# Patient Record
Sex: MALE | Race: WHITE | NOT HISPANIC OR LATINO | Employment: OTHER | URBAN - METROPOLITAN AREA
[De-identification: names, ages, dates, MRNs, and addresses within clinical notes are randomized per-mention and may not be internally consistent; named-entity substitution may affect disease eponyms.]

---

## 2017-01-09 ENCOUNTER — GENERIC CONVERSION - ENCOUNTER (OUTPATIENT)
Dept: OTHER | Facility: OTHER | Age: 72
End: 2017-01-09

## 2017-01-10 ENCOUNTER — GENERIC CONVERSION - ENCOUNTER (OUTPATIENT)
Dept: OTHER | Facility: OTHER | Age: 72
End: 2017-01-10

## 2017-01-10 LAB
INTERPRETATION (HISTORICAL): NORMAL
PDF IMAGE (HISTORICAL): NORMAL

## 2017-01-17 ENCOUNTER — ALLSCRIPTS OFFICE VISIT (OUTPATIENT)
Dept: OTHER | Facility: OTHER | Age: 72
End: 2017-01-17

## 2017-01-23 ENCOUNTER — ALLSCRIPTS OFFICE VISIT (OUTPATIENT)
Dept: OTHER | Facility: OTHER | Age: 72
End: 2017-01-23

## 2017-04-25 ENCOUNTER — GENERIC CONVERSION - ENCOUNTER (OUTPATIENT)
Dept: OTHER | Facility: OTHER | Age: 72
End: 2017-04-25

## 2017-04-25 LAB
A/G RATIO (HISTORICAL): 1.9 (ref 1.2–2.2)
ALBUMIN SERPL BCP-MCNC: 4.2 G/DL (ref 3.5–4.8)
ALP SERPL-CCNC: 81 IU/L (ref 39–117)
ALT SERPL W P-5'-P-CCNC: 17 IU/L (ref 0–44)
AST SERPL W P-5'-P-CCNC: 21 IU/L (ref 0–40)
BILIRUB SERPL-MCNC: 0.4 MG/DL (ref 0–1.2)
BUN SERPL-MCNC: 28 MG/DL (ref 8–27)
BUN/CREA RATIO (HISTORICAL): 16 (ref 10–24)
CALCIUM SERPL-MCNC: 9.4 MG/DL (ref 8.6–10.2)
CHLORIDE SERPL-SCNC: 104 MMOL/L (ref 96–106)
CHOLEST SERPL-MCNC: 155 MG/DL (ref 100–199)
CHOLEST/HDLC SERPL: 2.6 RATIO UNITS (ref 0–5)
CO2 SERPL-SCNC: 22 MMOL/L (ref 18–29)
CREAT SERPL-MCNC: 1.74 MG/DL (ref 0.76–1.27)
EGFR AFRICAN AMERICAN (HISTORICAL): 44 ML/MIN/1.73
EGFR-AMERICAN CALC (HISTORICAL): 38 ML/MIN/1.73
GLUCOSE SERPL-MCNC: 119 MG/DL (ref 65–99)
HBA1C MFR BLD HPLC: 7.4 % (ref 4.8–5.6)
HDLC SERPL-MCNC: 59 MG/DL
LDLC SERPL CALC-MCNC: 81 MG/DL (ref 0–99)
POTASSIUM SERPL-SCNC: 4.2 MMOL/L (ref 3.5–5.2)
SODIUM SERPL-SCNC: 143 MMOL/L (ref 134–144)
TOT. GLOBULIN, SERUM (HISTORICAL): 2.2 G/DL (ref 1.5–4.5)
TOTAL PROTEIN (HISTORICAL): 6.4 G/DL (ref 6–8.5)
TRIGL SERPL-MCNC: 74 MG/DL (ref 0–149)
VLDLC SERPL CALC-MCNC: 15 MG/DL (ref 5–40)

## 2017-04-26 LAB
INTERPRETATION (HISTORICAL): NORMAL
INTERPRETATION (HISTORICAL): NORMAL
PDF IMAGE (HISTORICAL): NORMAL

## 2017-04-28 ENCOUNTER — GENERIC CONVERSION - ENCOUNTER (OUTPATIENT)
Dept: OTHER | Facility: OTHER | Age: 72
End: 2017-04-28

## 2017-05-03 ENCOUNTER — ALLSCRIPTS OFFICE VISIT (OUTPATIENT)
Dept: OTHER | Facility: OTHER | Age: 72
End: 2017-05-03

## 2017-06-01 DIAGNOSIS — N18.30 CHRONIC KIDNEY DISEASE, STAGE III (MODERATE) (HCC): ICD-10-CM

## 2017-06-01 DIAGNOSIS — E55.9 VITAMIN D DEFICIENCY: ICD-10-CM

## 2017-07-25 ENCOUNTER — ALLSCRIPTS OFFICE VISIT (OUTPATIENT)
Dept: OTHER | Facility: OTHER | Age: 72
End: 2017-07-25

## 2017-08-03 ENCOUNTER — GENERIC CONVERSION - ENCOUNTER (OUTPATIENT)
Dept: OTHER | Facility: OTHER | Age: 72
End: 2017-08-03

## 2017-08-03 LAB
A/G RATIO (HISTORICAL): 2.1 (ref 1.2–2.2)
ALBUMIN SERPL BCP-MCNC: 4.2 G/DL (ref 3.5–4.8)
ALP SERPL-CCNC: 78 IU/L (ref 39–117)
ALT SERPL W P-5'-P-CCNC: 13 IU/L (ref 0–44)
AST SERPL W P-5'-P-CCNC: 17 IU/L (ref 0–40)
BASOPHILS # BLD AUTO: 0.1 X10E3/UL (ref 0–0.2)
BASOPHILS # BLD AUTO: 1 %
BILIRUB SERPL-MCNC: 0.3 MG/DL (ref 0–1.2)
BUN SERPL-MCNC: 24 MG/DL (ref 8–27)
BUN/CREA RATIO (HISTORICAL): 15 (ref 10–24)
CALCIUM SERPL-MCNC: 9.1 MG/DL (ref 8.6–10.2)
CHLORIDE SERPL-SCNC: 102 MMOL/L (ref 96–106)
CO2 SERPL-SCNC: 24 MMOL/L (ref 18–29)
CREAT SERPL-MCNC: 1.65 MG/DL (ref 0.76–1.27)
DEPRECATED RDW RBC AUTO: 13.2 % (ref 12.3–15.4)
EGFR AFRICAN AMERICAN (HISTORICAL): 47 ML/MIN/1.73
EGFR-AMERICAN CALC (HISTORICAL): 41 ML/MIN/1.73
EOSINOPHIL # BLD AUTO: 0.1 X10E3/UL (ref 0–0.4)
EOSINOPHIL # BLD AUTO: 2 %
GLUCOSE SERPL-MCNC: 203 MG/DL (ref 65–99)
HBA1C MFR BLD HPLC: 7.3 % (ref 4.8–5.6)
HCT VFR BLD AUTO: 38.4 % (ref 37.5–51)
HGB BLD-MCNC: 13.4 G/DL (ref 12.6–17.7)
IMM.GRANULOCYTES (CD4/8) (HISTORICAL): 0 %
IMM.GRANULOCYTES (CD4/8) (HISTORICAL): 0 X10E3/UL (ref 0–0.1)
LYMPHOCYTES # BLD AUTO: 2.2 X10E3/UL (ref 0.7–3.1)
LYMPHOCYTES # BLD AUTO: 36 %
MCH RBC QN AUTO: 31.5 PG (ref 26.6–33)
MCHC RBC AUTO-ENTMCNC: 34.9 G/DL (ref 31.5–35.7)
MCV RBC AUTO: 90 FL (ref 79–97)
MONOCYTES # BLD AUTO: 0.6 X10E3/UL (ref 0.1–0.9)
MONOCYTES (HISTORICAL): 10 %
NEUTROPHILS # BLD AUTO: 3.1 X10E3/UL (ref 1.4–7)
NEUTROPHILS # BLD AUTO: 51 %
PLATELET # BLD AUTO: 260 X10E3/UL (ref 150–379)
POTASSIUM SERPL-SCNC: 4.3 MMOL/L (ref 3.5–5.2)
RBC (HISTORICAL): 4.25 X10E6/UL (ref 4.14–5.8)
SODIUM SERPL-SCNC: 142 MMOL/L (ref 134–144)
TOT. GLOBULIN, SERUM (HISTORICAL): 2 G/DL (ref 1.5–4.5)
TOTAL PROTEIN (HISTORICAL): 6.2 G/DL (ref 6–8.5)
WBC # BLD AUTO: 6.2 X10E3/UL (ref 3.4–10.8)

## 2017-08-04 ENCOUNTER — GENERIC CONVERSION - ENCOUNTER (OUTPATIENT)
Dept: OTHER | Facility: OTHER | Age: 72
End: 2017-08-04

## 2017-08-04 LAB
INTERPRETATION (HISTORICAL): NORMAL
PDF IMAGE (HISTORICAL): NORMAL
TSH SERPL DL<=0.05 MIU/L-ACNC: 2.95 UIU/ML (ref 0.45–4.5)

## 2017-08-14 ENCOUNTER — ALLSCRIPTS OFFICE VISIT (OUTPATIENT)
Dept: OTHER | Facility: OTHER | Age: 72
End: 2017-08-14

## 2017-09-06 ENCOUNTER — GENERIC CONVERSION - ENCOUNTER (OUTPATIENT)
Dept: OTHER | Facility: OTHER | Age: 72
End: 2017-09-06

## 2017-09-12 ENCOUNTER — GENERIC CONVERSION - ENCOUNTER (OUTPATIENT)
Dept: OTHER | Facility: OTHER | Age: 72
End: 2017-09-12

## 2017-09-19 ENCOUNTER — GENERIC CONVERSION - ENCOUNTER (OUTPATIENT)
Dept: OTHER | Facility: OTHER | Age: 72
End: 2017-09-19

## 2017-12-12 ENCOUNTER — GENERIC CONVERSION - ENCOUNTER (OUTPATIENT)
Dept: OTHER | Facility: OTHER | Age: 72
End: 2017-12-12

## 2017-12-12 LAB
A/G RATIO (HISTORICAL): 1.9 (ref 1.2–2.2)
ALBUMIN SERPL BCP-MCNC: 4.1 G/DL (ref 3.5–4.8)
ALP SERPL-CCNC: 76 IU/L (ref 39–117)
ALT SERPL W P-5'-P-CCNC: 16 IU/L (ref 0–44)
AST SERPL W P-5'-P-CCNC: 20 IU/L (ref 0–40)
BASOPHILS # BLD AUTO: 0.1 X10E3/UL (ref 0–0.2)
BASOPHILS # BLD AUTO: 1 %
BILIRUB SERPL-MCNC: 0.4 MG/DL (ref 0–1.2)
BUN SERPL-MCNC: 28 MG/DL (ref 8–27)
BUN/CREA RATIO (HISTORICAL): 18 (ref 10–24)
CALCIUM SERPL-MCNC: 9.4 MG/DL (ref 8.6–10.2)
CHLORIDE SERPL-SCNC: 104 MMOL/L (ref 96–106)
CHOLEST SERPL-MCNC: 153 MG/DL (ref 100–199)
CHOLEST/HDLC SERPL: 2.7 RATIO UNITS (ref 0–5)
CO2 SERPL-SCNC: 22 MMOL/L (ref 18–29)
CREAT SERPL-MCNC: 1.6 MG/DL (ref 0.76–1.27)
DEPRECATED RDW RBC AUTO: 12.6 % (ref 12.3–15.4)
EGFR AFRICAN AMERICAN (HISTORICAL): 49 ML/MIN/1.73
EGFR-AMERICAN CALC (HISTORICAL): 42 ML/MIN/1.73
EOSINOPHIL # BLD AUTO: 0.2 X10E3/UL (ref 0–0.4)
EOSINOPHIL # BLD AUTO: 3 %
GLUCOSE SERPL-MCNC: 132 MG/DL (ref 65–99)
HBA1C MFR BLD HPLC: 7.4 % (ref 4.8–5.6)
HCT VFR BLD AUTO: 40.1 % (ref 37.5–51)
HDLC SERPL-MCNC: 57 MG/DL
HGB BLD-MCNC: 13.9 G/DL (ref 13–17.7)
IMM.GRANULOCYTES (CD4/8) (HISTORICAL): 0 %
IMM.GRANULOCYTES (CD4/8) (HISTORICAL): 0 X10E3/UL (ref 0–0.1)
LDLC SERPL CALC-MCNC: 78 MG/DL (ref 0–99)
LYMPHOCYTES # BLD AUTO: 2.3 X10E3/UL (ref 0.7–3.1)
LYMPHOCYTES # BLD AUTO: 41 %
MCH RBC QN AUTO: 31.6 PG (ref 26.6–33)
MCHC RBC AUTO-ENTMCNC: 34.7 G/DL (ref 31.5–35.7)
MCV RBC AUTO: 91 FL (ref 79–97)
MONOCYTES # BLD AUTO: 0.7 X10E3/UL (ref 0.1–0.9)
MONOCYTES (HISTORICAL): 12 %
NEUTROPHILS # BLD AUTO: 2.4 X10E3/UL (ref 1.4–7)
NEUTROPHILS # BLD AUTO: 43 %
PLATELET # BLD AUTO: 285 X10E3/UL (ref 150–379)
POTASSIUM SERPL-SCNC: 4.4 MMOL/L (ref 3.5–5.2)
RBC (HISTORICAL): 4.4 X10E6/UL (ref 4.14–5.8)
SODIUM SERPL-SCNC: 143 MMOL/L (ref 134–144)
TOT. GLOBULIN, SERUM (HISTORICAL): 2.2 G/DL (ref 1.5–4.5)
TOTAL PROTEIN (HISTORICAL): 6.3 G/DL (ref 6–8.5)
TRIGL SERPL-MCNC: 92 MG/DL (ref 0–149)
VLDLC SERPL CALC-MCNC: 18 MG/DL (ref 5–40)
WBC # BLD AUTO: 5.6 X10E3/UL (ref 3.4–10.8)

## 2017-12-13 ENCOUNTER — GENERIC CONVERSION - ENCOUNTER (OUTPATIENT)
Dept: OTHER | Facility: OTHER | Age: 72
End: 2017-12-13

## 2017-12-13 LAB
INTERPRETATION (HISTORICAL): NORMAL
INTERPRETATION (HISTORICAL): NORMAL
PDF IMAGE (HISTORICAL): NORMAL
TSH SERPL DL<=0.05 MIU/L-ACNC: 3.63 UIU/ML (ref 0.45–4.5)

## 2017-12-19 ENCOUNTER — ALLSCRIPTS OFFICE VISIT (OUTPATIENT)
Dept: OTHER | Facility: OTHER | Age: 72
End: 2017-12-19

## 2017-12-20 NOTE — PROGRESS NOTES
Assessment  1  Former smoker (V15 82) (I06 628)   · quit 1976; 4 ppd habit   2  Chronic kidney disease, stage 3 (585 3) (N18 3)   3  Diabetes mellitus due to underlying condition with diabetic autonomic neuropathy (249 60,337 1) (E08 43)   4  HTN (hypertension) (401 9) (I10)   5  Hypothyroidism (244 9) (E03 9)   6  Hammond's esophagus (530 85) (K22 70)   7  Coronary artery disease (414 00) (I25 10)   8  On angiotensin-converting enzyme (ACE) inhibitors (V07 52) (Z79 899)    Plan  Hammond's esophagus, Chronic kidney disease, stage 3, Coronary artery disease,Diabetes mellitus due to underlying condition with diabetic autonomic neuropathy, HTN(hypertension), Hypothyroidism, On angiotensin-converting enzyme (ACE) inhibitors    · (1) CBC/PLT/DIFF; Status:Active; Requested HHD:50HRV5052;    · (1) COMPREHENSIVE METABOLIC PANEL; Status:Active; Requested for:19Apr2018;    · (1) HEMOGLOBIN A1C; Status:Active; Requested for:19Apr2018;    · (1) MICROALBUMIN CREATININE RATIO, RANDOM URINE; Status:Active; Requestedfor:19Apr2018;    · Follow-up visit in 4 Months Evaluation and Treatment  Follow-up  Status: Hold For -Scheduling  Requested for: 30XFI2016  Diabetes mellitus due to underlying condition with diabetic autonomic neuropathy    · From  HumaLOG KwikPen 100 UNIT/ML Subcutaneous Solution Pen-injectorInject subcutaneously up to 8 units 3 times daily To HumaLOGKwikPen 100 UNIT/ML Subcutaneous Solution Pen-injector Inject subcutaneously up to9 units 3 times daily   · From  Levemir FlexTouch 100 UNIT/ML Subcutaneous Solution Pen-injectorInject subcutaneously 40  units at bedtime To Levemir FlexTouch 100UNIT/ML Subcutaneous Solution Pen-injector Inject subcutaneously 42  units at bedtime  HTN (hypertension)    · Lisinopril 5 MG Oral Tablet; 1 every day   · Furosemide 40 MG Oral Tablet; Take 1 tablet by mouth  daily   · Metoprolol Succinate ER 25 MG Oral Tablet Extended Release 24 Hour;  Take 1tablet daily  Hypothyroidism    · Levothyroxine Sodium 50 MCG Oral Tablet; Take 1 tablet by mouth  daily  On angiotensin-converting enzyme (ACE) inhibitors    · CoQ-10 100 MG Oral Capsule; TAKE AS DIRECTED  Unlinked    · Aspir-81 81 MG Oral Tablet Delayed Release; 1 every day    Discussion/Summary    Diabetic meds adjusted  bp is low will decrease the acestatin is acceptable  Chief Complaint  f/u lab work discuss lipid panel and blood pressure check rmklpn      History of Present Illness  Pt is here for a 6 month follow uppt denies new issues todayno chest pain no sobpt denies polyuria no polydipsiano chnages in vision      Review of Systems   Constitutional: No fever or chills, feels well, no tiredness, no recent weight gain or weight loss  Eyes: No complaints of eye pain, no red eyes, no discharge from eyes, no itchy eyes  ENT: no complaints of earache, no hearing loss, no nosebleeds, no nasal discharge, no sore throat, no hoarseness  Cardiovascular: No complaints of slow heart rate, no fast heart rate, no chest pain, no palpitations, no leg claudication, no lower extremity  Respiratory: No complaints of shortness of breath, no wheezing, no cough, no SOB on exertion, no orthopnea or PND  Gastrointestinal: No complaints of abdominal pain, no constipation, no nausea or vomiting, no diarrhea or bloody stools  Genitourinary: No complaints of dysuria, no incontinence, no hesitancy, no nocturia, no genital lesion, no testicular pain  Musculoskeletal: No complaints of arthralgia, no myalgias, no joint swelling or stiffness, no limb pain or swelling  Integumentary: No complaints of skin rash or skin lesions, no itching, no skin wound, no dry skin  Neurological: No compliants of headache, no confusion, no convulsions, no numbness or tingling, no dizziness or fainting, no limb weakness, no difficulty walking  Psychiatric: Is not suicidal, no sleep disturbances, no anxiety or depression, no change in personality, no emotional problems  Active Problems  1  Abnormal echocardiogram (793 2) (R93 1)   2  Hammond's esophagus (530 85) (K22 70)   3  BMI 29 0-29 9,adult (V85 25) (Z68 29)   4  Chronic constipation (564 00) (K59 09)   5  Chronic kidney disease, stage 3 (585 3) (N18 3)   6  Colon, diverticulosis (562 10) (K57 30)   7  Cor pulmonale (416 9) (I27 81)   8  Coronary artery disease (414 00) (I25 10)   9  Diabetes mellitus due to underlying condition with diabetic autonomic neuropathy (249 60,337 1) (E08 43)   10  Disc degeneration, lumbar (722 52) (M51 36)   11  Encounter for screening for malignant neoplasm of colon (V76 51) (Z12 11)   12  Enlarged prostate (600 00) (N40 0)   13  Flu vaccine need (V04 81) (Z23)   14  Former smoker (V15 82) (K36 577)   15  H/O heart artery stent (V45 82) (Z95 5)   16  Hiatal hernia (553 3) (K44 9)   17  History of myocardial infarction (412) (I25 2)   18  HTN (hypertension) (401 9) (I10)   19  Hypothyroidism (244 9) (E03 9)   20  Lumbar radiculopathy (724 4) (M54 16)   21  Medicare annual wellness visit, initial (V70 0) (Z00 00)   22  Mixed hyperlipidemia (272 2) (E78 2)   23  Need for pneumococcal vaccination (V03 82) (Z23)   24  Need for shingles vaccine (V04 89) (Z23)   25  Need for tetanus booster (V03 7) (Z23)   26  Need for vaccination (V05 9) (Z23)   27  On angiotensin-converting enzyme (ACE) inhibitors (V07 52) (Z79 899)   28  Pes planus, congenital (754 61) (Q66 50)   29  Renal insufficiency (593 9) (N28 9)   30  Screening for AAA (abdominal aortic aneurysm) (V81 2) (Z13 6)   31  Screening for genitourinary condition (V81 6) (Z13 89)   32  Vitamin D deficiency (268 9) (E55 9)    Past Medical History  1  History of Abnormal taste in mouth (781 1) (R43 2)   2  History of Acquired ankle/foot deformity (736 70) (M21 969)   3  History of Acute upper respiratory infection (465 9) (J06 9)   4  History of Advance care planning (V65 49) (Z71 89)   5  History of Aortic narrowing (747 10) (Q25 1)   6   History of Bilateral leg edema (782 3) (R60 0)   7  History of Bruise (924 9) (T14 8XXA)   8  History of Bulla, lung (492 0) (J43 9)   9  History of Bunion (727 1) (M21 619)   10  History of Callus (700) (L84)   11  History of Difficulty walking (719 7) (R26 2)   12  History of Encounter for screening for malignant neoplasm of prostate (V76 44) (Z12 5)   13  History of Encounter for screening for osteoporosis (V82 81) (Z13 820)   14  Hiatal hernia (553 3) (K44 9)   15  History of arthritis (V13 4) (Z87 39)   16  History of diabetes mellitus (V12 29) (Z86 39)   17  History of diverticulitis of colon (V12 79) (Z87 19)   18  History of high cholesterol (V12 29) (Z86 39)   19  History of hypertension (V12 59) (Z86 79)   20  History of kidney problems (V13 09) (Z87 448)   21  History of thyroid disease (V12 29) (Z86 39)   22  History of transient cerebral ischemia (V12 54) (Z86 73)   23  History of urinary tract infection (V13 02) (Z87 440)   24  Old myocardial infarction (412) (I25 2)   25  History of Screening for genitourinary condition (V81 6) (Z13 89)    The active problems and past medical history were reviewed and updated today  Surgical History  1  History of Cath Stent Placement   2  History of Colonoscopy (Fiberoptic) Screening   3  History of Complete Colonoscopy   4  History of Knee Surgery   5  History of Umbilical Hernia Repair   6  History of Upper Gastrointestinal Endoscopy (Therapeutic)    The surgical history was reviewed and updated today  Family History  Mother    1  Family history of Cancer, colon   2  Family history of arthritis (V17 7) (Z82 61)   3  Family history of diabetes mellitus (V18 0) (Z83 3)   4  Family history of hypertension (V17 49) (Z82 49)   5  Family history of malignant neoplasm of breast (V16 3) (Z80 3)   6  Family history of High cholesterol  Sibling    7  Family history of diabetes mellitus (V18 0) (Z83 3)   8  Family history of hypertension (V17 49) (Z82 49)  Sister    5   Family history of malignant neoplasm (V16 9) (Z80 9)  Brother    10  Family history of cerebrovascular accident (CVA) (V17 1) (Z82 3)    The family history was reviewed and updated today  Social History   · Former smoker (V15 82) (O66 326)   · Lack of exercise (V69 0) (Z72 3)   ·    · No alcohol use   · Sleeps 6 -7 hours a day  The social history was reviewed and updated today  Current Meds   1  Aspir-81 81 MG Oral Tablet Delayed Release; 1 every day; Therapy: 41WXB5799 to Recorded   2  Atorvastatin Calcium 40 MG Oral Tablet; Take 1 tablet by mouth  every day; Therapy: 94Wop5360 to (Evaluate:02Jan2018)  Requested for: 85KYK0407; Last Rx:04Oct2017 Ordered   3  BD Pen Needle Sherie U/F 32G X 4 MM Miscellaneous; Use 4 times daily to inject insulin as directed; Therapy: 41UIZ7886 to (Evaluate:09Sep2017)  Requested for: 62Mve6468; Last Rx:16Tcu4222 Ordered   4  Furosemide 40 MG Oral Tablet; Take 1 tablet by mouth  daily; Therapy: 20DPR5499 to (Evaluate:04Feb2018)  Requested for: 26GLN4771; Last Rx:06Nov2017 Ordered   5  HumaLOG KwikPen 100 UNIT/ML Subcutaneous Solution Pen-injector; Inject subcutaneously up to 8 units 3 times daily; Therapy: 87NHF2518 to (Evaluate:08Feb2018)  Requested for: 89VZA6848; Last Rx:07Rdl3183 Ordered   6  Levemir FlexTouch 100 UNIT/ML Subcutaneous Solution Pen-injector; Inject subcutaneously 40  units at bedtime; Therapy: 66EBG0880 to (Evaluate:08Jan2018)  Requested for: 52QBA2062; Last Rx:37Xqy2477 Ordered   7  Levothyroxine Sodium 50 MCG Oral Tablet; Take 1 tablet by mouth  daily; Therapy: 90Jco6794 to [phone])  Requested for: 04Oct2017; Last Rx:04Oct2017 Ordered   8  Lisinopril 10 MG Oral Tablet; Take 1 tablet by mouth  daily; Therapy: 32Thc6817 to (Evaluate:04Feb2018)  Requested for: 38UOW4468; Last Rx:06Nov2017 Ordered   9  Metoprolol Succinate ER 25 MG Oral Tablet Extended Release 24 Hour; Take 1 tablet daily;  Therapy: 05RSP4728 to (Evaluate:21Apr2018)  Requested for: 22YXZ5952; Last Rx:98Lch9938 Ordered   10  Multi For Him 50+ Oral Tablet; TAKE 1 TABLET DAILY; Therapy: (Lemueljagdeep Johanny) to Recorded   11  Pantoprazole Sodium 40 MG Oral Tablet Delayed Release; Take 1 tablet by mouth  daily; Therapy: 50EGE6129 to (Anel Hunt)  Requested for: 44DAH4956; Last  Rx:53Svb5648 Ordered    The medication list was reviewed and updated today  Allergies  1  No Known Drug Allergies    Vitals  Vital Signs    Recorded: 81MCE8779 01:24PM   Temperature 97 2 F   Heart Rate 76   Respiration 18   Systolic 304   Diastolic 60   Height 5 ft 8 5 in   Weight 202 lb    BMI Calculated 30 27   BSA Calculated 2 06     Physical Exam   Constitutional  General appearance: No acute distress, well appearing and well nourished  Eyes  Conjunctiva and lids: No swelling, erythema, or discharge  Pupils and irises: Equal, round and reactive to light  Ears, Nose, Mouth, and Throat  External inspection of ears and nose: Normal    Otoscopic examination: Tympanic membrance translucent with normal light reflex  Canals patent without erythema  Oropharynx: Normal with no erythema, edema, exudate or lesions  Pulmonary  Auscultation of lungs: Clear to auscultation, equal breath sounds bilaterally, no wheezes, no rales, no rhonci  Cardiovascular  Auscultation of heart: Normal rate and rhythm, normal S1 and S2, without murmurs  Abdomen  Abdomen: Non-tender, no masses  Lymphatic  Palpation of lymph nodes in neck: No lymphadenopathy  Musculoskeletal  Gait and station: Normal    Digits and nails: Normal without clubbing or cyanosis  Skin  Skin and subcutaneous tissue: Normal without rashes or lesions  Neurologic  Cranial nerves: Cranial nerves 2-12 intact           Results/Data  Falls Risk Assessment (Dx Z13 89 Screen for Neurologic Disorder) 51KSM5217 01:22PM User, [facility]     Test Name Result Flag Reference   Falls Risk      No falls in the past year     PHQ-2 Adult Depression Screening 38QPC3485 01: 22PM User, [place]     Test Name Result Flag Reference   PHQ-2 Adult Depression Score 0     Over the last two weeks, how often have you been bothered by any of the following problems? Little interest or pleasure in doing things: Not at all - 0 Feeling down, depressed, or hopeless: Not at all - 0   PHQ-2 Adult Depression Screening Negative       Falls Risk Assessment (Dx Z13 89 Screen for Neurologic Disorder) 09TSN4746 01:22PM User, [employer]     Test Name Result Flag Reference   Falls Risk      No falls in the past year     PHQ-2 Adult Depression Screening 09CRI6132 01:22PM User, [employer]     Test Name Result Flag Reference   PHQ-2 Adult Depression Score 0     Over the last two weeks, how often have you been bothered by any of the following problems? Little interest or pleasure in doing things: Not at all - 0 Feeling down, depressed, or hopeless: Not at all - 0   PHQ-2 Adult Depression Screening Negative       (1) CBC/PLT/DIFF 17Lmb7662 09:04AM Bina Yasmani     Test Name Result Flag Reference   WBC 5 6 x10E3/uL  3 4-10 8   RBC 4 40 x10E6/uL  4 14-5 80   Hemoglobin 13 9 g/dL  13 0-17 7   **Please note reference interval change**   Hematocrit 40 1 %  37 5-51 0   MCV 91 fL  79-97   MCH 31 6 pg  26 6-33 0   MCHC 34 7 g/dL  31 5-35 7   RDW 12 6 %  12 3-15 4   Platelets 018 B00Z8/AM  150-379   Neutrophils 43 %  Not Estab  Lymphs 41 %  Not Estab  Monocytes 12 %  Not Estab  Eos 3 %  Not Estab  Basos 1 %  Not Estab  Neutrophils (Absolute) 2 4 x10E3/uL  1 4-7 0   Lymphs (Absolute) 2 3 x10E3/uL  0 7-3 1   Monocytes(Absolute) 0 7 x10E3/uL  0 1-0 9   Eos (Absolute) 0 2 x10E3/uL  0 0-0 4   Baso (Absolute) 0 1 x10E3/uL  0 0-0 2   Immature Granulocytes 0 %  Not Estab     Immature Grans (Abs) 0 0 x10E3/uL  0 0-0 1     (1) COMPREHENSIVE METABOLIC PANEL 85ZSW5346 38:85RS Bina Yasmani     Test Name Result Flag Reference   Glucose, Serum 132 mg/dL H 65-99   BUN 28 mg/dL H 8-27   Creatinine, Serum 1 60 mg/dL H 0 76-1 27 BUN/Creatinine Ratio 18  10-24   Sodium, Serum 143 mmol/L  134-144   Potassium, Serum 4 4 mmol/L  3 5-5 2   Chloride, Serum 104 mmol/L     Carbon Dioxide, Total 22 mmol/L  18-29   Calcium, Serum 9 4 mg/dL  8 6-10 2   Protein, Total, Serum 6 3 g/dL  6 0-8 5   Albumin, Serum 4 1 g/dL  3 5-4 8   Globulin, Total 2 2 g/dL  1 5-4 5   A/G Ratio 1 9  1 2-2 2   Bilirubin, Total 0 4 mg/dL  0 0-1 2   Alkaline Phosphatase, S 76 IU/L     AST (SGOT) 20 IU/L  0-40   ALT (SGPT) 16 IU/L  0-44   eGFR If NonAfricn Am 42 mL/min/1 73 L >59   eGFR If Africn Am 49 mL/min/1 73 L >59     (1) LIPID PANEL, FASTING 12Dec2017 09:04AM Se 911 [facility]     Test Name Result Flag Reference   Cholesterol, Total 153 mg/dL  100-199   Triglycerides 92 mg/dL  0-149   HDL Cholesterol 57 mg/dL  >39   VLDL Cholesterol Keyur 18 mg/dL  5-40   LDL Cholesterol Calc 78 mg/dL  0-99   T  Chol/HDL Ratio 2 7 ratio units  0 0-5 0   T  Chol/HDL Ratio                                                            Men  Women                                              1/2 Avg  Risk  3 4    3 3                                                  Avg Risk  5 0    4 4                                               2X Avg  Risk  9 6    7 1                                               3X Avg  Risk 23 4   11 0     (1) TSH 95Cqa3654 09:04AM Se [facility]     Test Name Result Flag Reference   TSH 3 630 uIU/mL  0 450-4 500     (1) HEMOGLOBIN A1C 77Bol6371 09:04AM Se [facility]     Test Name Result Flag Reference   Hemoglobin A1c 7 4 % H 4 8-5 6   Pre-diabetes: 5 7 - 6 4          Diabetes: >6 4          Glycemic control for adults with diabetes: <7 0     Health Management  Controlled type 2 diabetes mellitus with insulin therapy   *VB - Eye Exam; every 1 year; Last 12Sep2017; Next Due: 25Yzv4810; Active  *VB - Foot Exam; every 1 year; Last 23EKZ0100; Next Due: 24ANK0678;  Active    Signatures   Electronically signed by : Hyun Hernandez DO; Dec 19 2017  1:58PM EST (Author)

## 2018-01-10 NOTE — RESULT NOTES
Verified Results  (1) COMPREHENSIVE METABOLIC PANEL 10QTQ0954 86:97XU [facility]     Test Name Result Flag Reference   Glucose, Serum 190 mg/dL H 65-99   BUN 35 mg/dL H 8-27   Creatinine, Serum 1 74 mg/dL H 0 76-1 27   eGFR If NonAfricn Am 38 mL/min/1 73 L >59   eGFR If Africn Am 44 mL/min/1 73 L >59   BUN/Creatinine Ratio 20  10-22   Sodium, Serum 143 mmol/L  134-144   Potassium, Serum 4 4 mmol/L  3 5-5 2   Chloride, Serum 104 mmol/L     Carbon Dioxide, Total 23 mmol/L  18-29   Calcium, Serum 9 6 mg/dL  8 6-10 2   Protein, Total, Serum 6 0 g/dL  6 0-8 5   Albumin, Serum 4 5 g/dL  3 5-4 8   Globulin, Total 1 5 g/dL  1 5-4 5   A/G Ratio 3 0 H 1 1-2 5   Bilirubin, Total 0 3 mg/dL  0 0-1 2   Alkaline Phosphatase, S 104 IU/L     AST (SGOT) 23 IU/L  0-40   ALT (SGPT) 23 IU/L  0-44     (1) HEMOGLOBIN A1C 92GRY6126 09:56AM [facility]     Test Name Result Flag Reference   Hemoglobin A1c 7 9 % H 4 8-5 6   Pre-diabetes: 5 7 - 6 4           Diabetes: >6 4           Glycemic control for adults with diabetes: <7 0     (1) MICROALBUMIN CREATININE RATIO, RANDOM URINE 47FRT1335 09:56AM [facility]     Test Name Result Flag Reference   Creatinine, Urine 83 7 mg/dL  Not Estab  Microalbumin, Urine 3 2 ug/mL  Not Estab  Microalb/Creat Ratio 3 8 mg/g creat  0 0-30 0     () [facility] CKD Program 01ZZJ1436 09:56AM [facility]     Test Name Result Flag Reference   Interpretation Note     -------------------------------  CHRONIC KIDNEY DISEASE:  EGFR, BLOOD PRESSURE, AND PROTEINURIA ASSESSMENT  The regression of eGFR with time is not statistically  significant  Current eGFR is 38 mL/min/1 73mE2 corresponding  to CKD stage 3b  Multiply eGFR by 1 159 if patient is    Potassium is within goal and has not  changed significantly, was 4 3 and now is 4 4 mmol/L  Albumin:Creatinine ratio is not elevated , 3 8 mg/g creat    Glycemic control (HB A1c: 7 9 %) is above goal but may be  appropriate if patient is at risk for hypoglycemia  EGFR, BLOOD PRESSURE, AND PROTEINURIA TREATMENT SUGGESTIONS  -  Based upon current eGFR, patient is at high risk for adverse  outcomes such as CKD progression, CVD, and mortality  Guidelines recommend a target blood pressure of 140/90 mmHg  or less in CKD patients to reduce cardiovascular risk and  CKD progression  A higher blood pressure target may be  appropriate in older individuals to avoid symptomatic  hypotension  EGFR, BLOOD PRESSURE, AND PROTEINURIA FOLLOW-UP  -  Spot Urine Panel (Albumin preferred) within 12 months;  fasting Renal Panel within 6 months; Hemoglobin A1C within 3  months;  -  BONE and MINERAL ASSESSMENT  Calcium is within goal and has risen, was 9 2 and now is 9 6  mg/dL  Carbon Dioxide is within goal and has not changed  significantly, was 23 and now is 23 mmol/L  KDOQI guidelines  recommend the measurement of 25-hydroxy vitamin D in  patients with CKD  BONE and MINERAL TREATMENT SUGGESTIONS  -  Interpretations require simultaneous measurements of serum  calcium and phosphorus  BONE and MINERAL FOLLOW-UP  -  fasting PTH with Renal Panel and 25-Hydroxy Vitamin D are  recommended by KDOQI guidelines, at least yearly;  -  LIPIDS ASSESSMENT  Most recent order does not include a fasting Lipid Panel  LIPIDS FOLLOW-UP  -  fasting Lipid Panel within 9 months;  -  ANEMIA ASSESSMENT  Most recent order does not include a CBC Panel or iron  studies  ANEMIA FOLLOW-UP  -  CBC within 9 months;  -------------------------------  DISCLAIMER  These assessments and treatment suggestions are provided as  a convenience in support of the physician-patient  relationship and are not intended to replace the physician's  clinical judgment  They are derived from the national  guidelines in addition to other evidence and expert opinion  The clinician should consider this information within the  context of clinical opinion and the individual patient    SEE GUIDANCE FOR CHRONIC KIDNEY DISEASE PROGRAM: [employer] Kidney Disease Outcomes Quality Initiative  (KDOQI (TM)), with its limitations and disclaimers, are at  www kidney  org/professionals/KDOQI  Kidney Disease Improving  Global Outcomes (KDIGO) clinical practice guidelines are at  http://kdigo  org/home/guidelines/  The members of  Edward Wilburn national advisory panel are listed at  www  [employer] com  This program is intended for patients who  have been diagnosed with stages 3, 4, or pre-dialysis 5 CKD  It is not intended for children, pregnant patients, or  transplant patients  PDF Image            Discussion/Summary   Will discuss labs at follow up appt

## 2018-01-10 NOTE — MISCELLANEOUS
Message  talked with pt and reminded him to make appt with urologist  (he refuses to go to [place] so we gave him the name of Dr Krupa Cox) Faxed records to Dr Krupa Cox as well/lr      Active Problems    1  Abnormal echocardiogram (793 2) (R93 1)   2  Acquired ankle/foot deformity (736 70) (M21 969)   3  Hammond's esophagus (530 85) (K22 70)   4  Bilateral leg edema (782 3) (R60 0)   5  Bruise (924 9) (T14 8)   6  Bunion (727 1) (M20 10)   7  Callus (700) (L84)   8  Chronic renal failure, stage 3 (moderate) (585 3) (N18 3)   9  Colon, diverticulosis (562 10) (K57 30)   10  Controlled type 2 diabetes mellitus with insulin therapy (250 00,V58 67) (E11 9,Z79 4)   11  Cor pulmonale (416 9) (I27 81)   12  Coronary artery disease (414 00) (I25 10)   13  Diabetes mellitus due to underlying condition with diabetic autonomic neuropathy    (249 60,337 1) (E08 43)   14  Difficulty walking (719 7) (R26 2)   15  Disc degeneration, lumbar (722 52) (M51 36)   16  Encounter for screening for malignant neoplasm of colon (V76 51) (Z12 11)   17  Encounter for screening for malignant neoplasm of prostate (V76 44) (Z12 5)   18  Enlarged prostate (600 00) (N40 0)   19  Esophagitis, reflux (530 11) (K21 0)   20  Flu vaccine need (V04 81) (Z23)   21  H/O heart artery stent (V45 82) (Z95 5)   22  Hiatal hernia (553 3) (K44 9)   23  History of myocardial infarction (412) (I25 2)   24  HTN (hypertension) (401 9) (I10)   25  Hypothyroidism (244 9) (E03 9)   26  Lumbar radiculopathy (724 4) (M54 16)   27  Mixed hyperlipidemia (272 2) (E78 2)   28  Need for pneumococcal vaccination (V03 82) (Z23)   29  Need for shingles vaccine (V04 89) (Z23)   30  Need for tetanus booster (V03 7) (Z23)   31  Need for vaccination (V05 9) (Z23)   32  Pes planus, congenital (754 61) (Q66 50)   33  Renal insufficiency (593 9) (N28 9)   34  Screening for genitourinary condition (V81 6) (Z13 89)   35  Vitamin D deficiency (268 9) (E55 9)    Current Meds   1   Aspir-81 81 MG Oral Tablet Delayed Release; 1 every day; Therapy: 01SXD7654 to Recorded   2  BD Pen Needle Sherie U/F 32G X 4 MM Miscellaneous; use 4 times daily to inject insulin   as directed Dx: E8 43; Therapy: 81DSP4552 to (Evaluate:36Qfa0048)  Requested for: 31FSG8899; Last   Rx:11Jan2016 Ordered   3  Crestor 20 MG Oral Tablet (Rosuvastatin Calcium); 1 every other day; Therapy: 65BTA7108 to  Requested for: 97Mox2612 Recorded   4  Furosemide 40 MG Oral Tablet; TAKE 1 TABLET DAILY; Therapy: 99UJC4906 to (Evaluate:60Tla0965)  Requested for: 90VCT6108; Last   Rx:69Sjv6424 Ordered   5  HumaLOG 100 UNIT/ML Subcutaneous Solution; 8 units 3 times a day; Therapy: 58LNS2096 to (Evaluate:25Jun2016); Last Rx:18Fuw1926 Ordered   6  Levemir FlexTouch 100 UNIT/ML Subcutaneous Solution Pen-injector; 30 UNITS AT   NIGHT  Requested for: 37QXW8797; Last RI:65AOO2041 Ordered   7  Levemir FlexTouch 100 UNIT/ML Subcutaneous Solution Pen-injector; INJECT   SUBCUTANEOUSLY AS DIRECTED; Therapy: (Lauren Shelter) to Recorded   8  Levothyroxine Sodium 50 MCG Oral Tablet; Take 1 tablet daily; Therapy: 91SWQ5877 to ([facility])  Requested for: 76GCK7635; Last   VJ:61ZZE2359 Ordered   9  Lisinopril 10 MG Oral Tablet; TAKE 1 TABLET DAILY; Therapy: 15YYU5357 to (Evaluate:81Wnf4456) Recorded   10  Metoprolol Tartrate 50 MG Oral Tablet; qd Recorded   11  Multi For Him 50+ Oral Tablet; TAKE 1 TABLET DAILY; Therapy: (Lauren Shelter) to Recorded   12  Pantoprazole Sodium 40 MG Oral Tablet Delayed Release; Take 1 tablet by mouth     daily; Therapy: 80LHS3912 to (Evaluate:46Gid5977)  Requested for: 20Apr2016; Last    Rx:98Jlp4686 Ordered    Allergies    1   No Known Drug Allergies    Signatures   Electronically signed by : Alex Arevalo, ; Jul 8 2016 11:48AM EST                       (Author)

## 2018-01-11 ENCOUNTER — ALLSCRIPTS OFFICE VISIT (OUTPATIENT)
Dept: OTHER | Facility: OTHER | Age: 73
End: 2018-01-11

## 2018-01-11 NOTE — RESULT NOTES
Discussion/Summary   will discuss labs at follow up appt     Verified Results  (1) COMPREHENSIVE METABOLIC PANEL [id number] 27:56EY [facility]     Test Name Result Flag Reference   Glucose, Serum 119 mg/dL H 65-99   BUN 28 mg/dL H 8-27   Creatinine, Serum 1 74 mg/dL H 0 76-1 27   BUN/Creatinine Ratio 16  10-24   Sodium, Serum 143 mmol/L  134-144   Potassium, Serum 4 2 mmol/L  3 5-5 2   Chloride, Serum 104 mmol/L     Carbon Dioxide, Total 22 mmol/L  18-29   Calcium, Serum 9 4 mg/dL  8 6-10 2   Protein, Total, Serum 6 4 g/dL  6 0-8 5   Albumin, Serum 4 2 g/dL  3 5-4 8   Globulin, Total 2 2 g/dL  1 5-4 5   A/G Ratio 1 9  1 2-2 2   Bilirubin, Total 0 4 mg/dL  0 0-1 2   Alkaline Phosphatase, S 81 IU/L     AST (SGOT) 21 IU/L  0-40   ALT (SGPT) 17 IU/L  0-44   eGFR If NonAfricn Am 38 mL/min/1 73 L >59   eGFR If Africn Am 44 mL/min/1 73 L >59     (1) LIPID PANEL, FASTING 25Apr2017 08:14AM [facility]     Test Name Result Flag Reference   Cholesterol, Total 155 mg/dL  100-199   Triglycerides 74 mg/dL  0-149   HDL Cholesterol 59 mg/dL  >39   VLDL Cholesterol Keyur 15 mg/dL  5-40   LDL Cholesterol Calc 81 mg/dL  0-99   T  Chol/HDL Ratio 2 6 ratio units  0 0-5 0   T  Chol/HDL Ratio                                                             Men  Women                                               1/2 Avg  Risk  3 4    3 3                                                   Avg Risk  5 0    4 4                                                2X Avg  Risk  9 6    7 1                                                3X Avg  Risk 23 4   11 0     (1) HEMOGLOBIN A1C 25Apr2017 08:14AM [facility]     Test Name Result Flag Reference   Hemoglobin A1c 7 4 % H 4 8-5 6   Pre-diabetes: 5 7 - 6 4           Diabetes: >6 4           Glycemic control for adults with diabetes: <7 0     [facility]) Cardiovascular Risk Assessment 25Apr2017 08:14AM [facility]     Test Name Result Flag Reference   Interpretation NTAP     PDF Image Not applicable       [facility]) [facility] CKD Program [id number] 08:14AM [facility]     Test Name Result Flag Reference   Interpretation Note     -------------------------------  CHRONIC KIDNEY DISEASE:  EGFR, BLOOD PRESSURE, AND PROTEINURIA ASSESSMENT  The regression of eGFR with time is not statistically  significant  Current eGFR is 38 mL/min/1 73mE2 corresponding  to CKD stage 3b  Multiply eGFR by 1 159 if patient is    Potassium is within goal and has not  changed significantly, was 4 4 and now is 4 2 mmol/L  Glycemic control (HB A1c: 7 4 %) is above goal but may be  appropriate if patient is at risk for hypoglycemia  Previous  urine protein measurement was normal   EGFR, BLOOD PRESSURE, AND PROTEINURIA TREATMENT SUGGESTIONS  -  Based upon current eGFR, patient is at high risk for adverse  outcomes such as CKD progression, CVD, and mortality  Guidelines recommend a target blood pressure of 140/90 mmHg  or less in CKD patients to reduce cardiovascular risk and  CKD progression  A higher blood pressure target may be  appropriate in older individuals to avoid symptomatic  hypotension  EGFR, BLOOD PRESSURE, AND PROTEINURIA FOLLOW-UP  -  fasting Renal Panel within 6 months; Spot Urine Panel  (Albumin preferred) within 9 months; Hemoglobin A1C within 3  months;  -  BONE and MINERAL ASSESSMENT  Calcium is within goal and has not changed significantly,  was 9 6 and now is 9 4 mg/dL  Carbon Dioxide is within goal  and has not changed significantly, was 23 and now is 22  mmol/L  KDOQI guidelines recommend the measurement of  25-hydroxy vitamin D in patients with CKD  BONE and MINERAL TREATMENT SUGGESTIONS  -  Interpretations require simultaneous measurements of serum  calcium and phosphorus    BONE and MINERAL FOLLOW-UP  -  fasting PTH with Renal Panel and 25-Hydroxy Vitamin D are  recommended by KDOQI guidelines, at least yearly;  -  LIPIDS ASSESSMENT  LDL-C is normal and has not changed significantly, was 74  and now is 81 mg/dL  Triglyceride is normal and has not  changed significantly, was 90 and now is 74 mg/dL  Non-HDL  Cholesterol is optimal and has not changed significantly,  was 92 and now is 96 mg/dL  HDL-C is normal and has not  changed significantly, was 59 and now is 59 mg/dL  LIPIDS TREATMENT SUGGESTIONS  -  Therapeutic lifestyle changes are always valuable to  maintain optimal blood lipid status (diet, exercise, weight  management)  If at least a 50% LDL reduction from baseline  has not been achieved, begin or increase statin  Consider  measurement of LDL particle number or Apo B to adjudicate  need for further LDL lowering therapy  If statin cannot be  tolerated or increased, alternatives include use of an  intestinal agent (ezetimibe or bile acid sequestrant) or  niacin  LIPIDS FOLLOW-UP  -  fasting Lipid Panel within 12 months;  -  ANEMIA ASSESSMENT  Most recent order does not include a CBC Panel or iron  studies  ANEMIA FOLLOW-UP  -  CBC within 6 months;  -------------------------------  DISCLAIMER  These assessments and treatment suggestions are provided as  a convenience in support of the physician-patient  relationship and are not intended to replace the physician's  clinical judgment  They are derived from the national  guidelines in addition to other evidence and expert opinion  The clinician should consider this information within the  context of clinical opinion and the individual patient  SEE GUIDANCE FOR CHRONIC KIDNEY DISEASE PROGRAM: National  Kidney Foundation Kidney Disease Outcomes Quality Initiative  (KDOQI (TM)), with its limitations and disclaimers, are at  www kidney  org/professionals/KDOQI  Kidney Disease Improving  Global Outcomes (KDIGO) clinical practice guidelines are at  http://kdigo  org/home/guidelines/  The members of  Edward Wilburn national advisory panel are listed at  www  [employer] com   This program is intended for patients who  have been diagnosed with stages 3, 4, or pre-dialysis 5 CKD  It is not intended for children, pregnant patients, or  transplant patients  PDF Image

## 2018-01-12 NOTE — RESULT NOTES
Verified Results  (1) CBC/PLT/DIFF 11Oct2016 08:54AM [place] [facility]     Test Name Result Flag Reference   WBC 7 2 x10E3/uL  3 4-10 8   RBC 4 61 x10E6/uL  4 14-5 80   Hemoglobin 14 3 g/dL  12 6-17 7   Hematocrit 41 9 %  37 5-51 0   MCV 91 fL  79-97   MCH 31 0 pg  26 6-33 0   MCHC 34 1 g/dL  31 5-35 7   RDW 13 0 %  12 3-15 4   Platelets 910 X59B2/DE  150-379   Neutrophils 47 %     Lymphs 39 %     Monocytes 10 %     Eos 3 %     Basos 1 %     Neutrophils (Absolute) 3 4 x10E3/uL  1 4-7 0   Lymphs (Absolute) 2 8 x10E3/uL  0 7-3 1   Monocytes(Absolute) 0 7 x10E3/uL  0 1-0 9   Eos (Absolute) 0 2 x10E3/uL  0 0-0 4   Baso (Absolute) 0 1 x10E3/uL  0 0-0 2   Immature Granulocytes 0 %     Immature Grans (Abs) 0 0 x10E3/uL  0 0-0 1     (1) COMPREHENSIVE METABOLIC PANEL [id number] 70:62AE [facility]     Test Name Result Flag Reference   Glucose, Serum 134 mg/dL H 65-99   BUN 24 mg/dL  8-27   Creatinine, Serum 1 58 mg/dL H 0 76-1 27   eGFR If NonAfricn Am 43 mL/min/1 73 L >59   eGFR If Africn Am 50 mL/min/1 73 L >59   BUN/Creatinine Ratio 15  10-22   Sodium, Serum 142 mmol/L  134-144   **Effective October 17, 2016 the reference interval**                   for Sodium, Serum will be changing to:                                                             136 - 144   Potassium, Serum 4 3 mmol/L  3 5-5 2   **Effective October 17, 2016 the reference interval**                   for Potassium, Serum will be changing to:                                          0 -  7 days        3 7 - 5 2                                          8 - 30 days        3 7 - 6 4                                          1 -  6 months      3 8 - 6 0                                   7 months -  1 year        3 8 - 5 3                                              >1 year        3 5 - 5 2   Chloride, Serum 101 mmol/L     **Effective October 17, 2016 the reference interval**                   for Chloride, Serum will be changing to: 97 - 106   Carbon Dioxide, Total 23 mmol/L  18-29   Calcium, Serum 9 2 mg/dL  8 6-10 2   Protein, Total, Serum 6 4 g/dL  6 0-8 5   Albumin, Serum 4 3 g/dL  3 5-4 8   Globulin, Total 2 1 g/dL  1 5-4 5   A/G Ratio 2 0  1 1-2 5   Bilirubin, Total 0 4 mg/dL  0 0-1 2   Alkaline Phosphatase, S 81 IU/L     AST (SGOT) 19 IU/L  0-40   ALT (SGPT) 16 IU/L  0-44     (1) LIPID PANEL, FASTING 11Oct2016 08:54AM [facility]     Test Name Result Flag Reference   Cholesterol, Total 151 mg/dL  100-199   Triglycerides 90 mg/dL  0-149   HDL Cholesterol 59 mg/dL  >39   According to ATP-III Guidelines, HDL-C >59 mg/dL is considered a  negative risk factor for CHD  VLDL Cholesterol Keyur 18 mg/dL  5-40   LDL Cholesterol Calc 74 mg/dL  0-99   T  Chol/HDL Ratio 2 6 ratio units  0 0-5 0   T  Chol/HDL Ratio                                                             Men  Women                                               1/2 Avg  Risk  3 4    3 3                                                   Avg Risk  5 0    4 4                                                2X Avg  Risk  9 6    7 1                                                3X Avg  Risk 23 4   11 0     (1) TSH 11Oct2016 08:54AM [facility]     Test Name Result Flag Reference   TSH 3 180 uIU/mL  0 450-4 500     (1) HEMOGLOBIN A1C 11Oct2016 08:54AM [facility]     Test Name Result Flag Reference   Hemoglobin A1c 7 8 % H 4 8-5 6   Pre-diabetes: 5 7 - 6 4           Diabetes: >6 4           Glycemic control for adults with diabetes: <7 0     (1) MICROALBUMIN CREATININE RATIO, RANDOM URINE 11Oct2016 08:54AM [facility]     Test Name Result Flag Reference   Creatinine, Urine 104 3 mg/dL  Not Estab  Microalbumin, Urine <3 0 ug/mL  Not Estab     Microalb/Creat Ratio <2 9 mg/g creat  0 0-30 0     [facility]) [facility] CKD Program 11Oct2016 08:54AM [facility]     Test Name Result Flag Reference   Interpretation Note -------------------------------  CHRONIC KIDNEY DISEASE:  EGFR, BLOOD PRESSURE, AND PROTEINURIA ASSESSMENT  The regression of eGFR with time is not statistically  significant  Current eGFR is 43 mL/min/1 73mE2 corresponding  to CKD stage 3b  Multiply eGFR by 1 159 if patient is    Potassium is within goal and has not  changed significantly, was 4 5 and now is 4 3 mmol/L  Urine  albumin is undetectable  Glycemic control (HB A1c: 7 8 %) is  above goal but may be appropriate if patient is at risk for  hypoglycemia  EGFR, BLOOD PRESSURE, AND PROTEINURIA TREATMENT SUGGESTIONS  -  Based upon current eGFR, patient is at high risk for adverse  outcomes such as CKD progression, CVD, and mortality  Guidelines recommend a target blood pressure of 140/90 mmHg  or less in CKD patients to reduce cardiovascular risk and  CKD progression  A higher blood pressure target may be  appropriate in older individuals to avoid symptomatic  hypotension  EGFR, BLOOD PRESSURE, AND PROTEINURIA FOLLOW-UP  -  Spot Urine Panel (Albumin preferred) within 12 months;  fasting Renal Panel within 6 months; Hemoglobin A1C within 3  months;  -  BONE and MINERAL ASSESSMENT  Calcium is within goal and has not changed significantly,  was 9 5 and now is 9 2 mg/dL  Carbon Dioxide is within goal  and has not changed significantly, was 22 and now is 23  mmol/L  KDOQI guidelines recommend the measurement of  25-hydroxy vitamin D in patients with CKD  BONE and MINERAL TREATMENT SUGGESTIONS  -  Interpretations require simultaneous measurements of serum  calcium and phosphorus  BONE and MINERAL FOLLOW-UP  -  fasting PTH with Renal Panel and 25-Hydroxy Vitamin D are  recommended by KDOQI guidelines, at least yearly;  -  LIPIDS ASSESSMENT  LDL-C is normal and has not changed significantly, was 80  and now is 74 mg/dL  Triglyceride is normal and has risen,  was 67 and now is 90 mg/dL   Non-HDL Cholesterol is optimal  and has not changed significantly, was 93 and now is 92  mg/dL  HDL-C is normal and has decreased, was 67 and now is  59 mg/dL  LIPIDS TREATMENT SUGGESTIONS  -  Therapeutic lifestyle changes are always valuable to  maintain optimal blood lipid status (diet, exercise, weight  management)  If at least a 50% LDL reduction from baseline  has not been achieved, begin or increase statin  Consider  measurement of LDL particle number or Apo B to adjudicate  need for further LDL lowering therapy  If statin cannot be  tolerated or increased, alternatives include use of an  intestinal agent (ezetimibe or bile acid sequestrant) or  niacin  LIPIDS FOLLOW-UP  -  fasting Lipid Panel within 12 months;  -  ANEMIA ASSESSMENT  Hemoglobin is normal and has risen, was 13 5 and now is 14 3  g/dL  Hemoglobin target assumes EMMA is not in use  ANEMIA TREATMENT SUGGESTIONS  -  No specific change of treatment is indicated at this time  ANEMIA FOLLOW-UP  -  CBC within 12 months;  -------------------------------  DISCLAIMER  These assessments and treatment suggestions are provided as  a convenience in support of the physician-patient  relationship and are not intended to replace the physician's  clinical judgment  They are derived from the national  guidelines in addition to other evidence and expert opinion  The clinician should consider this information within the  context of clinical opinion and the individual patient  SEE GUIDANCE FOR CHRONIC KIDNEY DISEASE PROGRAM: National  Kidney Foundation Kidney Disease Outcomes Quality Initiative  (KDOQI (TM)), with its limitations and disclaimers, are at  www kidney  org/professionals/KDOQI  Kidney Disease Improving  Global Outcomes (KDIGO) clinical practice guidelines are at  http://kdigo  org/home/guidelines/  The members of  Edward Wilburn national advisory panel are listed at  www  [employer] com  This program is intended for patients who  have been diagnosed with stages 3, 4, or pre-dialysis 5 CKD    It is not intended for children, pregnant patients, or  transplant patients  PDF Image          [facility]) [facility] [id number] 08[facility]54[facility]      Test Name Result Flag Reference   Interpretation NTAP     PDF Image Not applicable         Discussion/Summary   Will discuss labs at follow up appt

## 2018-01-12 NOTE — PROGRESS NOTES
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     (249 60,337 1) (E08 43)   3  H/O heart artery stent (V45 82) (Z95 5)   4  HTN (hypertension) (401 9) (I10)   5  Mixed hyperlipidemia (272 2) (E78 2)    Plan   Coronary artery disease, History of myocardial infarction    · EKG/ECG- POC; Status:Complete;   Done: [id number]   Perform: In Office; Due:11Jan2019; Last Updated By:Humberto Lai; 1/11/2018 8:51:13 AM;Ordered; For:Coronary artery disease, History of myocardial infarction; Ordered By:Morgan Henley;    Discussion/Summary   Cardiology Discussion Summary Free Text Note Form [facility]:    1  CAD - Continue ASA and Atorvastatin  Goal LDL < 70  Was slightly higher in December  We discussed adjusting diet and exercising more  Hypertension - BP at goal  Continue metoprolol and lisinopril  LE edema - resolved with increased Lasix dose, may miss doses occasionally as no edema has been present for awhile  Dyslipidemia - Atorvastatin  Recheck with Dr Alejandro Rubio during next visit  DM -follow up with Dr Alejandro Rubio      Chief Complaint   Chief Complaint Free Text Note Form: 6 month f/u visit - ekg done today  No cardiac complaints today  ylm/ma      History of Present Illness   Cardiology HPI Free Text Note Form [facility]: Mr Umm Douglass is a 67year old male here for followup of CAD  He has a h/o PCI to the circumflex vessel in 1992  His last stress test was done in 5/2015 which showed inferior/inferolateral infarct c/w attenuation  EF 63%  He exercised for 6:38   denies any chest pain or shortness of breath with exertion  He denies lower extremity edema, orthopnea or PND  not exercise regularly but is very active with grandchildren  Reports good adherence to medications  has increased by 3 pounds since last visit    profile in December showed LDL of 78, HDL of 57 and TG of 92          Review of Systems Cardiology Male ROS:         Cardiac: no chest pain-- and-- no signs of swelling  Skin: Rash located on the       Genitourinary: recurrent urinary tract infections,-- frequent urination at night-- and-- kidney problems      Psychological: No complaints of feeling depressed, anxiety, panic attacks, or difficulty concentrating  General: changes in weight lbs  Respiratory: No complaints of shortness of breath, cough with sputum, or wheezing  HEENT: No complaints of serious problems, hearing problems, nose problems, throat problems, or snoring  Gastrointestinal: constipation      Hematologic: No complaints of bleeding disorders, anemia, blood clots, or excessive brusing  Neurological: No complaints of numbness, tingling, dizziness, weakness, seizures, headaches, syncope or fainting, AM fatigue, daytime sleepiness, no witnessed apnea episodes  Musculoskeletal: arthritis-- and-- back pain    ROS Reviewed:    ROS reviewed  Active Problems   Problems    1  Abnormal echocardiogram (793 2) (R93 1)   2  Hammond's esophagus (530 85) (K22 70)   3  BMI 29 0-29 9,adult (V85 25) (Z68 29)   4  Chronic constipation (564 00) (K59 09)   5  Chronic kidney disease, stage 3 (585 3) (N18 3)   6  Colon, diverticulosis (562 10) (K57 30)   7  Denied: History of Cor pulmonale   8  Coronary artery disease (414 00) (I25 10)   9  Diabetes mellitus due to underlying condition with diabetic autonomic neuropathy     (249 60,337 1) (E08 43)   10  Disc degeneration, lumbar (722 52) (M51 36)   11  Encounter for screening for malignant neoplasm of colon (V76 51) (Z12 11)   12  Enlarged prostate (600 00) (N40 0)   13  Flu vaccine need (V04 81) (Z23)   14  Former smoker (V15 82) (R08 492)   15  H/O heart artery stent (V45 82) (Z95 5)   16  Hiatal hernia (553 3) (K44 9)   17  History of myocardial infarction (412) (I25 2)   18  HTN (hypertension) (401 9) (I10)   19  Hypothyroidism (244 9) (E03 9)   20   Lumbar radiculopathy (724 4) (M54 16)   21  Medicare annual wellness visit, initial (V70 0) (Z00 00)   22  Medicare annual wellness visit, subsequent (V70 0) (Z00 00)   23  Mixed hyperlipidemia (272 2) (E78 2)   24  Need for pneumococcal vaccination (V03 82) (Z23)   25  Need for shingles vaccine (V04 89) (Z23)   26  Need for tetanus booster (V03 7) (Z23)   27  Need for vaccination (V05 9) (Z23)   28  On angiotensin-converting enzyme (ACE) inhibitors (V07 52) (Z79 899)   29  Pes planus, congenital (754 61) (Q66 50)   30  Renal insufficiency (593 9) (N28 9)   31  Screening for AAA (abdominal aortic aneurysm) (V81 2) (Z13 6)   32  Vitamin D deficiency (268 9) (E55 9)    Past Medical History   Problems    1  History of Abnormal taste in mouth (781 1) (R43 2)   2  History of Acquired ankle/foot deformity (736 70) (M21 969)   3  History of Acute upper respiratory infection (465 9) (J06 9)   4  History of Advance care planning (V65 49) (Z71 89)   5  History of Aortic narrowing (747 10) (Q25 1)   6  History of Bilateral leg edema (782 3) (R60 0)   7  History of Bruise (924 9) (T14 8XXA)   8  History of Bulla, lung (492 0) (J43 9)   9  History of Bunion (727 1) (M21 619)   10  History of Callus (700) (L84)   11  Denied: History of Cor pulmonale   12  History of Difficulty walking (719 7) (R26 2)   13  History of Encounter for screening for malignant neoplasm of prostate (V76 44) (Z12 5)   14  History of Encounter for screening for osteoporosis (V82 81) (Z13 820)   15  Hiatal hernia (553 3) (K44 9)   16  History of arthritis (V13 4) (Z87 39)   17  History of diabetes mellitus (V12 29) (Z86 39)   18  History of diverticulitis of colon (V12 79) (Z87 19)   19  History of high cholesterol (V12 29) (Z86 39)   20  History of hypertension (V12 59) (Z86 79)   21  History of kidney problems (V13 09) (Z87 448)   22  History of thyroid disease (V12 29) (Z86 39)   23  History of transient cerebral ischemia (V12 54) (Z86 73)   24   History of urinary tract infection (V13 02) (Z87 440)   25  Old myocardial infarction (412) (I25 2)   32  History of Screening for genitourinary condition (V81 6) (Z13 89)   27  History of Screening for genitourinary condition (V81 6) (Z13 89)   28  Screening for genitourinary condition (V81 6) (Z13 89)  Active Problems And Past Medical History Reviewed: The active problems and past medical history were reviewed and updated today  Surgical History   Problems    1  History of Cath Stent Placement   2  History of Colonoscopy (Fiberoptic) Screening   3  History of Complete Colonoscopy   4  History of Knee Surgery   5  History of Umbilical Hernia Repair   6  History of Upper Gastrointestinal Endoscopy (Therapeutic)  Surgical History Reviewed: The surgical history was reviewed and updated today  Family History   Mother    1  Family history of Cancer, colon   2  Family history of arthritis (V17 7) (Z82 61)   3  Family history of diabetes mellitus (V18 0) (Z83 3)   4  Family history of hypertension (V17 49) (Z82 49)   5  Family history of malignant neoplasm of breast (V16 3) (Z80 3)   6  Family history of High cholesterol  Sibling    7  Family history of diabetes mellitus (V18 0) (Z83 3)   8  Family history of hypertension (V17 49) (Z82 49)  Sister    5  Family history of malignant neoplasm (V16 9) (Z80 9)  Brother    8  Family history of cerebrovascular accident (CVA) (V17 1) (Z82 3)  Family History Reviewed: The family history was reviewed and updated today  Social History   Problems    · Former smoker (V15 82) (D51 424)   · Lack of exercise (V69 0) (Z72 3)   ·    · No alcohol use   · Sleeps 6 -7 hours a day  Social History Reviewed: The social history was reviewed and updated today  Current Meds    1  Aspir-81 81 MG Oral Tablet Delayed Release; 1 every day; Therapy: 11XCF3036 to Recorded   2  Atorvastatin Calcium 40 MG Oral Tablet; Take 1 tablet by mouth  every day;      Therapy: 36Apl8425 to (Evaluate:27Mar2018)  Requested for: 38Eja3221; Last     Rx:27Dec2017 Ordered   3  BD Pen Needle Sherie U/F 32G X 4 MM Miscellaneous; Use 4 times daily to inject insulin     as directed; Therapy: 87NJV9596 to (Evaluate:06Jan2018)  Requested for: 43Ufo2345; Last     Rx:27Dec2017 Ordered   4  CoQ-10 100 MG Oral Capsule; TAKE AS DIRECTED; Therapy: 98SWU4889 to (Last Rx:19Dec2017) Ordered   5  Furosemide 40 MG Oral Tablet; Take 1 tablet by mouth  daily; Therapy: 24JDG8998 to (Evaluate:04Feb2018)  Requested for: 73EPE1204; Last     Rx:06Nov2017 Ordered   6  HumaLOG KwikPen 100 UNIT/ML Subcutaneous Solution Pen-injector; Inject     subcutaneously up to 9 units 3 times daily; Therapy: 79RBY6764 to (Evaluate:20Feb2018)  Requested for: 40GZK6911; Last     Rx:19Dec2017 Ordered   7  Levemir FlexTouch 100 UNIT/ML Subcutaneous Solution Pen-injector; Inject     subcutaneously 42  units at bedtime; Therapy: 85WHO5599 to ([phone])  Requested for: 93HPZ0291; Last     Rx:19Dec2017 Ordered   8  Levothyroxine Sodium 50 MCG Oral Tablet; Take 1 tablet by mouth  daily; Therapy: 04Apr2016 to (22 [id number])  Requested for: 83DVL8294; Last     Rx:04Oct2017 Ordered   9  Lisinopril 5 MG Oral Tablet; 1 every day; Therapy: 77Jrg3354 to (Last Rx:19Dec2017)  Requested for: 14OVB5166 Ordered   10  Metoprolol Succinate ER 25 MG Oral Tablet Extended Release 24 Hour; Take 1 tablet      daily; Therapy: 75UIV9820 to (Evaluate:21Apr2018)  Requested for: 37JQA9776; Last      Rx:08Xsa3099 Ordered   11  Multi For Him 50+ Oral Tablet; TAKE 1 TABLET DAILY; Therapy: ([place]) to Recorded   12  Pantoprazole Sodium 40 MG Oral Tablet Delayed Release; Take 1 tablet by mouth  daily; Therapy: 80LRZ5380 to (Daniela Bolton)  Requested for: 81IIO6497; Last      Rx:62Jwb0753 Ordered  Medication List Reviewed: The medication list was reviewed and updated today        Allergies   Medication 1  No Known Drug Allergies    Vitals   Vital Signs    Recorded: 33WSQ8631 08:53AM   Heart Rate 63, Apical   Systolic 893, LUE, Sitting   Diastolic 76, LUE, Sitting   Height 5 ft 8 5 in   Weight 203 lb    BMI Calculated 30 42   BSA Calculated 2 07   O2 Saturation 97, RA     Physical Exam        Constitutional - General appearance: No acute distress, well appearing and well nourished  Eyes - Conjunctiva and Sclera examination: Conjunctiva pink, sclera anicteric  Neck - Normal, no JVD   Pulmonary - Respiratory effort: No signs of respiratory distress  -- Auscultation of lungs: Clear to auscultation  Cardiovascular - Auscultation of heart: Abnormal   A grade 2 systolic murmur was heard at the LUSB  -- Pedal pulses: Normal, 2+ bilaterally  -- Examination of extremities for edema and/or varicosities: Normal        Abdomen - Soft  Musculoskeletal - Gait and station: Normal gait  Skin - Skin: Normal without rashes  Skin is warm and well perfused  Neurologic - Speech normal  No focal deficits  Psychiatric - Orientation to person, place, and time: Normal       Results/Data   Diagnostic Studies Reviewed Cardio:      Echocardiogram/KEVIN: EF 60% with mild valve disease  Mild pulmonary HTN  ECG Report:      Rhythm and rate:  normal sinus rhythm  P-waves:   the P wave is normal       QRS: the QRS is normal      ST segment: the ST segments are normal       Health Management   Controlled type 2 diabetes mellitus with insulin therapy   *VB - Eye Exam; every 1 year; Last 74Yiq8058; Next Due: 98Plf2120; Active  *VB - Foot Exam; every 1 year; Last 98TIG4880; Next Due: 16LTU6516;  Active    Signatures    Electronically signed by : Morgan Kate DO; Jan 11 2018  9:36AM EST                       (Author)

## 2018-01-13 VITALS
WEIGHT: 200 LBS | SYSTOLIC BLOOD PRESSURE: 112 MMHG | HEART RATE: 80 BPM | RESPIRATION RATE: 16 BRPM | DIASTOLIC BLOOD PRESSURE: 60 MMHG | TEMPERATURE: 96.6 F | HEIGHT: 69 IN | BODY MASS INDEX: 29.62 KG/M2

## 2018-01-13 VITALS
HEIGHT: 69 IN | BODY MASS INDEX: 29.62 KG/M2 | TEMPERATURE: 98.2 F | DIASTOLIC BLOOD PRESSURE: 62 MMHG | RESPIRATION RATE: 18 BRPM | WEIGHT: 200 LBS | SYSTOLIC BLOOD PRESSURE: 110 MMHG | HEART RATE: 66 BPM

## 2018-01-13 VITALS
HEIGHT: 69 IN | WEIGHT: 199 LBS | HEART RATE: 58 BPM | DIASTOLIC BLOOD PRESSURE: 52 MMHG | BODY MASS INDEX: 29.47 KG/M2 | SYSTOLIC BLOOD PRESSURE: 102 MMHG | OXYGEN SATURATION: 96 %

## 2018-01-15 VITALS
HEART RATE: 60 BPM | HEIGHT: 69 IN | RESPIRATION RATE: 16 BRPM | TEMPERATURE: 98.1 F | BODY MASS INDEX: 30.51 KG/M2 | SYSTOLIC BLOOD PRESSURE: 124 MMHG | WEIGHT: 206 LBS | DIASTOLIC BLOOD PRESSURE: 64 MMHG

## 2018-01-15 VITALS
SYSTOLIC BLOOD PRESSURE: 122 MMHG | DIASTOLIC BLOOD PRESSURE: 60 MMHG | BODY MASS INDEX: 29.77 KG/M2 | HEIGHT: 69 IN | WEIGHT: 201 LBS

## 2018-01-15 NOTE — PROGRESS NOTES
Assessment    1  Medicare annual wellness visit, initial (V70 0) (Z00 00)   2  Controlled type 2 diabetes mellitus with insulin therapy (250 00,V58 67) (E11 9,Z79 4)   3  HTN (hypertension) (401 9) (I10)   4  Hypothyroidism (244 9) (E03 9)   5  Mixed hyperlipidemia (272 2) (E78 2)   6  Former smoker (V15 82) (K87 220)   · quit 1976; 4 ppd habit   7  History of Screening for genitourinary condition (V81 6) (Z13 89)   8  Screening for genitourinary condition (V81 6) (Z13 89)   9  Chronic renal failure, stage 3 (moderate) (585 3) (N18 3)   10  BMI 29 0-29 9,adult (V85 25) (Z68 29)   11  Screening for AAA (abdominal aortic aneurysm) (V81 2) (Z13 6)   12  Encounter for screening for osteoporosis (V82 81) (Z13 820)    Plan  Hammond's esophagus, Esophagitis, reflux    · Pantoprazole Sodium 40 MG Oral Tablet Delayed Release; Take 1 tablet by  mouth  daily  Chronic renal failure, stage 3 (moderate), Controlled type 2 diabetes mellitus with insulin  therapy, HTN (hypertension), Hypothyroidism, Mixed hyperlipidemia    · (1) COMPREHENSIVE METABOLIC PANEL; Status:Active; Requested IMJ:[id number];    · (1) HEMOGLOBIN A1C; Status:Active; Requested LUR:[id number];    · (1) MICROALBUMIN CREATININE RATIO, RANDOM URINE; Status:Active; Requested  KFU:[id number]; Controlled type 2 diabetes mellitus with insulin therapy    · From  Levemir FlexTouch 100 UNIT/ML Subcutaneous Solution Pen-injector  Inject 35 units HS To Levemir FlexTouch 100 UNIT/ML Subcutaneous Solution  Pen-injector INJECT 40 units HS  Controlled type 2 diabetes mellitus with insulin therapy, HTN (hypertension)    · Lisinopril 10 MG Oral Tablet; TAKE 1 TABLET DAILY  Diabetes mellitus due to underlying condition with diabetic autonomic neuropathy    · HumaLOG KwikPen 100 UNIT/ML Subcutaneous Solution Pen-injector;  Inject  up to 8 units 3  times daily  Encounter for screening for osteoporosis, Medicare annual wellness visit, initial    · * DXA BONE DENSITY SPINE HIP AND PELVIS; Status:Active; Requested  for:14Oct2016;   HTN (hypertension)    · Furosemide 40 MG Oral Tablet; Take 1 tablet by mouth  daily  Hypothyroidism    · Levothyroxine Sodium 50 MCG Oral Tablet; Take 1 tablet by mouth  daily  Medicare annual wellness visit, initial    · Medicare Annual Wellness Visit; Status:Complete;   Done: 67XMM1265 10:02AM  Mixed hyperlipidemia    · Atorvastatin Calcium 40 MG Oral Tablet; 1 every day   · Levemir FlexTouch 100 UNIT/ML Subcutaneous Solution Pen-injector;  INJECT SUBCUTANEOUSLY AS DIRECTED  Screening for genitourinary condition    · *VB - Urinary Incontinence Screen (Dx Z13 89 Screen for UI); Status:Complete;   Done:  20OPR7498 10:10AM  SocHx: Former smoker, Medicare annual wellness visit, initial, Screening for AAA  (abdominal aortic aneurysm)    · VAS CMS AAA SCREENING; Status:Active; Requested for:14Oct2016; Unlinked    · Aspir-81 81 MG Oral Tablet Delayed Release; 1 every day   · Metoprolol Tartrate 50 MG Oral Tablet; qd    Discussion/Summary    Looking at colon and path report looks like pt has tubular adenomatous polyp - 2 of them - pt states he was told gi was going to call him to let him know when his next colon will be  Does not sound like he got in touch yet  I advised pt to call them and see when his next one should be  Report says 3 years but this may have been before his path came back  pt has stromg family history of colon ca  Impression: Initial Annual Wellness Visit  Cardiovascular screening and counseling: the risks and benefits of screening were discussed, screening is current, due for a lipid panel and Dx - V81 2 Screen for CV Disorder  Diabetes screening and counseling: the risks and benefits of screening were discussed, screening is current, due for blood glucose and Dx - V77 1 Screen for DM  Colorectal cancer screening and counseling: the risks and benefits of screening were discussed, screening is current, Adline Dub and Dx - V76 51 Screen for CRC  Prostate cancer screening and counseling: the risks and benefits of screening were discussed, screening is current, Justin Quiñones - and Dx - V76 44 Screen PSA  Osteoporosis screening and counseling: the risks and benefits of screening were discussed and due for bone density ultrasound  Abdominal aortic aneurysm screening and counseling: the risks and benefits of screening were discussed and Dx - V81 2 Screen for CV Disorder  Glaucoma screening and counseling: the risks and benefits of screening were discussed, screening is current and Dx - V80 1 Screen for Glaucoma  HIV screening and counseling: screening not indicated  Chief Complaint  AWV rmklpn      Advance Directives  Advance Directive [facility]:   NO - Patient does not have an advance health care directive  The patient has a signed [employer] form located  sheet given  Capacity/Competence: This patient has full decision making capacity for discussion of advance care planning and This patient has full decision making competency for discussion of advance care planning  The provider spent 10 minutes discussing Advance Directives  History of Present Illness  HPI: pt is here for an AWV    pt has seen Dr Justin Quiñones for his enlarged prostate and was advised on watching and he can see him back if symptoms arise  pt currently denies symptoms    pt does not want flu shot   Welcome to [employer] and Wellness Visits: The patient is being seen for the initial annual wellness visit  Medicare Screening and Risk Factors   Hospitalizations: no previous hospitalizations  Medicare Screening Tests Risk Questions   Drug and Alcohol Use: The patient is a former cigarette smoker  The patient reports rare alcohol use  He has never used illicit drugs  Diet and Physical Activity: Current diet includes well balanced meals and limited junk food  He exercises 2 times per week  Exercise: walking 20 minutes per day     Mood Disorder and Cognitive Impairment Screening: He denies feeling down, depressed, or hopeless over the past two weeks  He denies feeling little interest or pleasure in doing things over the past two weeks  Cognitive impairment screening: denies difficulty learning/retaining new information, denies difficulty handling complex tasks, denies difficulty with reasoning, denies difficulty with spatial ability and orientation, denies difficulty with language and denies difficulty with behavior  Functional Ability/Level of Safety: Hearing is normal bilaterally and a hearing aid is not used  Activities of daily living details: does not need help using the phone, no transportation help needed, does not need help shopping, no meal preparation help needed, does not need help doing housework, does not need help doing laundry, does not need help managing medications and does not need help managing money  Home safety risk factors:  no grab bars in the bathroom, but no unfamiliar surroundings, no loose rugs, no poor household lighting, no uneven floors, no household clutter and handrails on the stairs  Advance Directives: Advance directives: no living will, no durable power of  for health care directives and no advance directives  Co-Managers and Medical Equipment/Suppliers: See Patient Care Team   Preventive Quality Program 65 and Older: Falls Risk: The patient fell 0 times in the past 12 months  The patient currently has no urinary incontinence symptoms  Patient Care Team    Care Team Member Role Specialty Office Number   Patricia Powell MD Specialist Ophthalmology [phone]   Shania Paula [facility] [phone], [address] [place] [address] Specialist Cardiology [phone]   Kendall CHRISTINE  Specialist Nephrology [phone]   Ying Bass MD Specialist Urology [phone]   [address] [place] [address] Specialist Podiatry [phone]     Active Problems    1  Abnormal echocardiogram (793 2) (R93 1)   2   Advance care planning (V65 49) (Z71 89)   3  Hammond's esophagus (530 85) (K22 70)   4  Chronic constipation (564 00) (K59 09)   5  Chronic renal failure, stage 3 (moderate) (585 3) (N18 3)   6  Colon, diverticulosis (562 10) (K57 30)   7  Controlled type 2 diabetes mellitus with insulin therapy (250 00,V58 67) (E11 9,Z79 4)   8  Cor pulmonale (416 9) (I27 81)   9  Coronary artery disease (414 00) (I25 10)   10  Diabetes mellitus due to underlying condition with diabetic autonomic neuropathy    (249 60,337 1) (E08 43)   11  Difficulty walking (719 7) (R26 2)   12  Disc degeneration, lumbar (722 52) (M51 36)   13  Encounter for screening for malignant neoplasm of colon (V76 51) (Z12 11)   14  Encounter for screening for malignant neoplasm of prostate (V76 44) (Z12 5)   15  Enlarged prostate (600 00) (N40 0)   16  Esophagitis, reflux (530 11) (K21 0)   17  Flu vaccine need (V04 81) (Z23)   18  Former smoker (V15 82) (O76 683)   19  H/O heart artery stent (V45 82) (Z95 5)   20  Hiatal hernia (553 3) (K44 9)   21  History of myocardial infarction (412) (I25 2)   22  HTN (hypertension) (401 9) (I10)   23  Hypothyroidism (244 9) (E03 9)   24  Lumbar radiculopathy (724 4) (M54 16)   25  Mixed hyperlipidemia (272 2) (E78 2)   26  Need for pneumococcal vaccination (V03 82) (Z23)   27  Need for shingles vaccine (V04 89) (Z23)   28  Need for tetanus booster (V03 7) (Z23)   29  Need for vaccination (V05 9) (Z23)   30  Pes planus, congenital (754 61) (Q66 50)   31  Renal insufficiency (593 9) (N28 9)   32   Vitamin D deficiency (268 9) (E55 9)    Past Medical History    · History of Abnormal taste in mouth (781 1) (R43 2)   · History of Acquired ankle/foot deformity (736 70) (M21 969)   · History of Acute upper respiratory infection (465 9) (J06 9)   · History of Aortic narrowing (747 10) (Q25 1)   · History of Bilateral leg edema (782 3) (R60 0)   · History of Bruise (924 9) (T14 8)   · History of Bulla, lung (492 0) (J43 9)   · History of Bunion (727 1) (M21 619)   · History of Callus (700) (L84)   · Hiatal hernia (553 3) (K44 9)   · History of arthritis (V13 4) (Z87 39)   · History of diabetes mellitus (V12 29) (Z86 39)   · History of diverticulitis of colon (V12 79) (Z87 19)   · History of high cholesterol (V12 29) (Z86 39)   · History of hypertension (V12 59) (Z86 79)   · History of kidney problems (V13 09) (Z87 448)   · History of thyroid disease (V12 29) (Z86 39)   · History of transient cerebral ischemia (V12 54) (Z86 73)   · History of urinary tract infection (V13 02) (Z87 440)   · Old myocardial infarction (412) (I25 2)   · History of Screening for genitourinary condition (V81 6) (Z13 89)    The active problems and past medical history were reviewed and updated today  Surgical History    · History of Cath Stent Placement   · History of Colonoscopy (Fiberoptic) Screening   · History of Complete Colonoscopy   · History of Knee Surgery   · History of Umbilical Hernia Repair   · History of Upper Gastrointestinal Endoscopy (Therapeutic)    The surgical history was reviewed and updated today  Family History  Mother    · Family history of Cancer, colon   · Family history of arthritis (V17 7) (Z82 61)   · Family history of diabetes mellitus (V18 0) (Z83 3)   · Family history of hypertension (V17 49) (Z82 49)   · Family history of malignant neoplasm of breast (V16 3) (Z80 3)   · Family history of High cholesterol  Sibling    · Family history of diabetes mellitus (V18 0) (Z83 3)   · Family history of hypertension (V17 49) (Z82 49)  Sister    · Family history of malignant neoplasm (V16 9) (Z80 9)  Brother    · Family history of cerebrovascular accident (CVA) (V17 1) (Z82 3)    The family history was reviewed and updated today         Social History    · Former smoker (V15 82) (P91 469)   · quit 1976; 4 ppd habit   · Lack of exercise (V69 0) (Z72 3)   ·    · No alcohol use   · Sleeps 6 -7 hours a day  The social history was reviewed and updated today       Current Meds   1  Aspir-81 81 MG Oral Tablet Delayed Release; 1 every day; Therapy: 77ZRV8001 to Recorded   2  Atorvastatin Calcium 40 MG Oral Tablet; 1 every day; Therapy: 35Civ8645 to (Last Rx:90Enz7818)  Requested for: 73Luy5623 Ordered   3  BD Pen Needle Sherie U/F 32G X 4 MM Miscellaneous; Use 4 times daily to inject insulin   as directed; Therapy: 42MEQ6194 to (Evaluate:20Oct2016)  Requested for: 98TGE5784; Last   Rx:44Bnu9939 Ordered   4  Furosemide 40 MG Oral Tablet; Take 1 tablet by mouth  daily; Therapy: 72WHG3510 to (Evaluate:05Csi9032)  Requested for: 97Hgw6617; Last   Rx:10Lya3734 Ordered   5  HumaLOG KwikPen 100 UNIT/ML Subcutaneous Solution Pen-injector; Inject up to 8   units 3  times daily; Therapy: 15RRU8485 to (Evaluate:26Oct2016)  Requested for: 03Jtu1465; Last   Rx:07Eir2265 Ordered   6  Levemir FlexTouch 100 UNIT/ML Subcutaneous Solution Pen-injector; INJECT   SUBCUTANEOUSLY AS DIRECTED; Therapy: (Recorded:40Oyq8127) to Recorded   7  Levothyroxine Sodium 50 MCG Oral Tablet; Take 1 tablet by mouth  daily; Therapy: 04Apr2016 to (Joann Angela)  Requested for: 04TKO2089; Last   Rx:10Oct2016 Ordered   8  Lisinopril 10 MG Oral Tablet; TAKE 1 TABLET DAILY; Therapy: 46LAP7087 to (Evaluate:09Apr2017)  Requested for: 41IOG0476; Last   Rx:11Oct2016 Ordered   9  Metoprolol Tartrate 50 MG Oral Tablet; qd Recorded   10  Multi For Him 50+ Oral Tablet; TAKE 1 TABLET DAILY; Therapy: (Hannah Burns) to Recorded   11  Pantoprazole Sodium 40 MG Oral Tablet Delayed Release; Take 1 tablet by mouth     daily; Therapy: 85VPH0538 to (Evaluate:08Jan2017)  Requested for: 75VQV9623; Last    Rx:95Vbd9869 Ordered    The medication list was reviewed and updated today  Allergies    1   No Known Drug Allergies    Immunizations   1    Influenza  Temporarily Deferred: Pt refuses    PCV  28-Dec-2015    PPSV  Temporarily Deferred: Pt refuses    Tetanus  Temporarily Deferred: Pt refuses    Zoster  Temporarily Deferred: Pt refuses     Vitals  Signs    Systolic: 399  Diastolic: 70  Heart Rate: 72  Respiration: 18  Temperature: 97 6 F  Height: 5 ft 9 in  Weight: 199 lb   BMI Calculated: 29 39  BSA Calculated: 2 06    Results/Data  PHQ-2 Adult Depression Screening 14Oct2016 10:28AM User, Adreimas     Test Name Result Flag Reference   PHQ-2 Adult Depression Score 0     Over the last two weeks, how often have you been bothered by any of the following problems?   Little interest or pleasure in doing things: Not at all - 0  Feeling down, depressed, or hopeless: Not at all - 0   PHQ-2 Adult Depression Screening Negative       Falls Risk Assessment (Dx Z13 89 Screen for Neurologic Disorder) 63LFM9419 10:28AM User, Adreimas     Test Name Result Flag Reference   Falls Risk      No falls in the past year     *VB - Urinary Incontinence Screen (Dx Z13 89 Screen for UI) 14Oct2016 10:10AM Faby Alvarenga     Test Name Result Flag Reference   Urinary Incontinence Assessment 14Oct2016       Medicare Annual Wellness Visit 48YJL0751 10:02AM [employer]y [email or web address]     Test Name Result Flag Reference   [address] 62SFY1775       (1) CBC/PLT/DIFF 80YGY4350 08:54AM Faby [email or web address]     Test Name Result Flag Reference   WBC 7 2 x10E3/uL  3 4-10 8   RBC 4 61 x10E6/uL  4 14-5 80   Hemoglobin 14 3 g/dL  12 6-17 7   Hematocrit 41 9 %  37 5-51 0   MCV 91 fL  79-97   MCH 31 0 pg  26 6-33 0   MCHC 34 1 g/dL  31 5-35 7   RDW 13 0 %  12 3-15 4   Platelets 068 B47Y3/TC  150-379   Neutrophils 47 %     Lymphs 39 %     Monocytes 10 %     Eos 3 %     Basos 1 %     Neutrophils (Absolute) 3 4 x10E3/uL  1 4-7 0   Lymphs (Absolute) 2 8 x10E3/uL  0 7-3 1   Monocytes(Absolute) 0 7 x10E3/uL  0 1-0 9   Eos (Absolute) 0 2 x10E3/uL  0 0-0 4   Baso (Absolute) 0 1 x10E3/uL  0 0-0 2   Immature Granulocytes 0 %     Immature Grans (Abs) 0 0 x10E3/uL  0 0-0 1     (1) COMPREHENSIVE METABOLIC PANEL 01ZML0071 93:72OK Georganna Tenafly     Test Name Result Flag Reference   Glucose, Serum 134 mg/dL H 65-99   BUN 24 mg/dL  8-27   Creatinine, Serum 1 58 mg/dL H 0 76-1 27   eGFR If NonAfricn Am 43 mL/min/1 73 L >59   eGFR If Africn Am 50 mL/min/1 73 L >59   BUN/Creatinine Ratio 15  10-22   Sodium, Serum 142 mmol/L  134-144   **Effective October 17, 2016 the reference interval**                   for Sodium, Serum will be changing to:                                                             136 - 144   Potassium, Serum 4 3 mmol/L  3 5-5 2   **Effective October 17, 2016 the reference interval**                   for Potassium, Serum will be changing to:                                          0 -  7 days        3 7 - 5 2                                          8 - 30 days        3 7 - 6 4                                          1 -  6 months      3 8 - 6 0                                   7 months -  1 year        3 8 - 5 3                                              >1 year        3 5 - 5 2   Chloride, Serum 101 mmol/L     **Effective October 17, 2016 the reference interval**                   for Chloride, Serum will be changing to:                                                              97 - 106   Carbon Dioxide, Total 23 mmol/L  18-29   Calcium, Serum 9 2 mg/dL  8 6-10 2   Protein, Total, Serum 6 4 g/dL  6 0-8 5   Albumin, Serum 4 3 g/dL  3 5-4 8   Globulin, Total 2 1 g/dL  1 5-4 5   A/G Ratio 2 0  1 1-2 5   Bilirubin, Total 0 4 mg/dL  0 0-1 2   Alkaline Phosphatase, S 81 IU/L     AST (SGOT) 19 IU/L  0-40   ALT (SGPT) 16 IU/L  0-44     (1) LIPID PANEL, FASTING 11Oct2016 08:54AM Lexii Slider     Test Name Result Flag Reference   Cholesterol, Total 151 mg/dL  100-199   Triglycerides 90 mg/dL  0-149   HDL Cholesterol 59 mg/dL  >39   According to ATP-III Guidelines, HDL-C >59 mg/dL is considered a  negative risk factor for CHD  VLDL Cholesterol Keyur 18 mg/dL  5-40   LDL Cholesterol Calc 74 mg/dL  0-99   T   Chol/HDL Ratio 2 6 ratio units 0 0-5 0   T  Chol/HDL Ratio                                                             Men  Women                                               1/2 Avg  Risk  3 4    3 3                                                   Avg Risk  5 0    4 4                                                2X Avg  Risk  9 6    7 1                                                3X Avg  Risk 23 4   11 0     (1) TSH 11Oct2016 08:54AM Marbella Bone     Test Name Result Flag Reference   TSH 3 180 uIU/mL  0 450-4 500     (1) HEMOGLOBIN A1C 11Oct2016 08:54AM Marbella Bone     Test Name Result Flag Reference   Hemoglobin A1c 7 8 % H 4 8-5 6   Pre-diabetes: 5 7 - 6 4           Diabetes: >6 4           Glycemic control for adults with diabetes: <7 0     (1) MICROALBUMIN CREATININE RATIO, RANDOM URINE 11Oct2016 08:54AM Marbella Bone     Test Name Result Flag Reference   Creatinine, Urine 104 3 mg/dL  Not Estab  Microalbumin, Urine <3 0 ug/mL  Not Estab     Microalb/Creat Ratio <2 9 mg/g creat  0 0-30 0     Future Appointments    Date/Time Provider Specialty Site   11/07/2016 09:00 AM Brenda Fox DO Cardiology [facility] [address] [place] [address]   01/17/2017 09:30 AM Marbella Torres DO Family Medicine [address]     Signatures   Electronically signed by : Anant Lara DO; Oct 14 2016 10:33AM EST                       (Author)

## 2018-01-16 NOTE — RESULT NOTES
Verified Results  (1) TSH 91BDY2372 08:26AM uNha El     Test Name Result Flag Reference   TSH 0 770 uIU/mL  0 450-4 500       Discussion/Summary   labs acceptable

## 2018-01-16 NOTE — RESULT NOTES
Verified Results  ([address] [facility]) Hemoglobin A1c 65ZWO2939 09:01AM Shamar Velazquez     Test Name Result Flag Reference   Hemoglobin A1c 7 9 %Hb H    Reference Range:  American Diabetes Association (ADA) Guidelines:  <5 7: Decreased risk for diabetes  5 7 - 6 4: Increased risk for diabetes  >6 4: Ongoing Hyperglycemia of any cause  <7 0: Glycemic control for adults with diabetes   Estimated Average Glucose 180 mg/dL         Discussion/Summary   Will discuss labs at follow up appt

## 2018-01-16 NOTE — RESULT NOTES
Verified Results  (1) CBC/PLT/DIFF [id number] 08:43AM [facility]     Test Name Result Flag Reference   WBC 5 7 x10E3/uL  3 4-10 8   RBC 4 36 x10E6/uL  4 14-5 80   Hemoglobin 13 5 g/dL  12 6-17 7   Hematocrit 39 4 %  37 5-51 0   MCV 90 fL  79-97   MCH 31 0 pg  26 6-33 0   MCHC 34 3 g/dL  31 5-35 7   RDW 13 3 %  12 3-15 4   Platelets 812 N85D5/CG  150-379   Neutrophils 45 %     Lymphs 40 %     Monocytes 9 %     Eos 4 %     Basos 2 %     Neutrophils (Absolute) 2 6 x10E3/uL  1 4-7 0   Lymphs (Absolute) 2 3 x10E3/uL  0 7-3 1   Monocytes(Absolute) 0 5 x10E3/uL  0 1-0 9   Eos (Absolute) 0 2 x10E3/uL  0 0-0 4   Baso (Absolute) 0 1 x10E3/uL  0 0-0 2   Immature Granulocytes 0 %     Immature Grans (Abs) 0 0 x10E3/uL  0 0-0 1   WBC 5 7 x10E3/uL  3 4-10 8   RBC 4 36 x10E6/uL  4 14-5 80   Hemoglobin 13 5 g/dL  12 6-17 7   Hematocrit 39 4 %  37 5-51 0   MCV 90 fL  79-97   MCH 31 0 pg  26 6-33 0   MCHC 34 3 g/dL  31 5-35 7   RDW 13 3 %  12 3-15 4   Platelets 532 K66M6/KE  150-379   Neutrophils 45 %     Lymphs 40 %     Monocytes 9 %     Eos 4 %     Basos 2 %     Neutrophils (Absolute) 2 6 x10E3/uL  1 4-7 0   Lymphs (Absolute) 2 3 x10E3/uL  0 7-3 1   Monocytes(Absolute) 0 5 x10E3/uL  0 1-0 9   Eos (Absolute) 0 2 x10E3/uL  0 0-0 4   Baso (Absolute) 0 1 x10E3/uL  0 0-0 2   Immature Granulocytes 0 %     Immature Grans (Abs) 0 0 x10E3/uL  0 0-0 1           (1) COMPREHENSIVE METABOLIC PANEL [id number] 55:80WJ [facility]     Test Name Result Flag Reference   Glucose, Serum 135 mg/dL H 65-99   BUN 32 mg/dL H 8-27   Creatinine, Serum 1 64 mg/dL H 0 76-1 27   eGFR If NonAfricn Am 41 mL/min/1 73 L >59   eGFR If Africn Am 48 mL/min/1 73 L >59   BUN/Creatinine Ratio 20  10-22   Sodium, Serum 143 mmol/L  134-144   Potassium, Serum 4 5 mmol/L  3 5-5 2   Chloride, Serum 103 mmol/L     Carbon Dioxide, Total 22 mmol/L  18-29   Calcium, Serum 9 5 mg/dL  8 6-10 2   Protein, Total, Serum 6 4 g/dL  6 0-8 5   Albumin, Serum 4 4 g/dL 3 5-4 8   Globulin, Total 2 0 g/dL  1 5-4 5   A/G Ratio 2 2  1 1-2 5   Bilirubin, Total 0 3 mg/dL  0 0-1 2   Alkaline Phosphatase, S 66 IU/L     AST (SGOT) 22 IU/L  0-40   ALT (SGPT) 17 IU/L  0-44   Glucose, Serum 135 mg/dL H 65-99   BUN 32 mg/dL H 8-27   Creatinine, Serum 1 64 mg/dL H 0 76-1 27   eGFR If NonAfricn Am 41 mL/min/1 73 L >59   eGFR If Africn Am 48 mL/min/1 73 L >59   BUN/Creatinine Ratio 20  10-22   Sodium, Serum 143 mmol/L  134-144   Potassium, Serum 4 5 mmol/L  3 5-5 2   Chloride, Serum 103 mmol/L     Carbon Dioxide, Total 22 mmol/L  18-29   Calcium, Serum 9 5 mg/dL  8 6-10 2   Protein, Total, Serum 6 4 g/dL  6 0-8 5   Albumin, Serum 4 4 g/dL  3 5-4 8   Globulin, Total 2 0 g/dL  1 5-4 5   A/G Ratio 2 2  1 1-2 5   Bilirubin, Total 0 3 mg/dL  0 0-1 2   Alkaline Phosphatase, S 66 IU/L     AST (SGOT) 22 IU/L  0-40   ALT (SGPT) 17 IU/L  0-44           (1) TSH [id number] 08:43AM [facility], Deberah Cockayne     Test Name Result Flag Reference   TSH 0 490 uIU/mL  0 450-4 500                 (1) HEMOGLOBIN A1C [id number] 08:43AM [facility], Deberah Cockayne     Test Name Result Flag Reference   Hemoglobin A1c 7 5 % H 4 8-5 6   Pre-diabetes: 5 7 - 6 4           Diabetes: >6 4           Glycemic control for adults with diabetes: <7 0                       (1) PSA (SCREEN) (Dx V76 44 Screen for Prostate Cancer) [id number] 08:43AM [facility], Deberah Cockayne     Test Name Result Flag Reference   Prostate Specific Ag, Serum 3 2 ng/mL  0 0-4 0   Roche ECLIA methodology  According to the American Urological Association, Serum PSA should  decrease and remain at undetectable levels after radical  prostatectomy  The AUA defines biochemical recurrence as an initial  PSA value 0 2 ng/mL or greater followed by a subsequent confirmatory  PSA value 0 2 ng/mL or greater  Values obtained with different assay methods or kits cannot be used  interchangeably   Results cannot be interpreted as absolute evidence  of the presence or absence of malignant disease  [facility]) [address] [place] [address] Blvd CKD Program [id number] 08:43AM Lizeth Warner November     Test Name Result Flag Reference   Interpretation Note     -------------------------------  CHRONIC KIDNEY DISEASE:  EGFR, BLOOD PRESSURE, AND PROTEINURIA ASSESSMENT  The regression of eGFR with time is not statistically  significant  Current eGFR is 41 mL/min/1 73mE2 corresponding  to CKD stage 3b  Multiply eGFR by 1 159 if patient is    Potassium is within goal and has not  changed significantly, was 4 7 and now is 4 5 mmol/L  Glycemic control (HB A1c: 7 5 %) is not within goal and  additional action is indicated  EGFR, BLOOD PRESSURE, AND PROTEINURIA TREATMENT SUGGESTIONS  -  Guidelines recommend a target blood pressure of 140/90 mmHg  or less in CKD patients to reduce cardiovascular risk and  CKD progression  A higher blood pressure target may be  appropriate in older individuals to avoid symptomatic  hypotension  Assessment of albuminuria (urine  albumin:creatinine ratio or urine protein:creatinine ratio  preferred) is recommended at least annually in CKD patients  for staging and disease prognosis  EGFR, BLOOD PRESSURE, AND PROTEINURIA FOLLOW-UP  -  fasting Renal Panel within 6 months; Spot Urine Panel  (Albumin preferred) is recommended by KDOQI guidelines, at  least yearly; Hemoglobin A1C within 3 months;  -  BONE and MINERAL ASSESSMENT  Calcium is within goal and has not changed significantly,  was 9 3 and now is 9 5 mg/dL  Carbon Dioxide is within goal  and has not changed significantly, was 22 and now is 22  mmol/L  KDOQI guidelines recommend the measurement of  25-hydroxy vitamin D in patients with CKD  BONE and MINERAL TREATMENT SUGGESTIONS  -  Interpretations require simultaneous measurements of serum  calcium and phosphorus    BONE and MINERAL FOLLOW-UP  -  fasting PTH with Renal Panel and 25-Hydroxy Vitamin D are  recommended by KDOQI guidelines, at least yearly;  -  LIPIDS ASSESSMENT  Most recent order does not include a fasting Lipid Panel  LIPIDS FOLLOW-UP  -  fasting Lipid Panel within 9 months;  -  ANEMIA ASSESSMENT  Hemoglobin is normal and has risen, was 13 1 and now is 13 5  g/dL  Hemoglobin target assumes EMMA is not in use  ANEMIA TREATMENT SUGGESTIONS  -  No specific change of treatment is indicated at this time  ANEMIA FOLLOW-UP  -  CBC within 12 months;  -------------------------------  DISCLAIMER  These assessments and treatment suggestions are provided as  a convenience in support of the physician-patient  relationship and are not intended to replace the physician's  clinical judgment  They are derived from the national  guidelines in addition to other evidence and expert opinion  The clinician should consider this information within the  context of clinical opinion and the individual patient  SEE GUIDANCE FOR CHRONIC KIDNEY DISEASE PROGRAM: National  Kidney Foundation Kidney Disease Outcomes Quality Initiative  (KDOQI (TM)), with its limitations and disclaimers, are at  www kidney  org/professionals/KDOQI  Kidney Disease Improving  Global Outcomes (KDIGO) clinical practice guidelines are at  http://kdigo  org/home/guidelines/  The members of  Edward Wilburn national advisory panel are listed at  www  [employer] com  This program is intended for patients who  have been diagnosed with stages 3, 4, or pre-dialysis 5 CKD  It is not intended for children, pregnant patients, or  transplant patients  PDF Image

## 2018-01-17 NOTE — RESULT NOTES
Verified Results  [facility]) CMP14+eGFR [id number] 09:01AM [facility]     Test Name Result Flag Reference   Glucose, Serum 169 mg/dL H 65-99   BUN 25 mg/dL  8-27   Creatinine, Serum 1 67 mg/dL H 0 76-1 27   eGFR If NonAfricn Am 41 mL/min/1 73 L >59   eGFR If Africn Am 47 mL/min/1 73 L >59   BUN/Creatinine Ratio 15  10-22   Sodium, Serum 143 mmol/L  134-144   Potassium, Serum 4 7 mmol/L  3 5-5 2   Chloride, Serum 105 mmol/L     Carbon Dioxide, Total 22 mmol/L  18-29   Calcium, Serum 9 3 mg/dL  8 6-10 2   Protein, Total, Serum 6 4 g/dL  6 0-8 5   Albumin, Serum 4 4 g/dL  3 5-4 8   Globulin, Total 2 0 g/dL  1 5-4 5   A/G Ratio 2 2  1 1-2 5   Bilirubin, Total 0 3 mg/dL  0 0-1 2   Alkaline Phosphatase, S 66 IU/L     AST (SGOT) 18 IU/L  0-40   ALT (SGPT) 12 IU/L  0-44     (LC) Lipid Panel With LDL/HDL Ratio [id number] 09:01AM [facility]     Test Name Result Flag Reference   Cholesterol, Total 160 mg/dL  100-199   Triglycerides 67 mg/dL  0-149   HDL Cholesterol 67 mg/dL  >39   According to ATP-III Guidelines, HDL-C >59 mg/dL is considered a  negative risk factor for CHD  VLDL Cholesterol Keyur 13 mg/dL  5-40   LDL Cholesterol Calc 80 mg/dL  0-99   LDL/HDL Ratio 1 2 ratio units  0 0-3 6   LDL/HDL Ratio                                                             Men  Women                                               1/2 Avg  Risk  1 0    1 5                                                   Avg Risk  3 6    3 2                                                2X Avg  Risk  6 2    5 0                                                3X Avg  Risk  8 0    6 1     (LC) TSH+Free T4 [id number] 09:01AM [facility]     Test Name Result Flag Reference   TSH 4 720 uIU/mL H 0 450-4 500   T4,Free(Direct) 0 99 ng/dL  0 82-1 77     (LC) Microalbumin, Random Urine [id number] 09:01AM [facility]     Test Name Result Flag Reference   Microalbumin, Urine <3 0 ug/mL  0 0-17 0       Discussion/Summary   Will discuss labs at follow up appt

## 2018-01-17 NOTE — RESULT NOTES
Verified Results  [facility]) CBC+Platelet+Hem Review [id number] 09:01AM Naeem Judd     Test Name Result Flag Reference   WBC 6 4 x10E3/uL  3 4-10 8   RBC 4 37 x10E6/uL  4 14-5 80   Hemoglobin 13 1 g/dL  12 6-17 7   Hematocrit 40 0 %  37 5-51 0   MCV 92 fL  79-97   MCH 30 0 pg  26 6-33 0   MCHC 32 8 g/dL  31 5-35 7   RDW 14 0 %  12 3-15 4   Platelets 117 K93Y1/FZ  150-379   Neutrophils 57 %     Lymphs 39 %     Monocytes 3 %     Eos 0 %     Basos 1 %     Neutrophils Absolute 3 6 X10E3/uL  1 4-7 0   Lymphs (Absolute) 2 5 X10E3/uL  0 7-3 1   Monocytes(Absolute) 0 2 X10E3/uL  0 1-0 9   Eos (Absolute Value) 0 0 X10E3/uL  0 0-0 4   Baso(Absolute) 0 1 X10E3/uL  0 0-0 2   RBC Comment RBC's appear normal   Normal   Platelet Comment Adequate  Adequate     [facility]) Cardiovascular Risk Assessment [id number] 09:01AM Naeem Judd     Test Name Result Flag Reference   Interpretation NTAP     PDF Image Not applicable       [facility]) [address] [place] [address] CKD Program [id number] 09:01AM Naeem Judd     Test Name Result Flag Reference   Interpretation Note     -------------------------------  CHRONIC KIDNEY DISEASE:  EGFR, BLOOD PRESSURE, AND PROTEINURIA ASSESSMENT  The regression of eGFR with time is not statistically significant  Current eGFR is 41 mL/min/1 73mE2 corresponding to CKD stage 3b  Multiply eGFR by 1 159 if patient is   Potassium is  within goal and has not changed significantly, was 4 7 and now is 4 7  mmol/L  Urine albumin is undetectable  EGFR, BLOOD PRESSURE, AND PROTEINURIA TREATMENT SUGGESTIONS  -  Based upon current eGFR, patient is at high risk for adverse outcomes  such as CKD progression, CVD, and mortality  Guidelines recommend a  target blood pressure of 140/90 mmHg or less in CKD patients to reduce  cardiovascular risk and CKD progression  A higher blood pressure  target may be appropriate in older individuals to avoid symptomatic  hypotension    EGFR, BLOOD PRESSURE, AND PROTEINURIA FOLLOW-UP  -  Spot Urine Panel (Albumin preferred) within 12 months; fasting Renal  Panel within 6 months;  -  BONE and MINERAL ASSESSMENT  Calcium is within goal and has not changed significantly, was 9 4 and  now is 9 3 mg/dL  Carbon Dioxide is within goal and has not changed  significantly, was 22 and now is 22 mmol/L  KDOQI guidelines recommend  the measurement of 25-hydroxy vitamin D in patients with CKD  BONE and MINERAL TREATMENT SUGGESTIONS  -  Interpretations require simultaneous measurements of serum calcium and  phosphorus  BONE and MINERAL FOLLOW-UP  -  fasting PTH with Renal Panel and 25-Hydroxy Vitamin D are recommended  by KDOQI guidelines, at least yearly;  -  LIPIDS ASSESSMENT  LDL-C is normal and has risen, was 57 and now is 80 mg/dL  Triglyceride is normal and has not changed significantly, was 80 and  now is 67 mg/dL  Non-HDL Cholesterol is optimal and has risen, was 73  and now is 93 mg/dL  HDL-C is high and has risen, was 55 and now is 67  mg/dL  LIPIDS TREATMENT SUGGESTIONS  -  Therapeutic lifestyle changes are always valuable to maintain optimal  blood lipid status (diet, exercise, weight management)  If at least a  50% LDL reduction from baseline has not been achieved, begin or  increase statin  Consider measurement of LDL particle number or Apo B  to adjudicate need for further LDL lowering therapy  If statin cannot  be tolerated or increased, alternatives include use of an intestinal  agent (ezetimibe or bile acid sequestrant) or niacin  LIPIDS FOLLOW-UP  -  fasting Lipid Panel within 12 months;  -  ANEMIA ASSESSMENT  Hemoglobin is normal and has risen, was 12 7 and now is 13 1 g/dL  Hemoglobin target assumes EMMA is not in use  ANEMIA TREATMENT SUGGESTIONS  -  No specific change of treatment is indicated at this time    ANEMIA FOLLOW-UP  -  CBC within 12 months;  -------------------------------  DISCLAIMER  These assessments and treatment suggestions are provided as a  convenience in support of the physician-patient relationship and are  not intended to replace the physician's clinical judgment  They are  derived from the national guidelines in addition to other evidence and  expert opinion  The clinician should consider this information within  the context of clinical opinion and the individual patient  SEE GUIDANCE FOR CHRONIC KIDNEY DISEASE PROGRAM: National Kidney  Foundation Kidney Disease Outcomes Quality Initiative (KDOQI (TM)),  with its limitations and disclaimers, are at  www kidney  org/professionals/KDOQI  Kidney Disease Improving Global  Outcomes (KDIGO) clinical practice guidelines are at  http://kdigo  org/home/guidelines/  The members of Edward Wilburn national  advisory panel are listed at www  [employer] com  This program is  intended for patients who have been diagnosed with stages 3, 4, or  pre-dialysis 5 CKD  It is not intended for children, pregnant  patients, or transplant patients  PDF Image            Discussion/Summary   will discuss labs at follow up appt

## 2018-01-18 NOTE — RESULT NOTES
Discussion/Summary   will discuss labs at follow up appt     Verified Results  (1) CBC/PLT/DIFF [id number] 10:34AM [facility]     Test Name Result Flag Reference   WBC 6 2 x10E3/uL  3 4-10 8   RBC 4 25 x10E6/uL  4 14-5 80   Hemoglobin 13 4 g/dL  12 6-17 7   Hematocrit 38 4 %  37 5-51 0   MCV 90 fL  79-97   MCH 31 5 pg  26 6-33 0   MCHC 34 9 g/dL  31 5-35 7   RDW 13 2 %  12 3-15 4   Platelets 781 S94M6/BS  150-379   Neutrophils 51 %     Lymphs 36 %     Monocytes 10 %     Eos 2 %     Basos 1 %     Neutrophils (Absolute) 3 1 x10E3/uL  1 4-7 0   Lymphs (Absolute) 2 2 x10E3/uL  0 7-3 1   Monocytes(Absolute) 0 6 x10E3/uL  0 1-0 9   Eos (Absolute) 0 1 x10E3/uL  0 0-0 4   Baso (Absolute) 0 1 x10E3/uL  0 0-0 2   Immature Granulocytes 0 %     Immature Grans (Abs) 0 0 x10E3/uL  0 0-0 1     (1) COMPREHENSIVE METABOLIC PANEL [id number] 69:30CF [facility]     Test Name Result Flag Reference   Glucose, Serum 203 mg/dL H 65-99   BUN 24 mg/dL  8-27   Creatinine, Serum 1 65 mg/dL H 0 76-1 27   BUN/Creatinine Ratio 15  10-24   Sodium, Serum 142 mmol/L  134-144   Potassium, Serum 4 3 mmol/L  3 5-5 2   Chloride, Serum 102 mmol/L     Carbon Dioxide, Total 24 mmol/L  18-29   Calcium, Serum 9 1 mg/dL  8 6-10 2   Protein, Total, Serum 6 2 g/dL  6 0-8 5   Albumin, Serum 4 2 g/dL  3 5-4 8   Globulin, Total 2 0 g/dL  1 5-4 5   A/G Ratio 2 1  1 2-2 2   Bilirubin, Total 0 3 mg/dL  0 0-1 2   Alkaline Phosphatase, S 78 IU/L     AST (SGOT) 17 IU/L  0-40   ALT (SGPT) 13 IU/L  0-44   eGFR If NonAfricn Am 41 mL/min/1 73 L >59   eGFR If Africn Am 47 mL/min/1 73 L >59     (1) TSH [id number] 10:34AM [facility]      Test Name Result Flag Reference   TSH 2 950 uIU/mL  0 450-4 500     (1) HEMOGLOBIN A1C [id number] 10:34AM [facility]     Test Name Result Flag Reference   Hemoglobin A1c 7 3 % H 4 8-5 6   Pre-diabetes: 5 7 - 6 4           Diabetes: >6 4           Glycemic control for adults with diabetes: <7 0     () [facility] CKD Program [id number] 10:34AM Jesus Ch     Test Name Result Flag Reference   Interpretation Note     -------------------------------  CHRONIC KIDNEY DISEASE:  EGFR, BLOOD PRESSURE, AND PROTEINURIA ASSESSMENT  Current eGFR is 41 mL/min/1 73mE2 corresponding to CKD stage  3b  Multiply eGFR by 1 159 if patient is   Potassium is within goal and has not changed significantly,  was 4 2 and now is 4 3 mmol/L  Glycemic control (HB A1c: 7 3  risk for hypoglycemia  Previous urine protein measurement  was normal   EGFR, BLOOD PRESSURE, AND PROTEINURIA TREATMENT SUGGESTIONS  -  Based upon current eGFR, patient is at high risk for adverse  outcomes such as CKD progression, CVD, and mortality  Guidelines recommend a target blood pressure of 140/90 mmHg  or less in CKD patients to reduce cardiovascular risk and  CKD progression  A higher blood pressure target may be  appropriate in older individuals to avoid symptomatic  hypotension  EGFR, BLOOD PRESSURE, AND PROTEINURIA FOLLOW-UP  -  fasting Renal Panel within 6 months; Spot Urine Panel  (Albumin preferred) within 5 months; Hemoglobin A1C within 3  months;  -  BONE and MINERAL ASSESSMENT  Calcium is within goal and has not changed significantly,  was 9 4 and now is 9 1 mg/dL  Carbon Dioxide is within goal  and has risen, was 22 and now is 24 mmol/L  KDOQI guidelines  recommend the measurement of 25-hydroxy vitamin D in  patients with CKD  BONE and MINERAL TREATMENT SUGGESTIONS  -  Interpretations require simultaneous measurements of serum  calcium and phosphorus  BONE and MINERAL FOLLOW-UP  -  fasting PTH with Renal Panel and 25-Hydroxy Vitamin D are  recommended by KDOQI guidelines, at least yearly;  -  LIPIDS ASSESSMENT  Most recent order does not include a fasting Lipid Panel  LIPIDS FOLLOW-UP  -  fasting Lipid Panel within 9 months;  -  ANEMIA ASSESSMENT  Hemoglobin is normal and has decreased, was 14 3 and now is  13 4 g/dL   Hemoglobin target assumes EMMA is not in use  ANEMIA TREATMENT SUGGESTIONS  -  No specific change of treatment is indicated at this time  ANEMIA FOLLOW-UP  -  CBC within 12 months;  -------------------------------  DISCLAIMER  These assessments and treatment suggestions are provided as  a convenience in support of the physician-patient  relationship and are not intended to replace the physician's  clinical judgment  They are derived from the national  guidelines in addition to other evidence and expert opinion  The clinician should consider this information within the  context of clinical opinion and the individual patient  SEE GUIDANCE FOR CHRONIC KIDNEY DISEASE PROGRAM: National  Kidney Foundation Kidney Disease Outcomes Quality Initiative  (KDOQI (TM)), with its limitations and disclaimers, are at  www kidney  org/professionals/KDOQI  Kidney Disease Improving  Global Outcomes (KDIGO) clinical practice guidelines are at  http://kdigo  org/home/guidelines/  The members of  Edward Wilburn national advisory panel are listed at  www  [employer] com  This program is intended for patients who  have been diagnosed with stages 3, 4, or pre-dialysis 5 CKD  It is not intended for children, pregnant patients, or  transplant patients  PDF Image

## 2018-01-22 VITALS
HEIGHT: 69 IN | BODY MASS INDEX: 30.07 KG/M2 | OXYGEN SATURATION: 97 % | HEART RATE: 63 BPM | SYSTOLIC BLOOD PRESSURE: 122 MMHG | DIASTOLIC BLOOD PRESSURE: 76 MMHG | WEIGHT: 203 LBS

## 2018-01-23 VITALS
RESPIRATION RATE: 18 BRPM | SYSTOLIC BLOOD PRESSURE: 106 MMHG | BODY MASS INDEX: 29.92 KG/M2 | HEART RATE: 76 BPM | TEMPERATURE: 97.2 F | DIASTOLIC BLOOD PRESSURE: 60 MMHG | WEIGHT: 202 LBS | HEIGHT: 69 IN

## 2018-01-23 NOTE — RESULT NOTES
Discussion/Summary   will discuss labs at follow up appt     Verified Results  (1) CBC/PLT/DIFF [id number] 09:04AM [facility]     Test Name Result Flag Reference   WBC 5 6 x10E3/uL  3 4-10 8   RBC 4 40 x10E6/uL  4 14-5 80   Hemoglobin 13 9 g/dL  13 0-17 7   **Please note reference interval change**   Hematocrit 40 1 %  37 5-51 0   MCV 91 fL  79-97   MCH 31 6 pg  26 6-33 0   MCHC 34 7 g/dL  31 5-35 7   RDW 12 6 %  12 3-15 4   Platelets 794 L70T5/PV  150-379   Neutrophils 43 %  Not Estab  Lymphs 41 %  Not Estab  Monocytes 12 %  Not Estab  Eos 3 %  Not Estab  Basos 1 %  Not Estab  Neutrophils (Absolute) 2 4 x10E3/uL  1 4-7 0   Lymphs (Absolute) 2 3 x10E3/uL  0 7-3 1   Monocytes(Absolute) 0 7 x10E3/uL  0 1-0 9   Eos (Absolute) 0 2 x10E3/uL  0 0-0 4   Baso (Absolute) 0 1 x10E3/uL  0 0-0 2   Immature Granulocytes 0 %  Not Estab     Immature Grans (Abs) 0 0 x10E3/uL  0 0-0 1     (1) COMPREHENSIVE METABOLIC PANEL [id number] 62:25PA [facility]     Test Name Result Flag Reference   Glucose, Serum 132 mg/dL H 65-99   BUN 28 mg/dL H 8-27   Creatinine, Serum 1 60 mg/dL H 0 76-1 27   BUN/Creatinine Ratio 18  10-24   Sodium, Serum 143 mmol/L  134-144   Potassium, Serum 4 4 mmol/L  3 5-5 2   Chloride, Serum 104 mmol/L     Carbon Dioxide, Total 22 mmol/L  18-29   Calcium, Serum 9 4 mg/dL  8 6-10 2   Protein, Total, Serum 6 3 g/dL  6 0-8 5   Albumin, Serum 4 1 g/dL  3 5-4 8   Globulin, Total 2 2 g/dL  1 5-4 5   A/G Ratio 1 9  1 2-2 2   Bilirubin, Total 0 4 mg/dL  0 0-1 2   Alkaline Phosphatase, S 76 IU/L     AST (SGOT) 20 IU/L  0-40   ALT (SGPT) 16 IU/L  0-44   eGFR If NonAfricn Am 42 mL/min/1 73 L >59   eGFR If Africn Am 49 mL/min/1 73 L >59     (1) LIPID PANEL, FASTING [id number] 09:04AM [facility]     Test Name Result Flag Reference   Cholesterol, Total 153 mg/dL  100-199   Triglycerides 92 mg/dL  0-149   HDL Cholesterol 57 mg/dL  >39   VLDL Cholesterol Keyur 18 mg/dL  5-40   LDL Cholesterol Calc 78 mg/dL  0-99   T  Chol/HDL Ratio 2 7 ratio units  0 0-5 0   T  Chol/HDL Ratio                                                             Men  Women                                               1/2 Avg  Risk  3 4    3 3                                                   Avg Risk  5 0    4 4                                                2X Avg  Risk  9 6    7 1                                                3X Avg  Risk 23 4   11 0     (1) TSH [id number] 09:04AM [facility] Mason[facility]     Test Name Result Flag Reference   TSH 3 630 uIU/mL  0 450-4 500     (1) HEMOGLOBIN A1C [id number] 09:04AM Tony Norberto     Test Name Result Flag Reference   Hemoglobin A1c 7 4 % H 4 8-5 6   Pre-diabetes: 5 7 - 6 4           Diabetes: >6 4           Glycemic control for adults with diabetes: <7 0     () [facility] CKD Program [id number] 09:04AM Tonyyovany AponteNorberto     Test Name Result Flag Reference   Interpretation Note     -------------------------------  CHRONIC KIDNEY DISEASE:  EGFR, BLOOD PRESSURE, AND PROTEINURIA ASSESSMENT  The overall regression of eGFR with time is not  statistically significant  Current eGFR is 42 mL/min/1 73mE2  corresponding to CKD stage 3b  Multiply eGFR by 1 159 if  patient is   Potassium is within goal and  has not changed significantly, was 4 3 and now is 4 4  mmol/L  Glycemic control (HB A1c: 7 4 %) is above goal but  may be appropriate if patient is at risk for hypoglycemia  Previous urine protein measurement was normal   EGFR, BLOOD PRESSURE, AND PROTEINURIA TREATMENT SUGGESTIONS  -  Based upon current eGFR, patient is at high risk for adverse  outcomes such as CKD progression, CVD, and mortality  Guidelines recommend a target blood pressure of 140/90 mmHg  or less in CKD patients to reduce cardiovascular risk and  CKD progression  A higher blood pressure target may be  appropriate in older individuals to avoid symptomatic  hypotension    EGFR, BLOOD PRESSURE, AND PROTEINURIA FOLLOW-UP  -  fasting Renal Panel within 6 months; Spot Urine Panel  (Albumin preferred) within 1 month; Hemoglobin A1C within 3  months;  -  BONE and MINERAL ASSESSMENT  Calcium is within goal and has not changed significantly,  was 9 1 and now is 9 4 mg/dL  Carbon Dioxide is within goal  and has not changed significantly, was 24 and now is 22  mmol/L  Guidelines recommend the measurement of 25-hydroxy  vitamin D in patients with CKD  BONE and MINERAL TREATMENT SUGGESTIONS  -  Interpretations require simultaneous measurements of serum  calcium and phosphorus  BONE and MINERAL FOLLOW-UP  -  fasting PTH with Renal Panel and 25-Hydroxy Vitamin D are  recommended by KDOQI guidelines, at least yearly;  -  LIPIDS ASSESSMENT  LDL-C is normal and has not changed significantly, was 81  and now is 78 mg/dL  Triglyceride is normal and has not  changed significantly, was 74 and now is 92 mg/dL  Non-HDL  Cholesterol is optimal and has not changed significantly,  was 96 and now is 96 mg/dL  HDL-C is normal and has not  changed significantly, was 59 and now is 57 mg/dL  LIPIDS TREATMENT SUGGESTIONS  -  Therapeutic lifestyle changes are always valuable to  maintain optimal blood lipid status (diet, exercise, weight  management)  If at least a 50% LDL reduction from baseline  has not been achieved, begin or increase statin  Consider  measurement of LDL particle number or Apo B to adjudicate  need for further LDL lowering therapy  If statin cannot be  tolerated or increased, alternatives include use of an  intestinal agent (ezetimibe or bile acid sequestrant) or  niacin  LIPIDS FOLLOW-UP  -  fasting Lipid Panel within 12 months;  -  ANEMIA ASSESSMENT  Hemoglobin is normal and has risen, was 13 4 and now is 13 9  g/dL  Hemoglobin target assumes EMMA is not in use  ANEMIA TREATMENT SUGGESTIONS  -  No specific change of treatment is indicated at this time    ANEMIA FOLLOW-UP  -  CBC within 12 months;  -------------------------------  DISCLAIMER  These assessments and treatment suggestions are provided as  a convenience in support of the physician-patient  relationship and are not intended to replace the physician's  clinical judgment  They are derived from national guidelines  in addition to other evidence and expert opinion  The  clinician should consider this information within the  context of clinical opinion and the individual patient  SEE GUIDANCE FOR CHRONIC KIDNEY DISEASE PROGRAM: Kidney  Disease Improving Global Outcomes (KDIGO) clinical practice  guidelines are at http://kdigo  org/home/guidelines/   [facility] Kidney Disease Outcomes Quality  Initiative (KDOQI (TM)), with its limitations and  disclaimers, are at www kidney  org/professionals/KDOQI  This  program is intended for patients who have been diagnosed  with stages 3, 4, or pre-dialysis 5 CKD  It is not intended  for children, pregnant patients, or transplant patients  PDF Image   [facility]) Cardiovascular Risk Assessment [id number] 09:04AM Atul Rocha     Test Name Result Flag Reference   Interpretation Note     Supplemental report is available     PDF Image Not applicable

## 2018-01-23 NOTE — PROGRESS NOTES
Assessment    1  Former smoker (V15 82) (A74 321)   · quit 1976; 4 ppd habit   2  Chronic kidney disease, stage 3 (585 3) (N18 3)   3  Diabetes mellitus due to underlying condition with diabetic autonomic neuropathy   (249 60,337 1) (E08 43)   4  HTN (hypertension) (401 9) (I10)   5  Hypothyroidism (244 9) (E03 9)   6  Hammond's esophagus (530 85) (K22 70)   7  Coronary artery disease (414 00) (I25 10)   8  On angiotensin-converting enzyme (ACE) inhibitors (V07 52) (Z79 899)   9  Medicare annual wellness visit, subsequent (V70 0) (Z00 00)    Plan  Hammond's esophagus, Chronic kidney disease, stage 3, Coronary artery disease, Diabetes  mellitus due to underlying condition with diabetic autonomic neuropathy, HTN  (hypertension), Hypothyroidism, On angiotensin-converting enzyme (ACE) inhibitors    · (1) CBC/PLT/DIFF; Status:Active; Requested ECQ:17DUJ7824;    · (1) COMPREHENSIVE METABOLIC PANEL; Status:Active; Requested for:19Apr2018;    · (1) HEMOGLOBIN A1C; Status:Active; Requested for:19Apr2018;    · (1) MICROALBUMIN CREATININE RATIO, RANDOM URINE; Status:Active; Requested  for:19Apr2018;    · Follow-up visit in 4 Months Evaluation and Treatment  Follow-up  Status: Complete  Done:  20IVM7747  Diabetes mellitus due to underlying condition with diabetic autonomic neuropathy    · From  HumaLOG KwikPen 100 UNIT/ML Subcutaneous Solution Pen-injector  Inject subcutaneously up to 8 units 3 times daily To HumaLOG  KwikPen 100 UNIT/ML Subcutaneous Solution Pen-injector Inject subcutaneously up to  9 units 3 times daily   · From  Levemir FlexTouch 100 UNIT/ML Subcutaneous Solution Pen-injector  Inject subcutaneously 40  units at bedtime To Levemir FlexTouch 100  UNIT/ML Subcutaneous Solution Pen-injector Inject subcutaneously 42  units at bedtime  HTN (hypertension)    · Lisinopril 5 MG Oral Tablet; 1 every day   · Furosemide 40 MG Oral Tablet;  Take 1 tablet by mouth  daily   · Metoprolol Succinate ER 25 MG Oral Tablet Extended Release 24 Hour; Take  1 tablet daily  Hypothyroidism    · Levothyroxine Sodium 50 MCG Oral Tablet; Take 1 tablet by mouth  daily  Medicare annual wellness visit, subsequent    · Medicare Annual Wellness Visit; Status:Complete;   Done: 62DWB9787 04:52PM  On angiotensin-converting enzyme (ACE) inhibitors    · CoQ-10 100 MG Oral Capsule; TAKE AS DIRECTED  Unlinked    · Aspir-81 81 MG Oral Tablet Delayed Release; 1 every day    Discussion/Summary  Impression: Subsequent Annual Wellness Visit  Cardiovascular screening and counseling: screening is current, due for a lipid panel and Dx - V81 2 Screen for CV Disorder  Diabetes screening and counseling: screening is current, due for blood glucose and Dx - V77 1 Screen for DM  Colorectal cancer screening and counseling: the risks and benefits of screening were discussed and Dx - V76 51 Screen for CRC  Prostate cancer screening and counseling: Dx - V76 44 Screen PSA  Chief Complaint  AWV rmklpn      History of Present Illness  HPI: pt here for an AWV   Welcome to [employer] and Wellness Visits: The patient is being seen for the subsequent annual wellness visit  Medicare Screening and Risk Factors   Hospitalizations: no previous hospitalizations  Medicare Screening Tests Risk Questions   Drug and Alcohol Use: The patient is a former cigarette smoker  The patient reports never drinking alcohol  He has never used illicit drugs  Diet and Physical Activity: Current diet includes well balanced meals and limited junk food  He exercises infrequently  Exercise: walking  Mood Disorder and Cognitive Impairment Screening: He denies feeling down, depressed, or hopeless over the past two weeks  He denies feeling little interest or pleasure in doing things over the past two weeks     Cognitive impairment screening: denies difficulty learning/retaining new information, denies difficulty handling complex tasks, denies difficulty with reasoning, denies difficulty with spatial ability and orientation, denies difficulty with language and denies difficulty with behavior  Functional Ability/Level of Safety: Hearing is normal bilaterally  He does not use a hearing aid  The patient is currently able to do activities of daily living without limitations, able to do instrumental activities of daily living without limitations, able to participate in social activities without limitations and able to drive without limitations  Activities of daily living details: does not need help using the phone, no transportation help needed, does not need help shopping, no meal preparation help needed, does not need help doing housework, does not need help doing laundry, does not need help managing medications and does not need help managing money  Fall risk factors: The patient fell 0 times in the past 12 months  Home safety risk factors:  no grab bars in the bathroom, but no unfamiliar surroundings, no loose rugs, no poor household lighting, no uneven floors, no household clutter and handrails on the stairs  Advance Directives: Advance directives: no living will, no durable power of  for health care directives and no advance directives  Co-Managers and Medical Equipment/Suppliers: See Patient Care Team   Preventive Quality Program 65 and Older: Falls Risk: The patient fell 0 times in the past 12 months  The patient currently has no urinary incontinence symptoms  Patient Care Team    Care Team Member Role Specialty Office Number   Chapito Talley MD Specialist Ophthalmology [phone]   Lizette Marston Referring Family Medicine [phone] [address] [place] [address] Specialist Cardiology [phone]   Ida CHRISTINE  Specialist Nephrology [phone]   Ricardo Frazier MD Specialist Urology [phone]   [address] [place] [address] Specialist Podiatry [phone]     Active Problems    1  Abnormal echocardiogram (793 2) (R93 1)   2   Hammond's esophagus (414 02) (K22 70)   3  BMI 29 0-29 9,adult (V85 25) (Z68 29)   4  Chronic constipation (564 00) (K59 09)   5  Chronic kidney disease, stage 3 (585 3) (N18 3)   6  Colon, diverticulosis (562 10) (K57 30)   7  Cor pulmonale (416 9) (I27 81)   8  Coronary artery disease (414 00) (I25 10)   9  Diabetes mellitus due to underlying condition with diabetic autonomic neuropathy   (249 60,337 1) (E08 43)   10  Disc degeneration, lumbar (722 52) (M51 36)   11  Encounter for screening for malignant neoplasm of colon (V76 51) (Z12 11)   12  Enlarged prostate (600 00) (N40 0)   13  Flu vaccine need (V04 81) (Z23)   14  Former smoker (V15 82) (L54 816)   15  H/O heart artery stent (V45 82) (Z95 5)   16  Hiatal hernia (553 3) (K44 9)   17  History of myocardial infarction (412) (I25 2)   18  HTN (hypertension) (401 9) (I10)   19  Hypothyroidism (244 9) (E03 9)   20  Lumbar radiculopathy (724 4) (M54 16)   21  Medicare annual wellness visit, initial (V70 0) (Z00 00)   22  Mixed hyperlipidemia (272 2) (E78 2)   23  Need for pneumococcal vaccination (V03 82) (Z23)   24  Need for shingles vaccine (V04 89) (Z23)   25  Need for tetanus booster (V03 7) (Z23)   26  Need for vaccination (V05 9) (Z23)   27  On angiotensin-converting enzyme (ACE) inhibitors (V07 52) (Z79 899)   28  Pes planus, congenital (754 61) (Q66 50)   29  Renal insufficiency (593 9) (N28 9)   30  Screening for AAA (abdominal aortic aneurysm) (V81 2) (Z13 6)   31   Vitamin D deficiency (268 9) (E55 9)    Past Medical History    · History of Abnormal taste in mouth (781 1) (R43 2)   · History of Acquired ankle/foot deformity (736 70) (M21 969)   · History of Acute upper respiratory infection (465 9) (J06 9)   · History of Advance care planning (V65 49) (Z71 89)   · History of Aortic narrowing (747 10) (Q25 1)   · History of Bilateral leg edema (782 3) (R60 0)   · History of Bruise (924 9) (T14 8XXA)   · History of Bulla, lung (492 0) (J43 9)   · History of Bunion (727 1) (M21 619)   · History of Callus (700) (L84)   · History of Difficulty walking (719 7) (R26 2)   · History of Encounter for screening for malignant neoplasm of prostate (V76 44) (Z12 5)   · History of Encounter for screening for osteoporosis (V82 81) (V69 363)   · Hiatal hernia (553 3) (K44 9)   · History of arthritis (V13 4) (Z87 39)   · History of diabetes mellitus (V12 29) (Z86 39)   · History of diverticulitis of colon (V12 79) (Z87 19)   · History of high cholesterol (V12 29) (Z86 39)   · History of hypertension (V12 59) (Z86 79)   · History of kidney problems (V13 09) (Z87 448)   · History of thyroid disease (V12 29) (Z86 39)   · History of transient cerebral ischemia (V12 54) (Z86 73)   · History of urinary tract infection (V13 02) (Z87 440)   · Old myocardial infarction (412) (I25 2)   · History of Screening for genitourinary condition (V81 6) (Z13 89)    Surgical History    · History of Cath Stent Placement   · History of Colonoscopy (Fiberoptic) Screening   · History of Complete Colonoscopy   · History of Knee Surgery   · History of Umbilical Hernia Repair   · History of Upper Gastrointestinal Endoscopy (Therapeutic)    Family History  Mother    · Family history of Cancer, colon   · Family history of arthritis (V17 7) (Z82 61)   · Family history of diabetes mellitus (V18 0) (Z83 3)   · Family history of hypertension (V17 49) (Z82 49)   · Family history of malignant neoplasm of breast (V16 3) (Z80 3)   · Family history of High cholesterol  Sibling    · Family history of diabetes mellitus (V18 0) (Z83 3)   · Family history of hypertension (V17 49) (Z82 49)  Sister    · Family history of malignant neoplasm (V16 9) (Z80 9)  Brother    · Family history of cerebrovascular accident (CVA) (V17 1) (Z82 3)    Social History    · Former smoker (V15 82) (Z87 891)   · quit 1976; 4 ppd habit   · Lack of exercise (V69 0) (Z72 3)   ·    · No alcohol use   · Sleeps 6 -7 hours a day    Current Meds   1   Aspir-81 81 MG Oral Tablet Delayed Release; 1 every day; Therapy: 87GSV8922 to Recorded   2  Atorvastatin Calcium 40 MG Oral Tablet; Take 1 tablet by mouth  every day; Therapy: 38Bgi8397 to (Evaluate:02Jan2018)  Requested for: 14SRG5031; Last   Rx:04Oct2017 Ordered   3  BD Pen Needle Sherie U/F 32G X 4 MM Miscellaneous; Use 4 times daily to inject insulin   as directed; Therapy: 22XRH4840 to (Evaluate:09Sep2017)  Requested for: 71Axv0755; Last   Rx:63Ymm3137 Ordered   4  Furosemide 40 MG Oral Tablet; Take 1 tablet by mouth  daily; Therapy: 62THZ5799 to (Evaluate:04Feb2018)  Requested for: 62JOP8298; Last   Rx:06Nov2017 Ordered   5  HumaLOG KwikPen 100 UNIT/ML Subcutaneous Solution Pen-injector; Inject   subcutaneously up to 8 units 3 times daily; Therapy: 85ZOF3250 to (Evaluate:08Feb2018)  Requested for: 86XBC8914; Last   Rx:19Hsd5374 Ordered   6  Levemir FlexTouch 100 UNIT/ML Subcutaneous Solution Pen-injector; Inject   subcutaneously 40  units at bedtime; Therapy: 28RYN9298 to (Evaluate:08Jan2018)  Requested for: 69SAQ0149; Last   Rx:50Jhd7206 Ordered   7  Levothyroxine Sodium 50 MCG Oral Tablet; Take 1 tablet by mouth  daily; Therapy: 04Apr2016 to ()  Requested for: 04Oct2017; Last   Rx:04Oct2017 Ordered   8  Lisinopril 10 MG Oral Tablet; Take 1 tablet by mouth  daily; Therapy: 20Kgg8845 to (Evaluate:04Feb2018)  Requested for: 14ZOV3904; Last   Rx:06Nov2017 Ordered   9  Metoprolol Succinate ER 25 MG Oral Tablet Extended Release 24 Hour; Take 1 tablet   daily; Therapy: 86IAC4865 to (Evaluate:21Apr2018)  Requested for: 98YTE5579; Last   Rx:72Yqd9811 Ordered   10  Multi For Him 50+ Oral Tablet; TAKE 1 TABLET DAILY; Therapy: (Ludmila Scanlon) to Recorded   11  Pantoprazole Sodium 40 MG Oral Tablet Delayed Release; Take 1 tablet by mouth     daily; Therapy: 60ZGR5988 to (Patsey Peabody)  Requested for: 56PRI5974; Last    Rx:74Fby7904 Ordered    Allergies    1   No Known Drug Allergies    Immunizations   1    Influenza  Temporarily Deferred: Pt refuses    PCV  28-Dec-2015    PPSV  Temporarily Deferred: Pt refuses    Tetanus  Temporarily Deferred: Pt refuses    Zoster  Temporarily Deferred: Pt refuses     Vitals  Signs    Temperature: 97 2 F  Heart Rate: 76  Respiration: 18  Systolic: 306  Diastolic: 60  Height: 5 ft 8 5 in  Weight: 202 lb   BMI Calculated: 30 27  BSA Calculated: 2 06    Results/Data  PHQ-2 Adult Depression Screening 84Wjf4945 02:48PM User, [employer]     Test Name Result Flag Reference   PHQ-2 Adult Depression Score 0     Over the last two weeks, how often have you been bothered by any of the following problems? Little interest or pleasure in doing things: Not at all - 0  Feeling down, depressed, or hopeless: Not at all - 0   PHQ-2 Adult Depression Screening Negative       Falls Risk Assessment (Dx Z13 89 Screen for Neurologic Disorder) 66LIH4221 02:48PM User, [employer]     Test Name Result Flag Reference   Falls Risk      No falls in the past year       Health Management  Controlled type 2 diabetes mellitus with insulin therapy   *VB - Eye Exam; every 1 year; Last 08Fpl4692; Next Due: 53Itw1506; Active  *VB - Foot Exam; every 1 year; Last 08AGB9902; Next Due: 71ZHP0212;  Active    Signatures   Electronically signed by : Catarina Bloch, DO; Dec 19 2017  4:53PM EST                       (Author)

## 2018-01-24 DIAGNOSIS — I10 ESSENTIAL HYPERTENSION: Primary | ICD-10-CM

## 2018-01-24 RX ORDER — FUROSEMIDE 40 MG/1
TABLET ORAL
Qty: 90 TABLET | Refills: 1 | Status: SHIPPED | OUTPATIENT
Start: 2018-01-24 | End: 2018-02-05 | Stop reason: SDUPTHER

## 2018-02-05 DIAGNOSIS — E08.43 DIABETES MELLITUS DUE TO UNDERLYING CONDITION WITH DIABETIC AUTONOMIC NEUROPATHY, WITH LONG-TERM CURRENT USE OF INSULIN (HCC): Primary | ICD-10-CM

## 2018-02-05 DIAGNOSIS — Z79.4 DIABETES MELLITUS DUE TO UNDERLYING CONDITION WITH DIABETIC AUTONOMIC NEUROPATHY, WITH LONG-TERM CURRENT USE OF INSULIN (HCC): Primary | ICD-10-CM

## 2018-02-05 DIAGNOSIS — I10 ESSENTIAL HYPERTENSION: ICD-10-CM

## 2018-02-05 PROBLEM — N18.30 CHRONIC KIDNEY DISEASE, STAGE 3 (HCC): Status: ACTIVE | Noted: 2017-01-23

## 2018-02-05 RX ORDER — FUROSEMIDE 40 MG/1
TABLET ORAL
Qty: 90 TABLET | Refills: 1 | Status: SHIPPED | OUTPATIENT
Start: 2018-02-05 | End: 2018-08-30 | Stop reason: SDUPTHER

## 2018-02-05 RX ORDER — LEVOTHYROXINE SODIUM 0.05 MG/1
TABLET ORAL
Qty: 90 TABLET | Refills: 1 | Status: SHIPPED | OUTPATIENT
Start: 2018-02-05 | End: 2018-05-10 | Stop reason: SDUPTHER

## 2018-02-05 RX ORDER — INSULIN LISPRO 100 [IU]/ML
INJECTION, SOLUTION INTRAVENOUS; SUBCUTANEOUS
Qty: 30 ML | Refills: 1 | Status: SHIPPED | OUTPATIENT
Start: 2018-02-05 | End: 2019-02-22 | Stop reason: SDUPTHER

## 2018-02-05 RX ORDER — PEN NEEDLE, DIABETIC 32GX 5/32"
NEEDLE, DISPOSABLE MISCELLANEOUS
Qty: 200 EACH | Refills: 1 | Status: SHIPPED | OUTPATIENT
Start: 2018-02-05 | End: 2018-06-04 | Stop reason: SDUPTHER

## 2018-02-05 RX ORDER — INSULIN DETEMIR 100 [IU]/ML
INJECTION, SOLUTION SUBCUTANEOUS
Qty: 45 ML | Refills: 1 | Status: SHIPPED | OUTPATIENT
Start: 2018-02-05 | End: 2019-02-22 | Stop reason: SDUPTHER

## 2018-03-12 ENCOUNTER — TELEPHONE (OUTPATIENT)
Dept: FAMILY MEDICINE CLINIC | Facility: CLINIC | Age: 73
End: 2018-03-12

## 2018-03-12 NOTE — TELEPHONE ENCOUNTER
Please have pt call his ins and get his formulary so we can reccomend a viable alternative  I have no idea what his plan will allow

## 2018-03-12 NOTE — TELEPHONE ENCOUNTER
PT SAYS THAT HIS HUMALOG QUICK PEN IS TOO EXPENSIVE, HE WANTS TO KNOW WHAT ELSE WE CAN RECOMMEND  I RECOMMENDED CHECKING WITH HIS INSURANCE, BUT HE WANTED TO KNOW WHAT DR LOMBARDI THOUGHT MIGHT BE COMPARABLE   PLEASE ADVISE

## 2018-03-26 DIAGNOSIS — E78.2 MIXED HYPERLIPIDEMIA: Primary | ICD-10-CM

## 2018-03-26 DIAGNOSIS — K22.719 BARRETT'S ESOPHAGUS WITH DYSPLASIA: ICD-10-CM

## 2018-03-26 DIAGNOSIS — I10 ESSENTIAL HYPERTENSION: Primary | ICD-10-CM

## 2018-03-26 RX ORDER — ATORVASTATIN CALCIUM 40 MG/1
TABLET, FILM COATED ORAL
Qty: 90 TABLET | Refills: 1 | Status: SHIPPED | OUTPATIENT
Start: 2018-03-26 | End: 2018-08-30 | Stop reason: SDUPTHER

## 2018-03-26 RX ORDER — METOPROLOL SUCCINATE 25 MG/1
TABLET, EXTENDED RELEASE ORAL
Qty: 90 TABLET | Refills: 2 | Status: SHIPPED | OUTPATIENT
Start: 2018-03-26 | End: 2018-11-15 | Stop reason: SDUPTHER

## 2018-03-26 RX ORDER — PANTOPRAZOLE SODIUM 40 MG/1
TABLET, DELAYED RELEASE ORAL
Qty: 90 TABLET | Refills: 1 | Status: SHIPPED | OUTPATIENT
Start: 2018-03-26 | End: 2019-05-11 | Stop reason: SDUPTHER

## 2018-04-20 LAB
ALBUMIN SERPL-MCNC: 4.3 G/DL (ref 3.5–4.8)
ALBUMIN/CREAT UR: 8.4 MG/G CREAT (ref 0–30)
ALBUMIN/GLOB SERPL: 2.3 {RATIO} (ref 1.2–2.2)
ALP SERPL-CCNC: 86 IU/L (ref 39–117)
ALT SERPL-CCNC: 16 IU/L (ref 0–44)
AST SERPL-CCNC: 18 IU/L (ref 0–40)
BASOPHILS # BLD AUTO: 0.1 X10E3/UL (ref 0–0.2)
BASOPHILS NFR BLD AUTO: 2 %
BILIRUB SERPL-MCNC: 0.3 MG/DL (ref 0–1.2)
BUN SERPL-MCNC: 29 MG/DL (ref 8–27)
BUN/CREAT SERPL: 16 (ref 10–24)
CALCIUM SERPL-MCNC: 9.4 MG/DL (ref 8.6–10.2)
CHLORIDE SERPL-SCNC: 100 MMOL/L (ref 96–106)
CO2 SERPL-SCNC: 24 MMOL/L (ref 18–29)
CREAT SERPL-MCNC: 1.79 MG/DL (ref 0.76–1.27)
CREAT UR-MCNC: 58.6 MG/DL
EOSINOPHIL # BLD AUTO: 0.3 X10E3/UL (ref 0–0.4)
EOSINOPHIL NFR BLD AUTO: 4 %
ERYTHROCYTE [DISTWIDTH] IN BLOOD BY AUTOMATED COUNT: 12.8 % (ref 12.3–15.4)
GLOBULIN SER-MCNC: 1.9 G/DL (ref 1.5–4.5)
GLUCOSE SERPL-MCNC: 190 MG/DL (ref 65–99)
HBA1C MFR BLD: 7.5 % (ref 4.8–5.6)
HCT VFR BLD AUTO: 41.3 % (ref 37.5–51)
HGB BLD-MCNC: 14.3 G/DL (ref 13–17.7)
IMM GRANULOCYTES # BLD: 0 X10E3/UL (ref 0–0.1)
IMM GRANULOCYTES NFR BLD: 0 %
LYMPHOCYTES # BLD AUTO: 2.4 X10E3/UL (ref 0.7–3.1)
LYMPHOCYTES NFR BLD AUTO: 37 %
MCH RBC QN AUTO: 31.8 PG (ref 26.6–33)
MCHC RBC AUTO-ENTMCNC: 34.6 G/DL (ref 31.5–35.7)
MCV RBC AUTO: 92 FL (ref 79–97)
MICROALBUMIN UR-MCNC: 4.9 UG/ML
MICRODELETION SYND BLD/T FISH: NORMAL
MONOCYTES # BLD AUTO: 0.8 X10E3/UL (ref 0.1–0.9)
MONOCYTES NFR BLD AUTO: 12 %
NEUTROPHILS # BLD AUTO: 3 X10E3/UL (ref 1.4–7)
NEUTROPHILS NFR BLD AUTO: 45 %
PLATELET # BLD AUTO: 276 X10E3/UL (ref 150–379)
POTASSIUM SERPL-SCNC: 4.3 MMOL/L (ref 3.5–5.2)
PROT SERPL-MCNC: 6.2 G/DL (ref 6–8.5)
RBC # BLD AUTO: 4.5 X10E6/UL (ref 4.14–5.8)
SL AMB EGFR AFRICAN AMERICAN: 43 ML/MIN/1.73
SL AMB EGFR NON AFRICAN AMERICAN: 37 ML/MIN/1.73
SODIUM SERPL-SCNC: 141 MMOL/L (ref 134–144)
WBC # BLD AUTO: 6.5 X10E3/UL (ref 3.4–10.8)

## 2018-05-10 ENCOUNTER — OFFICE VISIT (OUTPATIENT)
Dept: FAMILY MEDICINE CLINIC | Facility: CLINIC | Age: 73
End: 2018-05-10
Payer: MEDICARE

## 2018-05-10 ENCOUNTER — TELEPHONE (OUTPATIENT)
Dept: FAMILY MEDICINE CLINIC | Facility: CLINIC | Age: 73
End: 2018-05-10

## 2018-05-10 VITALS
TEMPERATURE: 97.5 F | DIASTOLIC BLOOD PRESSURE: 74 MMHG | BODY MASS INDEX: 30.36 KG/M2 | HEART RATE: 84 BPM | HEIGHT: 69 IN | SYSTOLIC BLOOD PRESSURE: 110 MMHG | WEIGHT: 205 LBS | RESPIRATION RATE: 16 BRPM

## 2018-05-10 DIAGNOSIS — I10 ESSENTIAL HYPERTENSION: ICD-10-CM

## 2018-05-10 DIAGNOSIS — I25.10 CORONARY ARTERY DISEASE INVOLVING NATIVE CORONARY ARTERY OF NATIVE HEART WITHOUT ANGINA PECTORIS: ICD-10-CM

## 2018-05-10 DIAGNOSIS — E08.43 DIABETES MELLITUS DUE TO UNDERLYING CONDITION WITH DIABETIC AUTONOMIC NEUROPATHY, WITH LONG-TERM CURRENT USE OF INSULIN (HCC): Primary | ICD-10-CM

## 2018-05-10 DIAGNOSIS — N18.30 CHRONIC KIDNEY DISEASE, STAGE 3 (HCC): ICD-10-CM

## 2018-05-10 DIAGNOSIS — Z87.891 FORMER SMOKER: ICD-10-CM

## 2018-05-10 DIAGNOSIS — E03.1 CONGENITAL HYPOTHYROIDISM WITHOUT GOITER: ICD-10-CM

## 2018-05-10 DIAGNOSIS — E78.2 MIXED HYPERLIPIDEMIA: ICD-10-CM

## 2018-05-10 DIAGNOSIS — Z13.6 SCREENING FOR AAA (ABDOMINAL AORTIC ANEURYSM): ICD-10-CM

## 2018-05-10 DIAGNOSIS — Z79.4 DIABETES MELLITUS DUE TO UNDERLYING CONDITION WITH DIABETIC AUTONOMIC NEUROPATHY, WITH LONG-TERM CURRENT USE OF INSULIN (HCC): Primary | ICD-10-CM

## 2018-05-10 PROCEDURE — 99214 OFFICE O/P EST MOD 30 MIN: CPT | Performed by: FAMILY MEDICINE

## 2018-05-10 RX ORDER — ASPIRIN 81 MG/1
81 TABLET ORAL EVERY MORNING
COMMUNITY
Start: 2015-09-28

## 2018-05-10 RX ORDER — LISINOPRIL 5 MG/1
5 TABLET ORAL DAILY
Refills: 0
Start: 2018-05-10 | End: 2018-05-13 | Stop reason: SDUPTHER

## 2018-05-10 RX ORDER — LISINOPRIL 5 MG/1
TABLET ORAL DAILY
COMMUNITY
Start: 2015-08-27 | End: 2018-05-10 | Stop reason: SDUPTHER

## 2018-05-10 RX ORDER — GLIPIZIDE 5 MG/1
5 TABLET ORAL
Qty: 180 TABLET | Refills: 1 | Status: SHIPPED | OUTPATIENT
Start: 2018-05-10 | End: 2018-08-30 | Stop reason: SDUPTHER

## 2018-05-10 RX ORDER — LEVOTHYROXINE SODIUM 0.05 MG/1
50 TABLET ORAL DAILY
Qty: 90 TABLET | Refills: 0
Start: 2018-05-10 | End: 2018-08-30 | Stop reason: SDUPTHER

## 2018-05-10 NOTE — TELEPHONE ENCOUNTER
----- Message from Ashlie Meneses DO sent at 5/10/2018  9:28 AM EDT -----  Dr Haja Banda - glaucoma screening - done within a year we need for dash

## 2018-05-10 NOTE — TELEPHONE ENCOUNTER
----- Message from Audrey Godwin DO sent at 5/10/2018  9:28 AM EDT -----  [place], colon done last sept - we need for dash

## 2018-05-10 NOTE — ASSESSMENT & PLAN NOTE
Because of cost pt is taking insulin every other day  We will have him cut dose and take daily at 1/2 dose as it will last same amount until he can take it as directed    I will add med will follow three months

## 2018-05-10 NOTE — PROGRESS NOTES
Assessment/Plan:    Problem List Items Addressed This Visit     Chronic kidney disease, stage 3    Relevant Orders    CBC and differential    Comprehensive metabolic panel    Coronary artery disease involving native coronary artery of native heart without angina pectoris    Relevant Orders    CBC and differential    Diabetes mellitus due to underlying condition with diabetic autonomic neuropathy, with long-term current use of insulin ([address] ) - Primary     Because of cost pt is taking insulin every other day  We will have him cut dose and take daily at 1/2 dose as it will last same amount until he can take it as directed  I will add med will follow three months         Relevant Medications    lisinopril (ZESTRIL) 5 mg tablet    glipiZIDE (GLUCOTROL) 5 mg tablet    Other Relevant Orders    CBC and differential    HEMOGLOBIN A1C W/ EAG ESTIMATION    Comprehensive metabolic panel    Essential hypertension    Relevant Medications    lisinopril (ZESTRIL) 5 mg tablet    Other Relevant Orders    CBC and differential    Congenital hypothyroidism without goiter    Relevant Medications    levothyroxine 50 mcg tablet    Other Relevant Orders    CBC and differential    Mixed hyperlipidemia    Relevant Orders    CBC and differential    Lipid Panel with Direct LDL reflex    Comprehensive metabolic panel      Other Visit Diagnoses     Screening for AAA (abdominal aortic aneurysm)        Relevant Orders    US abdominal aorta screening aaa    CBC and differential    Former smoker        Relevant Orders    US abdominal aorta screening aaa    CBC and differential          There are no Patient Instructions on file for this visit  Return in about 3 months (around 8/10/2018)  Subjective:      Patient ID: Cami Khanna is a 67 y o  male      Chief Complaint   Patient presents with    Follow-up     lj    Hypertension     review new labs       Pt is here to folow up chronic conditions  Pt had his labs  Pt states he has been very active no Chest pain no sob  Back is sore    The levimir is expensive pt is doing it every other day he is in his donought hole already      Hypertension   Pertinent negatives include no chest pain, headaches, palpitations or shortness of breath  The following portions of the patient's history were reviewed and updated as appropriate: allergies, current medications, past family history, past medical history, past social history, past surgical history and problem list     Review of Systems   Constitutional: Negative for activity change, appetite change, chills, diaphoresis, fatigue, fever and unexpected weight change  HENT: Negative for congestion, dental problem, ear pain, mouth sores, sinus pain, sinus pressure, sore throat and trouble swallowing  Eyes: Negative for photophobia, discharge and itching  Respiratory: Negative for apnea, chest tightness and shortness of breath  Cardiovascular: Negative for chest pain, palpitations and leg swelling  Gastrointestinal: Negative for abdominal distention, abdominal pain, blood in stool, nausea and vomiting  Endocrine: Negative for cold intolerance, heat intolerance, polydipsia, polyphagia and polyuria  Genitourinary: Negative for difficulty urinating  Musculoskeletal: Negative for arthralgias  Skin: Negative for color change and wound  Neurological: Negative for dizziness, syncope, speech difficulty and headaches  Hematological: Negative for adenopathy  Psychiatric/Behavioral: Negative for agitation and behavioral problems           Current Outpatient Prescriptions   Medication Sig Dispense Refill    aspirin (ASPIR-81) 81 mg EC tablet Take by mouth daily      atorvastatin (LIPITOR) 40 mg tablet TAKE 1 TABLET BY MOUTH  EVERY DAY 90 tablet 1    BD PEN NEEDLE URSULA U/F 32G X 4 MM MISC USE 4 TIMES DAILY TO INJECT INSULIN AS DIRECTED 200 each 1    Coenzyme Q10 (COQ-10) 100 MG CAPS Take by mouth      furosemide (LASIX) 40 mg tablet TAKE 1 TABLET BY MOUTH  DAILY 90 tablet 1    HUMALOG KWIKPEN 100 UNIT/ML SOPN INJECT SUBCUTANEOUSLY UP TO 8 UNITS 3 TIMES DAILY 30 mL 1    LEVEMIR FLEXTOUCH subcutaneous injection pen 100 units/mL INJECT SUBCUTANEOUSLY 40  UNITS AT BEDTIME 45 mL 1    levothyroxine 50 mcg tablet Take 1 tablet (50 mcg total) by mouth daily 90 tablet 0    lisinopril (ZESTRIL) 5 mg tablet Take 1 tablet (5 mg total) by mouth daily  0    metoprolol succinate (TOPROL-XL) 25 mg 24 hr tablet TAKE 1 TABLET BY MOUTH  DAILY 90 tablet 2    pantoprazole (PROTONIX) 40 mg tablet TAKE 1 TABLET BY MOUTH  DAILY 90 tablet 1    glipiZIDE (GLUCOTROL) 5 mg tablet Take 1 tablet (5 mg total) by mouth 2 (two) times a day before meals 180 tablet 1     No current facility-administered medications for this visit  Objective:    /74   Pulse 84   Temp 97 5 °F (36 4 °C)   Resp 16   Ht 5' 9" (1 753 m)   Wt 93 kg (205 lb)   BMI 30 27 kg/m²        Physical Exam   Constitutional: He appears well-developed and well-nourished  No distress  HENT:   Head: Normocephalic and atraumatic  Right Ear: External ear normal    Left Ear: External ear normal    Nose: Nose normal    Mouth/Throat: Oropharynx is clear and moist  No oropharyngeal exudate  Eyes: EOM are normal  Pupils are equal, round, and reactive to light  Right eye exhibits no discharge  Left eye exhibits no discharge  No scleral icterus  Neck: No thyromegaly present  Cardiovascular: Normal rate and normal heart sounds  No murmur heard  Pulmonary/Chest: Effort normal and breath sounds normal  No respiratory distress  He has no wheezes  Abdominal: Soft  Bowel sounds are normal  He exhibits no distension and no mass  There is no tenderness  There is no rebound and no guarding  Musculoskeletal: Normal range of motion  Neurological: He is alert  He displays normal reflexes  Coordination normal    Skin: Skin is warm and dry  No rash noted  He is not diaphoretic  No erythema     Psychiatric: He has a normal mood and affect  His behavior is normal    Nursing note and vitals reviewed  Recent Results (from the past 672 hour(s))   CBC and differential    Collection Time: 04/19/18  9:17 AM   Result Value Ref Range    [facility] LAB WHITE BLOOD CELL COUNT 6 5 3 4 - 10 8 x10E3/uL    [facility] LAB RED BLOOD CELLS 4 50 4  14 - 5 80 x10E6/uL    Hemoglobin 14 3 13 0 - 17 7 g/dL    Hematocrit 41 3 37 5 - 51 0 %    MCV 92 79 - 97 fL    MCH 31 8 26 6 - 33 0 pg    MCHC 34 6 31 5 - 35 7 g/dL    RDW 12 8 12 3 - 15 4 %    Platelet Count 528 169 - 379 x10E3/uL    Neutrophils 45 Not Estab  %    Lymphocytes 37 Not Estab  %    Monocytes 12 Not Estab  %    Eosinophils 4 Not Estab  %    Basophils 2 Not Estab  %    Neutrophils (Absolute) 3 0 1 4 - 7 0 x10E3/uL    Lymphocytes (Absolute) 2 4 0 7 - 3 1 x10E3/uL    Monocytes (Absolute) 0 8 0 1 - 0 9 x10E3/uL    Eosinophils (Absolute) 0 3 0 0 - 0 4 x10E3/uL    Basophils (Absolute) 0 1 0 0 - 0 2 x10E3/uL    IMM  GRANULOCYTES (CD4/8) 0 Not Estab  %    IIMM  GRANS,ABS (CD4/8) 0 0 0 0 - 0 1 x10E3/uL   Comprehensive metabolic panel    Collection Time: 04/19/18  9:17 AM   Result Value Ref Range    [facility] GLUCOSE 190 (H) 65 - 99 mg/dL    BUN 29 (H) 8 - 27 mg/dL    Creatinine, Serum 1 79 (H) 0 76 - 1 27 mg/dL    eGFR Non  37 (L) >59 mL/min/1 73    [facility] AM[facility] EGFR AFRICAN AMERICAN 43 (L) >59 mL/min/1 73    [facility] BUN/CREATININE RATIO 16 10 - 24    SL AMB SODIUM 141 134 - 144 mmol/L    SL AMB POTASSIUM 4 3 3 5 - 5 2 mmol/L    SL AMB CHLORIDE 100 96 - 106 mmol/L    SL AMB CARBON DIOXIDE 24 18 - 29 mmol/L    CALCIUM 9 4 8 6 - 10 2 mg/dL    [facility] PROTEIN, TOTAL 6 2 6 0 - 8 5 g/dL    Serum Albumin 4 3 3 5 - 4 8 g/dL    Globulin, Total 1 9 1 5 - 4 5 g/dL    [facility] ALBUMIN/GLOBULIN RATIO 2 3 (H) 1 2 - 2 2    [facility] AM[facility] BILIRUBIN, TOTAL 0 3 0 0 - 1 2 mg/dL    Alk Phos Isoenzymes 86 39 - 117 IU/L    [facility] AM[facility] AST 18 0 - 40 IU/L    SL AMB ALT 16 0 - 44 IU/L   Microalbumin / creatinine urine ratio Collection Time: 04/19/18  9:17 AM   Result Value Ref Range    Creatinine, Urine 58 6 Not Estab  mg/dL    Microalbum  ,U,Random 4 9 Not Estab  ug/mL    Microalb/Creat Ratio 8 4 0 0 - 30 0 mg/g creat   [facility] CKD [facility]    Collection Time: 04/19/18  9:17 AM   Result Value Ref Range    [facility] INTERPRETATION Note    Hemoglobin A1c    Collection Time: 04/19/18  9:17 AM   Result Value Ref Range    Hemoglobin A1C 7 5 (H) 4 8 - 5 6 %         Audrey Godwin DO

## 2018-05-13 DIAGNOSIS — E08.43 DIABETES MELLITUS DUE TO UNDERLYING CONDITION WITH DIABETIC AUTONOMIC NEUROPATHY, WITH LONG-TERM CURRENT USE OF INSULIN (HCC): ICD-10-CM

## 2018-05-13 DIAGNOSIS — I10 ESSENTIAL HYPERTENSION: ICD-10-CM

## 2018-05-13 DIAGNOSIS — Z79.4 DIABETES MELLITUS DUE TO UNDERLYING CONDITION WITH DIABETIC AUTONOMIC NEUROPATHY, WITH LONG-TERM CURRENT USE OF INSULIN (HCC): ICD-10-CM

## 2018-05-14 RX ORDER — LISINOPRIL 5 MG/1
TABLET ORAL
Qty: 90 TABLET | Refills: 1 | Status: SHIPPED | OUTPATIENT
Start: 2018-05-14 | End: 2018-08-14 | Stop reason: SDUPTHER

## 2018-06-04 DIAGNOSIS — Z79.4 DIABETES MELLITUS DUE TO UNDERLYING CONDITION WITH DIABETIC AUTONOMIC NEUROPATHY, WITH LONG-TERM CURRENT USE OF INSULIN (HCC): ICD-10-CM

## 2018-06-04 DIAGNOSIS — E08.43 DIABETES MELLITUS DUE TO UNDERLYING CONDITION WITH DIABETIC AUTONOMIC NEUROPATHY, WITH LONG-TERM CURRENT USE OF INSULIN (HCC): ICD-10-CM

## 2018-06-05 NOTE — TELEPHONE ENCOUNTER
Pt left message on refill hotline for a refill on his michelle pen needles  Pt states he uses 4 needles a day a 90 day supply 360 total sent to [facility]  Pt is almost out please send   Natalya Olivares

## 2018-06-27 ENCOUNTER — OFFICE VISIT (OUTPATIENT)
Dept: CARDIOLOGY CLINIC | Facility: CLINIC | Age: 73
End: 2018-06-27
Payer: MEDICARE

## 2018-06-27 VITALS
OXYGEN SATURATION: 97 % | HEART RATE: 64 BPM | DIASTOLIC BLOOD PRESSURE: 60 MMHG | WEIGHT: 203 LBS | HEIGHT: 69 IN | SYSTOLIC BLOOD PRESSURE: 110 MMHG | BODY MASS INDEX: 30.07 KG/M2

## 2018-06-27 DIAGNOSIS — E78.2 MIXED HYPERLIPIDEMIA: ICD-10-CM

## 2018-06-27 DIAGNOSIS — I10 ESSENTIAL HYPERTENSION: ICD-10-CM

## 2018-06-27 DIAGNOSIS — I25.2 HISTORY OF MYOCARDIAL INFARCTION: ICD-10-CM

## 2018-06-27 DIAGNOSIS — I50.32 CHRONIC DIASTOLIC CONGESTIVE HEART FAILURE (HCC): ICD-10-CM

## 2018-06-27 DIAGNOSIS — I25.10 CORONARY ARTERY DISEASE INVOLVING NATIVE CORONARY ARTERY OF NATIVE HEART WITHOUT ANGINA PECTORIS: Primary | ICD-10-CM

## 2018-06-27 PROCEDURE — 93000 ELECTROCARDIOGRAM COMPLETE: CPT | Performed by: INTERNAL MEDICINE

## 2018-06-27 PROCEDURE — 99214 OFFICE O/P EST MOD 30 MIN: CPT | Performed by: INTERNAL MEDICINE

## 2018-06-27 NOTE — PROGRESS NOTES
Subjective:       Patient is a 75 y o  male who presents for follow-up of atherosclerotic coronary artery disease  Recent history: taking medications as instructed, no medication side effects noted, no TIA's, no chest pain on exertion, no dyspnea on exertion, no swelling of ankles and no palpitations  Patient's symptoms have been stable  Medication side effects include: none  He has a h/o PCI to the circumflex vessel in 1992  His last stress test was done in 5/2015 which showed inferior/inferolateral infarct c/w attenuation  EF 63%  He exercised for 6:38  He denies any chest pain or shortness of breath with exertion  He denies lower extremity edema, orthopnea or PND  Reports good adherence to medications  No change in weight since his last visit  His last lipid  profile in December showed LDL of 78, HDL of 57 and TG of 92   Scheduled to have repeat study in August      The following portions of the patient's history were reviewed and updated as appropriate:   He  has a past medical history of Aortic narrowing; Arthritis; Bilateral leg edema; [place], lung ([facility] [address] ); Diabetes mellitus ([facility] [address] ); High cholesterol; History of kidney problems; Hypertension; Old myocardial infarction; Thyroid disease; and Transient cerebral ischemia  He  has a past surgical history that includes Coronary angioplasty with stent; Colonoscopy; Knee surgery; Umbilical hernia repair; and Upper gastrointestinal endoscopy  His family history includes Arthritis in his mother; Cancer in his mother and sister; Colon cancer in his mother; Diabetes in his mother and other; Hypertension in his mother and other; Other in his brother and mother  He  reports that he quit smoking about 42 years ago  He smoked 4 00 packs per day  He has never used smokeless tobacco  He reports that he does not drink alcohol  His drug history is not on file    Current Outpatient Prescriptions   Medication Sig Dispense Refill    aspirin (ASPIR-81) 81 mg EC tablet Take by mouth daily      atorvastatin (LIPITOR) 40 mg tablet TAKE 1 TABLET BY MOUTH  EVERY DAY 90 tablet 1    Coenzyme Q10 (COQ-10) 100 MG CAPS Take by mouth      furosemide (LASIX) 40 mg tablet TAKE 1 TABLET BY MOUTH  DAILY 90 tablet 1    glipiZIDE (GLUCOTROL) 5 mg tablet Take 1 tablet (5 mg total) by mouth 2 (two) times a day before meals 180 tablet 1    HUMALOG KWIKPEN 100 UNIT/ML SOPN INJECT SUBCUTANEOUSLY UP TO 8 UNITS 3 TIMES DAILY 30 mL 1    Insulin Pen Needle (BD PEN NEEDLE URSULA U/F) 32G X 4 MM MISC by Other route 4 (four) times a day 360 each 1    LEVEMIR FLEXTOUCH subcutaneous injection pen 100 units/mL INJECT SUBCUTANEOUSLY 40  UNITS AT BEDTIME 45 mL 1    levothyroxine 50 mcg tablet Take 1 tablet (50 mcg total) by mouth daily 90 tablet 0    lisinopril (ZESTRIL) 5 mg tablet TAKE 1 TABLET BY MOUTH  EVERY DAY 90 tablet 1    metoprolol succinate (TOPROL-XL) 25 mg 24 hr tablet TAKE 1 TABLET BY MOUTH  DAILY 90 tablet 2    pantoprazole (PROTONIX) 40 mg tablet TAKE 1 TABLET BY MOUTH  DAILY 90 tablet 1     No current facility-administered medications for this visit  He has No Known Allergies       Review of Systems  Review of Systems   Constitutional: Negative for chills, fatigue and fever  HENT: Negative for congestion, nosebleeds and postnasal drip  Respiratory: Negative for cough, chest tightness and shortness of breath  Cardiovascular: Negative for chest pain, palpitations and leg swelling  Gastrointestinal: Negative for abdominal distention, abdominal pain, diarrhea, nausea and vomiting  Endocrine: Negative for polydipsia, polyphagia and polyuria  Musculoskeletal: Positive for arthralgias, back pain and gait problem  Negative for myalgias  Skin: Negative for color change, pallor and rash  Allergic/Immunologic: Negative for environmental allergies, food allergies and immunocompromised state  Neurological: Negative for dizziness, seizures, syncope and light-headedness  Hematological: Negative for adenopathy  Does not bruise/bleed easily  Psychiatric/Behavioral: Negative for dysphoric mood  The patient is not nervous/anxious  Objective:      Physical Exam  /60 (BP Location: Left arm, Patient Position: Sitting, Cuff Size: Standard)   Pulse 64 Comment: apical  Ht 5' 9" (1 753 m)   Wt 92 1 kg (203 lb)   SpO2 97% Comment: RA  BMI 29 98 kg/m²   Physical Exam   Constitutional: He appears healthy  No distress  HENT:   Nose: Nose normal    Mouth/Throat: Oropharynx is clear  Eyes: Conjunctivae are normal  Pupils are equal, round, and reactive to light  Neck: Neck supple  No JVD present  Cardiovascular: Normal rate and regular rhythm  Exam reveals no distant heart sounds and no friction rub  Murmur heard  Pulmonary/Chest: Effort normal and breath sounds normal  He has no wheezes  He has no rales  He exhibits no tenderness  Abdominal: Soft  He exhibits no distension  There is no tenderness  Musculoskeletal: He exhibits no edema  Neurological: He is alert and oriented to person, place, and time  Skin: Skin is warm and dry  No rash noted  Cardiographics  ECG: NSR with low voltage    Lab Review   Lab Results   Component Value Date     12/12/2017    K 4 4 12/12/2017     12/12/2017    CO2 22 12/12/2017    BUN 29 (H) 04/19/2018    CREATININE 1 79 (H) 04/19/2018    CREATININE 1 60 (H) 12/12/2017    GLUCOSE 132 (H) 12/12/2017    CALCIUM 9 4 12/12/2017     Lab Results   Component Value Date    WBC 5 6 12/12/2017    HGB 14 3 04/19/2018    HGB 13 9 12/12/2017    HCT 41 3 04/19/2018    HCT 40 1 12/12/2017    MCV 92 04/19/2018    MCV 91 12/12/2017     04/19/2018     12/12/2017     Lab Results   Component Value Date    CHOL 153 12/12/2017    TRIG 92 12/12/2017    HDL 57 12/12/2017          Assessment:      1  Coronary artery disease involving native coronary artery of native heart without angina pectoris    2   Essential hypertension    3  Mixed hyperlipidemia    4  History of myocardial infarction    5  Chronic diastolic congestive heart failure (HCC)         Plan:       - previous LDL was > 70  Discussed diet and exercise  Scheduled for repeat testing next month  Will review results and add Zetia if needed  - continue ASA  - continue atorvastatin 40 mg daily  - continue lisinopril 5 mg daily  - furosemide 40 mg daily

## 2018-06-29 PROBLEM — I50.32 CHRONIC DIASTOLIC CONGESTIVE HEART FAILURE (HCC): Status: ACTIVE | Noted: 2018-06-29

## 2018-07-11 ENCOUNTER — HOSPITAL ENCOUNTER (OUTPATIENT)
Dept: NON INVASIVE DIAGNOSTICS | Facility: HOSPITAL | Age: 73
Discharge: HOME | End: 2018-07-11
Attending: INTERNAL MEDICINE
Payer: MEDICARE

## 2018-07-11 DIAGNOSIS — I25.10 CORONARY ARTERY DISEASE INVOLVING NATIVE CORONARY ARTERY OF NATIVE HEART WITHOUT ANGINA PECTORIS: ICD-10-CM

## 2018-07-11 PROCEDURE — 93017 CV STRESS TEST TRACING ONLY: CPT

## 2018-07-12 LAB
CHEST PAIN STATEMENT: NORMAL
MAX DIASTOLIC BP: 68 MMHG
MAX HEART RATE: 137 BPM
MAX PREDICTED HEART RATE: 146 BPM
MAX. SYSTOLIC BP: 164 MMHG
PROTOCOL NAME: NORMAL
REASON FOR TERMINATION: NORMAL
TARGET HR FORMULA: NORMAL
TEST INDICATION: NORMAL
TIME IN EXERCISE PHASE: NORMAL

## 2018-07-13 PROCEDURE — 93016 CV STRESS TEST SUPVJ ONLY: CPT | Performed by: INTERNAL MEDICINE

## 2018-07-13 PROCEDURE — 93018 CV STRESS TEST I&R ONLY: CPT | Performed by: INTERNAL MEDICINE

## 2018-08-10 LAB
ALBUMIN SERPL-MCNC: 4.4 G/DL (ref 3.5–4.8)
ALBUMIN/GLOB SERPL: 2.2 {RATIO} (ref 1.2–2.2)
ALP SERPL-CCNC: 83 IU/L (ref 39–117)
ALT SERPL-CCNC: 17 IU/L (ref 0–44)
AMBIG ABBREV DEFAULT: NORMAL
AST SERPL-CCNC: 20 IU/L (ref 0–40)
BASOPHILS # BLD AUTO: 0.1 X10E3/UL (ref 0–0.2)
BASOPHILS NFR BLD AUTO: 1 %
BILIRUB SERPL-MCNC: 0.5 MG/DL (ref 0–1.2)
BUN SERPL-MCNC: 24 MG/DL (ref 8–27)
BUN/CREAT SERPL: 16 (ref 10–24)
CALCIUM SERPL-MCNC: 9.4 MG/DL (ref 8.6–10.2)
CHLORIDE SERPL-SCNC: 105 MMOL/L (ref 96–106)
CHOLEST SERPL-MCNC: 142 MG/DL (ref 100–199)
CO2 SERPL-SCNC: 22 MMOL/L (ref 20–29)
CREAT SERPL-MCNC: 1.49 MG/DL (ref 0.76–1.27)
EOSINOPHIL # BLD AUTO: 0.4 X10E3/UL (ref 0–0.4)
EOSINOPHIL NFR BLD AUTO: 6 %
ERYTHROCYTE [DISTWIDTH] IN BLOOD BY AUTOMATED COUNT: 13.6 % (ref 12.3–15.4)
EST. AVERAGE GLUCOSE BLD GHB EST-MCNC: 157 MG/DL
GLOBULIN SER-MCNC: 2 G/DL (ref 1.5–4.5)
GLUCOSE SERPL-MCNC: 119 MG/DL (ref 65–99)
HBA1C MFR BLD: 7.1 % (ref 4.8–5.6)
HCT VFR BLD AUTO: 41.9 % (ref 37.5–51)
HDLC SERPL-MCNC: 53 MG/DL
HGB BLD-MCNC: 14.2 G/DL (ref 13–17.7)
IMM GRANULOCYTES # BLD: 0 X10E3/UL (ref 0–0.1)
IMM GRANULOCYTES NFR BLD: 0 %
LDLC SERPL CALC-MCNC: 75 MG/DL (ref 0–99)
LYMPHOCYTES # BLD AUTO: 2.3 X10E3/UL (ref 0.7–3.1)
LYMPHOCYTES NFR BLD AUTO: 38 %
MCH RBC QN AUTO: 30.9 PG (ref 26.6–33)
MCHC RBC AUTO-ENTMCNC: 33.9 G/DL (ref 31.5–35.7)
MCV RBC AUTO: 91 FL (ref 79–97)
MICRODELETION SYND BLD/T FISH: NORMAL
MICRODELETION SYND BLD/T FISH: NORMAL
MONOCYTES # BLD AUTO: 0.5 X10E3/UL (ref 0.1–0.9)
MONOCYTES NFR BLD AUTO: 9 %
NEUTROPHILS # BLD AUTO: 2.8 X10E3/UL (ref 1.4–7)
NEUTROPHILS NFR BLD AUTO: 46 %
PLATELET # BLD AUTO: 262 X10E3/UL (ref 150–379)
POTASSIUM SERPL-SCNC: 4.2 MMOL/L (ref 3.5–5.2)
PROT SERPL-MCNC: 6.4 G/DL (ref 6–8.5)
RBC # BLD AUTO: 4.59 X10E6/UL (ref 4.14–5.8)
SL AMB EGFR AFRICAN AMERICAN: 53 ML/MIN/1.73
SL AMB EGFR NON AFRICAN AMERICAN: 46 ML/MIN/1.73
SL AMB PDF IMAGE: NORMAL
SODIUM SERPL-SCNC: 144 MMOL/L (ref 134–144)
TRIGL SERPL-MCNC: 69 MG/DL (ref 0–149)
WBC # BLD AUTO: 6 X10E3/UL (ref 3.4–10.8)

## 2018-08-14 ENCOUNTER — OFFICE VISIT (OUTPATIENT)
Dept: FAMILY MEDICINE CLINIC | Facility: CLINIC | Age: 73
End: 2018-08-14
Payer: MEDICARE

## 2018-08-14 VITALS
DIASTOLIC BLOOD PRESSURE: 70 MMHG | SYSTOLIC BLOOD PRESSURE: 136 MMHG | BODY MASS INDEX: 29.77 KG/M2 | TEMPERATURE: 97.2 F | RESPIRATION RATE: 18 BRPM | HEIGHT: 69 IN | WEIGHT: 201 LBS

## 2018-08-14 DIAGNOSIS — I10 ESSENTIAL HYPERTENSION: ICD-10-CM

## 2018-08-14 DIAGNOSIS — N18.30 CHRONIC KIDNEY DISEASE, STAGE 3 (HCC): ICD-10-CM

## 2018-08-14 DIAGNOSIS — K22.719 BARRETT'S ESOPHAGUS WITH DYSPLASIA: ICD-10-CM

## 2018-08-14 DIAGNOSIS — Z79.4 DIABETES MELLITUS DUE TO UNDERLYING CONDITION WITH DIABETIC AUTONOMIC NEUROPATHY, WITH LONG-TERM CURRENT USE OF INSULIN (HCC): ICD-10-CM

## 2018-08-14 DIAGNOSIS — M51.36 DISC DEGENERATION, LUMBAR: Primary | ICD-10-CM

## 2018-08-14 DIAGNOSIS — E08.43 DIABETES MELLITUS DUE TO UNDERLYING CONDITION WITH DIABETIC AUTONOMIC NEUROPATHY, WITH LONG-TERM CURRENT USE OF INSULIN (HCC): ICD-10-CM

## 2018-08-14 DIAGNOSIS — E03.1 CONGENITAL HYPOTHYROIDISM WITHOUT GOITER: ICD-10-CM

## 2018-08-14 DIAGNOSIS — E78.2 MIXED HYPERLIPIDEMIA: ICD-10-CM

## 2018-08-14 DIAGNOSIS — M54.16 LUMBAR RADICULOPATHY: ICD-10-CM

## 2018-08-14 DIAGNOSIS — I25.10 CORONARY ARTERY DISEASE INVOLVING NATIVE CORONARY ARTERY OF NATIVE HEART WITHOUT ANGINA PECTORIS: ICD-10-CM

## 2018-08-14 DIAGNOSIS — I50.32 CHRONIC DIASTOLIC CONGESTIVE HEART FAILURE (HCC): ICD-10-CM

## 2018-08-14 PROCEDURE — 99214 OFFICE O/P EST MOD 30 MIN: CPT | Performed by: FAMILY MEDICINE

## 2018-08-14 RX ORDER — LISINOPRIL 5 MG/1
5 TABLET ORAL DAILY
Qty: 90 TABLET | Refills: 0 | Status: SHIPPED | OUTPATIENT
Start: 2018-08-14 | End: 2018-11-15 | Stop reason: SDUPTHER

## 2018-08-14 NOTE — PROGRESS NOTES
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 autonomic neuropathy, with long-term current use of insulin (HCC)     Lab Results   Component Value Date    HGBA1C 7 5 (H) 04/19/2018       No results for input(s): POCGLU in the last 72 hours  Blood Sugar Average: Last 72 hrs:  stable           Relevant Orders    CBC and differential    Comprehensive metabolic panel    Hemoglobin A1C    Disc degeneration, lumbar - Primary     Pt states he is looking for new belt         Relevant Orders    CBC and differential    Essential hypertension     Stable           Relevant Medications    lisinopril (ZESTRIL) 5 mg tablet    Other Relevant Orders    CBC and differential    Comprehensive metabolic panel    Congenital hypothyroidism without goiter    Relevant Orders    CBC and differential    TSH, 3rd generation    Lumbar radiculopathy    Relevant Orders    CBC and differential    Mixed hyperlipidemia    Relevant Orders    CBC and differential    Chronic diastolic congestive heart failure (HCC)    Relevant Orders    CBC and differential          There are no Patient Instructions on file for this visit  Return in about 4 months (around 12/14/2018) for Recheck  Subjective:      Patient ID: Juan C Prieto is a 75 y o  male      Chief Complaint   Patient presents with    Follow-up     lab work review and medication review  [id number]       Pt is here for a three month follow up  Pt denies cp, no sob no polyuria no polydipsia  Pt states he has 4 herniated dischs in his back and states he was looking for a new belt          The following portions of the patient's history were reviewed and updated as appropriate: allergies, current medications, past family history, past medical history, past social history, past surgical history and problem list     Review of Systems   Constitutional: Negative for activity change, appetite change, chills, diaphoresis, fatigue, fever and unexpected weight change  HENT: Negative for congestion, dental problem, ear pain, mouth sores, sinus pain, sinus pressure, sore throat and trouble swallowing  Eyes: Negative for photophobia, discharge and itching  Respiratory: Negative for apnea, chest tightness and shortness of breath  Cardiovascular: Negative for chest pain, palpitations and leg swelling  Gastrointestinal: Negative for abdominal distention, abdominal pain, blood in stool, nausea and vomiting  Endocrine: Negative for cold intolerance, heat intolerance, polydipsia, polyphagia and polyuria  Genitourinary: Negative for difficulty urinating  Musculoskeletal: Negative for arthralgias  Skin: Negative for color change and wound  Neurological: Negative for dizziness, syncope, speech difficulty and headaches  Hematological: Negative for adenopathy  Psychiatric/Behavioral: Negative for agitation and behavioral problems           Current Outpatient Prescriptions   Medication Sig Dispense Refill    aspirin (ASPIR-81) 81 mg EC tablet Take by mouth daily      atorvastatin (LIPITOR) 40 mg tablet TAKE 1 TABLET BY MOUTH  EVERY DAY 90 tablet 1    Coenzyme Q10 (COQ-10) 100 MG CAPS Take by mouth      furosemide (LASIX) 40 mg tablet TAKE 1 TABLET BY MOUTH  DAILY 90 tablet 1    glipiZIDE (GLUCOTROL) 5 mg tablet Take 1 tablet (5 mg total) by mouth 2 (two) times a day before meals 180 tablet 1    HUMALOG KWIKPEN 100 UNIT/ML SOPN INJECT SUBCUTANEOUSLY UP TO 8 UNITS 3 TIMES DAILY 30 mL 1    Insulin Pen Needle (BD PEN NEEDLE URUSLA U/F) 32G X 4 MM MISC by Other route 4 (four) times a day 360 each 1    LEVEMIR FLEXTOUCH subcutaneous injection pen 100 units/mL INJECT SUBCUTANEOUSLY 40  UNITS AT BEDTIME 45 mL 1    levothyroxine 50 mcg tablet Take 1 tablet (50 mcg total) by mouth daily 90 tablet 0    lisinopril (ZESTRIL) 5 mg tablet Take 1 tablet (5 mg total) by mouth daily 90 tablet 0    metoprolol succinate (TOPROL-XL) 25 mg 24 hr tablet TAKE 1 TABLET BY MOUTH  DAILY 90 tablet 2    pantoprazole (PROTONIX) 40 mg tablet TAKE 1 TABLET BY MOUTH  DAILY 90 tablet 1     No current facility-administered medications for this visit  Objective:    /70   Temp (!) 97 2 °F (36 2 °C)   Resp 18   Ht 5' 9" (1 753 m)   Wt 91 2 kg (201 lb)   BMI 29 68 kg/m²        Physical Exam   Constitutional: He appears well-developed and well-nourished  No distress  HENT:   Head: Normocephalic and atraumatic  Right Ear: External ear normal    Left Ear: External ear normal    Nose: Nose normal    Mouth/Throat: Oropharynx is clear and moist  No oropharyngeal exudate  Eyes: EOM are normal  Pupils are equal, round, and reactive to light  Right eye exhibits no discharge  Left eye exhibits no discharge  No scleral icterus  Neck: No thyromegaly present  Cardiovascular: Normal rate and normal heart sounds  No murmur heard  Pulmonary/Chest: Effort normal and breath sounds normal  No respiratory distress  He has no wheezes  Abdominal: Soft  Bowel sounds are normal  He exhibits no distension and no mass  There is no tenderness  There is no rebound and no guarding  Musculoskeletal: Normal range of motion  Neurological: He is alert  He displays normal reflexes  Coordination normal    Skin: Skin is warm and dry  No rash noted  He is not diaphoretic  No erythema  Psychiatric: He has a normal mood and affect  His behavior is normal    Nursing note and vitals reviewed          Recent Results (from the past 1008 hour(s))   Stress strip    Collection Time: 07/11/18  2:06 PM   Result Value Ref Range    Protocol Name 74-03 [facility]     Time In Exercise Phase 00:07:01     MAX   SYSTOLIC  mmHg    Max Diastolic Bp 68 mmHg    Max Heart Rate 137 BPM    Max Predicted Heart Rate 146 BPM    Reason for Termination Target Heart Rate Achieved     Test Indication CAD     Target Hr Formular (220 - Age)*100%     Arrhy During Ex      ECG Interp Before Ex      ECG Interp during Ex      Ex Summary Comment      Chest Pain Statement none     Overall Hr Response To Exercise      Overall BP Response To Exercise        labs from lab crop not interfaced used paper copy    Carmella Meza DO

## 2018-08-14 NOTE — ASSESSMENT & PLAN NOTE
Lab Results   Component Value Date    HGBA1C 7 5 (H) 04/19/2018       No results for input(s): POCGLU in the last 72 hours      Blood Sugar Average: Last 72 hrs:  stable

## 2018-08-30 DIAGNOSIS — I10 ESSENTIAL HYPERTENSION: ICD-10-CM

## 2018-08-30 DIAGNOSIS — E78.2 MIXED HYPERLIPIDEMIA: ICD-10-CM

## 2018-08-30 DIAGNOSIS — E03.1 CONGENITAL HYPOTHYROIDISM WITHOUT GOITER: ICD-10-CM

## 2018-08-30 DIAGNOSIS — E08.43 DIABETES MELLITUS DUE TO UNDERLYING CONDITION WITH DIABETIC AUTONOMIC NEUROPATHY, WITH LONG-TERM CURRENT USE OF INSULIN (HCC): ICD-10-CM

## 2018-08-30 DIAGNOSIS — Z79.4 DIABETES MELLITUS DUE TO UNDERLYING CONDITION WITH DIABETIC AUTONOMIC NEUROPATHY, WITH LONG-TERM CURRENT USE OF INSULIN (HCC): ICD-10-CM

## 2018-08-30 RX ORDER — ATORVASTATIN CALCIUM 40 MG/1
TABLET, FILM COATED ORAL
Qty: 90 TABLET | Refills: 1 | Status: SHIPPED | OUTPATIENT
Start: 2018-08-30 | End: 2019-02-22 | Stop reason: SDUPTHER

## 2018-08-30 RX ORDER — FUROSEMIDE 40 MG/1
TABLET ORAL
Qty: 90 TABLET | Refills: 1 | Status: SHIPPED | OUTPATIENT
Start: 2018-08-30 | End: 2019-05-11 | Stop reason: SDUPTHER

## 2018-08-30 RX ORDER — LEVOTHYROXINE SODIUM 0.05 MG/1
TABLET ORAL
Qty: 90 TABLET | Refills: 1 | Status: SHIPPED | OUTPATIENT
Start: 2018-08-30 | End: 2018-12-20 | Stop reason: SDUPTHER

## 2018-08-30 RX ORDER — GLIPIZIDE 5 MG/1
TABLET ORAL
Qty: 180 TABLET | Refills: 1 | Status: SHIPPED | OUTPATIENT
Start: 2018-08-30 | End: 2019-01-04 | Stop reason: SDUPTHER

## 2018-09-18 LAB
LEFT EYE DIABETIC RETINOPATHY: NORMAL
RIGHT EYE DIABETIC RETINOPATHY: NORMAL

## 2018-09-27 DIAGNOSIS — E08.43 DIABETES MELLITUS DUE TO UNDERLYING CONDITION WITH DIABETIC AUTONOMIC NEUROPATHY, WITH LONG-TERM CURRENT USE OF INSULIN (HCC): ICD-10-CM

## 2018-09-27 DIAGNOSIS — Z79.4 DIABETES MELLITUS DUE TO UNDERLYING CONDITION WITH DIABETIC AUTONOMIC NEUROPATHY, WITH LONG-TERM CURRENT USE OF INSULIN (HCC): ICD-10-CM

## 2018-09-27 RX ORDER — PEN NEEDLE, DIABETIC 32GX 5/32"
NEEDLE, DISPOSABLE MISCELLANEOUS
Qty: 360 EACH | Refills: 1 | Status: SHIPPED | OUTPATIENT
Start: 2018-09-27 | End: 2019-05-11 | Stop reason: SDUPTHER

## 2018-11-15 DIAGNOSIS — I10 ESSENTIAL HYPERTENSION: ICD-10-CM

## 2018-11-15 RX ORDER — LISINOPRIL 5 MG/1
TABLET ORAL
Qty: 90 TABLET | Refills: 0 | Status: SHIPPED | OUTPATIENT
Start: 2018-11-15 | End: 2018-12-20

## 2018-11-19 RX ORDER — METOPROLOL SUCCINATE 25 MG/1
TABLET, EXTENDED RELEASE ORAL
Qty: 90 TABLET | Refills: 2 | Status: SHIPPED | OUTPATIENT
Start: 2018-11-19 | End: 2019-09-12 | Stop reason: SDUPTHER

## 2018-12-11 LAB
ALBUMIN SERPL-MCNC: 4.2 G/DL (ref 3.5–4.8)
ALBUMIN/GLOB SERPL: 2.3 {RATIO} (ref 1.2–2.2)
ALP SERPL-CCNC: 79 IU/L (ref 39–117)
ALT SERPL-CCNC: 20 IU/L (ref 0–44)
AST SERPL-CCNC: 21 IU/L (ref 0–40)
BASOPHILS # BLD AUTO: 0.1 X10E3/UL (ref 0–0.2)
BASOPHILS NFR BLD AUTO: 1 %
BILIRUB SERPL-MCNC: 0.4 MG/DL (ref 0–1.2)
BUN SERPL-MCNC: 22 MG/DL (ref 8–27)
BUN/CREAT SERPL: 15 (ref 10–24)
CALCIUM SERPL-MCNC: 9.2 MG/DL (ref 8.6–10.2)
CHLORIDE SERPL-SCNC: 106 MMOL/L (ref 96–106)
CO2 SERPL-SCNC: 22 MMOL/L (ref 20–29)
CREAT SERPL-MCNC: 1.47 MG/DL (ref 0.76–1.27)
EOSINOPHIL # BLD AUTO: 0.3 X10E3/UL (ref 0–0.4)
EOSINOPHIL NFR BLD AUTO: 5 %
ERYTHROCYTE [DISTWIDTH] IN BLOOD BY AUTOMATED COUNT: 12.7 % (ref 12.3–15.4)
GLOBULIN SER-MCNC: 1.8 G/DL (ref 1.5–4.5)
GLUCOSE SERPL-MCNC: 136 MG/DL (ref 65–99)
HBA1C MFR BLD: 7.7 % (ref 4.8–5.6)
HCT VFR BLD AUTO: 40.8 % (ref 37.5–51)
HGB BLD-MCNC: 14 G/DL (ref 13–17.7)
IMM GRANULOCYTES # BLD: 0 X10E3/UL (ref 0–0.1)
IMM GRANULOCYTES NFR BLD: 0 %
LABCORP COMMENT: NORMAL
LYMPHOCYTES # BLD AUTO: 2.5 X10E3/UL (ref 0.7–3.1)
LYMPHOCYTES NFR BLD AUTO: 40 %
MCH RBC QN AUTO: 31.3 PG (ref 26.6–33)
MCHC RBC AUTO-ENTMCNC: 34.3 G/DL (ref 31.5–35.7)
MCV RBC AUTO: 91 FL (ref 79–97)
MICRODELETION SYND BLD/T FISH: NORMAL
MONOCYTES # BLD AUTO: 0.6 X10E3/UL (ref 0.1–0.9)
MONOCYTES NFR BLD AUTO: 10 %
NEUTROPHILS # BLD AUTO: 2.7 X10E3/UL (ref 1.4–7)
NEUTROPHILS NFR BLD AUTO: 44 %
PLATELET # BLD AUTO: 265 X10E3/UL (ref 150–379)
POTASSIUM SERPL-SCNC: 4.2 MMOL/L (ref 3.5–5.2)
PROT SERPL-MCNC: 6 G/DL (ref 6–8.5)
RBC # BLD AUTO: 4.48 X10E6/UL (ref 4.14–5.8)
SL AMB EGFR AFRICAN AMERICAN: 54 ML/MIN/1.73
SL AMB EGFR NON AFRICAN AMERICAN: 46 ML/MIN/1.73
SODIUM SERPL-SCNC: 143 MMOL/L (ref 134–144)
TSH SERPL DL<=0.005 MIU/L-ACNC: 4.95 UIU/ML (ref 0.45–4.5)
WBC # BLD AUTO: 6.2 X10E3/UL (ref 3.4–10.8)

## 2018-12-20 ENCOUNTER — TELEPHONE (OUTPATIENT)
Dept: FAMILY MEDICINE CLINIC | Facility: CLINIC | Age: 73
End: 2018-12-20

## 2018-12-20 ENCOUNTER — OFFICE VISIT (OUTPATIENT)
Dept: FAMILY MEDICINE CLINIC | Facility: CLINIC | Age: 73
End: 2018-12-20
Payer: MEDICARE

## 2018-12-20 VITALS
WEIGHT: 203 LBS | SYSTOLIC BLOOD PRESSURE: 130 MMHG | HEIGHT: 69 IN | RESPIRATION RATE: 16 BRPM | BODY MASS INDEX: 30.07 KG/M2 | DIASTOLIC BLOOD PRESSURE: 68 MMHG | TEMPERATURE: 96.8 F | HEART RATE: 72 BPM

## 2018-12-20 DIAGNOSIS — I10 ESSENTIAL HYPERTENSION: ICD-10-CM

## 2018-12-20 DIAGNOSIS — Z79.4 DIABETES MELLITUS DUE TO UNDERLYING CONDITION WITH DIABETIC AUTONOMIC NEUROPATHY, WITH LONG-TERM CURRENT USE OF INSULIN (HCC): Primary | ICD-10-CM

## 2018-12-20 DIAGNOSIS — E03.1 CONGENITAL HYPOTHYROIDISM WITHOUT GOITER: ICD-10-CM

## 2018-12-20 DIAGNOSIS — I50.32 CHRONIC DIASTOLIC CONGESTIVE HEART FAILURE (HCC): ICD-10-CM

## 2018-12-20 DIAGNOSIS — N18.30 CHRONIC KIDNEY DISEASE, STAGE 3 (HCC): ICD-10-CM

## 2018-12-20 DIAGNOSIS — I25.10 CORONARY ARTERY DISEASE INVOLVING NATIVE CORONARY ARTERY OF NATIVE HEART WITHOUT ANGINA PECTORIS: ICD-10-CM

## 2018-12-20 DIAGNOSIS — E08.43 DIABETES MELLITUS DUE TO UNDERLYING CONDITION WITH DIABETIC AUTONOMIC NEUROPATHY, WITH LONG-TERM CURRENT USE OF INSULIN (HCC): Primary | ICD-10-CM

## 2018-12-20 DIAGNOSIS — K22.719 BARRETT'S ESOPHAGUS WITH DYSPLASIA: ICD-10-CM

## 2018-12-20 DIAGNOSIS — E78.2 MIXED HYPERLIPIDEMIA: ICD-10-CM

## 2018-12-20 PROBLEM — H25.093 AGE-RELATED INCIPIENT CATARACT OF BOTH EYES: Status: ACTIVE | Noted: 2018-12-20

## 2018-12-20 PROCEDURE — 99214 OFFICE O/P EST MOD 30 MIN: CPT | Performed by: FAMILY MEDICINE

## 2018-12-20 RX ORDER — OLMESARTAN MEDOXOMIL 5 MG/1
20 TABLET ORAL DAILY
Qty: 90 TABLET | Refills: 1 | Status: SHIPPED | OUTPATIENT
Start: 2018-12-20 | End: 2018-12-20 | Stop reason: SDUPTHER

## 2018-12-20 RX ORDER — OLMESARTAN MEDOXOMIL 5 MG/1
5 TABLET ORAL DAILY
Qty: 90 TABLET | Refills: 0 | Status: SHIPPED | OUTPATIENT
Start: 2018-12-20 | End: 2019-04-23

## 2018-12-20 RX ORDER — LEVOTHYROXINE SODIUM 0.05 MG/1
50 TABLET ORAL DAILY
Qty: 90 TABLET | Refills: 1 | Status: SHIPPED | OUTPATIENT
Start: 2018-12-20 | End: 2019-01-04 | Stop reason: SDUPTHER

## 2018-12-20 NOTE — PROGRESS NOTES
Assessment/Plan:    Problem List Items Addressed This Visit     Hammond's esophagus with dysplasia    Relevant Orders    CBC and differential    Chronic kidney disease, stage 3 (HCC)    Relevant Orders    CBC and differential    Coronary artery disease involving native coronary artery of native heart without angina pectoris    Relevant Orders    CBC and differential    Diabetes mellitus due to underlying condition with diabetic autonomic neuropathy, with long-term current use of insulin ([facility] [address] ) - Primary     Lab Results   Component Value Date    HGBA1C 7 7 (H) 12/10/2018       No results for input(s): POCGLU in the last 72 hours  Blood Sugar Average: Last 72 hrs: pt is a little higher than he was however its the holiday he will be strict and we will see in 4 months           Relevant Orders    Hemoglobin A1C    Microalbumin / creatinine urine ratio    CBC and differential    Comprehensive metabolic panel    Essential hypertension    Relevant Medications    olmesartan (BENICAR) 5 mg tablet    Other Relevant Orders    CBC and differential    Congenital hypothyroidism without goiter    Relevant Medications    levothyroxine 50 mcg tablet    Other Relevant Orders    TSH, 3rd generation    TSH, 3rd generation    CBC and differential    Mixed hyperlipidemia    Relevant Orders    CBC and differential    Lipid Panel with Direct LDL reflex    Chronic diastolic congestive heart failure ([facility] )    Relevant Orders    CBC and differential          There are no Patient Instructions on file for this visit  Return in about 4 months (around 4/20/2019) for Recheck, deborah an AWV which he will sched with a preop at different date  Subjective:      Patient ID: Jesus Orellana is a 75 y o  male  Chief Complaint   Patient presents with    review Blood work       Pt is here for a follow up appt  Pt had his labs done  No chest pain no sob    Pt does not do flu shots    Pt states he is starting with short term memory issues  The following portions of the patient's history were reviewed and updated as appropriate: allergies, current medications, past family history, past medical history, past social history, past surgical history and problem list     Review of Systems   Constitutional: Negative for activity change, appetite change, chills, diaphoresis, fatigue, fever and unexpected weight change  HENT: Negative for congestion, dental problem, ear pain, mouth sores, sinus pain, sinus pressure, sore throat and trouble swallowing  Eyes: Negative for photophobia, discharge and itching  Respiratory: Negative for apnea, chest tightness and shortness of breath  Cardiovascular: Negative for chest pain, palpitations and leg swelling  Gastrointestinal: Negative for abdominal distention, abdominal pain, blood in stool, nausea and vomiting  Endocrine: Negative for cold intolerance, heat intolerance, polydipsia, polyphagia and polyuria  Genitourinary: Negative for difficulty urinating  Musculoskeletal: Negative for arthralgias  Skin: Negative for color change and wound  Neurological: Negative for dizziness, syncope, speech difficulty and headaches  Hematological: Negative for adenopathy  Psychiatric/Behavioral: Negative for agitation and behavioral problems           Current Outpatient Prescriptions   Medication Sig Dispense Refill    aspirin (ASPIR-81) 81 mg EC tablet Take by mouth daily      atorvastatin (LIPITOR) 40 mg tablet TAKE 1 TABLET BY MOUTH  EVERY DAY 90 tablet 1    BD PEN NEEDLE URSULA U/F 32G X 4 MM MISC USE 4 TIMES DAILY AS  DIRECTED 360 each 1    Coenzyme Q10 (COQ-10) 100 MG CAPS Take by mouth      furosemide (LASIX) 40 mg tablet TAKE 1 TABLET BY MOUTH  DAILY 90 tablet 1    glipiZIDE (GLUCOTROL) 5 mg tablet TAKE 1 TABLET BY MOUTH TWO  TIMES A DAY BEFORE MEALS 180 tablet 1    HUMALOG KWIKPEN 100 UNIT/ML SOPN INJECT SUBCUTANEOUSLY UP TO 8 UNITS 3 TIMES DAILY 30 mL 1    LEVEMIR FLEXTOUCH subcutaneous injection pen 100 units/mL INJECT SUBCUTANEOUSLY 40  UNITS AT BEDTIME 45 mL 1    levothyroxine 50 mcg tablet Take 1 tablet (50 mcg total) by mouth daily 90 tablet 1    metoprolol succinate (TOPROL-XL) 25 mg 24 hr tablet TAKE 1 TABLET BY MOUTH  DAILY 90 tablet 2    pantoprazole (PROTONIX) 40 mg tablet TAKE 1 TABLET BY MOUTH  DAILY 90 tablet 1    olmesartan (BENICAR) 5 mg tablet Take 4 tablets (20 mg total) by mouth daily 90 tablet 1     No current facility-administered medications for this visit  Objective:    /68   Pulse 72   Temp (!) 96 8 °F (36 °C)   Resp 16   Ht 5' 9" (1 753 m)   Wt 92 1 kg (203 lb)   BMI 29 98 kg/m²        Physical Exam   Constitutional: He appears well-developed and well-nourished  No distress  HENT:   Head: Normocephalic and atraumatic  Right Ear: External ear normal    Left Ear: External ear normal    Nose: Nose normal    Mouth/Throat: Oropharynx is clear and moist  No oropharyngeal exudate  Eyes: Pupils are equal, round, and reactive to light  EOM are normal  Right eye exhibits no discharge  Left eye exhibits no discharge  No scleral icterus  Neck: No thyromegaly present  Cardiovascular: Normal rate and normal heart sounds  No murmur heard  Pulmonary/Chest: Effort normal and breath sounds normal  No respiratory distress  He has no wheezes  Abdominal: Soft  Bowel sounds are normal  He exhibits no distension and no mass  There is no tenderness  There is no rebound and no guarding  Musculoskeletal: Normal range of motion  Neurological: He is alert  He displays normal reflexes  Coordination normal    Skin: Skin is warm and dry  No rash noted  He is not diaphoretic  No erythema  Psychiatric: He has a normal mood and affect  His behavior is normal    Nursing note and vitals reviewed             Recent Results (from the past 672 hour(s))   CBC and differential    Collection Time: 12/10/18  8:58 AM   Result Value Ref Range    White Blood Cell Count 6 2 3 4 - 10 8 x10E3/uL    Red Blood Cell Count 4 48 4 14 - 5 80 x10E6/uL    Hemoglobin 14 0 13 0 - 17 7 g/dL    HCT 40 8 37 5 - 51 0 %    MCV 91 79 - 97 fL    MCH 31 3 26 6 - 33 0 pg    MCHC 34 3 31 5 - 35 7 g/dL    RDW 12 7 12 3 - 15 4 %    Platelet Count 330 642 - 379 x10E3/uL    Neutrophils 44 Not Estab  %    Lymphocytes 40 Not Estab  %    Monocytes 10 Not Estab  %    Eosinophils 5 Not Estab  %    Basophils PCT 1 Not Estab  %    Neutrophils (Absolute) 2 7 1 4 - 7 0 x10E3/uL    Lymphocytes (Absolute) 2 5 0 7 - 3 1 x10E3/uL    Monocytes (Absolute) 0 6 0 1 - 0 9 x10E3/uL    Eosinophils (Absolute) 0 3 0 0 - 0 4 x10E3/uL    Basophils ABS 0 1 0 0 - 0 2 x10E3/uL    Immature Granulocytes 0 Not Estab  %    Immature Granulocytes (Absolute) 0 0 0 0 - 0 1 x10E3/uL   Comprehensive metabolic panel    Collection Time: 12/10/18  8:58 AM   Result Value Ref Range    Glucose, Random 136 (H) 65 - 99 mg/dL    BUN 22 8 - 27 mg/dL    Creatinine 1 47 (H) 0 76 - 1 27 mg/dL    eGFR Non  46 (L) >59 mL/min/1 73    SL AMB EGFR AFRICAN AMERICAN 54 (L) >59 mL/min/1 73    SL AMB BUN/CREATININE RATIO 15 10 - 24    Sodium 143 134 - 144 mmol/L    Potassium 4 2 3 5 - 5 2 mmol/L    Chloride 106 96 - 106 mmol/L    CO2 22 20 - 29 mmol/L    CALCIUM 9 2 8 6 - 10 2 mg/dL    SL AMB PROTEIN, TOTAL 6 0 6 0 - 8 5 g/dL    Albumin 4 2 3 5 - 4 8 g/dL    Globulin, Total 1 8 1 5 - 4 5 g/dL    Albumin/Globulin Ratio 2 3 (H) 1 2 - 2 2    TOTAL BILIRUBIN 0 4 0 0 - 1 2 mg/dL    Alk Phos Isoenzymes 79 39 - 117 IU/L    SL AMB AST 21 0 - 40 IU/L    SL AMB ALT 20 0 - 44 IU/L   [facility] CKD Program    Collection Time: 12/10/18  8:58 AM   Result Value Ref Range    Interpretation Note    Hemoglobin A1c (w/out EAG)    Collection Time: 12/10/18  8:58 AM   Result Value Ref Range    Hemoglobin A1C 7 7 (H) 4 8 - 5 6 %   TSH, 3rd generation    Collection Time: 12/10/18  8:58 AM   Result Value Ref Range    TSH 4 950 (H) 0 450 - 4 500 uIU/mL   Specimen Status Report Collection Time: 12/10/18  8:58 AM   Result Value Ref Range    Comment Comment          Lakia Adler DO

## 2018-12-20 NOTE — ASSESSMENT & PLAN NOTE
Lab Results   Component Value Date    HGBA1C 7 7 (H) 12/10/2018       No results for input(s): POCGLU in the last 72 hours      Blood Sugar Average: Last 72 hrs: pt is a little higher than he was however its the holiday he will be strict and we will see in 4 months

## 2018-12-20 NOTE — TELEPHONE ENCOUNTER
[facility] called and left a message on the Rx line to confirm dosage for Medication Benicar  I confirmed with Dr Lombardi the dosage  He stated it is 5mg /1 tab once a day  In the encounter the dosage has to be rectified    Lei Romano MA

## 2019-01-04 DIAGNOSIS — E03.1 CONGENITAL HYPOTHYROIDISM WITHOUT GOITER: ICD-10-CM

## 2019-01-04 DIAGNOSIS — Z79.4 DIABETES MELLITUS DUE TO UNDERLYING CONDITION WITH DIABETIC AUTONOMIC NEUROPATHY, WITH LONG-TERM CURRENT USE OF INSULIN (HCC): ICD-10-CM

## 2019-01-04 DIAGNOSIS — E08.43 DIABETES MELLITUS DUE TO UNDERLYING CONDITION WITH DIABETIC AUTONOMIC NEUROPATHY, WITH LONG-TERM CURRENT USE OF INSULIN (HCC): ICD-10-CM

## 2019-01-04 RX ORDER — LEVOTHYROXINE SODIUM 0.05 MG/1
TABLET ORAL
Qty: 90 TABLET | Refills: 1 | Status: SHIPPED | OUTPATIENT
Start: 2019-01-04 | End: 2019-04-23 | Stop reason: SDUPTHER

## 2019-01-04 RX ORDER — GLIPIZIDE 5 MG/1
TABLET ORAL
Qty: 180 TABLET | Refills: 1 | Status: SHIPPED | OUTPATIENT
Start: 2019-01-04 | End: 2019-04-23

## 2019-01-07 ENCOUNTER — OFFICE VISIT (OUTPATIENT)
Dept: CARDIOLOGY CLINIC | Facility: CLINIC | Age: 74
End: 2019-01-07
Payer: MEDICARE

## 2019-01-07 VITALS
HEIGHT: 69 IN | OXYGEN SATURATION: 97 % | DIASTOLIC BLOOD PRESSURE: 68 MMHG | BODY MASS INDEX: 29.92 KG/M2 | WEIGHT: 202 LBS | HEART RATE: 67 BPM | SYSTOLIC BLOOD PRESSURE: 142 MMHG

## 2019-01-07 DIAGNOSIS — E78.2 MIXED HYPERLIPIDEMIA: ICD-10-CM

## 2019-01-07 DIAGNOSIS — I50.32 CHRONIC DIASTOLIC CONGESTIVE HEART FAILURE (HCC): ICD-10-CM

## 2019-01-07 DIAGNOSIS — I25.10 CAD IN NATIVE ARTERY: Primary | ICD-10-CM

## 2019-01-07 DIAGNOSIS — I10 ESSENTIAL HYPERTENSION: ICD-10-CM

## 2019-01-07 DIAGNOSIS — Z95.5 S/P PRIMARY ANGIOPLASTY WITH CORONARY STENT: ICD-10-CM

## 2019-01-07 DIAGNOSIS — I25.2 HISTORY OF MYOCARDIAL INFARCTION: ICD-10-CM

## 2019-01-07 PROCEDURE — 99214 OFFICE O/P EST MOD 30 MIN: CPT | Performed by: INTERNAL MEDICINE

## 2019-01-07 PROCEDURE — 93000 ELECTROCARDIOGRAM COMPLETE: CPT | Performed by: INTERNAL MEDICINE

## 2019-01-07 RX ORDER — EZETIMIBE 10 MG/1
10 TABLET ORAL DAILY
Qty: 30 TABLET | Refills: 5 | Status: SHIPPED | OUTPATIENT
Start: 2019-01-07 | End: 2019-12-26

## 2019-01-07 NOTE — PROGRESS NOTES
Subjective:        Zita Doyle is a 75 y o  male who presents for follow-up of atherosclerotic coronary artery disease  Recent history: taking medications as instructed, no medication side effects noted, no TIA's, no chest pain on exertion, no dyspnea on exertion, no swelling of ankles and no palpitations  Patient's symptoms have been stable  Medication side effects include: none  He has a h/o PCI to the circumflex vessel in 1992  His last stress test was done in 5/2015 which showed inferior/inferolateral infarct c/w attenuation  EF 63%  He exercised for 6:38  He denies any chest pain or shortness of breath with exertion  He denies lower extremity edema, orthopnea or PND  Reports good adherence to medications  No change in weight since his last visit  His last lipid  profile in December showed LDL of 75, HDL of 53 and TG of 69   Scheduled to have repeat study in March  The following portions of the patient's history were reviewed and updated as appropriate:   He  has a past medical history of Aortic narrowing; Arthritis; Bilateral leg edema; [place], lung ([facility] [address] ); Diabetes mellitus ([facility] [address] ); High cholesterol; History of kidney problems; Hypertension; Old myocardial infarction; Thyroid disease; and Transient cerebral ischemia  He  has a past surgical history that includes Coronary angioplasty with stent; Colonoscopy; Knee surgery; Umbilical hernia repair; and Upper gastrointestinal endoscopy  His family history includes Arthritis in his mother; Cancer in his mother and sister; Colon cancer in his mother; Diabetes in his mother and other; Hypertension in his mother and other; Other in his brother and mother  He  reports that he quit smoking about 43 years ago  He smoked 4 00 packs per day  He has never used smokeless tobacco  He reports that he does not drink alcohol or use drugs    Current Outpatient Prescriptions   Medication Sig Dispense Refill    aspirin (ASPIR-81) 81 mg EC tablet Take by mouth daily      atorvastatin (LIPITOR) 40 mg tablet TAKE 1 TABLET BY MOUTH  EVERY DAY 90 tablet 1    BD PEN NEEDLE URSULA U/F 32G X 4 MM MISC USE 4 TIMES DAILY AS  DIRECTED 360 each 1    Coenzyme Q10 (COQ-10) 100 MG CAPS Take by mouth      furosemide (LASIX) 40 mg tablet TAKE 1 TABLET BY MOUTH  DAILY 90 tablet 1    glipiZIDE (GLUCOTROL) 5 mg tablet TAKE 1 TABLET BY MOUTH TWO  TIMES A DAY BEFORE MEALS 180 tablet 1    HUMALOG KWIKPEN 100 UNIT/ML SOPN INJECT SUBCUTANEOUSLY UP TO 8 UNITS 3 TIMES DAILY 30 mL 1    LEVEMIR FLEXTOUCH subcutaneous injection pen 100 units/mL INJECT SUBCUTANEOUSLY 40  UNITS AT BEDTIME 45 mL 1    levothyroxine 50 mcg tablet TAKE 1 TABLET BY MOUTH  DAILY 90 tablet 1    metoprolol succinate (TOPROL-XL) 25 mg 24 hr tablet TAKE 1 TABLET BY MOUTH  DAILY 90 tablet 2    olmesartan (BENICAR) 5 mg tablet Take 1 tablet (5 mg total) by mouth daily 90 tablet 0    pantoprazole (PROTONIX) 40 mg tablet TAKE 1 TABLET BY MOUTH  DAILY 90 tablet 1     No current facility-administered medications for this visit  He has No Known Allergies       Review of Systems  Review of Systems   Constitutional: Negative for chills, fatigue and fever  HENT: Negative for congestion, nosebleeds and postnasal drip  Respiratory: Negative for cough, chest tightness and shortness of breath  Cardiovascular: Negative for chest pain, palpitations and leg swelling  Gastrointestinal: Negative for abdominal distention, abdominal pain, diarrhea, nausea and vomiting  Endocrine: Negative for polydipsia, polyphagia and polyuria  Musculoskeletal: Positive for arthralgias, back pain and gait problem  Negative for myalgias  Skin: Negative for color change, pallor and rash  Allergic/Immunologic: Negative for environmental allergies, food allergies and immunocompromised state  Neurological: Negative for dizziness, seizures, syncope and light-headedness  Hematological: Negative for adenopathy   Does not bruise/bleed easily  Psychiatric/Behavioral: Negative for dysphoric mood  The patient is not nervous/anxious  Objective:      Physical Exam  /68 (BP Location: Left arm, Patient Position: Sitting, Cuff Size: Standard)   Pulse 67 Comment: apical  Ht 5' 9" (1 753 m)   Wt 91 6 kg (202 lb)   SpO2 97%   BMI 29 83 kg/m²   Physical Exam   Constitutional: He appears healthy  No distress  HENT:   Nose: Nose normal    Mouth/Throat: Oropharynx is clear  Eyes: Pupils are equal, round, and reactive to light  Conjunctivae are normal    Neck: Neck supple  No JVD present  Cardiovascular: Normal rate and regular rhythm  Exam reveals no distant heart sounds and no friction rub  Murmur heard  Pulmonary/Chest: Effort normal and breath sounds normal  He has no wheezes  He has no rales  He exhibits no tenderness  Abdominal: Soft  He exhibits no distension  There is no tenderness  Musculoskeletal: He exhibits no edema  Neurological: He is alert and oriented to person, place, and time  Skin: Skin is warm and dry  No rash noted  Cardiographics  ECG: Normal sinus rhythm  Lab Review   Lab Results   Component Value Date     12/12/2017    K 4 2 12/10/2018     12/10/2018    CO2 22 12/10/2018    BUN 22 12/10/2018    CREATININE 1 60 (H) 12/12/2017    GLUCOSE 132 (H) 12/12/2017    CALCIUM 9 4 12/12/2017     Lab Results   Component Value Date    WBC 6 2 12/10/2018    WBC 5 6 12/12/2017    HGB 14 0 12/10/2018    HGB 13 9 12/12/2017    HCT 40 8 12/10/2018    HCT 40 1 12/12/2017    MCV 91 12/10/2018    MCV 91 12/12/2017     12/10/2018     12/12/2017     Lab Results   Component Value Date    CHOL 153 12/12/2017    TRIG 69 08/09/2018    HDL 53 08/09/2018          Assessment:      1  CAD in native artery    2  S/P primary angioplasty with coronary stent    3  Essential hypertension    4  Chronic diastolic congestive heart failure ([address] )    5  Mixed hyperlipidemia    6   History of myocardial infarction         Plan:       - previous LDL was > 70  Discussed diet and exercise along with weight loss  Will add Zetia  He is concerned about cost    - continue ASA  - continue atorvastatin 40 mg daily  - was switched to olmesartan from lisinopril since his last visit - BP is better at home and during visits with Dr Stanley Hernández   - Continue furosemide 40 mg daily  - Consider switch of glipizide to Jardiance (generic name empagliflozin) or Victoza (liraglutide) for cardiovascular benefit  He is scheduled to see Dr Stanley Hernández next week  He will discuss it with him

## 2019-01-11 ENCOUNTER — OFFICE VISIT (OUTPATIENT)
Dept: FAMILY MEDICINE CLINIC | Facility: CLINIC | Age: 74
End: 2019-01-11
Payer: MEDICARE

## 2019-01-11 VITALS
WEIGHT: 205 LBS | TEMPERATURE: 96.3 F | SYSTOLIC BLOOD PRESSURE: 126 MMHG | RESPIRATION RATE: 16 BRPM | BODY MASS INDEX: 30.36 KG/M2 | HEIGHT: 69 IN | DIASTOLIC BLOOD PRESSURE: 76 MMHG | HEART RATE: 72 BPM

## 2019-01-11 DIAGNOSIS — H25.093 AGE-RELATED INCIPIENT CATARACT OF BOTH EYES: Primary | ICD-10-CM

## 2019-01-11 DIAGNOSIS — N18.30 CHRONIC KIDNEY DISEASE, STAGE 3 (HCC): ICD-10-CM

## 2019-01-11 DIAGNOSIS — Z79.4 DIABETES MELLITUS DUE TO UNDERLYING CONDITION WITH DIABETIC AUTONOMIC NEUROPATHY, WITH LONG-TERM CURRENT USE OF INSULIN (HCC): ICD-10-CM

## 2019-01-11 DIAGNOSIS — E08.43 DIABETES MELLITUS DUE TO UNDERLYING CONDITION WITH DIABETIC AUTONOMIC NEUROPATHY, WITH LONG-TERM CURRENT USE OF INSULIN (HCC): ICD-10-CM

## 2019-01-11 DIAGNOSIS — I50.32 CHRONIC DIASTOLIC CONGESTIVE HEART FAILURE (HCC): ICD-10-CM

## 2019-01-11 DIAGNOSIS — M79.671 PAIN OF RIGHT HEEL: ICD-10-CM

## 2019-01-11 DIAGNOSIS — E78.2 MIXED HYPERLIPIDEMIA: ICD-10-CM

## 2019-01-11 DIAGNOSIS — I10 ESSENTIAL HYPERTENSION: ICD-10-CM

## 2019-01-11 DIAGNOSIS — I25.10 CORONARY ARTERY DISEASE INVOLVING NATIVE CORONARY ARTERY OF NATIVE HEART WITHOUT ANGINA PECTORIS: ICD-10-CM

## 2019-01-11 DIAGNOSIS — E03.1 CONGENITAL HYPOTHYROIDISM WITHOUT GOITER: ICD-10-CM

## 2019-01-11 DIAGNOSIS — Z01.818 PRE-OP EXAMINATION: ICD-10-CM

## 2019-01-11 DIAGNOSIS — K22.719 BARRETT'S ESOPHAGUS WITH DYSPLASIA: ICD-10-CM

## 2019-01-11 PROCEDURE — 99214 OFFICE O/P EST MOD 30 MIN: CPT | Performed by: FAMILY MEDICINE

## 2019-01-11 NOTE — PROGRESS NOTES
FAMILY PRACTICE PRE-OPERATIVE EVALUATION  [facility]    NAME: Amaris Liz  AGE: 75 y o  SEX: male  : 1945     DATE: 2019    Family Practice Pre-Operative Evaluation      Chief Complaint: Pre-operative Evaluation     Surgery: B/L Cataract  Anticipated Date of Surgery: 19  Surgeon: Dr Sue Ledezma      History of Present Illness:     Pt is here to go over chronic conditions prior to surgery        Anesthesia:  regional  Bleeding Risk: no recent abnormal bleeding, no remote history of abnormal bleeding and no use of Ca-channel blockers  Current Anti-platelet/anticoagulation medication: Aspirin    Assessment of Cardiac Risk:  · Denies unstable or severe angina or MI in the last 6 weeks or history of stent placement in the last year   · Denies decompensated heart failure (e g  New onset heart failure, NYHA functional class IV heart failure, or worsening existing heart failure)  · Denies significant arrhythmias such as high grade AV block, symptomatic ventricular arrhythmia, newly recognized ventricular tachycardia, supraventricular tachycardia with resting heart rate >100, or symptomatic bradycardia  · Denies severe heart valve disease including aortic stenosis or symptomatic mitral stenosis     Exercise Capacity:  · Able to walk 4 blocks without symptoms?: Yes  · Able to walk 2 flights without symptoms?: Yes    Prior Anesthesia Reactions: No     Personal history of venous thromboembolic disease? No but pt does have a distant history in his 30's where he had random TIA's that he would recover fully from  2    History of steroid use for >2 weeks within last year?  No         Review of Systems:     Review of Systems    Current Problem List:     Patient Active Problem List   Diagnosis    Hammond's esophagus with dysplasia    Chronic kidney disease, stage 3 ([address] )    Colon, diverticulosis    Chronic constipation    Coronary artery disease involving native coronary artery of native heart without angina pectoris    Diabetes mellitus due to underlying condition with diabetic autonomic neuropathy, with long-term current use of insulin (HCC)    Disc degeneration, lumbar    Enlarged prostate    Hiatal hernia    History of myocardial infarction    Essential hypertension    Congenital hypothyroidism without goiter    Lumbar radiculopathy    Mixed hyperlipidemia    Chronic diastolic congestive heart failure (HCC)    Age-related incipient cataract of both eyes    Pain of right heel       Allergies:     No Known Allergies    Current Medications:       Current Outpatient Prescriptions:     aspirin (ASPIR-81) 81 mg EC tablet, Take by mouth daily, Disp: , Rfl:     atorvastatin (LIPITOR) 40 mg tablet, TAKE 1 TABLET BY MOUTH  EVERY DAY, Disp: 90 tablet, Rfl: 1    BD PEN NEEDLE URSULA U/F 32G X 4 MM MISC, USE 4 TIMES DAILY AS  DIRECTED, Disp: 360 each, Rfl: 1    Coenzyme Q10 (COQ-10) 100 MG CAPS, Take by mouth, Disp: , Rfl:     ezetimibe (ZETIA) 10 mg tablet, Take 1 tablet (10 mg total) by mouth daily, Disp: 30 tablet, Rfl: 5    furosemide (LASIX) 40 mg tablet, TAKE 1 TABLET BY MOUTH  DAILY, Disp: 90 tablet, Rfl: 1    glipiZIDE (GLUCOTROL) 5 mg tablet, TAKE 1 TABLET BY MOUTH TWO  TIMES A DAY BEFORE MEALS, Disp: 180 tablet, Rfl: 1    HUMALOG KWIKPEN 100 UNIT/ML SOPN, INJECT SUBCUTANEOUSLY UP TO 8 UNITS 3 TIMES DAILY, Disp: 30 mL, Rfl: 1    LEVEMIR FLEXTOUCH subcutaneous injection pen 100 units/mL, INJECT SUBCUTANEOUSLY 40  UNITS AT BEDTIME, Disp: 45 mL, Rfl: 1    levothyroxine 50 mcg tablet, TAKE 1 TABLET BY MOUTH  DAILY, Disp: 90 tablet, Rfl: 1    metoprolol succinate (TOPROL-XL) 25 mg 24 hr tablet, TAKE 1 TABLET BY MOUTH  DAILY, Disp: 90 tablet, Rfl: 2    olmesartan (BENICAR) 5 mg tablet, Take 1 tablet (5 mg total) by mouth daily, Disp: 90 tablet, Rfl: 0    pantoprazole (PROTONIX) 40 mg tablet, TAKE 1 TABLET BY MOUTH  DAILY, Disp: 90 tablet, Rfl: 1   diclofenac sodium (VOLTAREN) 1 %, Apply 4 g topically 4 (four) times a day for 30 days, Disp: 480 g, Rfl: 0    Past Medical History:       Past Medical History:   Diagnosis Date    Aortic narrowing     Last Assessed:  9/28/15    Arthritis     Bilateral leg edema     Bulla, lung ([facility] )     Diabetes mellitus ([facility] )     High cholesterol     History of kidney problems     Hypertension     Old myocardial infarction     Last Assessed:  8/27/15    Thyroid disease     Transient cerebral ischemia     Last Assessed:  8/27/15        Past Surgical History:   Procedure Laterality Date    COLONOSCOPY      Resolved:  2015    CORONARY ANGIOPLASTY WITH STENT PLACEMENT      Stent implanted late 1990's, Last Assessed:  8/27/15    KNEE SURGERY      Last Assessed:  0/08/15    UMBILICAL HERNIA REPAIR      1980's, Last Assessed:  8/27/15    UPPER GASTROINTESTINAL ENDOSCOPY      Last Assessed:  8/27/15        Family History   Problem Relation Age of Onset    Colon cancer Mother     Arthritis Mother     Diabetes Mother     Hypertension Mother     Cancer Mother         Breast    Other Mother         High cholesterol    Cancer Sister     Other Brother         Cerebrovascular accident (CVA)    Diabetes Other         Sibling    Hypertension Other         Sibling    Mental illness Neg Hx         Social History     Social History    Marital status: /Civil Union     Spouse name: N/A    Number of children: N/A    Years of education: N/A     Occupational History    Not on file       Social History Main Topics    Smoking status: Former Smoker     Packs/day: 4 00     Quit date: 1976    Smokeless tobacco: Never Used    Alcohol use No    Drug use: No    Sexual activity: Not on file     Other Topics Concern    Not on file     Social History Narrative    Lack of exercise    Sleeps 6-7 hours a day            Physical Exam:     /76   Pulse 72   Temp (!) 96 3 °F (35 7 °C)   Resp 16   Ht 5' 9" (1 753 m)   Wt 93 kg (205 lb)   BMI 30 27 kg/m²     Physical Exam     Data:     Pre-operative work-up    Laboratory Results: I have personally reviewed the pertinent laboratory results/reports    and they are acceptable  EKG: I have personally reviewed pertinent films in PACS - NSR no signs of ischemia    Chest x-ray: not performed    No results found for this or any previous visit (from the past 672 hour(s))  Assessment & Recommendations:     Problem List Items Addressed This Visit     Hammond's esophagus with dysplasia    Chronic kidney disease, stage 3 (HCC)    Coronary artery disease involving native coronary artery of native heart without angina pectoris    Diabetes mellitus due to underlying condition with diabetic autonomic neuropathy, with long-term current use of insulin (HCC)    Essential hypertension    Congenital hypothyroidism without goiter    Mixed hyperlipidemia    Chronic diastolic congestive heart failure (HCC)    Age-related incipient cataract of both eyes - Primary    Pain of right heel    Relevant Medications    diclofenac sodium (VOLTAREN) 1 %    Other Relevant Orders    XR heel / calcaneus 2+ vw right      Other Visit Diagnoses     Pre-op examination              Pre-Op Evaluation Assessment  75 y o  male with planned surgery: B/L cataract surgery  Known risk factors for perioperative complications: Coronary disease  Congestive heart failure  Diabetes mellitus  Pt states while he is here about 4 months ago he struck his foot on concrete, it hurt buit it got better,  Now that it is cold out he has developed pain in that heel  Worse when he wakes up in the am      Current medications which may produce withdrawal symptoms if withheld perioperatively: none    Pre-Op Evaluation Plan  1  Further preoperative workup as follows:   - None; no further preoperative work-up is required    2   Medication Management/Recommendations:   - None, continue medication regimen including morning of surgery, with sip of water  - Patient should continue his statin medication up through and including the day of surgery  - Patient has been instructed to avoid aspirin containing medications or non-steroidal anti-inflammatory drugs for the week preceding surgery  3  Prophylaxis for cardiac events with perioperative beta-blockers: not indicated  4  Patient requires further consultation with: None    Clearance  Patient is CLEARED for surgery without any additional cardiac testing       Jordy Watkins [address]  [address] [facility] [address]  Phone#  [phone]  Fax#  [phone]    Recent Results (from the past 1344 hour(s))   CBC and differential    Collection Time: 12/10/18  8:58 AM   Result Value Ref Range    White Blood Cell Count 6 2 3 4 - 10 8 x10E3/uL    Red Blood Cell Count 4 48 4 14 - 5 80 x10E6/uL    Hemoglobin 14 0 13 0 - 17 7 g/dL    HCT 40 8 37 5 - 51 0 %    MCV 91 79 - 97 fL    MCH 31 3 26 6 - 33 0 pg    MCHC 34 3 31 5 - 35 7 g/dL    RDW 12 7 12 3 - 15 4 %    Platelet Count 928 874 - 379 x10E3/uL    Neutrophils 44 Not Estab  %    Lymphocytes 40 Not Estab  %    Monocytes 10 Not Estab  %    Eosinophils 5 Not Estab  %    Basophils PCT 1 Not Estab  %    Neutrophils (Absolute) 2 7 1 4 - 7 0 x10E3/uL    Lymphocytes (Absolute) 2 5 0 7 - 3 1 x10E3/uL    Monocytes (Absolute) 0 6 0 1 - 0 9 x10E3/uL    Eosinophils (Absolute) 0 3 0 0 - 0 4 x10E3/uL    Basophils ABS 0 1 0 0 - 0 2 x10E3/uL    Immature Granulocytes 0 Not Estab  %    Immature Granulocytes (Absolute) 0 0 0 0 - 0 1 x10E3/uL   Comprehensive metabolic panel    Collection Time: 12/10/18  8:58 AM   Result Value Ref Range    Glucose, Random 136 (H) 65 - 99 mg/dL    BUN 22 8 - 27 mg/dL    Creatinine 1 47 (H) 0 76 - 1 27 mg/dL    eGFR Non  46 (L) >59 mL/min/1 73    SL AMB EGFR AFRICAN AMERICAN 54 (L) >59 mL/min/1 73    SL AMB BUN/CREATININE RATIO 15 10 - 24    Sodium 143 134 - 144 mmol/L    Potassium 4 2 3 5 - 5 2 mmol/L    Chloride 106 96 - 106 mmol/L    CO2 22 20 - 29 mmol/L    CALCIUM 9 2 8 6 - 10 2 mg/dL    SL AMB PROTEIN, TOTAL 6 0 6 0 - 8 5 g/dL    Albumin 4 2 3 5 - 4 8 g/dL    Globulin, Total 1 8 1 5 - 4 5 g/dL    Albumin/Globulin Ratio 2 3 (H) 1 2 - 2 2    TOTAL BILIRUBIN 0 4 0 0 - 1 2 mg/dL    Alk Phos Isoenzymes 79 39 - 117 IU/L    SL AMB AST 21 0 - 40 IU/L    SL AMB ALT 20 0 - 44 IU/L   [facility] CKD Program    Collection Time: 12/10/18  8:58 AM   Result Value Ref Range    Interpretation Note    Hemoglobin A1c (w/out EAG)    Collection Time: 12/10/18  8:58 AM   Result Value Ref Range    Hemoglobin A1C 7 7 (H) 4 8 - 5 6 %   TSH, 3rd generation    Collection Time: 12/10/18  8:58 AM   Result Value Ref Range    TSH 4 950 (H) 0 450 - 4 500 uIU/mL   Specimen Status Report    Collection Time: 12/10/18  8:58 AM   Result Value Ref Range    Comment Comment

## 2019-01-14 ENCOUNTER — HOSPITAL ENCOUNTER (OUTPATIENT)
Dept: RADIOLOGY | Facility: HOSPITAL | Age: 74
Discharge: HOME | End: 2019-01-14
Attending: FAMILY MEDICINE
Payer: MEDICARE

## 2019-01-14 ENCOUNTER — TRANSCRIBE ORDERS (OUTPATIENT)
Dept: ADMINISTRATIVE | Facility: HOSPITAL | Age: 74
End: 2019-01-14

## 2019-01-14 DIAGNOSIS — M79.671 PAIN OF RIGHT HEEL: ICD-10-CM

## 2019-01-14 PROCEDURE — 73650 X-RAY EXAM OF HEEL: CPT

## 2019-01-16 ENCOUNTER — TELEPHONE (OUTPATIENT)
Dept: FAMILY MEDICINE CLINIC | Facility: CLINIC | Age: 74
End: 2019-01-16

## 2019-01-16 PROBLEM — M19.072 PRIMARY OSTEOARTHRITIS OF LEFT FOOT: Status: ACTIVE | Noted: 2019-01-16

## 2019-01-16 NOTE — TELEPHONE ENCOUNTER
A letter of Medical Necessity is needed to be faxed to the [employer] for Medicare [phone]  In order for the review board to reverse the decision    klpn

## 2019-01-16 NOTE — TELEPHONE ENCOUNTER
Pt stated he would discuss gel on next visit  Until then will use OTC gels for pain and discomfort    Task Complete  rmklpn

## 2019-01-16 NOTE — TELEPHONE ENCOUNTER
PA was done on Cover My Meds  Rubén Perezr  ID [id number]  [employer] has denied this request   Lumbar radiculopathy is not covered under Medicare Part B or D  Osteoarthris will be  An appeal has been started    Will discuss with Dr Nathaniel Mcdonough aware too   klpn

## 2019-01-17 ENCOUNTER — TELEPHONE (OUTPATIENT)
Dept: FAMILY MEDICINE CLINIC | Facility: CLINIC | Age: 74
End: 2019-01-17

## 2019-01-17 RX ORDER — DIPHENOXYLATE HYDROCHLORIDE AND ATROPINE SULFATE 2.5; .025 MG/1; MG/1
1 TABLET ORAL EVERY MORNING
COMMUNITY

## 2019-01-17 NOTE — PRE-PROCEDURE INSTRUCTIONS
Pre-Surgery Instructions:   Medication Instructions    aspirin (ASPIR-81) 81 mg EC tablet Patient was instructed by Physician and understands   atorvastatin (LIPITOR) 40 mg tablet Instructed patient per Anesthesia Guidelines   BD PEN NEEDLE URSULA U/F 32G X 4 MM MISC Instructed patient per Anesthesia Guidelines   Coenzyme Q10 (COQ-10) 100 MG CAPS Instructed patient per Anesthesia Guidelines   ezetimibe (ZETIA) 10 mg tablet Instructed patient per Anesthesia Guidelines   furosemide (LASIX) 40 mg tablet Instructed patient per Anesthesia Guidelines   glipiZIDE (GLUCOTROL) 5 mg tablet Instructed patient per Anesthesia Guidelines   HUMALOG KWIKPEN 100 UNIT/ML SOPN Instructed patient per Anesthesia Guidelines   LEVEMIR FLEXTOUCH subcutaneous injection pen 100 units/mL Instructed patient per Anesthesia Guidelines   levothyroxine 50 mcg tablet Instructed patient per Anesthesia Guidelines   metoprolol succinate (TOPROL-XL) 25 mg 24 hr tablet Instructed patient per Anesthesia Guidelines   multivitamin (THERAGRAN) TABS Instructed patient per Anesthesia Guidelines   olmesartan (BENICAR) 5 mg tablet Instructed patient per Anesthesia Guidelines   pantoprazole (PROTONIX) 40 mg tablet Instructed patient per Anesthesia Guidelines  Pre op instructions given  Pt to take blood sugar,benicar,metoprolol succ  and levothyroxine the am of surgery   gamal Anderson [phone] Surgical Experience    The following information was developed to assist you to prepare for your operation  What do I need to do before coming to the hospital?   Arrange for a responsible person to drive you to and from the hospital    Arrange care for your children at home  Children are not allowed in the recovery areas of the hospital   Plan to wear clothing that is easy to put on and take off   If you are having shoulder surgery, wear a shirt that buttons or zippers in the front  Bathing  o Shower the evening before and the morning of your surgery with an antibacterial soap  Please refer to the Pre Op Showering Instructions for Surgery Patients Sheet   o Remove nail polish and all body piercing jewelry  o Do not shave any body part for at least 24 hours before surgery-this includes face, arms, legs and upper body  Food  o Nothing to eat or drink after midnight the night before your surgery  This includes candy and chewing gum  o Exception: If your surgery is after 12:00pm (noon), you may have clear liquids such as 7-Up®, ginger ale, apple or cranberry juice, Jell-O®, water, or clear broth until 8:00 am  o Do not drink milk or juice with pulp on the morning before surgery  o Do not drink alcohol 24 hours before surgery  Medicine  o Follow instructions you received from your surgeon about which medicines you may take on the day of surgery  o If instructed to take medicine on the morning of surgery, take pills with just a small sip of water  Call your prescribing doctor for specific infroamtion on what to do if you take insulin    What should I bring to the hospital?    Bring:  Denise Rucker or a walker, if you have them, for foot or knee surgery   A list of the daily medicines, vitamins, minerals, herbals and nutritional supplements you take  Include the dosages of medicines and the time you take them each day   Glasses, dentures or hearing aids   Minimal clothing; you will be wearing hospital sleepwear   Photo ID; required to verify your identity   If you have a Living Will or Power of , bring a copy of the documents   If you have an ostomy, bring an extra pouch and any supplies you use    Do not bring   Medicines or inhalers   Money, valuables or jewelry    What other information should I know about the day of surgery?  Notify your surgeons if you develop a cold, sore throat, cough, fever, rash or any other illness     Report to the Ambulatory Surgical/Same Day Surgery Unit   You will be instructed to stop at Registration only if you have not been pre-registered   Inform your  fi they do not stay that they will be asked by the staff to leave a phone number where they can be reached   Be available to be reached before surgery  In the event the operating room schedule changes, you may be asked to come in earlier or later than expected    *It is important to tell your doctor and others involved in your health care if you are taking or have been taking any non-prescription drugs, vitamins, minerals, herbals or other nutritional supplements   Any of these may interact with some food or medicines and cause a reaction

## 2019-01-17 NOTE — TELEPHONE ENCOUNTER
Please call pt let him know that a spur is present that is most likely causing his foot pain, the next step would be to see podiatry  As he may benefit from orthotics

## 2019-01-17 NOTE — TELEPHONE ENCOUNTER
[facility] requesting a call back, Called to give pt Dr Monster Rashid suggestion to   Dr Juana Mckeon     [facility], [place]

## 2019-01-23 ENCOUNTER — TELEPHONE (OUTPATIENT)
Dept: FAMILY MEDICINE CLINIC | Facility: CLINIC | Age: 74
End: 2019-01-23

## 2019-01-23 ENCOUNTER — ANESTHESIA EVENT (OUTPATIENT)
Dept: PERIOP | Facility: AMBULARY SURGERY CENTER | Age: 74
End: 2019-01-23
Payer: MEDICARE

## 2019-01-23 PROBLEM — H25.811 COMBINED FORMS OF AGE-RELATED CATARACT OF RIGHT EYE: Status: ACTIVE | Noted: 2019-01-23

## 2019-01-23 NOTE — TELEPHONE ENCOUNTER
FYI  To Dr Kulwinder Dougherty Pt was again requesting Diclofenac Sodium Gel  Earlier this month it was denied  It was recommended to the pt to speak to the pharmacist   There is OTC gels that are comparable  Called [employer] who did not have any suggestions of what to order that they would cover    rmklpn

## 2019-01-23 NOTE — H&P
Admit Note     Nilson Girard    The above male patient   75y o   years old has been followed for cataract development in their Right eye  Due to the decrease in vision and the affect on their lifestyle, they have elected to have cataract surgery  The risks and benefits were discussed with the patient using verbal discussion and education material  The IOL option were also discussed  The patient was cleared by  their medical doctor and had an interview with the anesthesia department  No contraindications to the surgery were found  Informed consent was obtained for cataract surgery and possible intraocular lens implantation  Ophthalmic Examination:     A complete ophthalmic exam was performed  The best corrected visual acuity in each eye was  OD 20/80     and OS 20/70      The Snellen chart was used as well as glare testing if indicated to determine the level of vision function  Slit lamp was used to examine the cornea and anterior structures of the eye  No significant pathology was found as a contraindication to surgery  Intraocular pressures were checked and found to be within normal limits  Dilated fundus exam was performed and no significant pathology was found that would attribute to the decreased vision  Examination of the crystalline lens revealed significant cataract formation and the cataract was a significant cause of the patient's decrease in vision  The potential benefits of the cataract surgery out weighed the risks of the procedure and were reviewed with the patient during the pre-operative visit  In summary, it was determined that the patient's decrease in visual acuity was mainly attributable to the cataract formation  Cataract surgery was recommended to for visual rehabilitation and improvement in the patient's  lifestyle  The decision included the patient's ability to safely drive a motor vehicle as well as live independently   The patient had an A-scan to determine the power of the lens to be placed into the eye at the time of cataract surgery  The risks and benefits were also review again at this visit including total loss of the eye on rare occassions  The IOL options were also reviewed based on the patients lifestyle and ocular findings  The above patient was informed of the need to be cleared by their medical doctor prior to surgery  Any pre-operative labs would be determined by their primary care doctor and/or cardiologist      A potential Vision was checked and found to be  20/30 in the operative eye  The post operative plan and visits were reviewed with the patient and scheduled  Admitting Diagnosis:    Cataract Right Eye  Procedure Planned:  Cataract Extraction Right Eye with possible Intraocular lens Implant      Anesthesia: Local MAC    Surgeon: Alexandra Norman MD

## 2019-01-24 ENCOUNTER — HOSPITAL ENCOUNTER (OUTPATIENT)
Facility: AMBULARY SURGERY CENTER | Age: 74
Setting detail: OUTPATIENT SURGERY
Discharge: HOME | End: 2019-01-24
Attending: OPHTHALMOLOGY | Admitting: OPHTHALMOLOGY
Payer: MEDICARE

## 2019-01-24 ENCOUNTER — ANESTHESIA (OUTPATIENT)
Dept: PERIOP | Facility: AMBULARY SURGERY CENTER | Age: 74
End: 2019-01-24
Payer: MEDICARE

## 2019-01-24 VITALS
OXYGEN SATURATION: 97 % | DIASTOLIC BLOOD PRESSURE: 67 MMHG | SYSTOLIC BLOOD PRESSURE: 134 MMHG | BODY MASS INDEX: 30.36 KG/M2 | HEART RATE: 60 BPM | TEMPERATURE: 98.2 F | RESPIRATION RATE: 16 BRPM | HEIGHT: 69 IN | WEIGHT: 205 LBS

## 2019-01-24 PROBLEM — H25.811 COMBINED FORMS OF AGE-RELATED CATARACT OF RIGHT EYE: Status: RESOLVED | Noted: 2019-01-23 | Resolved: 2019-01-24

## 2019-01-24 LAB — GLUCOSE SERPL-MCNC: 153 MG/DL (ref 65–140)

## 2019-01-24 PROCEDURE — V2632 POST CHMBR INTRAOCULAR LENS: HCPCS | Performed by: OPHTHALMOLOGY

## 2019-01-24 PROCEDURE — 82948 REAGENT STRIP/BLOOD GLUCOSE: CPT

## 2019-01-24 DEVICE — IMPLANTABLE DEVICE: Type: IMPLANTABLE DEVICE | Site: EYE | Status: FUNCTIONAL

## 2019-01-24 RX ORDER — TETRACAINE HYDROCHLORIDE 5 MG/ML
SOLUTION OPHTHALMIC AS NEEDED
Status: DISCONTINUED | OUTPATIENT
Start: 2019-01-24 | End: 2019-01-24 | Stop reason: HOSPADM

## 2019-01-24 RX ORDER — OFLOXACIN 3 MG/ML
1 SOLUTION/ DROPS OPHTHALMIC
Status: DISCONTINUED | OUTPATIENT
Start: 2019-01-25 | End: 2019-01-24 | Stop reason: HOSPADM

## 2019-01-24 RX ORDER — PHENYLEPHRINE HCL 2.5 %
1 DROPS OPHTHALMIC (EYE)
Status: COMPLETED | OUTPATIENT
Start: 2019-01-25 | End: 2019-01-24

## 2019-01-24 RX ORDER — BALANCED SALT SOLUTION 6.4; .75; .48; .3; 3.9; 1.7 MG/ML; MG/ML; MG/ML; MG/ML; MG/ML; MG/ML
SOLUTION OPHTHALMIC AS NEEDED
Status: DISCONTINUED | OUTPATIENT
Start: 2019-01-24 | End: 2019-01-24 | Stop reason: HOSPADM

## 2019-01-24 RX ORDER — MIDAZOLAM HYDROCHLORIDE 1 MG/ML
INJECTION INTRAMUSCULAR; INTRAVENOUS AS NEEDED
Status: DISCONTINUED | OUTPATIENT
Start: 2019-01-24 | End: 2019-01-24 | Stop reason: SURG

## 2019-01-24 RX ORDER — SODIUM CHLORIDE 9 MG/ML
125 INJECTION, SOLUTION INTRAVENOUS CONTINUOUS
Status: DISCONTINUED | OUTPATIENT
Start: 2019-01-24 | End: 2019-01-24 | Stop reason: HOSPADM

## 2019-01-24 RX ORDER — TROPICAMIDE 10 MG/ML
1 SOLUTION/ DROPS OPHTHALMIC
Status: COMPLETED | OUTPATIENT
Start: 2019-01-25 | End: 2019-01-24

## 2019-01-24 RX ORDER — CYCLOPENTOLATE HYDROCHLORIDE 10 MG/ML
1 SOLUTION/ DROPS OPHTHALMIC
Status: COMPLETED | OUTPATIENT
Start: 2019-01-25 | End: 2019-01-24

## 2019-01-24 RX ORDER — PROPOFOL 10 MG/ML
INJECTION, EMULSION INTRAVENOUS AS NEEDED
Status: DISCONTINUED | OUTPATIENT
Start: 2019-01-24 | End: 2019-01-24 | Stop reason: SURG

## 2019-01-24 RX ORDER — LIDOCAINE HYDROCHLORIDE 20 MG/ML
1 JELLY TOPICAL
Status: DISCONTINUED | OUTPATIENT
Start: 2019-01-25 | End: 2019-01-24 | Stop reason: HOSPADM

## 2019-01-24 RX ORDER — OFLOXACIN 3 MG/ML
SOLUTION/ DROPS OPHTHALMIC AS NEEDED
Status: DISCONTINUED | OUTPATIENT
Start: 2019-01-24 | End: 2019-01-24 | Stop reason: HOSPADM

## 2019-01-24 RX ORDER — MANNITOL 250 MG/ML
INJECTION, SOLUTION INTRAVENOUS AS NEEDED
Status: DISCONTINUED | OUTPATIENT
Start: 2019-01-24 | End: 2019-01-24 | Stop reason: SURG

## 2019-01-24 RX ADMIN — OFLOXACIN 1 DROP: 3 SOLUTION/ DROPS OPHTHALMIC at 10:10

## 2019-01-24 RX ADMIN — TROPICAMIDE 1 DROP: 10 SOLUTION/ DROPS OPHTHALMIC at 10:11

## 2019-01-24 RX ADMIN — PHENYLEPHRINE HYDROCHLORIDE 1 DROP: 25 SOLUTION/ DROPS OPHTHALMIC at 09:55

## 2019-01-24 RX ADMIN — MANNITOL 25 G: 12.5 INJECTION, SOLUTION INTRAVENOUS at 10:31

## 2019-01-24 RX ADMIN — LIDOCAINE HYDROCHLORIDE 1 APPLICATION: 20 JELLY TOPICAL at 09:46

## 2019-01-24 RX ADMIN — OFLOXACIN 1 DROP: 3 SOLUTION/ DROPS OPHTHALMIC at 09:46

## 2019-01-24 RX ADMIN — PHENYLEPHRINE HYDROCHLORIDE 1 DROP: 25 SOLUTION/ DROPS OPHTHALMIC at 09:46

## 2019-01-24 RX ADMIN — SODIUM CHLORIDE 125 ML/HR: 0.9 INJECTION, SOLUTION INTRAVENOUS at 10:11

## 2019-01-24 RX ADMIN — TROPICAMIDE 1 DROP: 10 SOLUTION/ DROPS OPHTHALMIC at 09:46

## 2019-01-24 RX ADMIN — MIDAZOLAM HYDROCHLORIDE 2 MG: 1 INJECTION, SOLUTION INTRAMUSCULAR; INTRAVENOUS at 10:42

## 2019-01-24 RX ADMIN — OFLOXACIN 1 DROP: 3 SOLUTION/ DROPS OPHTHALMIC at 10:55

## 2019-01-24 RX ADMIN — PHENYLEPHRINE HYDROCHLORIDE 1 DROP: 25 SOLUTION/ DROPS OPHTHALMIC at 10:10

## 2019-01-24 RX ADMIN — CYCLOPENTOLATE HYDROCHLORIDE 1 DROP: 10 SOLUTION OPHTHALMIC at 10:10

## 2019-01-24 RX ADMIN — CYCLOPENTOLATE HYDROCHLORIDE 1 DROP: 10 SOLUTION OPHTHALMIC at 09:45

## 2019-01-24 RX ADMIN — LIDOCAINE HYDROCHLORIDE 1 APPLICATION: 20 JELLY TOPICAL at 09:55

## 2019-01-24 RX ADMIN — PROPOFOL 50 MG: 10 INJECTION, EMULSION INTRAVENOUS at 10:30

## 2019-01-24 RX ADMIN — CYCLOPENTOLATE HYDROCHLORIDE 1 DROP: 10 SOLUTION OPHTHALMIC at 09:55

## 2019-01-24 RX ADMIN — LIDOCAINE HYDROCHLORIDE 1 APPLICATION: 20 JELLY TOPICAL at 10:10

## 2019-01-24 RX ADMIN — TROPICAMIDE 1 DROP: 10 SOLUTION/ DROPS OPHTHALMIC at 09:55

## 2019-01-24 NOTE — OP NOTE
Postoperative Note  PATIENT NAME: Mary Carmichael  : 1945  MRN: [id number]  [place] [address] OR ROOM 01    Surgery Date: 2019    Combined forms of age-related cataract of right eye [H25 811]    Post-Op Diagnosis Codes:     * Combined forms of age-related cataract of right eye [H25 811]    Procedure(s):  EXTRACTION EXTRACAPSULAR CATARACT PHACO INTRAOCULAR LENS (IOL) RIGHT EYE    Surgeon(s) and Role:     * Manoj Zafar MD - Primary    Assistants:  Circulator: Kathryn Jama RN; Igor Friend RN  Scrub Person: Carina Jaeger    Anesthesia Type:   IV Sedation with Anesthesia     Anesthesiologist: Robert Pickard MD    Operative Indication:  Vision reduced to 20/80 secondary to progressive cataract formation  The cataract was interfering with the patient's daily activities  All risks and benefits to the surgical procedure were explained to the patient and family members that were present during the pre-operative visit  Eye models and educational material were used as well as a video to explain cataract surgery  The risks included but were not limited to blindness, infection, choroidal hemorrhage, loss of the eye, glaucoma, corneal edema, macular edema, retinal detachment, the need for a corneal transplant and the need to wear glasses postoperatively  The risk of having to move or rotate the IOL in the rarre even the IOL power was incorrect was explained as well  The speciality IOL-intraocular lenses (ex  Toric, Multifocal),were discussed pre-operatively, if appropriate for this particular patient  The patient understood and signed the appropriate consent form  Operative Technique:  The patient was brought back to the operating suite and placed in the supine position  The patient was identified in from of the surgeon as well as the OR staff  The operative eye was confirmed and marked  The patient  was given a peribulbar block using  2% Lidocaine  The pressure of the eye was checked by digital massage   The operative eye was prepped and draped in the usual sterile fashion  A wire lid speculum was inserted  A clear corneal, temporal approach was used  A 2 75 Phaco blade was used to tunnel and enter the  cornea from the temporal side  The anterior chamber was entered and visco-elastic was used to deepen the anterior chamber  Side port paracentesis tracts were performed using a 1 0 mm paracentesis blade at approximately 12 and 6 position  A circular tear capsulotomy was performed using a 25 gauge bent needle and Utrata capsulotomy forceps  [place] dissection was carried out with balanced salt solution using a 27 gauge flat irrigating cannula  The nucleus was freely rotatable with in the capsular bag  The Nucleus was phacoemulsified   Using the [employer] Infinity machine and the phaco chop method  The cortical shell was removed using the bimanual I/A  The posterior capsule was polished as indicated  The posterior capsular bag was inflated using Provisc  The posterior chamber intraocular lens [id number] was taken from the package an  inspected and the power confirmed  The lens was then inserted  into the posterior capsular bag using the appropriate IOL folder and   The lens was well centered using the Sinsky hook  The Provisc was removed using the I/A unit  The side ports and the temporal incision were stromal hydrated until they were water tight  A 10-vicryl suture was placed to further secure the temporal incision if indicated after testing the incision  The pressure of the eye was adjusted accordingly using balance salt solution through the side ports  At the end of the case the patient was given a drop of Iopidine to prevent a pressure spike  Also, the patient was given a drop of antibiotic drop and antibiotic ointment to prevent infection  The patient's eye was then patched with an eye pad and covered with a plastic shield  The Patient was then taken to the recovery room   After vital signs were stable the patient was discharged with family/friend in satisfactory condition  Addendum: 2 bottles Mannitol 50ml 25%solution 12 5 gm    ACD 2 01mm  Jere Cross MD

## 2019-01-24 NOTE — DISCHARGE SUMMARY
Deya Portillo was discharged in satisfactory condition  Discharge instructions were reviewed and then given to the patient   Arrangements were made for follow up the next day with Hetal Rae MD

## 2019-01-24 NOTE — DISCHARGE INSTRUCTIONS
[facility]  Discharge Instructions    Dr Kelley Mckee and Dr Merlene Iverson discharged in satisfactory condition  Post operative instructions were given  Follow-up Appointment was given for 9 AM on 1/25/19 at the  [facility]  Normal Symptoms: Foreign body sensation, scratchy, picky feeling  Try Extra Strength Tylenol for headache  Call Office if severe pain or discomfort  Keep patch on operative eye until 4 pm today  Bring sunglasses to office in the morning  Do not bring the 3 eye drops  NEW Instructions on how to use the 3 drops will be given in AM     Activity: Avoid any heavy,  bending, lifting or excessive activity until instructed  May walk around home with assistance  OK to watch TV  Avoid any excessive reading  No driving until told (Normally 2-4 days after surgery)  Avoid sleeping on operative eye  No water in the eye    Diet: Resume normal diet as tolerated  If experiencing nausea, try bland foods or liquid diet first      Resume normal medications unless otherwise instructed     If any Questions or Problems Please Call: [phone] [facility] take off [facility] and patch and start drops , Do not put shield back on during the day  Blurry Vision normal today  Pink/White Top                     [employer] Top               4:00PM                         4:05 PM               4:10 PM               8:00PM                         8: 05PM                8:10PM    BEDTIME:    Take Tan Top ONLY and then replace the plastic shield over eye  No gauze pad needed at bedtime    TOMORROW MORNING , before coming to office, take all THREE drops, then put on glasses  Example of times below  7:00AM                           7: 05AM                  7:10 AM            YOU MAY EXPERIENCE "DOUBLE VISION" - "Blurry Vision"  ONCE YOU TAKE OFF EYE PATCH/SHIELD THIS IS NORMAL

## 2019-01-24 NOTE — ANESTHESIA PREPROCEDURE EVALUATION
Review of Systems/Medical History  Patient summary reviewed  Chart reviewed  No history of anesthetic complications     Cardiovascular  Hyperlipidemia, Hypertension , Past MI , CAD , CHF ,    Pulmonary       GI/Hepatic     Hiatal hernia,        Chronic kidney disease stage 3, Prostatic disorder, benign prostatic hyperplasia       Endo/Other  Diabetes type 2 Oral agent, History of thyroid disease , hypothyroidism,      GYN       Hematology   Musculoskeletal    Arthritis     Neurology    TIA,    Psychology           Physical Exam    Airway    Mallampati score: II  TM Distance: >3 FB  Neck ROM: full     Dental       Cardiovascular  Cardiovascular exam normal    Pulmonary  Pulmonary exam normal     Other Findings        Anesthesia Plan  ASA Score- 3     Anesthesia Type- IV sedation with anesthesia with ASA Monitors  Additional Monitors:   Airway Plan:         Plan Factors-    Induction-     Postoperative Plan-     Informed Consent- Anesthetic plan and risks discussed with patient  I personally reviewed this patient with the CRNA  Discussed and agreed on the Anesthesia Plan with the CRNA  Lalo Avila

## 2019-01-29 ENCOUNTER — OFFICE VISIT (OUTPATIENT)
Dept: PODIATRY | Facility: CLINIC | Age: 74
End: 2019-01-29
Payer: MEDICARE

## 2019-01-29 VITALS
BODY MASS INDEX: 30.36 KG/M2 | SYSTOLIC BLOOD PRESSURE: 134 MMHG | DIASTOLIC BLOOD PRESSURE: 67 MMHG | HEIGHT: 69 IN | WEIGHT: 205 LBS | RESPIRATION RATE: 17 BRPM | HEART RATE: 60 BPM

## 2019-01-29 DIAGNOSIS — M25.572 ARTHRALGIA OF LEFT FOOT: ICD-10-CM

## 2019-01-29 DIAGNOSIS — M79.672 LEFT FOOT PAIN: ICD-10-CM

## 2019-01-29 DIAGNOSIS — M72.2 PLANTAR FASCIITIS: Primary | ICD-10-CM

## 2019-01-29 DIAGNOSIS — M79.671 RIGHT FOOT PAIN: ICD-10-CM

## 2019-01-29 DIAGNOSIS — B35.1 ONYCHOMYCOSIS: ICD-10-CM

## 2019-01-29 PROCEDURE — 99203 OFFICE O/P NEW LOW 30 MIN: CPT | Performed by: PODIATRIST

## 2019-01-29 PROCEDURE — 73620 X-RAY EXAM OF FOOT: CPT | Performed by: PODIATRIST

## 2019-01-29 PROCEDURE — 20550 NJX 1 TENDON SHEATH/LIGAMENT: CPT | Performed by: PODIATRIST

## 2019-01-29 RX ORDER — TERBINAFINE HYDROCHLORIDE 250 MG/1
TABLET ORAL
Qty: 15 TABLET | Refills: 0 | Status: SHIPPED | OUTPATIENT
Start: 2019-01-29 | End: 2019-02-14

## 2019-01-29 RX ORDER — MELOXICAM 7.5 MG/1
7.5 TABLET ORAL DAILY
Qty: 10 TABLET | Refills: 0 | Status: SHIPPED | OUTPATIENT
Start: 2019-01-29 | End: 2019-02-14

## 2019-01-29 NOTE — PROGRESS NOTES
Assessment/Plan:  Plantar fasciitis right foot  Posttraumatic bursitis left foot  Pain upon ambulation  Mycosis of nail  Plan  X-rays reviewed with patient  This was of the right foot  X-rays of the left foot taken in the office  These were reviewed  Right foot trigger point injection done  1 25 cc of Kenalog 10 injected into right heel without pain or complication  All mycotic nails debrided  We will add Mobic  Patient return for follow-up  No problem-specific Assessment & Plan notes found for this encounter  There are no diagnoses linked to this encounter  Subjective:  Patient is seen on referral   Patient has history of injury at home  Approximately 4 weeks prior he slipped down the steps and injured his right and left foot  He saw primary care  Right foot x-rays ordered  Patient has pain in his right heel  He has pain in the ball of the left foot  In addition he is concerned with thickened discolored toenails  In addition he has soft corn of the left foot      Past Medical History:   Diagnosis Date    Aortic narrowing     Last Assessed:  9/28/15    Arthritis     Hammond esophagus     Bilateral leg edema     Bulla, lung (HCC)     CHF (congestive heart failure) (HCC)     Chronic kidney disease     stage 3    Diabetes mellitus (HCC)     Enlarged prostate     Heel pain     right heel slipped in the garage-landed on the right heel    Herniated lumbar intervertebral disc     x 4 discs-fell off a roof    Hiatal hernia     High cholesterol     History of kidney problems     Hypertension     Old myocardial infarction     x2-pt was unaware of the MI-one stent    Thyroid disease     hypothyroid    Transient cerebral ischemia     Last Assessed:  8/27/15    Wears dentures     full upper, partial lower    Wears glasses        Past Surgical History:   Procedure Laterality Date    COLONOSCOPY      Resolved:  2015    CORONARY ANGIOPLASTY WITH STENT PLACEMENT      Stent implanted early 1990's,     ESOPHAGOGASTRODUODENOSCOPY      KNEE SURGERY      right knee cartilage surgery-left meniscus repair    IA REMV CATARACT EXTRACAP,INSERT LENS Right 1/24/2019    Procedure: EXTRACTION EXTRACAPSULAR CATARACT PHACO INTRAOCULAR LENS (IOL);   Surgeon: Bud Giordano MD;  Location: [facility] MAIN OR;  Service: Ophthalmology    TONSILLECTOMY      UMBILICAL HERNIA REPAIR      1980's,    UPPER GASTROINTESTINAL ENDOSCOPY      Last Assessed:  8/27/15       No Known Allergies      Current Outpatient Prescriptions:     aspirin (ASPIR-81) 81 mg EC tablet, Take 81 mg by mouth every morning  , Disp: , Rfl:     atorvastatin (LIPITOR) 40 mg tablet, TAKE 1 TABLET BY MOUTH  EVERY DAY, Disp: 90 tablet, Rfl: 1    BD PEN NEEDLE URSULA U/F 32G X 4 MM MISC, USE 4 TIMES DAILY AS  DIRECTED, Disp: 360 each, Rfl: 1    Coenzyme Q10 (COQ-10) 100 MG CAPS, Take 300 mg by mouth daily at bedtime  , Disp: , Rfl:     ezetimibe (ZETIA) 10 mg tablet, Take 1 tablet (10 mg total) by mouth daily, Disp: 30 tablet, Rfl: 5    furosemide (LASIX) 40 mg tablet, TAKE 1 TABLET BY MOUTH  DAILY, Disp: 90 tablet, Rfl: 1    glipiZIDE (GLUCOTROL) 5 mg tablet, TAKE 1 TABLET BY MOUTH TWO  TIMES A DAY BEFORE MEALS, Disp: 180 tablet, Rfl: 1    HUMALOG KWIKPEN 100 UNIT/ML SOPN, INJECT SUBCUTANEOUSLY UP TO 8 UNITS 3 TIMES DAILY, Disp: 30 mL, Rfl: 1    LEVEMIR FLEXTOUCH subcutaneous injection pen 100 units/mL, INJECT SUBCUTANEOUSLY 40  UNITS AT BEDTIME (Patient taking differently: INJECT SUBCUTANEOUSLY 40  UNITS AT HS), Disp: 45 mL, Rfl: 1    levothyroxine 50 mcg tablet, TAKE 1 TABLET BY MOUTH  DAILY, Disp: 90 tablet, Rfl: 1    metoprolol succinate (TOPROL-XL) 25 mg 24 hr tablet, TAKE 1 TABLET BY MOUTH  DAILY (Patient taking differently: TAKE 1 TABLET BY MOUTH  DAILY-am), Disp: 90 tablet, Rfl: 2    multivitamin (THERAGRAN) TABS, Take 1 tablet by mouth every morning, Disp: , Rfl:     olmesartan (BENICAR) 5 mg tablet, Take 1 tablet (5 mg total) by mouth daily (Patient taking differently: Take 5 mg by mouth every morning  ), Disp: 90 tablet, Rfl: 0    pantoprazole (PROTONIX) 40 mg tablet, TAKE 1 TABLET BY MOUTH  DAILY (Patient taking differently: every other day as needed), Disp: 90 tablet, Rfl: 1    Patient Active Problem List   Diagnosis    Hammond's esophagus with dysplasia    Chronic kidney disease, stage 3 (HCC)    Colon, diverticulosis    Chronic constipation    Coronary artery disease involving native coronary artery of native heart without angina pectoris    Diabetes mellitus due to underlying condition with diabetic autonomic neuropathy, with long-term current use of insulin (HCC)    Disc degeneration, lumbar    Enlarged prostate    Hiatal hernia    History of myocardial infarction    Essential hypertension    Congenital hypothyroidism without goiter    Lumbar radiculopathy    Mixed hyperlipidemia    Chronic diastolic congestive heart failure (HCC)    Age-related incipient cataract of both eyes    Pain of right heel    Primary osteoarthritis of left foot          Patient ID: Mansi Tyson is a 75 y o  male  HPI    The following portions of the patient's history were reviewed and updated as appropriate: allergies, current medications, past family history, past medical history, past social history, past surgical history and problem list     Review of Systems      Objective:  Patient's shoes and socks removed     Foot Exam    General  General Appearance: appears stated age and healthy   Orientation: alert and oriented to person, place, and time   Affect: appropriate   Gait: antalgic       Right Foot/Ankle     Inspection and Palpation  Ecchymosis: none  Tenderness: calcaneus tenderness, bony tenderness and plantar fascia   Swelling: dorsum and metatarsals   Arch: pes planus  Hammertoes: fifth toe  Hallux valgus: no  Hallux limitus: yes  Skin Exam: dry skin;     Neurovascular  Dorsalis pedis: 2+  Posterior tibial: 2+      Left Foot/Ankle      Inspection and Palpation  Ecchymosis: none  Tenderness: lesser metatarsophalangeal joints and metatarsals   Swelling: dorsum and metatarsals   Arch: pes planus  Hammertoes: fifth toe  Hallux valgus: no  Hallux limitus: yes  Skin Exam: dry skin;     Neurovascular  Dorsalis pedis: 2+  Posterior tibial: 2+        Physical Exam   Constitutional: He appears well-developed and well-nourished  Cardiovascular: Normal rate and regular rhythm  Pulses:       Dorsalis pedis pulses are 2+ on the right side, and 2+ on the left side  Posterior tibial pulses are 2+ on the right side, and 2+ on the left side  Musculoskeletal:        Right foot: There is bony tenderness  Patient is pronated in stance and gait  There is pain with palpation right plantar fascia insertion  X-ray taken hospital demonstrates plantar calcaneal spur  No evidence of fracture  Left foot  Pain with palpation 2nd MPJ  No edema or ecchymosis noted  There is pain with palpation of the plantar aspect  X-ray of the office demonstrates long plantar flexed 2nd metatarsal   No evidence of fracture or bony mass  Feet:   Right Foot:   Skin Integrity: Positive for dry skin  Left Foot:   Skin Integrity: Positive for dry skin  Skin:   Soft corn noted 4th left interdigital space  There is dystrophy of nail  All nails demonstrate distal mycosis  There is paronychia of hallux nail bilateral   Nursing note and vitals reviewed

## 2019-02-14 NOTE — PRE-PROCEDURE INSTRUCTIONS
Pre-Surgery Instructions:   Medication Instructions    aspirin (ASPIR-81) 81 mg EC tablet Patient was instructed by Physician and understands   atorvastatin (LIPITOR) 40 mg tablet Instructed patient per Anesthesia Guidelines   BD PEN NEEDLE URSULA U/F 32G X 4 MM MISC Instructed patient per Anesthesia Guidelines   Coenzyme Q10 (COQ-10) 100 MG CAPS Patient was instructed by Physician and understands   ezetimibe (ZETIA) 10 mg tablet Instructed patient per Anesthesia Guidelines   furosemide (LASIX) 40 mg tablet Instructed patient per Anesthesia Guidelines   glipiZIDE (GLUCOTROL) 5 mg tablet Instructed patient per Anesthesia Guidelines   HUMALOG KWIKPEN 100 UNIT/ML SOPN Instructed patient per Anesthesia Guidelines   LEVEMIR FLEXTOUCH subcutaneous injection pen 100 units/mL Instructed patient per Anesthesia Guidelines   levothyroxine 50 mcg tablet Instructed patient per Anesthesia Guidelines   metoprolol succinate (TOPROL-XL) 25 mg 24 hr tablet Instructed patient per Anesthesia Guidelines   multivitamin (THERAGRAN) TABS Instructed patient per Anesthesia Guidelines   olmesartan (BENICAR) 5 mg tablet Instructed patient per Anesthesia Guidelines   pantoprazole (PROTONIX) 40 mg tablet Instructed patient per Anesthesia Guidelines  Pre op instructions given  Pt to take blood sugar,olmesartan,pantoprazole,metoprolol succinate,levothyroxine the am of surgery   wife Tin Decree [phone]  My Surgical Experience    The following information was developed to assist you to prepare for your operation  What do I need to do before coming to the hospital?   Arrange for a responsible person to drive you to and from the hospital    Arrange care for your children at home  Children are not allowed in the recovery areas of the hospital   Plan to wear clothing that is easy to put on and take off   If you are having shoulder surgery, wear a shirt that buttons or zippers in the front  Bathing  o Shower the evening before and the morning of your surgery with an antibacterial soap  Please refer to the Pre Op Showering Instructions for Surgery Patients Sheet   o Remove nail polish and all body piercing jewelry  o Do not shave any body part for at least 24 hours before surgery-this includes face, arms, legs and upper body  Food  o Nothing to eat or drink after midnight the night before your surgery  This includes candy and chewing gum  o Exception: If your surgery is after 12:00pm (noon), you may have clear liquids such as 7-Up®, ginger ale, apple or cranberry juice, Jell-O®, water, or clear broth until 8:00 am  o Do not drink milk or juice with pulp on the morning before surgery  o Do not drink alcohol 24 hours before surgery  Medicine  o Follow instructions you received from your surgeon about which medicines you may take on the day of surgery  o If instructed to take medicine on the morning of surgery, take pills with just a small sip of water  Call your prescribing doctor for specific infroamtion on what to do if you take insulin    What should I bring to the hospital?    Bring:  Ezzard Primer or a walker, if you have them, for foot or knee surgery   A list of the daily medicines, vitamins, minerals, herbals and nutritional supplements you take  Include the dosages of medicines and the time you take them each day   Glasses, dentures or hearing aids   Minimal clothing; you will be wearing hospital sleepwear   Photo ID; required to verify your identity   If you have a Living Will or Power of , bring a copy of the documents   If you have an ostomy, bring an extra pouch and any supplies you use    Do not bring   Medicines or inhalers   Money, valuables or jewelry    What other information should I know about the day of surgery?  Notify your surgeons if you develop a cold, sore throat, cough, fever, rash or any other illness     Report to the Ambulatory Surgical/Same Day Surgery Unit   You will be instructed to stop at Registration only if you have not been pre-registered   Inform your  fi they do not stay that they will be asked by the staff to leave a phone number where they can be reached   Be available to be reached before surgery  In the event the operating room schedule changes, you may be asked to come in earlier or later than expected    *It is important to tell your doctor and others involved in your health care if you are taking or have been taking any non-prescription drugs, vitamins, minerals, herbals or other nutritional supplements   Any of these may interact with some food or medicines and cause a reaction

## 2019-02-20 ENCOUNTER — ANESTHESIA EVENT (OUTPATIENT)
Dept: PERIOP | Facility: AMBULARY SURGERY CENTER | Age: 74
End: 2019-02-20
Payer: MEDICARE

## 2019-02-20 PROBLEM — H25.812 COMBINED FORMS OF AGE-RELATED CATARACT OF LEFT EYE: Status: ACTIVE | Noted: 2019-02-20

## 2019-02-20 NOTE — H&P
Admit Note     Chick Emily    The above male patient   75y o   years old has been followed for cataract development in their Left eye  Due to the decrease in vision and the affect on their lifestyle, they have elected to have cataract surgery  The risks and benefits were discussed with the patient using verbal discussion and education material  The IOL option were also discussed  The patient was cleared by  their medical doctor and had an interview with the anesthesia department  No contraindications to the surgery were found  Informed consent was obtained for cataract surgery and possible intraocular lens implantation  Ophthalmic Examination:     A complete ophthalmic exam was performed  The best corrected visual acuity in each eye was  OD 20/25     and OS 20/70      The Snellen chart was used as well as glare testing if indicated to determine the level of vision function  Slit lamp was used to examine the cornea and anterior structures of the eye  No significant pathology was found as a contraindication to surgery  Intraocular pressures were checked and found to be within normal limits  Dilated fundus exam was performed and no significant pathology was found that would attribute to the decreased vision  Examination of the crystalline lens revealed significant cataract formation and the cataract was a significant cause of the patient's decrease in vision  The potential benefits of the cataract surgery out weighed the risks of the procedure and were reviewed with the patient during the pre-operative visit  In summary, it was determined that the patient's decrease in visual acuity was mainly attributable to the cataract formation  Cataract surgery was recommended to for visual rehabilitation and improvement in the patient's  lifestyle  The decision included the patient's ability to safely drive a motor vehicle as well as live independently   The patient had an A-scan to determine the power of the lens to be placed into the eye at the time of cataract surgery  The risks and benefits were also review again at this visit including total loss of the eye on rare occassions  The IOL options were also reviewed based on the patients lifestyle and ocular findings  The above patient was informed of the need to be cleared by their medical doctor prior to surgery  Any pre-operative labs would be determined by their primary care doctor and/or cardiologist      A potential Vision was checked and found to be  20/25 in the operative eye  The post operative plan and visits were reviewed with the patient and scheduled  Admitting Diagnosis:    Cataract Left Eye  Procedure Planned:  Cataract Extraction Left Eye with possible Intraocular lens Implant      Anesthesia: Local MAC    Surgeon: Tanya Hansen MD

## 2019-02-20 NOTE — ANESTHESIA PREPROCEDURE EVALUATION
Review of Systems/Medical History  Patient summary reviewed  Chart reviewed  No history of anesthetic complications     Cardiovascular  Hyperlipidemia, Hypertension , Past MI , CAD , CHF ,    Pulmonary       GI/Hepatic     Hiatal hernia,        Chronic kidney disease stage 3, Prostatic disorder, benign prostatic hyperplasia       Endo/Other  Diabetes type 2 Oral agent, History of thyroid disease , hypothyroidism,      GYN       Hematology   Musculoskeletal    Arthritis     Neurology    TIA,    Psychology           Physical Exam    Airway    Mallampati score: II  TM Distance: >3 FB  Neck ROM: full     Dental       Cardiovascular  Rhythm: regular, Rate: normal,     Pulmonary  Breath sounds clear to auscultation,     Other Findings        Anesthesia Plan  ASA Score- 3     Anesthesia Type- IV sedation with anesthesia with ASA Monitors  Additional Monitors:   Airway Plan:         Plan Factors-    Induction- intravenous  Postoperative Plan-     Informed Consent- Anesthetic plan and risks discussed with patient  I personally reviewed this patient with the CRNA  Discussed and agreed on the Anesthesia Plan with the CRNA  Kevin Rosario

## 2019-02-21 ENCOUNTER — HOSPITAL ENCOUNTER (OUTPATIENT)
Facility: AMBULARY SURGERY CENTER | Age: 74
Setting detail: OUTPATIENT SURGERY
Discharge: HOME | End: 2019-02-21
Attending: OPHTHALMOLOGY | Admitting: OPHTHALMOLOGY
Payer: MEDICARE

## 2019-02-21 ENCOUNTER — ANESTHESIA (OUTPATIENT)
Dept: PERIOP | Facility: AMBULARY SURGERY CENTER | Age: 74
End: 2019-02-21
Payer: MEDICARE

## 2019-02-21 VITALS
RESPIRATION RATE: 18 BRPM | SYSTOLIC BLOOD PRESSURE: 146 MMHG | BODY MASS INDEX: 30.36 KG/M2 | TEMPERATURE: 97.9 F | DIASTOLIC BLOOD PRESSURE: 69 MMHG | WEIGHT: 205 LBS | HEIGHT: 69 IN | OXYGEN SATURATION: 97 % | HEART RATE: 55 BPM

## 2019-02-21 PROBLEM — H25.812 COMBINED FORMS OF AGE-RELATED CATARACT OF LEFT EYE: Status: RESOLVED | Noted: 2019-02-20 | Resolved: 2019-02-21

## 2019-02-21 LAB — GLUCOSE SERPL-MCNC: 184 MG/DL (ref 65–140)

## 2019-02-21 PROCEDURE — 82948 REAGENT STRIP/BLOOD GLUCOSE: CPT

## 2019-02-21 PROCEDURE — V2632 POST CHMBR INTRAOCULAR LENS: HCPCS | Performed by: OPHTHALMOLOGY

## 2019-02-21 DEVICE — IOL SN60WF 26.0: Type: IMPLANTABLE DEVICE | Site: EYE | Status: FUNCTIONAL

## 2019-02-21 RX ORDER — CYCLOPENTOLATE HYDROCHLORIDE 10 MG/ML
1 SOLUTION/ DROPS OPHTHALMIC
Status: COMPLETED | OUTPATIENT
Start: 2019-02-21 | End: 2019-02-21

## 2019-02-21 RX ORDER — BALANCED SALT SOLUTION 6.4; .75; .48; .3; 3.9; 1.7 MG/ML; MG/ML; MG/ML; MG/ML; MG/ML; MG/ML
SOLUTION OPHTHALMIC AS NEEDED
Status: DISCONTINUED | OUTPATIENT
Start: 2019-02-21 | End: 2019-02-21 | Stop reason: HOSPADM

## 2019-02-21 RX ORDER — PROPOFOL 10 MG/ML
INJECTION, EMULSION INTRAVENOUS AS NEEDED
Status: DISCONTINUED | OUTPATIENT
Start: 2019-02-21 | End: 2019-02-21 | Stop reason: SURG

## 2019-02-21 RX ORDER — TROPICAMIDE 10 MG/ML
1 SOLUTION/ DROPS OPHTHALMIC
Status: COMPLETED | OUTPATIENT
Start: 2019-02-21 | End: 2019-02-21

## 2019-02-21 RX ORDER — PHENYLEPHRINE HCL 2.5 %
1 DROPS OPHTHALMIC (EYE)
Status: COMPLETED | OUTPATIENT
Start: 2019-02-21 | End: 2019-02-21

## 2019-02-21 RX ORDER — SODIUM CHLORIDE 9 MG/ML
75 INJECTION, SOLUTION INTRAVENOUS CONTINUOUS
Status: DISCONTINUED | OUTPATIENT
Start: 2019-02-21 | End: 2019-02-21 | Stop reason: HOSPADM

## 2019-02-21 RX ORDER — MANNITOL 250 MG/ML
INJECTION, SOLUTION INTRAVENOUS AS NEEDED
Status: DISCONTINUED | OUTPATIENT
Start: 2019-02-21 | End: 2019-02-21 | Stop reason: SURG

## 2019-02-21 RX ORDER — LIDOCAINE HYDROCHLORIDE 10 MG/ML
INJECTION, SOLUTION INFILTRATION; PERINEURAL AS NEEDED
Status: DISCONTINUED | OUTPATIENT
Start: 2019-02-21 | End: 2019-02-21 | Stop reason: SURG

## 2019-02-21 RX ORDER — LIDOCAINE HYDROCHLORIDE 20 MG/ML
1 JELLY TOPICAL
Status: ACTIVE | OUTPATIENT
Start: 2019-02-21 | End: 2019-02-21

## 2019-02-21 RX ORDER — OFLOXACIN 3 MG/ML
SOLUTION/ DROPS OPHTHALMIC AS NEEDED
Status: DISCONTINUED | OUTPATIENT
Start: 2019-02-21 | End: 2019-02-21 | Stop reason: HOSPADM

## 2019-02-21 RX ORDER — OFLOXACIN 3 MG/ML
1 SOLUTION/ DROPS OPHTHALMIC
Status: COMPLETED | OUTPATIENT
Start: 2019-02-21 | End: 2019-02-21

## 2019-02-21 RX ORDER — TETRACAINE HYDROCHLORIDE 5 MG/ML
SOLUTION OPHTHALMIC AS NEEDED
Status: DISCONTINUED | OUTPATIENT
Start: 2019-02-21 | End: 2019-02-21 | Stop reason: HOSPADM

## 2019-02-21 RX ADMIN — MANNITOL 25 G: 250 INJECTION, SOLUTION INTRAVENOUS at 10:55

## 2019-02-21 RX ADMIN — OFLOXACIN 1 DROP: 3 SOLUTION OPHTHALMIC at 10:35

## 2019-02-21 RX ADMIN — LIDOCAINE HYDROCHLORIDE 25 MG: 10 INJECTION, SOLUTION INFILTRATION; PERINEURAL at 10:52

## 2019-02-21 RX ADMIN — PHENYLEPHRINE HYDROCHLORIDE 1 DROP: 25 SOLUTION/ DROPS OPHTHALMIC at 10:25

## 2019-02-21 RX ADMIN — LIDOCAINE HYDROCHLORIDE 1 APPLICATION: 20 JELLY TOPICAL at 10:15

## 2019-02-21 RX ADMIN — LIDOCAINE HYDROCHLORIDE 1 APPLICATION: 20 JELLY TOPICAL at 10:25

## 2019-02-21 RX ADMIN — CYCLOPENTOLATE HYDROCHLORIDE 1 DROP: 10 SOLUTION/ DROPS OPHTHALMIC at 10:15

## 2019-02-21 RX ADMIN — PHENYLEPHRINE HYDROCHLORIDE 1 DROP: 25 SOLUTION/ DROPS OPHTHALMIC at 10:15

## 2019-02-21 RX ADMIN — TROPICAMIDE 1 DROP: 10 SOLUTION/ DROPS OPHTHALMIC at 10:15

## 2019-02-21 RX ADMIN — PROPOFOL 70 MG: 10 INJECTION, EMULSION INTRAVENOUS at 10:52

## 2019-02-21 RX ADMIN — PHENYLEPHRINE HYDROCHLORIDE 1 DROP: 25 SOLUTION/ DROPS OPHTHALMIC at 10:35

## 2019-02-21 RX ADMIN — CYCLOPENTOLATE HYDROCHLORIDE 1 DROP: 10 SOLUTION/ DROPS OPHTHALMIC at 10:25

## 2019-02-21 RX ADMIN — CYCLOPENTOLATE HYDROCHLORIDE 1 DROP: 10 SOLUTION/ DROPS OPHTHALMIC at 10:35

## 2019-02-21 RX ADMIN — LIDOCAINE HYDROCHLORIDE 1 APPLICATION: 20 JELLY TOPICAL at 10:35

## 2019-02-21 RX ADMIN — OFLOXACIN 1 DROP: 3 SOLUTION OPHTHALMIC at 10:25

## 2019-02-21 RX ADMIN — TROPICAMIDE 1 DROP: 10 SOLUTION/ DROPS OPHTHALMIC at 10:35

## 2019-02-21 RX ADMIN — TROPICAMIDE 1 DROP: 10 SOLUTION/ DROPS OPHTHALMIC at 10:25

## 2019-02-21 RX ADMIN — OFLOXACIN 1 DROP: 3 SOLUTION OPHTHALMIC at 10:15

## 2019-02-21 RX ADMIN — SODIUM CHLORIDE 75 ML/HR: 0.9 INJECTION, SOLUTION INTRAVENOUS at 10:37

## 2019-02-21 NOTE — DISCHARGE INSTRUCTIONS
[facility]  Discharge Instructions    Dr Pascual Mckee and Dr Jeaneth Shin discharged in satisfactory condition  Post operative instructions were given  Follow-up Appointment was given for 9 AM on Friday 2/22/19 at the  [facility]  Normal Symptoms: Foreign body sensation, scratchy, picky feeling  Try Extra Strength Tylenol for headache  Call Office if severe pain or discomfort  Keep patch on operative eye until 4 pm today  Bring sunglasses to office in the morning  Do not bring the 3 eye drops  NEW Instructions on how to use the 3 drops will be given in AM     Activity: Avoid any heavy,  bending, lifting or excessive activity until instructed  May walk around home with assistance  OK to watch TV  Avoid any excessive reading  No driving until told (Normally 2-4 days after surgery)  Avoid sleeping on operative eye  No water in the eye    Diet: Resume normal diet as tolerated  If experiencing nausea, try bland foods or liquid diet first      Resume normal medications unless otherwise instructed     If any Questions or Problems Please Call: [phone] [facility] take off [facility] and patch and start drops , Do not put shield back on during the day  Blurry Vision normal today  Pink/White Top                     [employer]               4:00PM                         4:05 PM               4:10 PM               8:00PM                         8: 05PM                8:10PM    BEDTIME:    Take Tan Top ONLY and then replace the plastic shield over eye  No gauze pad needed at bedtime    TOMORROW MORNING , before coming to office, take all THREE drops, then put on glasses  Example of times below  7:00AM                            7: 05AM                  7:10 AM            YOU MAY EXPERIENCE "DOUBLE VISION" - "Blurry Vision"  ONCE YOU TAKE OFF EYE PATCH/SHIELD THIS IS NORMAL

## 2019-02-21 NOTE — DISCHARGE SUMMARY
Mann Saldaña was discharged in satisfactory condition  Discharge instructions were reviewed and then given to the patient   Arrangements were made for follow up the next day with Jere Cross MD

## 2019-02-21 NOTE — OP NOTE
Postoperative Note  PATIENT NAME: Marycarmen Sanders  : 1945  MRN: [id number]  [place] [address] OR ROOM 01    Surgery Date: 2019    Combined forms of age-related cataract of left eye [H25 812]    Post-Op Diagnosis Codes:     * Combined forms of age-related cataract of left eye [H25 812]    Procedure(s):  EXTRACTION EXTRACAPSULAR CATARACT PHACO INTRAOCULAR LENS (IOL) LEFT EYE    Surgeon(s) and Role:     * Tony Yoo MD - Primary    Assistants:  Circulator: Bk Huff RN  Scrub Person: [address]    Anesthesia Type:   IV Sedation with Anesthesia     Anesthesiologist: Jacinta Mccracken MD  CRNA: Gaudencio Manning CRNA    Operative Indication:  Vision reduced to 20/70 in the left eye secondary to progressive cataract formation  The cataract was interfering with the patient's daily activities  All risks and benefits to the surgical procedure were explained to the patient and family members that were present during the pre-operative visit  The risks included but were not limited to blindness, infection, choroidal hemorrhage, loss of the eye, glaucoma, corneal edema, macular edema, retinal detachment, the need for a corneal transplant and the need to wear glasses postoperatively  The rare possibility of having to remove/excahnage the IOL due to incorrect power or off axis (toric) was explained pre-oper to the patient  Educational materials, eye model and a video were all used to explain cataract surgery  The option of speciality IOL-lenses (Multifocal, toric IOL) were discussed pre-operatively as well as the risks and benefit of using the speciality IOL if indicated in this particular patient  The patient understood and signed the appropriate consent form  Operative Technique:  The patient was brought back to the operating suite and placed in the supine position  The patient was identified in from of the surgeon as well as the OR staff  The operative eye was confirmed and marked   The patient was given a peribulbar block using  2% Lidocaine  The pressure of the eye was checked by digital massage  The operative eye was prepped and draped in the usual sterile fashion  A wire lid speculum was inserted  A clear corneal, temporal approach was used  A 2 75 Phaco blade was used to tunnel and enter the  cornea from the temporal side  The anterior chamber was entered and visco-elastic was used to deepen the anterior chamber  Side port paracentesis tracts were performed using a 1 0 mm paracentesis blade at approximately 12 and 6 position  A circular tear capsulotomy was performed using a 25 gauge bent needle and Utrata capsulotomy forceps  [place] dissection was carried out with balanced salt solution using a 27 gauge flat irrigating cannula  The nucleus was freely rotatable with in the capsular bag  The Nucleus was phacoemulsified   Using the Raulito [employer] machine and the phaco chop method  The cortical shell was removed using the bimanual I/A  The posterior capsule was polished as indicated  The posterior capsular bag was inflated using Provisc  The posterior chamber intraocular lens power+26  [id number] was taken from the package an  inspected and the power confirmed  The lens was then inserted  into the posterior capsular bag using the appropriate IOL folder and   The lens was well centered using the Sinsky hook  The Provisc was removed using the I/A unit  The side ports and the temporal incision were stromal hydrated until they were water tight  A 10-vicryl suture was placed to further secure the temporal incision if indicated after testing the incision  The pressure of the eye was adjusted accordingly using balance salt solution through the side ports  At the end of the case the patient was given a drop of Iopidine to prevent a pressure spike  Also, the patient was given a drop of antibiotic drop and antibiotic ointment to prevent infection   The patient's eye was then patched with an eye pad and covered with a plastic shield  The Patient was then taken to the recovery room  After vital signs were stable the patient was discharged with family/friend in satisfactory condition  Addendum:ACD 2mm 2 bottles of 50ml 25% Mannitol given  Total of 25mg was given just prior to the start of the case    Juan Shaw MD

## 2019-02-22 DIAGNOSIS — E78.2 MIXED HYPERLIPIDEMIA: ICD-10-CM

## 2019-02-22 DIAGNOSIS — E08.43 DIABETES MELLITUS DUE TO UNDERLYING CONDITION WITH DIABETIC AUTONOMIC NEUROPATHY, WITH LONG-TERM CURRENT USE OF INSULIN (HCC): ICD-10-CM

## 2019-02-22 DIAGNOSIS — Z79.4 DIABETES MELLITUS DUE TO UNDERLYING CONDITION WITH DIABETIC AUTONOMIC NEUROPATHY, WITH LONG-TERM CURRENT USE OF INSULIN (HCC): ICD-10-CM

## 2019-02-22 RX ORDER — INSULIN DETEMIR 100 [IU]/ML
INJECTION, SOLUTION SUBCUTANEOUS
Qty: 45 ML | Refills: 1 | Status: SHIPPED | OUTPATIENT
Start: 2019-02-22 | End: 2019-04-23 | Stop reason: SDUPTHER

## 2019-02-22 RX ORDER — ATORVASTATIN CALCIUM 40 MG/1
TABLET, FILM COATED ORAL
Qty: 90 TABLET | Refills: 1 | Status: SHIPPED | OUTPATIENT
Start: 2019-02-22 | End: 2019-10-06 | Stop reason: SDUPTHER

## 2019-02-22 RX ORDER — INSULIN LISPRO 100 [IU]/ML
INJECTION, SOLUTION INTRAVENOUS; SUBCUTANEOUS
Qty: 30 ML | Refills: 1 | Status: SHIPPED | OUTPATIENT
Start: 2019-02-22 | End: 2019-07-23 | Stop reason: SDUPTHER

## 2019-03-05 ENCOUNTER — OFFICE VISIT (OUTPATIENT)
Dept: PODIATRY | Facility: CLINIC | Age: 74
End: 2019-03-05
Payer: MEDICARE

## 2019-03-05 VITALS
SYSTOLIC BLOOD PRESSURE: 118 MMHG | HEART RATE: 89 BPM | HEIGHT: 69 IN | DIASTOLIC BLOOD PRESSURE: 65 MMHG | RESPIRATION RATE: 16 BRPM | BODY MASS INDEX: 30.36 KG/M2 | WEIGHT: 205 LBS

## 2019-03-05 DIAGNOSIS — M79.671 PAIN IN BOTH FEET: ICD-10-CM

## 2019-03-05 DIAGNOSIS — B35.1 ONYCHOMYCOSIS: ICD-10-CM

## 2019-03-05 DIAGNOSIS — M72.2 PLANTAR FASCIITIS: Primary | ICD-10-CM

## 2019-03-05 DIAGNOSIS — I70.209 PERIPHERAL ARTERIOSCLEROSIS (HCC): ICD-10-CM

## 2019-03-05 DIAGNOSIS — M79.671 RIGHT FOOT PAIN: ICD-10-CM

## 2019-03-05 DIAGNOSIS — M79.672 PAIN IN BOTH FEET: ICD-10-CM

## 2019-03-05 PROCEDURE — 20550 NJX 1 TENDON SHEATH/LIGAMENT: CPT | Performed by: PODIATRIST

## 2019-03-05 PROCEDURE — 11721 DEBRIDE NAIL 6 OR MORE: CPT | Performed by: PODIATRIST

## 2019-03-05 NOTE — PROGRESS NOTES
Procedures   Foot Exam     Signed  Encounter Date: 1/29/2019   Assessment/Plan:  Plantar fasciitis right foot  Posttraumatic bursitis left foot  Pain upon ambulation  Mycosis of nail      Plan  X-rays reviewed with patient  This was of the right foot  X-rays of the left foot taken in the office  These were reviewed  Right foot trigger point injection done  1 25 cc of Kenalog 10 injected into right heel without pain or complication  All mycotic nails debrided  We will add Mobic  Patient return for follow-up      No problem-specific Assessment & Plan notes found for this encounter          There are no diagnoses linked to this encounter        Subjective:  patient did well with last injection  Gave him significant relief of heel pain  He still has pain in his feet and toes with ambulation  He has return of right heel pain    He is requesting right heel trigger point injection           Past Medical History:   Diagnosis Date    Aortic narrowing       Last Assessed:  9/28/15    Arthritis      Hammond esophagus      Bilateral leg edema      Bulla, lung (HCC)      CHF (congestive heart failure) (HCC)      Chronic kidney disease       stage 3    Diabetes mellitus (HCC)      Enlarged prostate      Heel pain       right heel slipped in the garage-landed on the right heel    Herniated lumbar intervertebral disc       x 4 discs-fell off a roof    Hiatal hernia      High cholesterol      History of kidney problems      Hypertension      Old myocardial infarction       x2-pt was unaware of the MI-one stent    Thyroid disease       hypothyroid    Transient cerebral ischemia       Last Assessed:  8/27/15    Wears dentures       full upper, partial lower    Wears glasses                 Past Surgical History:   Procedure Laterality Date    COLONOSCOPY         Resolved:  2015    CORONARY ANGIOPLASTY WITH STENT PLACEMENT         Stent implanted early 1990's,     ESOPHAGOGASTRODUODENOSCOPY        KNEE SURGERY         right knee cartilage surgery-left meniscus repair    [facility] REMV CATARACT EXTRACAP,INSERT LENS Right 1/24/2019     Procedure: EXTRACTION EXTRACAPSULAR CATARACT PHACO INTRAOCULAR LENS (IOL);   Surgeon: Joaquín Butler MD;  Location: [facility] MAIN OR;  Service: Ophthalmology    TONSILLECTOMY        UMBILICAL HERNIA REPAIR         1980's,    UPPER GASTROINTESTINAL ENDOSCOPY         Last Assessed:  8/27/15         No Known Allergies        Current Outpatient Prescriptions:     aspirin (ASPIR-81) 81 mg EC tablet, Take 81 mg by mouth every morning  , Disp: , Rfl:     atorvastatin (LIPITOR) 40 mg tablet, TAKE 1 TABLET BY MOUTH  EVERY DAY, Disp: 90 tablet, Rfl: 1    BD PEN NEEDLE URSULA U/F 32G X 4 MM MISC, USE 4 TIMES DAILY AS  DIRECTED, Disp: 360 each, Rfl: 1    Coenzyme Q10 (COQ-10) 100 MG CAPS, Take 300 mg by mouth daily at bedtime  , Disp: , Rfl:     ezetimibe (ZETIA) 10 mg tablet, Take 1 tablet (10 mg total) by mouth daily, Disp: 30 tablet, Rfl: 5    furosemide (LASIX) 40 mg tablet, TAKE 1 TABLET BY MOUTH  DAILY, Disp: 90 tablet, Rfl: 1    glipiZIDE (GLUCOTROL) 5 mg tablet, TAKE 1 TABLET BY MOUTH TWO  TIMES A DAY BEFORE MEALS, Disp: 180 tablet, Rfl: 1    HUMALOG KWIKPEN 100 UNIT/ML SOPN, INJECT SUBCUTANEOUSLY UP TO 8 UNITS 3 TIMES DAILY, Disp: 30 mL, Rfl: 1    LEVEMIR FLEXTOUCH subcutaneous injection pen 100 units/mL, INJECT SUBCUTANEOUSLY 40  UNITS AT BEDTIME (Patient taking differently: INJECT SUBCUTANEOUSLY 40  UNITS AT HS), Disp: 45 mL, Rfl: 1    levothyroxine 50 mcg tablet, TAKE 1 TABLET BY MOUTH  DAILY, Disp: 90 tablet, Rfl: 1    metoprolol succinate (TOPROL-XL) 25 mg 24 hr tablet, TAKE 1 TABLET BY MOUTH  DAILY (Patient taking differently: TAKE 1 TABLET BY MOUTH  DAILY-am), Disp: 90 tablet, Rfl: 2    multivitamin (THERAGRAN) TABS, Take 1 tablet by mouth every morning, Disp: , Rfl:     olmesartan (BENICAR) 5 mg tablet, Take 1 tablet (5 mg total) by mouth daily (Patient taking differently: Take 5 mg by mouth every morning  ), Disp: 90 tablet, Rfl: 0    pantoprazole (PROTONIX) 40 mg tablet, TAKE 1 TABLET BY MOUTH  DAILY (Patient taking differently: every other day as needed), Disp: 90 tablet, Rfl: 1         Patient Active Problem List   Diagnosis    Hammond's esophagus with dysplasia    Chronic kidney disease, stage 3 (HCC)    Colon, diverticulosis    Chronic constipation    Coronary artery disease involving native coronary artery of native heart without angina pectoris    Diabetes mellitus due to underlying condition with diabetic autonomic neuropathy, with long-term current use of insulin (HCC)    Disc degeneration, lumbar    Enlarged prostate    Hiatal hernia    History of myocardial infarction    Essential hypertension    Congenital hypothyroidism without goiter    Lumbar radiculopathy    Mixed hyperlipidemia    Chronic diastolic congestive heart failure (HCC)    Age-related incipient cataract of both eyes    Pain of right heel    Primary osteoarthritis of left foot             Patient ID: Mendel Labs is a 75 y o  male      HPI     The following portions of the patient's history were reviewed and updated as appropriate: allergies, current medications, past family history, past medical history, past social history, past surgical history and problem list      Review of Systems       Objective:  Patient's shoes and socks removed     Foot Exam     General  General Appearance: appears stated age and healthy   Orientation: alert and oriented to person, place, and time   Affect: appropriate   Gait: antalgic         Right Foot/Ankle      Inspection and Palpation  Ecchymosis: none  Tenderness: calcaneus tenderness, bony tenderness and plantar fascia   Swelling: dorsum and metatarsals   Arch: pes planus  Hammertoes: fifth toe  Hallux valgus: no  Hallux limitus: yes  Skin Exam: dry skin;      Neurovascular  Dorsalis pedis: 2+  Posterior tibial: 2+        Left Foot/Ankle    Inspection and Palpation  Ecchymosis: none  Tenderness: lesser metatarsophalangeal joints and metatarsals   Swelling: dorsum and metatarsals   Arch: pes planus  Hammertoes: fifth toe  Hallux valgus: no  Hallux limitus: yes  Skin Exam: dry skin;      Neurovascular  Dorsalis pedis: 2+  Posterior tibial: 2+           Physical Exam   Constitutional: He appears well-developed and well-nourished  Cardiovascular: Normal rate and regular rhythm  Pulses:       Dorsalis pedis pulses are 2+ on the right side, and 2+ on the left side  Posterior tibial pulses are 2+ on the right side, and 2+ on the left side  Musculoskeletal:        Right foot: There is bony tenderness  Patient is pronated in stance and gait  There is pain with palpation right plantar fascia insertion  X-ray taken hospital demonstrates plantar calcaneal spur  No evidence of fracture  Left foot  Pain with palpation 2nd MPJ  No edema or ecchymosis noted  There is pain with palpation of the plantar aspect  X-ray of the office demonstrates long plantar flexed 2nd metatarsal   No evidence of fracture or bony mass  Feet:   Right Foot:   Skin Integrity: Positive for dry skin  Left Foot:   Skin Integrity: Positive for dry skin  Skin:   Soft corn noted 4th left interdigital space  There is dystrophy of nail  All nails demonstrate distal mycosis    There is paronychia of hallux nail bilateral   Nursing note and vitals reviewed

## 2019-03-12 LAB — TSH SERPL DL<=0.005 MIU/L-ACNC: 3.13 UIU/ML (ref 0.45–4.5)

## 2019-03-22 LAB
ALBUMIN SERPL-MCNC: 4.2 G/DL (ref 3.5–4.8)
ALBUMIN/CREAT UR: 5.4 MG/G CREAT (ref 0–30)
ALBUMIN/GLOB SERPL: 1.8 {RATIO} (ref 1.2–2.2)
ALP SERPL-CCNC: 79 IU/L (ref 39–117)
ALT SERPL-CCNC: 15 IU/L (ref 0–44)
AST SERPL-CCNC: 16 IU/L (ref 0–40)
BASOPHILS # BLD AUTO: 0.1 X10E3/UL (ref 0–0.2)
BASOPHILS NFR BLD AUTO: 1 %
BILIRUB SERPL-MCNC: 0.5 MG/DL (ref 0–1.2)
BUN SERPL-MCNC: 28 MG/DL (ref 8–27)
BUN/CREAT SERPL: 17 (ref 10–24)
CALCIUM SERPL-MCNC: 9.3 MG/DL (ref 8.6–10.2)
CHLORIDE SERPL-SCNC: 109 MMOL/L (ref 96–106)
CHOLEST SERPL-MCNC: 140 MG/DL (ref 100–199)
CO2 SERPL-SCNC: 23 MMOL/L (ref 20–29)
CREAT SERPL-MCNC: 1.64 MG/DL (ref 0.76–1.27)
CREAT UR-MCNC: 88.3 MG/DL
EOSINOPHIL # BLD AUTO: 0.2 X10E3/UL (ref 0–0.4)
EOSINOPHIL NFR BLD AUTO: 2 %
ERYTHROCYTE [DISTWIDTH] IN BLOOD BY AUTOMATED COUNT: 13.1 % (ref 12.3–15.4)
GLOBULIN SER-MCNC: 2.3 G/DL (ref 1.5–4.5)
GLUCOSE SERPL-MCNC: 153 MG/DL (ref 65–99)
HBA1C MFR BLD: 7.9 % (ref 4.8–5.6)
HCT VFR BLD AUTO: 40.9 % (ref 37.5–51)
HDLC SERPL-MCNC: 59 MG/DL
HGB BLD-MCNC: 14 G/DL (ref 13–17.7)
IMM GRANULOCYTES # BLD: 0 X10E3/UL (ref 0–0.1)
IMM GRANULOCYTES NFR BLD: 0 %
LABCORP COMMENT: NORMAL
LDLC SERPL CALC-MCNC: 68 MG/DL (ref 0–99)
LYMPHOCYTES # BLD AUTO: 2.6 X10E3/UL (ref 0.7–3.1)
LYMPHOCYTES NFR BLD AUTO: 38 %
MCH RBC QN AUTO: 31.5 PG (ref 26.6–33)
MCHC RBC AUTO-ENTMCNC: 34.2 G/DL (ref 31.5–35.7)
MCV RBC AUTO: 92 FL (ref 79–97)
MICROALBUMIN UR-MCNC: 4.8 UG/ML
MICRODELETION SYND BLD/T FISH: NORMAL
MICRODELETION SYND BLD/T FISH: NORMAL
MONOCYTES # BLD AUTO: 0.6 X10E3/UL (ref 0.1–0.9)
MONOCYTES NFR BLD AUTO: 8 %
NEUTROPHILS # BLD AUTO: 3.5 X10E3/UL (ref 1.4–7)
NEUTROPHILS NFR BLD AUTO: 51 %
PLATELET # BLD AUTO: 269 X10E3/UL (ref 150–379)
POTASSIUM SERPL-SCNC: 4.8 MMOL/L (ref 3.5–5.2)
PROT SERPL-MCNC: 6.5 G/DL (ref 6–8.5)
RBC # BLD AUTO: 4.44 X10E6/UL (ref 4.14–5.8)
SL AMB EGFR AFRICAN AMERICAN: 47 ML/MIN/1.73
SL AMB EGFR NON AFRICAN AMERICAN: 41 ML/MIN/1.73
SL AMB PDF IMAGE: NORMAL
SODIUM SERPL-SCNC: 144 MMOL/L (ref 134–144)
TRIGL SERPL-MCNC: 63 MG/DL (ref 0–149)
TSH SERPL DL<=0.005 MIU/L-ACNC: 4.63 UIU/ML (ref 0.45–4.5)
WBC # BLD AUTO: 6.9 X10E3/UL (ref 3.4–10.8)

## 2019-03-25 ENCOUNTER — TELEPHONE (OUTPATIENT)
Dept: FAMILY MEDICINE CLINIC | Facility: CLINIC | Age: 74
End: 2019-03-25

## 2019-03-25 NOTE — TELEPHONE ENCOUNTER
Insulin machine called Continuous Glucose Monitor Machine  It replaces needles  He wants to know if Dr Shanna Huizar would be ok with him using this  He is calling his [address] too      Please ask Dr Shanna Huizar and call Todd Powersre back    Thank you

## 2019-04-23 ENCOUNTER — OFFICE VISIT (OUTPATIENT)
Dept: FAMILY MEDICINE CLINIC | Facility: CLINIC | Age: 74
End: 2019-04-23
Payer: MEDICARE

## 2019-04-23 VITALS
RESPIRATION RATE: 20 BRPM | TEMPERATURE: 98.2 F | HEART RATE: 72 BPM | DIASTOLIC BLOOD PRESSURE: 70 MMHG | BODY MASS INDEX: 30.36 KG/M2 | WEIGHT: 205 LBS | HEIGHT: 69 IN | SYSTOLIC BLOOD PRESSURE: 126 MMHG

## 2019-04-23 DIAGNOSIS — Z79.4 DIABETES MELLITUS DUE TO UNDERLYING CONDITION WITH DIABETIC AUTONOMIC NEUROPATHY, WITH LONG-TERM CURRENT USE OF INSULIN (HCC): ICD-10-CM

## 2019-04-23 DIAGNOSIS — E78.2 MIXED HYPERLIPIDEMIA: ICD-10-CM

## 2019-04-23 DIAGNOSIS — Z12.5 SCREENING FOR PROSTATE CANCER: ICD-10-CM

## 2019-04-23 DIAGNOSIS — I25.10 CORONARY ARTERY DISEASE INVOLVING NATIVE CORONARY ARTERY OF NATIVE HEART WITHOUT ANGINA PECTORIS: ICD-10-CM

## 2019-04-23 DIAGNOSIS — N18.31 CHRONIC KIDNEY DISEASE (CKD) STAGE G3A/A1, MODERATELY DECREASED GLOMERULAR FILTRATION RATE (GFR) BETWEEN 45-59 ML/MIN/1.73 SQUARE METER AND ALBUMINURIA CREATININE RATIO LESS THAN 30 MG/G (HCC): ICD-10-CM

## 2019-04-23 DIAGNOSIS — E03.1 CONGENITAL HYPOTHYROIDISM WITHOUT GOITER: ICD-10-CM

## 2019-04-23 DIAGNOSIS — Z23 NEED FOR VACCINATION: ICD-10-CM

## 2019-04-23 DIAGNOSIS — E11.22 TYPE 2 DIABETES MELLITUS WITH STAGE 3 CHRONIC KIDNEY DISEASE, WITH LONG-TERM CURRENT USE OF INSULIN (HCC): ICD-10-CM

## 2019-04-23 DIAGNOSIS — E08.43 DIABETES MELLITUS DUE TO UNDERLYING CONDITION WITH DIABETIC AUTONOMIC NEUROPATHY, WITH LONG-TERM CURRENT USE OF INSULIN (HCC): ICD-10-CM

## 2019-04-23 DIAGNOSIS — N18.30 TYPE 2 DIABETES MELLITUS WITH STAGE 3 CHRONIC KIDNEY DISEASE, WITH LONG-TERM CURRENT USE OF INSULIN (HCC): ICD-10-CM

## 2019-04-23 DIAGNOSIS — Z79.4 TYPE 2 DIABETES MELLITUS WITH STAGE 3 CHRONIC KIDNEY DISEASE, WITH LONG-TERM CURRENT USE OF INSULIN (HCC): ICD-10-CM

## 2019-04-23 DIAGNOSIS — E66.9 OBESITY (BMI 30.0-34.9): ICD-10-CM

## 2019-04-23 DIAGNOSIS — I10 ESSENTIAL HYPERTENSION: Primary | ICD-10-CM

## 2019-04-23 DIAGNOSIS — I50.32 CHRONIC DIASTOLIC CONGESTIVE HEART FAILURE (HCC): ICD-10-CM

## 2019-04-23 PROCEDURE — G0009 ADMIN PNEUMOCOCCAL VACCINE: HCPCS

## 2019-04-23 PROCEDURE — 99214 OFFICE O/P EST MOD 30 MIN: CPT | Performed by: FAMILY MEDICINE

## 2019-04-23 PROCEDURE — 90732 PPSV23 VACC 2 YRS+ SUBQ/IM: CPT

## 2019-04-23 RX ORDER — LEVOTHYROXINE SODIUM 0.07 MG/1
75 TABLET ORAL DAILY
Qty: 90 TABLET | Refills: 1 | Status: SHIPPED | OUTPATIENT
Start: 2019-04-23 | End: 2019-07-14 | Stop reason: SDUPTHER

## 2019-05-03 ENCOUNTER — HOSPITAL ENCOUNTER (OUTPATIENT)
Dept: RADIOLOGY | Facility: HOSPITAL | Age: 74
Discharge: HOME | End: 2019-05-03
Attending: FAMILY MEDICINE
Payer: MEDICARE

## 2019-05-03 DIAGNOSIS — N18.31 CHRONIC KIDNEY DISEASE (CKD) STAGE G3A/A1, MODERATELY DECREASED GLOMERULAR FILTRATION RATE (GFR) BETWEEN 45-59 ML/MIN/1.73 SQUARE METER AND ALBUMINURIA CREATININE RATIO LESS THAN 30 MG/G (HCC): ICD-10-CM

## 2019-05-03 PROCEDURE — 76770 US EXAM ABDO BACK WALL COMP: CPT

## 2019-05-07 ENCOUNTER — TELEPHONE (OUTPATIENT)
Dept: FAMILY MEDICINE CLINIC | Facility: CLINIC | Age: 74
End: 2019-05-07

## 2019-05-07 DIAGNOSIS — N40.1 ENLARGED PROSTATE WITH URINARY OBSTRUCTION: Primary | ICD-10-CM

## 2019-05-07 DIAGNOSIS — N13.8 ENLARGED PROSTATE WITH URINARY OBSTRUCTION: Primary | ICD-10-CM

## 2019-05-09 ENCOUNTER — TELEPHONE (OUTPATIENT)
Dept: FAMILY MEDICINE CLINIC | Facility: CLINIC | Age: 74
End: 2019-05-09

## 2019-05-10 ENCOUNTER — OFFICE VISIT (OUTPATIENT)
Dept: FAMILY MEDICINE CLINIC | Facility: CLINIC | Age: 74
End: 2019-05-10
Payer: MEDICARE

## 2019-05-10 ENCOUNTER — TELEPHONE (OUTPATIENT)
Dept: FAMILY MEDICINE CLINIC | Facility: CLINIC | Age: 74
End: 2019-05-10

## 2019-05-10 VITALS
WEIGHT: 203 LBS | DIASTOLIC BLOOD PRESSURE: 62 MMHG | HEART RATE: 74 BPM | TEMPERATURE: 97.8 F | BODY MASS INDEX: 30.07 KG/M2 | HEIGHT: 69 IN | SYSTOLIC BLOOD PRESSURE: 124 MMHG | RESPIRATION RATE: 18 BRPM

## 2019-05-10 DIAGNOSIS — Z79.4 TYPE 2 DIABETES MELLITUS WITH STAGE 3 CHRONIC KIDNEY DISEASE, WITH LONG-TERM CURRENT USE OF INSULIN (HCC): Primary | ICD-10-CM

## 2019-05-10 DIAGNOSIS — N48.1 BALANITIS: ICD-10-CM

## 2019-05-10 DIAGNOSIS — N18.30 TYPE 2 DIABETES MELLITUS WITH STAGE 3 CHRONIC KIDNEY DISEASE, WITH LONG-TERM CURRENT USE OF INSULIN (HCC): Primary | ICD-10-CM

## 2019-05-10 DIAGNOSIS — E11.22 TYPE 2 DIABETES MELLITUS WITH STAGE 3 CHRONIC KIDNEY DISEASE, WITH LONG-TERM CURRENT USE OF INSULIN (HCC): Primary | ICD-10-CM

## 2019-05-10 PROCEDURE — 1124F ACP DISCUSS-NO DSCNMKR DOCD: CPT | Performed by: FAMILY MEDICINE

## 2019-05-10 PROCEDURE — 99213 OFFICE O/P EST LOW 20 MIN: CPT | Performed by: FAMILY MEDICINE

## 2019-05-10 RX ORDER — GLIPIZIDE 5 MG/1
5 TABLET ORAL
Qty: 180 TABLET | Refills: 1 | Status: SHIPPED | OUTPATIENT
Start: 2019-05-10 | End: 2019-11-04 | Stop reason: SDUPTHER

## 2019-05-10 RX ORDER — CLOTRIMAZOLE AND BETAMETHASONE DIPROPIONATE 10; .64 MG/G; MG/G
CREAM TOPICAL 2 TIMES DAILY
Qty: 30 G | Refills: 0 | Status: SHIPPED | OUTPATIENT
Start: 2019-05-10 | End: 2022-01-27

## 2019-05-11 DIAGNOSIS — Z79.4 DIABETES MELLITUS DUE TO UNDERLYING CONDITION WITH DIABETIC AUTONOMIC NEUROPATHY, WITH LONG-TERM CURRENT USE OF INSULIN (HCC): ICD-10-CM

## 2019-05-11 DIAGNOSIS — E08.43 DIABETES MELLITUS DUE TO UNDERLYING CONDITION WITH DIABETIC AUTONOMIC NEUROPATHY, WITH LONG-TERM CURRENT USE OF INSULIN (HCC): ICD-10-CM

## 2019-05-11 DIAGNOSIS — K22.719 BARRETT'S ESOPHAGUS WITH DYSPLASIA: ICD-10-CM

## 2019-05-11 DIAGNOSIS — I10 ESSENTIAL HYPERTENSION: ICD-10-CM

## 2019-05-13 RX ORDER — PANTOPRAZOLE SODIUM 40 MG/1
TABLET, DELAYED RELEASE ORAL
Qty: 90 TABLET | Refills: 1 | Status: SHIPPED | OUTPATIENT
Start: 2019-05-13 | End: 2019-11-24 | Stop reason: SDUPTHER

## 2019-05-13 RX ORDER — FUROSEMIDE 40 MG/1
TABLET ORAL
Qty: 90 TABLET | Refills: 1 | Status: SHIPPED | OUTPATIENT
Start: 2019-05-13 | End: 2019-11-24 | Stop reason: SDUPTHER

## 2019-05-13 RX ORDER — PEN NEEDLE, DIABETIC 32GX 5/32"
NEEDLE, DISPOSABLE MISCELLANEOUS
Qty: 360 EACH | Refills: 1 | Status: SHIPPED | OUTPATIENT
Start: 2019-05-13 | End: 2019-07-23 | Stop reason: SDUPTHER

## 2019-05-13 RX ORDER — LISINOPRIL 5 MG/1
TABLET ORAL
Qty: 90 TABLET | Refills: 3 | Status: SHIPPED | OUTPATIENT
Start: 2019-05-13 | End: 2019-11-04 | Stop reason: SDUPTHER

## 2019-05-14 ENCOUNTER — OFFICE VISIT (OUTPATIENT)
Dept: PODIATRY | Facility: CLINIC | Age: 74
End: 2019-05-14
Payer: MEDICARE

## 2019-05-14 VITALS
SYSTOLIC BLOOD PRESSURE: 124 MMHG | WEIGHT: 203 LBS | HEART RATE: 74 BPM | RESPIRATION RATE: 17 BRPM | BODY MASS INDEX: 30.07 KG/M2 | HEIGHT: 69 IN | DIASTOLIC BLOOD PRESSURE: 62 MMHG

## 2019-05-14 DIAGNOSIS — E11.42 DIABETIC POLYNEUROPATHY ASSOCIATED WITH TYPE 2 DIABETES MELLITUS (HCC): Primary | ICD-10-CM

## 2019-05-14 DIAGNOSIS — M79.672 PAIN IN BOTH FEET: ICD-10-CM

## 2019-05-14 DIAGNOSIS — M72.2 PLANTAR FASCIITIS: ICD-10-CM

## 2019-05-14 DIAGNOSIS — I70.209 PERIPHERAL ARTERIOSCLEROSIS (HCC): ICD-10-CM

## 2019-05-14 DIAGNOSIS — B35.1 ONYCHOMYCOSIS: ICD-10-CM

## 2019-05-14 DIAGNOSIS — M79.671 PAIN IN BOTH FEET: ICD-10-CM

## 2019-05-14 DIAGNOSIS — M79.671 RIGHT FOOT PAIN: ICD-10-CM

## 2019-05-14 PROCEDURE — 20550 NJX 1 TENDON SHEATH/LIGAMENT: CPT | Performed by: PODIATRIST

## 2019-05-14 PROCEDURE — 99212 OFFICE O/P EST SF 10 MIN: CPT | Performed by: PODIATRIST

## 2019-06-22 DIAGNOSIS — I10 ESSENTIAL HYPERTENSION: ICD-10-CM

## 2019-06-24 RX ORDER — OLMESARTAN MEDOXOMIL 5 MG/1
TABLET ORAL
Qty: 90 TABLET | Refills: 3 | Status: SHIPPED | OUTPATIENT
Start: 2019-06-24 | End: 2019-11-04 | Stop reason: ALTCHOICE

## 2019-07-13 LAB
ALBUMIN SERPL-MCNC: 4.1 G/DL (ref 3.5–4.8)
ALBUMIN/CREAT UR: <4.7 MG/G CREAT (ref 0–30)
ALBUMIN/GLOB SERPL: 2 {RATIO} (ref 1.2–2.2)
ALP SERPL-CCNC: 84 IU/L (ref 39–117)
ALT SERPL-CCNC: 22 IU/L (ref 0–44)
AST SERPL-CCNC: 20 IU/L (ref 0–40)
BASOPHILS # BLD AUTO: 0.1 X10E3/UL (ref 0–0.2)
BASOPHILS NFR BLD AUTO: 1 %
BILIRUB SERPL-MCNC: 0.4 MG/DL (ref 0–1.2)
BUN SERPL-MCNC: 26 MG/DL (ref 8–27)
BUN/CREAT SERPL: 17 (ref 10–24)
CALCIUM SERPL-MCNC: 9.2 MG/DL (ref 8.6–10.2)
CHLORIDE SERPL-SCNC: 105 MMOL/L (ref 96–106)
CO2 SERPL-SCNC: 21 MMOL/L (ref 20–29)
CREAT SERPL-MCNC: 1.54 MG/DL (ref 0.76–1.27)
CREAT UR-MCNC: 63.4 MG/DL
EOSINOPHIL # BLD AUTO: 0.2 X10E3/UL (ref 0–0.4)
EOSINOPHIL NFR BLD AUTO: 3 %
ERYTHROCYTE [DISTWIDTH] IN BLOOD BY AUTOMATED COUNT: 13.8 % (ref 12.3–15.4)
GLOBULIN SER-MCNC: 2.1 G/DL (ref 1.5–4.5)
GLUCOSE SERPL-MCNC: 162 MG/DL (ref 65–99)
HBA1C MFR BLD: 7.8 % (ref 4.8–5.6)
HCT VFR BLD AUTO: 40.9 % (ref 37.5–51)
HGB BLD-MCNC: 13.7 G/DL (ref 13–17.7)
IMM GRANULOCYTES # BLD: 0 X10E3/UL (ref 0–0.1)
IMM GRANULOCYTES NFR BLD: 0 %
LYMPHOCYTES # BLD AUTO: 2.2 X10E3/UL (ref 0.7–3.1)
LYMPHOCYTES NFR BLD AUTO: 33 %
MCH RBC QN AUTO: 31.1 PG (ref 26.6–33)
MCHC RBC AUTO-ENTMCNC: 33.5 G/DL (ref 31.5–35.7)
MCV RBC AUTO: 93 FL (ref 79–97)
MICROALBUMIN UR-MCNC: <3 UG/ML
MICRODELETION SYND BLD/T FISH: NORMAL
MONOCYTES # BLD AUTO: 0.6 X10E3/UL (ref 0.1–0.9)
MONOCYTES NFR BLD AUTO: 9 %
NEUTROPHILS # BLD AUTO: 3.5 X10E3/UL (ref 1.4–7)
NEUTROPHILS NFR BLD AUTO: 54 %
PLATELET # BLD AUTO: 275 X10E3/UL (ref 150–450)
POTASSIUM SERPL-SCNC: 4.5 MMOL/L (ref 3.5–5.2)
PROT SERPL-MCNC: 6.2 G/DL (ref 6–8.5)
PSA SERPL-MCNC: 3.4 NG/ML (ref 0–4)
RBC # BLD AUTO: 4.41 X10E6/UL (ref 4.14–5.8)
SL AMB EGFR AFRICAN AMERICAN: 50 ML/MIN/1.73
SL AMB EGFR NON AFRICAN AMERICAN: 43 ML/MIN/1.73
SODIUM SERPL-SCNC: 140 MMOL/L (ref 134–144)
TSH SERPL DL<=0.005 MIU/L-ACNC: 1 UIU/ML (ref 0.45–4.5)
WBC # BLD AUTO: 6.7 X10E3/UL (ref 3.4–10.8)

## 2019-07-14 DIAGNOSIS — E03.1 CONGENITAL HYPOTHYROIDISM WITHOUT GOITER: ICD-10-CM

## 2019-07-15 RX ORDER — LEVOTHYROXINE SODIUM 0.07 MG/1
TABLET ORAL
Qty: 90 TABLET | Refills: 1 | Status: SHIPPED | OUTPATIENT
Start: 2019-07-15 | End: 2020-03-04

## 2019-07-23 ENCOUNTER — OFFICE VISIT (OUTPATIENT)
Dept: FAMILY MEDICINE CLINIC | Facility: CLINIC | Age: 74
End: 2019-07-23
Payer: MEDICARE

## 2019-07-23 VITALS
DIASTOLIC BLOOD PRESSURE: 68 MMHG | TEMPERATURE: 98 F | HEIGHT: 69 IN | RESPIRATION RATE: 16 BRPM | BODY MASS INDEX: 29.47 KG/M2 | WEIGHT: 199 LBS | HEART RATE: 68 BPM | SYSTOLIC BLOOD PRESSURE: 130 MMHG

## 2019-07-23 DIAGNOSIS — E11.42 DIABETIC POLYNEUROPATHY ASSOCIATED WITH TYPE 2 DIABETES MELLITUS (HCC): ICD-10-CM

## 2019-07-23 DIAGNOSIS — I25.10 CORONARY ARTERY DISEASE INVOLVING NATIVE CORONARY ARTERY OF NATIVE HEART WITHOUT ANGINA PECTORIS: ICD-10-CM

## 2019-07-23 DIAGNOSIS — E03.1 CONGENITAL HYPOTHYROIDISM WITHOUT GOITER: ICD-10-CM

## 2019-07-23 DIAGNOSIS — Z79.4 TYPE 2 DIABETES MELLITUS WITH STAGE 3 CHRONIC KIDNEY DISEASE, WITH LONG-TERM CURRENT USE OF INSULIN (HCC): Primary | ICD-10-CM

## 2019-07-23 DIAGNOSIS — Z79.4 DIABETES MELLITUS DUE TO UNDERLYING CONDITION WITH DIABETIC AUTONOMIC NEUROPATHY, WITH LONG-TERM CURRENT USE OF INSULIN (HCC): ICD-10-CM

## 2019-07-23 DIAGNOSIS — N18.30 TYPE 2 DIABETES MELLITUS WITH STAGE 3 CHRONIC KIDNEY DISEASE, WITH LONG-TERM CURRENT USE OF INSULIN (HCC): Primary | ICD-10-CM

## 2019-07-23 DIAGNOSIS — N18.31 CHRONIC KIDNEY DISEASE (CKD) STAGE G3A/A1, MODERATELY DECREASED GLOMERULAR FILTRATION RATE (GFR) BETWEEN 45-59 ML/MIN/1.73 SQUARE METER AND ALBUMINURIA CREATININE RATIO LESS THAN 30 MG/G (HCC): ICD-10-CM

## 2019-07-23 DIAGNOSIS — E78.2 MIXED HYPERLIPIDEMIA: ICD-10-CM

## 2019-07-23 DIAGNOSIS — E08.43 DIABETES MELLITUS DUE TO UNDERLYING CONDITION WITH DIABETIC AUTONOMIC NEUROPATHY, WITH LONG-TERM CURRENT USE OF INSULIN (HCC): ICD-10-CM

## 2019-07-23 DIAGNOSIS — I10 ESSENTIAL HYPERTENSION: ICD-10-CM

## 2019-07-23 DIAGNOSIS — E11.22 TYPE 2 DIABETES MELLITUS WITH STAGE 3 CHRONIC KIDNEY DISEASE, WITH LONG-TERM CURRENT USE OF INSULIN (HCC): Primary | ICD-10-CM

## 2019-07-23 LAB
LEFT EYE DIABETIC RETINOPATHY: NORMAL
LEFT EYE IMAGE QUALITY: NORMAL
LEFT EYE MACULAR EDEMA: NORMAL
LEFT EYE OTHER RETINOPATHY: NORMAL
RIGHT EYE DIABETIC RETINOPATHY: NORMAL
RIGHT EYE IMAGE QUALITY: NORMAL
RIGHT EYE MACULAR EDEMA: NORMAL
RIGHT EYE OTHER RETINOPATHY: NORMAL
SEVERITY (EYE EXAM): NORMAL

## 2019-07-23 PROCEDURE — 92250 FUNDUS PHOTOGRAPHY W/I&R: CPT | Performed by: FAMILY MEDICINE

## 2019-07-23 PROCEDURE — 99214 OFFICE O/P EST MOD 30 MIN: CPT | Performed by: FAMILY MEDICINE

## 2019-07-23 NOTE — PROGRESS NOTES
Assessment/Plan:    Problem List Items Addressed This Visit        Endocrine    Type 2 diabetes mellitus with stage 3 chronic kidney disease, with long-term current use of insulin ([facility]) - Primary    Relevant Medications    insulin detemir (LEVEMIR FLEXTOUCH) 100 Units/mL injection pen    insulin lispro (HUMALOG KWIKPEN) 100 units/mL injection pen    Insulin Pen Needle (BD PEN NEEDLE URSULA U/F) 32G X 4 MM MISC    Other Relevant Orders    IRIS Diabetic eye exam    Hemoglobin A1C    Comprehensive metabolic panel    Congenital hypothyroidism without goiter    Relevant Orders    TSH, 3rd generation    Diabetic polyneuropathy associated with type 2 diabetes mellitus (HCC)    Relevant Medications    insulin detemir (LEVEMIR FLEXTOUCH) 100 Units/mL injection pen    insulin lispro (HUMALOG KWIKPEN) 100 units/mL injection pen    Other Relevant Orders    IRIS Diabetic eye exam    Hemoglobin A1C    Comprehensive metabolic panel       Cardiovascular and Mediastinum    Coronary artery disease involving native coronary artery of native heart without angina pectoris    Essential hypertension       Genitourinary    Chronic kidney disease (CKD) stage G3a/A1, moderately decreased glomerular filtration rate (GFR) between 45-59 mL/min/1 73 square meter and albuminuria creatinine ratio less than 30 mg/g ([facility])       Other    Mixed hyperlipidemia    Relevant Orders    Lipid Panel with Direct LDL reflex      Other Visit Diagnoses     Diabetes mellitus due to underlying condition with diabetic autonomic neuropathy, with long-term current use of insulin ([facility])        Relevant Medications    insulin detemir (LEVEMIR FLEXTOUCH) 100 Units/mL injection pen    insulin lispro (HUMALOG KWIKPEN) 100 units/mL injection pen          BMI Counseling: Body mass index is 29 39 kg/m²  Discussed the patient's BMI with him  The BMI is above average  BMI counseling and education was provided to the patient   Nutrition recommendations include reducing portion sizes       There are no Patient Instructions on file for this visit  Return in about 3 months (around 10/23/2019) for AWV  Subjective:      Patient ID: Donte Campbell is a 75 y o  male  Chief Complaint   Patient presents with    Follow-up     3 month f/u-wmcma       Pt is here for a diabetic follow up  Pt denies CP, no SOB  No polyuria no polydipsia    Pt states he did not fast for his labs    Pt has been seen by Dr Sebastián Esparza - seen a few weeks ago  Pt was told everything was ok      The following portions of the patient's history were reviewed and updated as appropriate: allergies, current medications, past family history, past medical history, past social history, past surgical history and problem list     Review of Systems   Constitutional: Negative for activity change, appetite change, chills, diaphoresis, fatigue, fever and unexpected weight change  HENT: Negative for congestion, dental problem, ear pain, mouth sores, sinus pressure, sinus pain, sore throat and trouble swallowing  Eyes: Negative for photophobia, discharge and itching  Respiratory: Negative for apnea, chest tightness and shortness of breath  Cardiovascular: Negative for chest pain, palpitations and leg swelling  Gastrointestinal: Negative for abdominal distention, abdominal pain, blood in stool, nausea and vomiting  Endocrine: Negative for cold intolerance, heat intolerance, polydipsia, polyphagia and polyuria  Genitourinary: Negative for difficulty urinating  Musculoskeletal: Negative for arthralgias  Skin: Negative for color change and wound  Neurological: Negative for dizziness, syncope, speech difficulty and headaches  Hematological: Negative for adenopathy  Psychiatric/Behavioral: Negative for agitation and behavioral problems           Current Outpatient Medications   Medication Sig Dispense Refill    aspirin (ASPIR-81) 81 mg EC tablet Take 81 mg by mouth every morning        atorvastatin (LIPITOR) 40 mg tablet TAKE 1 TABLET BY MOUTH  EVERY DAY 90 tablet 1    clotrimazole-betamethasone (LOTRISONE) 1-0 05 % cream Apply topically 2 (two) times a day 30 g 0    Coenzyme Q10 (COQ-10) 100 MG CAPS Take 300 mg by mouth daily at bedtime        ezetimibe (ZETIA) 10 mg tablet Take 1 tablet (10 mg total) by mouth daily (Patient taking differently: Take 10 mg by mouth every morning ) 30 tablet 5    furosemide (LASIX) 40 mg tablet TAKE 1 TABLET BY MOUTH  DAILY 90 tablet 1    glipiZIDE (GLUCOTROL) 5 mg tablet Take 1 tablet (5 mg total) by mouth 2 (two) times a day before meals 180 tablet 1    insulin detemir (LEVEMIR FLEXTOUCH) 100 Units/mL injection pen Inject 25 Units under the skin daily at bedtime 45 mL 1    insulin lispro (HUMALOG KWIKPEN) 100 units/mL injection pen Inject 8 Units under the skin 3 (three) times a day 30 mL 1    Insulin Pen Needle (BD PEN NEEDLE URSULA U/F) 32G X 4 MM MISC QID as directed 360 each 1    levothyroxine 75 mcg tablet TAKE 1 TABLET BY MOUTH  DAILY 90 tablet 1    lisinopril (ZESTRIL) 5 mg tablet TAKE 1 TABLET BY MOUTH  EVERY DAY 90 tablet 3    metoprolol succinate (TOPROL-XL) 25 mg 24 hr tablet TAKE 1 TABLET BY MOUTH  DAILY (Patient taking differently: TAKE 1 TABLET BY MOUTH  DAILY-am) 90 tablet 2    multivitamin (THERAGRAN) TABS Take 1 tablet by mouth every morning      pantoprazole (PROTONIX) 40 mg tablet TAKE 1 TABLET BY MOUTH  DAILY 90 tablet 1    olmesartan (BENICAR) 5 mg tablet TAKE ONE TABLET BY MOUTH EVERY DAY 90 tablet 3     No current facility-administered medications for this visit  Objective:    /68   Pulse 68   Temp 98 °F (36 7 °C)   Resp 16   Ht 5' 9" (1 753 m)   Wt 90 3 kg (199 lb)   BMI 29 39 kg/m²        Physical Exam   Constitutional: He appears well-developed and well-nourished  No distress  HENT:   Head: Normocephalic and atraumatic     Right Ear: External ear normal    Left Ear: External ear normal    Nose: Nose normal  Mouth/Throat: Oropharynx is clear and moist  No oropharyngeal exudate  Eyes: Pupils are equal, round, and reactive to light  EOM are normal  Right eye exhibits no discharge  Left eye exhibits no discharge  No scleral icterus  Neck: No thyromegaly present  Cardiovascular: Normal rate and normal heart sounds  No murmur heard  Pulmonary/Chest: Effort normal and breath sounds normal  No respiratory distress  He has no wheezes  Abdominal: Soft  Bowel sounds are normal  He exhibits no distension and no mass  There is no tenderness  There is no rebound and no guarding  Musculoskeletal: Normal range of motion  Neurological: He is alert  He displays normal reflexes  Coordination normal    Skin: Skin is warm and dry  No rash noted  He is not diaphoretic  No erythema  Psychiatric: He has a normal mood and affect  His behavior is normal    Nursing note and vitals reviewed          Recent Results (from the past 672 hour(s))   Joselin Sneed Default    Collection Time: 07/12/19  9:29 AM   Result Value Ref Range    White Blood Cell Count 6 7 3 4 - 10 8 x10E3/uL    Red Blood Cell Count 4 41 4 14 - 5 80 x10E6/uL    Hemoglobin 13 7 13 0 - 17 7 g/dL    HCT 40 9 37 5 - 51 0 %    MCV 93 79 - 97 fL    MCH 31 1 26 6 - 33 0 pg    MCHC 33 5 31 5 - 35 7 g/dL    RDW 13 8 12 3 - 15 4 %    Platelet Count 197 809 - 450 x10E3/uL    Neutrophils 54 Not Estab  %    Lymphocytes 33 Not Estab  %    Monocytes 9 Not Estab  %    Eosinophils 3 Not Estab  %    Basophils PCT 1 Not Estab  %    Neutrophils (Absolute) 3 5 1 4 - 7 0 x10E3/uL    Lymphocytes (Absolute) 2 2 0 7 - 3 1 x10E3/uL    Monocytes (Absolute) 0 6 0 1 - 0 9 x10E3/uL    Eosinophils (Absolute) 0 2 0 0 - 0 4 x10E3/uL    Basophils ABS 0 1 0 0 - 0 2 x10E3/uL    Immature Granulocytes 0 Not Estab  %    Immature Granulocytes (Absolute) 0 0 0 0 - 0 1 x10E3/uL   Comprehensive metabolic panel    Collection Time: 07/12/19  9:29 AM   Result Value Ref Range    Glucose, Random 162 (H) 65 - 99 mg/dL    BUN 26 8 - 27 mg/dL    Creatinine 1 54 (H) 0 76 - 1 27 mg/dL    eGFR Non  43 (L) >59 mL/min/1 73    eGFR  50 (L) >59 mL/min/1 73    SL AMB BUN/CREATININE RATIO 17 10 - 24    Sodium 140 134 - 144 mmol/L    Potassium 4 5 3 5 - 5 2 mmol/L    Chloride 105 96 - 106 mmol/L    CO2 21 20 - 29 mmol/L    CALCIUM 9 2 8 6 - 10 2 mg/dL    Protein, Total 6 2 6 0 - 8 5 g/dL    Albumin 4 1 3 5 - 4 8 g/dL    Globulin, Total 2 1 1 5 - 4 5 g/dL    Albumin/Globulin Ratio 2 0 1 2 - 2 2    TOTAL BILIRUBIN 0 4 0 0 - 1 2 mg/dL    Alk Phos Isoenzymes 84 39 - 117 IU/L    AST 20 0 - 40 IU/L    ALT 22 0 - 44 IU/L   Microalbumin / creatinine urine ratio    Collection Time: 07/12/19  9:29 AM   Result Value Ref Range    Creatinine, Urine 63 4 Not Estab  mg/dL    Microalbum  ,U,Random <3 0 Not Estab  ug/mL    Microalb/Creat Ratio <4 7 0 0 - 30 0 mg/g creat   [facility] CKD Program    Collection Time: 07/12/19  9:29 AM   Result Value Ref Range    Interpretation Note    Hemoglobin A1c (w/out EAG)    Collection Time: 07/12/19  9:29 AM   Result Value Ref Range    Hemoglobin A1C 7 8 (H) 4 8 - 5 6 %   TSH, 3rd generation    Collection Time: 07/12/19  9:29 AM   Result Value Ref Range    TSH 0 999 0 450 - 4 500 uIU/mL   PSA Total, Diagnostic    Collection Time: 07/12/19  9:29 AM   Result Value Ref Range    Prostate Specific Antigen Total 3 4 0 0 - 4 0 ng/mL          Ramona Ulrich DO

## 2019-09-03 ENCOUNTER — OFFICE VISIT (OUTPATIENT)
Dept: PODIATRY | Facility: CLINIC | Age: 74
End: 2019-09-03
Payer: MEDICARE

## 2019-09-03 VITALS
DIASTOLIC BLOOD PRESSURE: 70 MMHG | SYSTOLIC BLOOD PRESSURE: 133 MMHG | WEIGHT: 199 LBS | BODY MASS INDEX: 29.47 KG/M2 | HEIGHT: 69 IN | HEART RATE: 64 BPM | RESPIRATION RATE: 17 BRPM

## 2019-09-03 DIAGNOSIS — M79.672 PAIN IN BOTH FEET: ICD-10-CM

## 2019-09-03 DIAGNOSIS — I70.209 PERIPHERAL ARTERIOSCLEROSIS (HCC): ICD-10-CM

## 2019-09-03 DIAGNOSIS — B35.1 ONYCHOMYCOSIS: ICD-10-CM

## 2019-09-03 DIAGNOSIS — E11.42 DIABETIC POLYNEUROPATHY ASSOCIATED WITH TYPE 2 DIABETES MELLITUS (HCC): Primary | ICD-10-CM

## 2019-09-03 DIAGNOSIS — M79.671 PAIN IN BOTH FEET: ICD-10-CM

## 2019-09-03 PROCEDURE — 11721 DEBRIDE NAIL 6 OR MORE: CPT | Performed by: PODIATRIST

## 2019-09-03 NOTE — PROGRESS NOTES
Assessment  Pain upon ambulation  Peripheral artery disease  Diabetic neuropathy  Mycosis of nail  Plan  Foot exam performed  Patient educated on condition  All nails debrided  This was done without pain or complication      Subjective: Patient has return of pain in his right heel  No history of trauma  He has pain upon rising  Also has pain in his toes when wearing shoes  Past Medical History:   Diagnosis Date    Aortic narrowing     Last Assessed: 9/28/15    Arthritis     Hammond esophagus     Bilateral leg edema     Bulla, lung (HCC)     CHF (congestive heart failure) (HCC)     Chronic kidney disease     stage 3    Diabetes mellitus (HCC)     Enlarged prostate     Heel pain     right heel slipped in the garage-landed on the right heel    Herniated lumbar intervertebral disc     x 4 discs-fell off a roof    Hiatal hernia     High cholesterol     History of kidney problems     Hypertension     Old myocardial infarction     x2-pt was unaware of the MI-one stent    Thyroid disease     hypothyroid    Transient cerebral ischemia     Last Assessed: 8/27/15    Wears dentures     full upper, partial lower    Wears glasses            Past Surgical History:   Procedure Laterality Date    CATARACT EXTRACTION      COLONOSCOPY      Resolved: 2015    CORONARY ANGIOPLASTY WITH STENT PLACEMENT      Stent implanted early 1990's,     ESOPHAGOGASTRODUODENOSCOPY      KNEE SURGERY      right knee cartilage surgery-left meniscus repair    ID REMV CATARACT EXTRACAP,INSERT LENS Right 1/24/2019    Procedure: EXTRACTION EXTRACAPSULAR CATARACT PHACO INTRAOCULAR LENS (IOL); Surgeon: Raymond Veras MD; Location: [facility] MAIN OR; Service: Ophthalmology     [facility] CATARACT EXTRACAP,INSERT LENS Left 2/21/2019    Procedure: EXTRACTION EXTRACAPSULAR CATARACT PHACO INTRAOCULAR LENS (IOL);  Surgeon: Raymond Veras MD; Location: [facility] MAIN OR; Service: Ophthalmology    TONSILLECTOMY      UMBILICAL HERNIA REPAIR      1980's,    UPPER GASTROINTESTINAL ENDOSCOPY      Last Assessed: 8/27/15     No Known Allergies     Current Outpatient Medications:    aspirin (ASPIR-81) 81 mg EC tablet, Take 81 mg by mouth every morning , Disp: , Rfl:    atorvastatin (LIPITOR) 40 mg tablet, TAKE 1 TABLET BY MOUTH EVERY DAY, Disp: 90 tablet, Rfl: 1    BD PEN NEEDLE URSULA U/F 32G X 4 MM MISC, USE 4 TIMES DAILY AS DIRECTED, Disp: 360 each, Rfl: 1    clotrimazole-betamethasone (LOTRISONE) 1-0 05 % cream, Apply topically 2 (two) times a day, Disp: 30 g, Rfl: 0    Coenzyme Q10 (COQ-10) 100 MG CAPS, Take 300 mg by mouth daily at bedtime , Disp: , Rfl:    ezetimibe (ZETIA) 10 mg tablet, Take 1 tablet (10 mg total) by mouth daily (Patient taking differently: Take 10 mg by mouth every morning ), Disp: 30 tablet, Rfl: 5    furosemide (LASIX) 40 mg tablet, TAKE 1 TABLET BY MOUTH DAILY, Disp: 90 tablet, Rfl: 1    glipiZIDE (GLUCOTROL) 5 mg tablet, Take 1 tablet (5 mg total) by mouth 2 (two) times a day before meals, Disp: 180 tablet, Rfl: 1    HUMALOG KWIKPEN 100 units/mL injection pen, INJECT SUBCUTANEOUSLY UP TO 8 UNITS 3 TIMES DAILY, Disp: 30 mL, Rfl: 1    insulin detemir (LEVEMIR FLEXTOUCH) 100 Units/mL injection pen, Inject 30 Units under the skin daily at bedtime, Disp: 45 mL, Rfl: 1    levothyroxine 75 mcg tablet, Take 1 tablet (75 mcg total) by mouth daily, Disp: 90 tablet, Rfl: 1    lisinopril (ZESTRIL) 5 mg tablet, TAKE 1 TABLET BY MOUTH EVERY DAY, Disp: 90 tablet, Rfl: 3    metoprolol succinate (TOPROL-XL) 25 mg 24 hr tablet, TAKE 1 TABLET BY MOUTH DAILY (Patient taking differently: TAKE 1 TABLET BY MOUTH DAILY-am), Disp: 90 tablet, Rfl: 2    multivitamin (THERAGRAN) TABS, Take 1 tablet by mouth every morning, Disp: , Rfl:    pantoprazole (PROTONIX) 40 mg tablet, TAKE 1 TABLET BY MOUTH DAILY, Disp: 90 tablet, Rfl: 1       Patient Active Problem List   Diagnosis    Hammond's esophagus with dysplasia    Chronic kidney disease (CKD) stage G3a/A1, moderately decreased glomerular filtration rate (GFR) between 45-59 mL/min/1 73 square meter and albuminuria creatinine ratio less than 30 mg/g (HCC)    Colon, diverticulosis    Chronic constipation    Coronary artery disease involving native coronary artery of native heart without angina pectoris    Type 2 diabetes mellitus with stage 3 chronic kidney disease, with long-term current use of insulin (HCC)    Disc degeneration, lumbar    Enlarged prostate    Hiatal hernia    History of myocardial infarction    Essential hypertension    Congenital hypothyroidism without goiter    Lumbar radiculopathy    Mixed hyperlipidemia    Chronic diastolic congestive heart failure (HCC)    Age-related incipient cataract of both eyes    Pain of right heel    Primary osteoarthritis of left foot    Plantar fasciitis    Left foot pain    Right foot pain    Arthralgia of left foot    Onychomycosis    Peripheral arteriosclerosis (HCC)    Pain in both feet     Patient ID: Frank Cronin is a 75 y o  male  HPI   The following portions of the patient's history were reviewed and updated as appropriate: He has a past medical history of Aortic narrowing, Arthritis, Hammond esophagus, Bilateral leg edema, Bulla, lung (HCC), CHF (congestive heart failure) ([address] ), Chronic kidney disease, Diabetes mellitus ([address] ), Enlarged prostate, Heel pain, Herniated lumbar intervertebral disc, Hiatal hernia, High cholesterol, History of kidney problems, Hypertension, Old myocardial infarction, Thyroid disease, Transient cerebral ischemia, Wears dentures, and Wears glasses     He        Patient Active Problem List    Diagnosis Date Noted    Peripheral arteriosclerosis [facility] ) 03/05/2019    Pain in both feet 03/05/2019    Plantar fasciitis 01/29/2019    Left foot pain 01/29/2019    Right foot pain 01/29/2019    Arthralgia of left foot 01/29/2019    Onychomycosis 01/29/2019    Primary osteoarthritis of left foot 01/16/2019    Pain of right heel 01/11/2019    Age-related incipient cataract of both eyes 12/20/2018    Chronic diastolic [facility] failure [facility] ) 06/29/2018    Chronic kidney disease (CKD) stage G3a/A1, moderately decreased glomerular filtration rate (GFR) between 45-59 mL/min/1 73 square meter and albuminuria creatinine ratio less than 30 mg/g ([facility]) 01/23/2017    Chronic constipation 07/11/2016    Enlarged prostate 05/31/2016    Type 2 diabetes mellitus with stage 3 chronic kidney disease, with long-term current use of insulin ([facility] ) 09/10/2015    Lumbar radiculopathy 09/10/2015    Hammond's esophagus with dysplasia 08/27/2015    Colon, diverticulosis 08/27/2015    Coronary artery disease involving native coronary artery of native heart without angina pectoris 08/27/2015    Disc degeneration, lumbar 08/27/2015    Hiatal hernia 08/27/2015    History of myocardial infarction 08/27/2015    Essential hypertension 08/27/2015    Congenital hypothyroidism without goiter 08/27/2015    Mixed hyperlipidemia 08/27/2015     He has a past surgical history that includes Coronary angioplasty with stent; Colonoscopy; Knee surgery; Upper gastrointestinal endoscopy; Tonsillectomy; Umbilical hernia repair; Esophagogastroduodenoscopy; pr remv cataract extracap,insert lens (Right, 1/24/2019); Cataract extraction; and pr remv cataract extracap,insert lens (Left, 2/21/2019)  His family history includes Arthritis in his mother; Cancer in his mother, sister, and sister; Colon cancer in his mother; Diabetes in his mother and other; Hernia in his father and son; Hyperlipidemia in his mother; Hypertension in his mother and other; Kidney disease in his brother and sister; Other in his brother  He reports that he quit smoking about 43 years ago  He smoked 4 00 packs per day  He has never used smokeless tobacco  He reports that he does not drink alcohol or use drugs            Current Outpatient Medications Medication Sig Dispense Refill    aspirin (ASPIR-81) 81 mg EC tablet Take 81 mg by mouth every morning       atorvastatin (LIPITOR) 40 mg tablet TAKE 1 TABLET BY MOUTH EVERY DAY 90 tablet 1    BD PEN NEEDLE URSULA U/F 32G X 4 MM MISC USE 4 TIMES DAILY AS DIRECTED 360 each 1    clotrimazole-betamethasone (LOTRISONE) 1-0 05 % cream Apply topically 2 (two) times a day 30 g 0    Coenzyme Q10 (COQ-10) 100 MG CAPS Take 300 mg by mouth daily at bedtime       ezetimibe (ZETIA) 10 mg tablet Take 1 tablet (10 mg total) by mouth daily (Patient taking differently: Take 10 mg by mouth every morning ) 30 tablet 5    furosemide (LASIX) 40 mg tablet TAKE 1 TABLET BY MOUTH DAILY 90 tablet 1    glipiZIDE (GLUCOTROL) 5 mg tablet Take 1 tablet (5 mg total) by mouth 2 (two) times a day before meals 180 tablet 1    HUMALOG KWIKPEN 100 units/mL injection pen INJECT SUBCUTANEOUSLY UP TO 8 UNITS 3 TIMES DAILY 30 mL 1    insulin detemir (LEVEMIR FLEXTOUCH) 100 Units/mL injection pen Inject 30 Units under the skin daily at bedtime 45 mL 1    levothyroxine 75 mcg tablet Take 1 tablet (75 mcg total) by mouth daily 90 tablet 1    lisinopril (ZESTRIL) 5 mg tablet TAKE 1 TABLET BY MOUTH EVERY DAY 90 tablet 3    metoprolol succinate (TOPROL-XL) 25 mg 24 hr tablet TAKE 1 TABLET BY MOUTH DAILY (Patient taking differently: TAKE 1 TABLET BY MOUTH DAILY-am) 90 tablet 2    multivitamin (THERAGRAN) TABS Take 1 tablet by mouth every morning      pantoprazole (PROTONIX) 40 mg tablet TAKE 1 TABLET BY MOUTH DAILY 90 tablet 1     No current facility-administered medications for this visit             Current Outpatient Medications on File Prior to Visit   Medication Sig    aspirin (ASPIR-81) 81 mg EC tablet Take 81 mg by mouth every morning     atorvastatin (LIPITOR) 40 mg tablet TAKE 1 TABLET BY MOUTH EVERY DAY    BD PEN NEEDLE URSULA U/F 32G X 4 MM MISC USE 4 TIMES DAILY AS DIRECTED    clotrimazole-betamethasone (LOTRISONE) 1-0 05 % cream Apply topically 2 (two) times a day    Coenzyme Q10 (COQ-10) 100 MG CAPS Take 300 mg by mouth daily at bedtime     ezetimibe (ZETIA) 10 mg tablet Take 1 tablet (10 mg total) by mouth daily (Patient taking differently: Take 10 mg by mouth every morning )    furosemide (LASIX) 40 mg tablet TAKE 1 TABLET BY MOUTH DAILY    glipiZIDE (GLUCOTROL) 5 mg tablet Take 1 tablet (5 mg total) by mouth 2 (two) times a day before meals    HUMALOG KWIKPEN 100 units/mL injection pen INJECT SUBCUTANEOUSLY UP TO 8 UNITS 3 TIMES DAILY    insulin detemir (LEVEMIR FLEXTOUCH) 100 Units/mL injection pen Inject 30 Units under the skin daily at bedtime    levothyroxine 75 mcg tablet Take 1 tablet (75 mcg total) by mouth daily    lisinopril (ZESTRIL) 5 mg tablet TAKE 1 TABLET BY MOUTH EVERY DAY    metoprolol succinate (TOPROL-XL) 25 mg 24 hr tablet TAKE 1 TABLET BY MOUTH DAILY (Patient taking differently: TAKE 1 TABLET BY MOUTH DAILY-am)    multivitamin (THERAGRAN) TABS Take 1 tablet by mouth every morning    pantoprazole (PROTONIX) 40 mg tablet TAKE 1 TABLET BY MOUTH DAILY     No current facility-administered medications on file prior to visit  He has No Known Allergies          Vitals:    05/14/19 0929   BP: 124/62   Pulse: 74   Resp: 17     Review of Systems   Objective:   Patient's shoes and socks removed     Foot ExamPhysical Exam   General   General Appearance: appears stated age and healthy   Orientation: alert and oriented to person, place, and time   Affect: appropriate   Gait: antalgic   Right Foot/Ankle   Inspection and Palpation   Ecchymosis: none  Tenderness: calcaneus tenderness, bony tenderness and plantar fascia   Swelling: dorsum and metatarsals   Arch: pes planus  Hammertoes: fifth toe  Hallux valgus: no  Hallux limitus: yes  Skin Exam: dry skin;   Neurovascular   Dorsalis pedis: 2+   Posterior tibial: 2+   Left Foot/Ankle   Inspection and Palpation   Ecchymosis: none  Tenderness: lesser metatarsophalangeal joints and metatarsals   Swelling: dorsum and metatarsals   Arch: pes planus  Hammertoes: fifth toe  Hallux valgus: no  Hallux limitus: yes  Skin Exam: dry skin;   Neurovascular   Dorsalis pedis: 2+   Posterior tibial: 2+   Physical Exam   Constitutional: He appears well-developed and well-nourished  Cardiovascular: Normal rate and regular rhythm  Pulses:   Dorsalis pedis pulses are 2+ on the right side, and 2+ on the left side  Posterior tibial pulses are 2+ on the right side, and 2+ on the left side  Musculoskeletal:   Right foot: There is bony tenderness  Patient is pronated in stance and gait  There is pain with palpation right plantar fascia insertion  X-ray taken hospital demonstrates plantar calcaneal spur  No evidence of fracture  Left foot  Pain with palpation 2nd MPJ  No edema or ecchymosis noted  There is pain with palpation of the plantar aspect  X-ray of the office demonstrates long plantar flexed 2nd metatarsal  No evidence of fracture or bony mass  Feet:   Right Foot:   Skin Integrity: Positive for dry skin  Left Foot:   Skin Integrity: Positive for dry skin  Skin:   Soft corn noted 4th left interdigital space  There is dystrophy of nail  All nails demonstrate distal mycosis  There is paronychia of hallux nail bilateral   Nursing note and vitals reviewed  Subjective: Patient has return of pain in his right heel  No history of trauma  He has pain upon rising  Also has pain in his toes when wearing shoes          Past Medical History:   Diagnosis Date    Aortic narrowing     Last Assessed: 9/28/15    Arthritis     Hammond esophagus     Bilateral leg edema     Bulla, lung (HCC)     CHF (congestive heart failure) (HCC)     Chronic kidney disease     stage 3    Diabetes mellitus (HCC)     Enlarged prostate     Heel pain     right heel slipped in the garage-landed on the right heel    Herniated lumbar intervertebral disc     x 4 discs-fell off a roof    Hiatal hernia     High cholesterol     History of kidney problems     Hypertension     Old myocardial infarction     x2-pt was unaware of the MI-one stent    Thyroid disease     hypothyroid    Transient cerebral ischemia     Last Assessed: 8/27/15    Wears dentures     full upper, partial lower    Wears glasses            Past Surgical History:   Procedure Laterality Date    CATARACT EXTRACTION      COLONOSCOPY      Resolved: 2015    CORONARY ANGIOPLASTY WITH STENT PLACEMENT      Stent implanted early 1990's,     ESOPHAGOGASTRODUODENOSCOPY      KNEE SURGERY      right knee cartilage surgery-left meniscus repair    OK REMV CATARACT EXTRACAP,INSERT LENS Right 1/24/2019    Procedure: EXTRACTION EXTRACAPSULAR CATARACT PHACO INTRAOCULAR LENS (IOL); Surgeon: Andre Lim MD; Location: [facility] MAIN OR; Service: Ophthalmology     [address] CATARACT EXTRACAP,INSERT LENS Left 2/21/2019    Procedure: EXTRACTION EXTRACAPSULAR CATARACT PHACO INTRAOCULAR LENS (IOL);  Surgeon: Andre Lim MD; Location: [facility] OR; Service: Ophthalmology    TONSILLECTOMY      UMBILICAL HERNIA REPAIR      1980's,    UPPER GASTROINTESTINAL ENDOSCOPY      Last Assessed: 8/27/15     No Known Allergies     Current Outpatient Medications:    aspirin (ASPIR-81) 81 mg EC tablet, Take 81 mg by mouth every morning , Disp: , Rfl:    atorvastatin (LIPITOR) 40 mg tablet, TAKE 1 TABLET BY MOUTH EVERY DAY, Disp: 90 tablet, Rfl: 1    BD PEN NEEDLE URSULA U/F 32G X 4 MM MISC, USE 4 TIMES DAILY AS DIRECTED, Disp: 360 each, Rfl: 1    clotrimazole-betamethasone (LOTRISONE) 1-0 05 % cream, Apply topically 2 (two) times a day, Disp: 30 g, Rfl: 0    Coenzyme Q10 (COQ-10) 100 MG CAPS, Take 300 mg by mouth daily at bedtime , Disp: , Rfl:    ezetimibe (ZETIA) 10 mg tablet, Take 1 tablet (10 mg total) by mouth daily (Patient taking differently: Take 10 mg by mouth every morning ), Disp: 30 tablet, Rfl: 5    furosemide (LASIX) 40 mg tablet, TAKE 1 TABLET BY MOUTH DAILY, Disp: 90 tablet, Rfl: 1    glipiZIDE (GLUCOTROL) 5 mg tablet, Take 1 tablet (5 mg total) by mouth 2 (two) times a day before meals, Disp: 180 tablet, Rfl: 1    HUMALOG KWIKPEN 100 units/mL injection pen, INJECT SUBCUTANEOUSLY UP TO 8 UNITS 3 TIMES DAILY, Disp: 30 mL, Rfl: 1    insulin detemir (LEVEMIR FLEXTOUCH) 100 Units/mL injection pen, Inject 30 Units under the skin daily at bedtime, Disp: 45 mL, Rfl: 1    levothyroxine 75 mcg tablet, Take 1 tablet (75 mcg total) by mouth daily, Disp: 90 tablet, Rfl: 1    lisinopril (ZESTRIL) 5 mg tablet, TAKE 1 TABLET BY MOUTH EVERY DAY, Disp: 90 tablet, Rfl: 3    metoprolol succinate (TOPROL-XL) 25 mg 24 hr tablet, TAKE 1 TABLET BY MOUTH DAILY (Patient taking differently: TAKE 1 TABLET BY MOUTH DAILY-am), Disp: 90 tablet, Rfl: 2    multivitamin (THERAGRAN) TABS, Take 1 tablet by mouth every morning, Disp: , Rfl:    pantoprazole (PROTONIX) 40 mg tablet, TAKE 1 TABLET BY MOUTH DAILY, Disp: 90 tablet, Rfl: 1       Patient Active Problem List   Diagnosis    Hammond's esophagus with dysplasia    Chronic kidney disease (CKD) stage G3a/A1, moderately decreased glomerular filtration rate (GFR) between 45-59 mL/min/1 73 square meter and albuminuria creatinine ratio less than 30 mg/g (HCC)    Colon, diverticulosis    Chronic constipation    Coronary artery disease involving native coronary artery of native heart without angina pectoris    Type 2 diabetes mellitus with stage 3 chronic kidney disease, with long-term current use of insulin (HCC)    Disc degeneration, lumbar    Enlarged prostate    Hiatal hernia    History of myocardial infarction    Essential hypertension    Congenital hypothyroidism without goiter    Lumbar radiculopathy    Mixed hyperlipidemia    Chronic diastolic congestive heart failure (HCC)    Age-related incipient cataract of both eyes    Pain of right heel    Primary osteoarthritis of left foot    Plantar fasciitis    Left foot pain    Right foot pain    Arthralgia of left foot    Onychomycosis    Peripheral arteriosclerosis (HCC)    Pain in both feet     Patient ID: Dominga Maxwell is a 75 y o  male  HPI   The following portions of the patient's history were reviewed and updated as appropriate: He has a past medical history of Aortic narrowing, Arthritis, Hammond esophagus, Bilateral leg edema, Bulla, lung (HCC), CHF (congestive heart failure) ([address] ), Chronic kidney disease, Diabetes mellitus ([address] ), Enlarged prostate, Heel pain, Herniated lumbar intervertebral disc, Hiatal hernia, High cholesterol, History of kidney problems, Hypertension, Old myocardial infarction, Thyroid disease, Transient cerebral ischemia, Wears dentures, and Wears glasses     He        Patient Active Problem List    Diagnosis Date Noted    Peripheral arteriosclerosis ([address] ) 03/05/2019    Pain in both feet 03/05/2019    Plantar fasciitis 01/29/2019    Left foot pain 01/29/2019    Right foot pain 01/29/2019    Arthralgia of left foot 01/29/2019    Onychomycosis 01/29/2019    Primary osteoarthritis of left foot 01/16/2019    Pain of right heel 01/11/2019    Age-related incipient cataract of both eyes 12/20/2018    Chronic diastolic congestive heart failure ([address] ) 06/29/2018    Chronic kidney disease (CKD) stage G3a/A1, moderately decreased glomerular filtration rate (GFR) between 45-59 mL/min/1 73 square meter and albuminuria creatinine ratio less than 30 mg/g (HCC) 01/23/2017    Chronic constipation 07/11/2016    Enlarged prostate 05/31/2016    Type 2 diabetes mellitus with stage 3 chronic kidney disease, with long-term current use of insulin ([address] ) 09/10/2015    Lumbar radiculopathy 09/10/2015    Hammond's esophagus with dysplasia 08/27/2015    Colon, diverticulosis 08/27/2015    Coronary artery disease involving native coronary artery of native heart without angina pectoris 08/27/2015    Disc degeneration, lumbar 08/27/2015    Hiatal hernia 08/27/2015    History of myocardial infarction 08/27/2015    Essential hypertension 08/27/2015    Congenital hypothyroidism without goiter 08/27/2015    Mixed hyperlipidemia 08/27/2015     He has a past surgical history that includes Coronary angioplasty with stent; Colonoscopy; Knee surgery; Upper gastrointestinal endoscopy; Tonsillectomy; Umbilical hernia repair; Esophagogastroduodenoscopy; pr remv cataract extracap,insert lens (Right, 1/24/2019); Cataract extraction; and pr remv cataract extracap,insert lens (Left, 2/21/2019)  His family history includes Arthritis in his mother; Cancer in his mother, sister, and sister; Colon cancer in his mother; Diabetes in his mother and other; Hernia in his father and son; Hyperlipidemia in his mother; Hypertension in his mother and other; Kidney disease in his brother and sister; Other in his brother  He reports that he quit smoking about 43 years ago  He smoked 4 00 packs per day  He has never used smokeless tobacco  He reports that he does not drink alcohol or use drugs            Current Outpatient Medications   Medication Sig Dispense Refill    aspirin (ASPIR-81) 81 mg EC tablet Take 81 mg by mouth every morning       atorvastatin (LIPITOR) 40 mg tablet TAKE 1 TABLET BY MOUTH EVERY DAY 90 tablet 1    BD PEN NEEDLE URSULA U/F 32G X 4 MM MISC USE 4 TIMES DAILY AS DIRECTED 360 each 1    clotrimazole-betamethasone (LOTRISONE) 1-0 05 % cream Apply topically 2 (two) times a day 30 g 0    Coenzyme Q10 (COQ-10) 100 MG CAPS Take 300 mg by mouth daily at bedtime       ezetimibe (ZETIA) 10 mg tablet Take 1 tablet (10 mg total) by mouth daily (Patient taking differently: Take 10 mg by mouth every morning ) 30 tablet 5    furosemide (LASIX) 40 mg tablet TAKE 1 TABLET BY MOUTH DAILY 90 tablet 1    glipiZIDE (GLUCOTROL) 5 mg tablet Take 1 tablet (5 mg total) by mouth 2 (two) times a day before meals 180 tablet 1    HUMALOG KWIKPEN 100 units/mL injection pen INJECT SUBCUTANEOUSLY UP TO 8 UNITS 3 TIMES DAILY 30 mL 1    insulin detemir (LEVEMIR FLEXTOUCH) 100 Units/mL injection pen Inject 30 Units under the skin daily at bedtime 45 mL 1    levothyroxine 75 mcg tablet Take 1 tablet (75 mcg total) by mouth daily 90 tablet 1    lisinopril (ZESTRIL) 5 mg tablet TAKE 1 TABLET BY MOUTH EVERY DAY 90 tablet 3    metoprolol succinate (TOPROL-XL) 25 mg 24 hr tablet TAKE 1 TABLET BY MOUTH DAILY (Patient taking differently: TAKE 1 TABLET BY MOUTH DAILY-am) 90 tablet 2    multivitamin (THERAGRAN) TABS Take 1 tablet by mouth every morning      pantoprazole (PROTONIX) 40 mg tablet TAKE 1 TABLET BY MOUTH DAILY 90 tablet 1     No current facility-administered medications for this visit             Current Outpatient Medications on File Prior to Visit   Medication Sig    aspirin (ASPIR-81) 81 mg EC tablet Take 81 mg by mouth every morning     atorvastatin (LIPITOR) 40 mg tablet TAKE 1 TABLET BY MOUTH EVERY DAY    BD PEN NEEDLE URSULA U/F 32G X 4 MM MISC USE 4 TIMES DAILY AS DIRECTED    clotrimazole-betamethasone (LOTRISONE) 1-0 05 % cream Apply topically 2 (two) times a day    Coenzyme Q10 (COQ-10) 100 MG CAPS Take 300 mg by mouth daily at bedtime     ezetimibe (ZETIA) 10 mg tablet Take 1 tablet (10 mg total) by mouth daily (Patient taking differently: Take 10 mg by mouth every morning )    furosemide (LASIX) 40 mg tablet TAKE 1 TABLET BY MOUTH DAILY    glipiZIDE (GLUCOTROL) 5 mg tablet Take 1 tablet (5 mg total) by mouth 2 (two) times a day before meals    HUMALOG KWIKPEN 100 units/mL injection pen INJECT SUBCUTANEOUSLY UP TO 8 UNITS 3 TIMES DAILY    insulin detemir (LEVEMIR FLEXTOUCH) 100 Units/mL injection pen Inject 30 Units under the skin daily at bedtime    levothyroxine 75 mcg tablet Take 1 tablet (75 mcg total) by mouth daily    lisinopril (ZESTRIL) 5 mg tablet TAKE 1 TABLET BY MOUTH EVERY DAY    metoprolol succinate (TOPROL-XL) 25 mg 24 hr tablet TAKE 1 TABLET BY MOUTH DAILY (Patient taking differently: TAKE 1 TABLET BY MOUTH DAILY-am)    multivitamin (THERAGRAN) TABS Take 1 tablet by mouth every morning    pantoprazole (PROTONIX) 40 mg tablet TAKE 1 TABLET BY MOUTH DAILY     No current facility-administered medications on file prior to visit  He has No Known Allergies          Vitals:    05/14/19 0929   BP: 124/62   Pulse: 74   Resp: 17     Review of Systems   Objective:   Patient's shoes and socks removed  Foot ExamPhysical Exam   General   General Appearance: appears stated age and healthy   Orientation: alert and oriented to person, place, and time   Affect: appropriate   Gait: antalgic   Right Foot/Ankle   Inspection and Palpation   Ecchymosis: none  Tenderness: calcaneus tenderness, bony tenderness and plantar fascia   Swelling: dorsum and metatarsals   Arch: pes planus  Hammertoes: fifth toe  Hallux valgus: no  Hallux limitus: yes  Skin Exam: dry skin;   Neurovascular   Dorsalis pedis: 2+   Posterior tibial: 2+   Left Foot/Ankle   Inspection and Palpation   Ecchymosis: none  Tenderness: lesser metatarsophalangeal joints and metatarsals   Swelling: dorsum and metatarsals   Arch: pes planus  Hammertoes: fifth toe  Hallux valgus: no  Hallux limitus: yes  Skin Exam: dry skin;   Neurovascular   Dorsalis pedis: 2+   Posterior tibial: 2+   Physical Exam   Constitutional: He appears well-developed and well-nourished  Cardiovascular: Normal rate and regular rhythm  Pulses:   Dorsalis pedis pulses are 2+ on the right side, and 2+ on the left side  Posterior tibial pulses are 2+ on the right side, and 2+ on the left side  Musculoskeletal:   Right foot: There is bony tenderness  Patient is pronated in stance and gait  There is pain with palpation right plantar fascia insertion  X-ray taken hospital demonstrates plantar calcaneal spur  No evidence of fracture  Left foot  Pain with palpation 2nd MPJ  No edema or ecchymosis noted   There is pain with palpation of the plantar aspect  X-ray of the office demonstrates long plantar flexed 2nd metatarsal  No evidence of fracture or bony mass  Feet:   Right Foot:   Skin Integrity: Positive for dry skin  Left Foot:   Skin Integrity: Positive for dry skin  Skin:   Soft corn noted 4th left interdigital space  There is dystrophy of nail  All nails demonstrate distal mycosis  There is paronychia of hallux nail bilateral   Nursing note and vitals reviewed

## 2019-09-12 DIAGNOSIS — I10 ESSENTIAL HYPERTENSION: ICD-10-CM

## 2019-09-13 RX ORDER — METOPROLOL SUCCINATE 25 MG/1
TABLET, EXTENDED RELEASE ORAL
Qty: 90 TABLET | Refills: 2 | Status: SHIPPED | OUTPATIENT
Start: 2019-09-13 | End: 2020-05-29

## 2019-10-06 DIAGNOSIS — E78.2 MIXED HYPERLIPIDEMIA: ICD-10-CM

## 2019-10-07 RX ORDER — ATORVASTATIN CALCIUM 40 MG/1
TABLET, FILM COATED ORAL
Qty: 90 TABLET | Refills: 1 | Status: SHIPPED | OUTPATIENT
Start: 2019-10-07 | End: 2019-11-04 | Stop reason: SDUPTHER

## 2019-10-16 LAB
ALBUMIN SERPL-MCNC: 4.1 G/DL (ref 3.5–4.8)
ALBUMIN/GLOB SERPL: 2.1 {RATIO} (ref 1.2–2.2)
ALP SERPL-CCNC: 77 IU/L (ref 39–117)
ALT SERPL-CCNC: 19 IU/L (ref 0–44)
AST SERPL-CCNC: 19 IU/L (ref 0–40)
BILIRUB SERPL-MCNC: 0.3 MG/DL (ref 0–1.2)
BUN SERPL-MCNC: 25 MG/DL (ref 8–27)
BUN/CREAT SERPL: 17 (ref 10–24)
CALCIUM SERPL-MCNC: 9.5 MG/DL (ref 8.6–10.2)
CHLORIDE SERPL-SCNC: 107 MMOL/L (ref 96–106)
CHOLEST SERPL-MCNC: 136 MG/DL (ref 100–199)
CO2 SERPL-SCNC: 24 MMOL/L (ref 20–29)
CREAT SERPL-MCNC: 1.49 MG/DL (ref 0.76–1.27)
GLOBULIN SER-MCNC: 2 G/DL (ref 1.5–4.5)
GLUCOSE SERPL-MCNC: 101 MG/DL (ref 65–99)
HBA1C MFR BLD: 7.3 % (ref 4.8–5.6)
HDLC SERPL-MCNC: 53 MG/DL
LDLC SERPL CALC-MCNC: 71 MG/DL (ref 0–99)
MICRODELETION SYND BLD/T FISH: NORMAL
MICRODELETION SYND BLD/T FISH: NORMAL
POTASSIUM SERPL-SCNC: 4.4 MMOL/L (ref 3.5–5.2)
PROT SERPL-MCNC: 6.1 G/DL (ref 6–8.5)
SL AMB EGFR AFRICAN AMERICAN: 52 ML/MIN/1.73
SL AMB EGFR NON AFRICAN AMERICAN: 45 ML/MIN/1.73
SL AMB PDF IMAGE: NORMAL
SODIUM SERPL-SCNC: 145 MMOL/L (ref 134–144)
TRIGL SERPL-MCNC: 62 MG/DL (ref 0–149)
TSH SERPL DL<=0.005 MIU/L-ACNC: 1.91 UIU/ML (ref 0.45–4.5)

## 2019-10-22 ENCOUNTER — OFFICE VISIT (OUTPATIENT)
Dept: FAMILY MEDICINE CLINIC | Facility: CLINIC | Age: 74
End: 2019-10-22
Payer: MEDICARE

## 2019-10-22 VITALS
HEIGHT: 69 IN | OXYGEN SATURATION: 94 % | HEART RATE: 75 BPM | TEMPERATURE: 98 F | BODY MASS INDEX: 30.81 KG/M2 | DIASTOLIC BLOOD PRESSURE: 50 MMHG | WEIGHT: 208 LBS | SYSTOLIC BLOOD PRESSURE: 120 MMHG | RESPIRATION RATE: 16 BRPM

## 2019-10-22 DIAGNOSIS — Z13.6 SCREENING FOR AAA (ABDOMINAL AORTIC ANEURYSM): ICD-10-CM

## 2019-10-22 DIAGNOSIS — E03.1 CONGENITAL HYPOTHYROIDISM WITHOUT GOITER: ICD-10-CM

## 2019-10-22 DIAGNOSIS — E11.22 TYPE 2 DIABETES MELLITUS WITH STAGE 3 CHRONIC KIDNEY DISEASE, WITH LONG-TERM CURRENT USE OF INSULIN (HCC): ICD-10-CM

## 2019-10-22 DIAGNOSIS — Z00.00 MEDICARE ANNUAL WELLNESS VISIT, SUBSEQUENT: Primary | ICD-10-CM

## 2019-10-22 DIAGNOSIS — N18.30 TYPE 2 DIABETES MELLITUS WITH STAGE 3 CHRONIC KIDNEY DISEASE, WITH LONG-TERM CURRENT USE OF INSULIN (HCC): ICD-10-CM

## 2019-10-22 DIAGNOSIS — Z12.2 ENCOUNTER FOR SCREENING FOR LUNG CANCER: ICD-10-CM

## 2019-10-22 DIAGNOSIS — E11.42 DIABETIC POLYNEUROPATHY ASSOCIATED WITH TYPE 2 DIABETES MELLITUS (HCC): ICD-10-CM

## 2019-10-22 DIAGNOSIS — Z79.4 TYPE 2 DIABETES MELLITUS WITH STAGE 3 CHRONIC KIDNEY DISEASE, WITH LONG-TERM CURRENT USE OF INSULIN (HCC): ICD-10-CM

## 2019-10-22 DIAGNOSIS — Z13.820 SCREENING FOR OSTEOPOROSIS: ICD-10-CM

## 2019-10-22 DIAGNOSIS — N18.31 CHRONIC KIDNEY DISEASE (CKD) STAGE G3A/A1, MODERATELY DECREASED GLOMERULAR FILTRATION RATE (GFR) BETWEEN 45-59 ML/MIN/1.73 SQUARE METER AND ALBUMINURIA CREATININE RATIO LESS THAN 30 MG/G (HCC): ICD-10-CM

## 2019-10-22 PROCEDURE — G0439 PPPS, SUBSEQ VISIT: HCPCS | Performed by: FAMILY MEDICINE

## 2019-10-22 NOTE — PATIENT INSTRUCTIONS

## 2019-10-22 NOTE — PROGRESS NOTES
Assessment and Plan:     Problem List Items Addressed This Visit        Endocrine    Type 2 diabetes mellitus with stage 3 chronic kidney disease, with long-term current use of insulin ([facility] )    Relevant Orders    Hemoglobin A1C    Microalbumin / creatinine urine ratio    Congenital hypothyroidism without goiter    Relevant Orders    TSH, 3rd generation    Diabetic polyneuropathy associated with type 2 diabetes mellitus ([facility] [address] )       Genitourinary    Chronic kidney disease (CKD) stage G3a/A1, moderately decreased glomerular filtration rate (GFR) between 45-59 mL/min/1 73 square meter and albuminuria creatinine ratio less than 30 mg/g ([facility])    Relevant Orders    Comprehensive metabolic panel      Other Visit Diagnoses     Medicare annual wellness visit, subsequent    -  Primary    Screening for osteoporosis        Relevant Orders    DXA bone density spine hip and pelvis    Screening for AAA (abdominal aortic aneurysm)        Relevant Orders    US abdominal aorta screening aaa    Encounter for screening for lung cancer        Relevant Orders    CT lung screening program           Preventive health issues were discussed with patient, and age appropriate screening tests were ordered as noted in patient's After Visit Summary  Personalized health advice and appropriate referrals for health education or preventive services given if needed, as noted in patient's After Visit Summary       History of Present Illness:     Patient presents for Medicare Annual Wellness visit    Patient Care Team:  Celi Jerry DO as PCP - General (Family Medicine)  Miriam Schulz DPM as Consulting Physician (Podiatry)  Reed Amin MD as Consulting Physician (Ophthalmology)  Christel Reich DO as Consulting Physician (Cardiology)  Milford Hammans, MD as Consulting Physician (Urology)     Problem List:     Patient Active Problem List   Diagnosis    Hammond's esophagus with dysplasia    Chronic kidney disease (CKD) stage G3a/A1, moderately decreased glomerular filtration rate (GFR) between 45-59 mL/min/1 73 square meter and albuminuria creatinine ratio less than 30 mg/g (HCC)    Colon, diverticulosis    Chronic constipation    Coronary artery disease involving native coronary artery of native heart without angina pectoris    Type 2 diabetes mellitus with stage 3 chronic kidney disease, with long-term current use of insulin (HCC)    Disc degeneration, lumbar    Enlarged prostate    Hiatal hernia    History of myocardial infarction    Essential hypertension    Congenital hypothyroidism without goiter    Lumbar radiculopathy    Mixed hyperlipidemia    Chronic diastolic congestive heart failure (HCC)    Age-related incipient cataract of both eyes    Pain of right heel    Primary osteoarthritis of left foot    Plantar fasciitis    Left foot pain    Right foot pain    Arthralgia of left foot    Onychomycosis    Peripheral arteriosclerosis (HCC)    Pain in both feet    Diabetic polyneuropathy associated with type 2 diabetes mellitus (HCC)      Past Medical and Surgical History:     Past Medical History:   Diagnosis Date    Aortic narrowing     Last Assessed:  9/28/15    Arthritis     Hammond esophagus     Bilateral leg edema     Bulla, lung (HCC)     CHF (congestive heart failure) (HCC)     Chronic kidney disease     stage 3    Diabetes mellitus (HCC)     Enlarged prostate     Heel pain     right heel slipped in the garage-landed on the right heel    Herniated lumbar intervertebral disc     x 4 discs-fell off a roof    Hiatal hernia     High cholesterol     History of kidney problems     Hypertension     Old myocardial infarction     x2-pt was unaware of the MI-one stent    Thyroid disease     hypothyroid    Transient cerebral ischemia     Last Assessed:  8/27/15    Wears dentures     full upper, partial lower    Wears glasses      Past Surgical History:   Procedure Laterality Date    CATARACT EXTRACTION      COLONOSCOPY      Resolved:      CORONARY ANGIOPLASTY WITH STENT PLACEMENT      Stent implanted early ,     ESOPHAGOGASTRODUODENOSCOPY      KNEE SURGERY      right knee cartilage surgery-left meniscus repair    UT REMV CATARACT EXTRACAP,INSERT LENS Right 2019    Procedure: EXTRACTION EXTRACAPSULAR CATARACT PHACO INTRAOCULAR LENS (IOL); Surgeon: Jovita Sheldon MD;  Location: [facility] MAIN OR;  Service: Ophthalmology     [address] CATARACT EXTRACAP,INSERT LENS Left 2019    Procedure: EXTRACTION EXTRACAPSULAR CATARACT PHACO INTRAOCULAR LENS (IOL);   Surgeon: Jovita Sheldon MD;  Location: [facility] MAIN OR;  Service: Ophthalmology    TONSILLECTOMY      UMBILICAL HERNIA REPAIR      18's,    UPPER GASTROINTESTINAL ENDOSCOPY      Last Assessed:  8/27/15      Family History:     Family History   Problem Relation Age of Onset    Colon cancer Mother     Arthritis Mother     Diabetes Mother     Hypertension Mother     Cancer Mother         Breast    Hyperlipidemia Mother     Cancer Sister         ovarian    Other Brother         Cerebrovascular accident (CVA)    Diabetes Other         Sibling    Hypertension Other         Sibling    Hernia Father         umbilical   Emaline Folds Hernia Son     Cancer Sister         liver    Kidney disease Sister     Kidney disease Brother     Mental illness Neg Hx       Social History:     Social History     Socioeconomic History    Marital status: /Civil Union     Spouse name: None    Number of children: None    Years of education: None    Highest education level: None   Occupational History    None   Social Needs    Financial resource strain: None    Food insecurity:     Worry: None     Inability: None    Transportation needs:     Medical: None     Non-medical: None   Tobacco Use    Smoking status: Former Smoker     Packs/day: 4 00     Last attempt to quit:      Years since quittin 8    Smokeless tobacco: Never Used   Substance and Sexual Activity    Alcohol use: No    Drug use: No    Sexual activity: None   Lifestyle    Physical activity:     Days per week: None     Minutes per session: None    Stress: None   Relationships    Social connections:     Talks on phone: None     Gets together: None     Attends Yarsani service: None     Active member of club or organization: None     Attends meetings of clubs or organizations: None     Relationship status: None    Intimate partner violence:     Fear of current or ex partner: None     Emotionally abused: None     Physically abused: None     Forced sexual activity: None   Other Topics Concern    None   Social History Narrative    Lack of exercise    Sleeps 6-7 hours a day       Medications and Allergies:     Current Outpatient Medications   Medication Sig Dispense Refill    aspirin (ASPIR-81) 81 mg EC tablet Take 81 mg by mouth every morning        atorvastatin (LIPITOR) 40 mg tablet TAKE 1 TABLET BY MOUTH  EVERY DAY 90 tablet 1    clotrimazole-betamethasone (LOTRISONE) 1-0 05 % cream Apply topically 2 (two) times a day 30 g 0    Coenzyme Q10 (COQ-10) 100 MG CAPS Take 300 mg by mouth daily at bedtime        ezetimibe (ZETIA) 10 mg tablet Take 1 tablet (10 mg total) by mouth daily (Patient taking differently: Take 10 mg by mouth every morning ) 30 tablet 5    furosemide (LASIX) 40 mg tablet TAKE 1 TABLET BY MOUTH  DAILY 90 tablet 1    glipiZIDE (GLUCOTROL) 5 mg tablet Take 1 tablet (5 mg total) by mouth 2 (two) times a day before meals 180 tablet 1    insulin detemir (LEVEMIR FLEXTOUCH) 100 Units/mL injection pen Inject 25 Units under the skin daily at bedtime 45 mL 1    insulin lispro (HUMALOG KWIKPEN) 100 units/mL injection pen Inject 8 Units under the skin 3 (three) times a day 30 mL 1    Insulin Pen Needle (BD PEN NEEDLE URSULA U/F) 32G X 4 MM MISC QID as directed 360 each 1    levothyroxine 75 mcg tablet TAKE 1 TABLET BY MOUTH  DAILY 90 tablet 1    lisinopril (ZESTRIL) 5 mg tablet TAKE 1 TABLET BY MOUTH  EVERY DAY 90 tablet 3    metoprolol succinate (TOPROL-XL) 25 mg 24 hr tablet TAKE 1 TABLET BY MOUTH  DAILY 90 tablet 2    multivitamin (THERAGRAN) TABS Take 1 tablet by mouth every morning      olmesartan (BENICAR) 5 mg tablet TAKE ONE TABLET BY MOUTH EVERY DAY 90 tablet 3    pantoprazole (PROTONIX) 40 mg tablet TAKE 1 TABLET BY MOUTH  DAILY 90 tablet 1     No current facility-administered medications for this visit  No Known Allergies   Immunizations:     Immunization History   Administered Date(s) Administered    INFLUENZA 01/31/2019    Pneumococcal Conjugate 13-Valent 12/28/2015    Pneumococcal Polysaccharide PPV23 04/23/2019      Health Maintenance:         Topic Date Due    CRC Screening: Colonoscopy  10/09/2021         Topic Date Due    HEPATITIS B VACCINES (1 of 3 - Risk 3-dose series) 06/29/1964    INFLUENZA VACCINE  07/01/2019      Medicare Health Risk Assessment:     /50   Pulse 75   Temp 98 °F (36 7 °C)   Resp 16   Ht 5' 9" (1 753 m)   Wt 94 3 kg (208 lb)   SpO2 94%   BMI 30 72 kg/m²      Antoinette Fuentes is here for his Subsequent Wellness visit  Last Medicare Wellness visit information reviewed, patient interviewed, no change since last AWV  Health Risk Assessment:   Patient rates overall health as good  Patient feels that their physical health rating is same  Eyesight was rated as same  Hearing was rated as same  Patient feels that their emotional and mental health rating is same  Pain experienced in the last 7 days has been none  Patient states that he has experienced no weight loss or gain in last 6 months  Depression Screening:   PHQ-2 Score: 0      Fall Risk Screening: In the past year, patient has experienced: no history of falling in past year      Home Safety:  Patient does not have trouble with stairs inside or outside of their home  Patient has working smoke alarms and has working carbon monoxide detector  Home safety hazards include: none  Nutrition:   Current diet is Regular and Limited junk food  Medications:   Patient is currently taking over-the-counter supplements  OTC medications include: see medication list  Patient is able to manage medications  Activities of Daily Living (ADLs)/Instrumental Activities of Daily Living (IADLs):   Walk and transfer into and out of bed and chair?: Yes  Dress and groom yourself?: Yes    Bathe or shower yourself?: Yes    Feed yourself?  Yes  Do your laundry/housekeeping?: Yes  Manage your money, pay your bills and track your expenses?: Yes  Make your own meals?: Yes    Do your own shopping?: Yes    Previous Hospitalizations:   Any hospitalizations or ED visits within the last 12 months?: No      Advance Care Planning:   Living will: No      Cognitive Screening:   Provider or family/friend/caregiver concerned regarding cognition?: No    PREVENTIVE SCREENINGS      Cardiovascular Screening:    General: History Lipid Disorder and Screening Current      Diabetes Screening:     General: History Diabetes and Risks and Benefits Discussed    Due for: Blood Glucose      Colorectal Cancer Screening:     General: Screening Current      Prostate Cancer Screening:    General: Screening Not Indicated      Osteoporosis Screening:    General: Risks and Benefits Discussed    Due for: Bone Density Ultrasound      Abdominal Aortic Aneurysm (AAA) Screening:    Risk factors include: age between 73-67 yo and tobacco use        General: Risks and Benefits Discussed    Due for: Screening AAA Ultrasound      Lung Cancer Screening:     General: Risks and Benefits Discussed    Due for: Low Dose CT (LDCT)      Hepatitis C Screening:    General: Screening Not Indicated    Recent Results (from the past 672 hour(s))   Comprehensive metabolic panel    Collection Time: 10/15/19  8:59 AM   Result Value Ref Range    Glucose, Random 101 (H) 65 - 99 mg/dL    BUN 25 8 - 27 mg/dL    Creatinine 1 49 (H) 0 76 - 1 27 mg/dL    eGFR Non  American 45 (L) >59 mL/min/1 73    eGFR  52 (L) >59 mL/min/1 73    SL AMB BUN/CREATININE RATIO 17 10 - 24    Sodium 145 (H) 134 - 144 mmol/L    Potassium 4 4 3 5 - 5 2 mmol/L    Chloride 107 (H) 96 - 106 mmol/L    CO2 24 20 - 29 mmol/L    CALCIUM 9 5 8 6 - 10 2 mg/dL    Protein, Total 6 1 6 0 - 8 5 g/dL    Albumin 4 1 3 5 - 4 8 g/dL    Globulin, Total 2 0 1 5 - 4 5 g/dL    Albumin/Globulin Ratio 2 1 1 2 - 2 2    TOTAL BILIRUBIN 0 3 0 0 - 1 2 mg/dL    Alk Phos Isoenzymes 77 39 - 117 IU/L    AST 19 0 - 40 IU/L    ALT 19 0 - 44 IU/L   Lipid panel    Collection Time: 10/15/19  8:59 AM   Result Value Ref Range    Cholesterol, Total 136 100 - 199 mg/dL    Triglycerides 62 0 - 149 mg/dL    HDL 53 >39 mg/dL    LDL Direct 71 0 - 99 mg/dL   [facility] CKD Program    Collection Time: 10/15/19  8:59 AM   Result Value Ref Range    Interpretation Note    Hemoglobin A1c (w/out EAG)    Collection Time: 10/15/19  8:59 AM   Result Value Ref Range    Hemoglobin A1C 7 3 (H) 4 8 - 5 6 %   TSH, 3rd generation    Collection Time: 10/15/19  8:59 AM   Result Value Ref Range    TSH 1 910 0 450 - 4 500 uIU/mL   Cardiovascular Report    Collection Time: 10/15/19  8:59 AM   Result Value Ref Range    Interpretation Note     PDF Image Not applicable          Marciano Babb DO

## 2019-10-24 ENCOUNTER — OFFICE VISIT (OUTPATIENT)
Dept: CARDIOLOGY CLINIC | Facility: CLINIC | Age: 74
End: 2019-10-24
Payer: MEDICARE

## 2019-10-24 VITALS
SYSTOLIC BLOOD PRESSURE: 124 MMHG | OXYGEN SATURATION: 97 % | DIASTOLIC BLOOD PRESSURE: 62 MMHG | HEART RATE: 62 BPM | HEIGHT: 69 IN | BODY MASS INDEX: 29.77 KG/M2 | WEIGHT: 201 LBS

## 2019-10-24 DIAGNOSIS — I50.32 CHRONIC DIASTOLIC CONGESTIVE HEART FAILURE (HCC): ICD-10-CM

## 2019-10-24 DIAGNOSIS — N18.30 TYPE 2 DIABETES MELLITUS WITH STAGE 3 CHRONIC KIDNEY DISEASE, WITH LONG-TERM CURRENT USE OF INSULIN (HCC): ICD-10-CM

## 2019-10-24 DIAGNOSIS — I50.32 CHRONIC DIASTOLIC (CONGESTIVE) HEART FAILURE (HCC): ICD-10-CM

## 2019-10-24 DIAGNOSIS — I25.10 CAD IN NATIVE ARTERY: Primary | ICD-10-CM

## 2019-10-24 DIAGNOSIS — E78.2 MIXED HYPERLIPIDEMIA: ICD-10-CM

## 2019-10-24 DIAGNOSIS — E11.22 TYPE 2 DIABETES MELLITUS WITH STAGE 3 CHRONIC KIDNEY DISEASE, WITH LONG-TERM CURRENT USE OF INSULIN (HCC): ICD-10-CM

## 2019-10-24 DIAGNOSIS — I25.2 HISTORY OF MYOCARDIAL INFARCTION: ICD-10-CM

## 2019-10-24 DIAGNOSIS — I25.10 CORONARY ARTERY DISEASE INVOLVING NATIVE CORONARY ARTERY OF NATIVE HEART WITHOUT ANGINA PECTORIS: ICD-10-CM

## 2019-10-24 DIAGNOSIS — I10 ESSENTIAL HYPERTENSION: ICD-10-CM

## 2019-10-24 DIAGNOSIS — Z95.5 S/P PRIMARY ANGIOPLASTY WITH CORONARY STENT: ICD-10-CM

## 2019-10-24 DIAGNOSIS — Z79.4 TYPE 2 DIABETES MELLITUS WITH STAGE 3 CHRONIC KIDNEY DISEASE, WITH LONG-TERM CURRENT USE OF INSULIN (HCC): ICD-10-CM

## 2019-10-24 PROCEDURE — 99214 OFFICE O/P EST MOD 30 MIN: CPT | Performed by: INTERNAL MEDICINE

## 2019-10-24 PROCEDURE — 93000 ELECTROCARDIOGRAM COMPLETE: CPT | Performed by: INTERNAL MEDICINE

## 2019-10-24 NOTE — PROGRESS NOTES
Subjective:        Soco Millan is a 75 y o  male who presents for follow-up of atherosclerotic coronary artery disease  Recent history: taking medications as instructed, no medication side effects noted, no TIA's, no chest pain on exertion, no dyspnea on exertion, no swelling of ankles and no palpitations  He had some chest discomfort after doing some work around the house  He did not have discomfort while he was working and attributes it to muscle aches  He does not recall the symptoms prior to his stent  He has a h/o PCI to the circumflex vessel in 1992  His last nuclear stress test was done in 5/2015 which showed inferior/inferolateral infarct c/w attenuation  EF 63%  He exercised for 6:38  He had a treadmill stress in July 2018 which was normal   He exercised for 7 minutes without ECG changes  No significant change in weight since his last visit  His last lipid  profile in October showed LDL of 71, HDL of 53 and TG of 62  HgA1C was 7 1%  The following portions of the patient's history were reviewed and updated as appropriate:   He  has a past medical history of Aortic narrowing, Arthritis, Hammond esophagus, Bilateral leg edema, Bulla, lung (HCC), CHF (congestive heart failure) ([address] ), Chronic kidney disease, Diabetes mellitus ([facility] [address] ), Enlarged prostate, Heel pain, Herniated lumbar intervertebral disc, Hiatal hernia, High cholesterol, History of kidney problems, Hypertension, Old myocardial infarction, Thyroid disease, Transient cerebral ischemia, Wears dentures, and Wears glasses  He  has a past surgical history that includes Coronary angioplasty with stent; Colonoscopy; Knee surgery; Upper gastrointestinal endoscopy; Tonsillectomy; Umbilical hernia repair; Esophagogastroduodenoscopy; pr remv cataract extracap,insert lens (Right, 1/24/2019); Cataract extraction; and pr remv cataract extracap,insert lens (Left, 2/21/2019)    His family history includes Arthritis in his mother; Cancer in his mother, sister, and sister; Colon cancer in his mother; Diabetes in his mother and other; Hernia in his father and son; Hyperlipidemia in his mother; Hypertension in his mother and other; Kidney disease in his brother and sister; Other in his brother  He  reports that he quit smoking about 43 years ago  He smoked 4 00 packs per day  He has never used smokeless tobacco  He reports that he does not drink alcohol or use drugs    Current Outpatient Medications   Medication Sig Dispense Refill    aspirin (ASPIR-81) 81 mg EC tablet Take 81 mg by mouth every morning        atorvastatin (LIPITOR) 40 mg tablet TAKE 1 TABLET BY MOUTH  EVERY DAY 90 tablet 1    clotrimazole-betamethasone (LOTRISONE) 1-0 05 % cream Apply topically 2 (two) times a day 30 g 0    Coenzyme Q10 (COQ-10) 100 MG CAPS Take 300 mg by mouth daily at bedtime        furosemide (LASIX) 40 mg tablet TAKE 1 TABLET BY MOUTH  DAILY 90 tablet 1    glipiZIDE (GLUCOTROL) 5 mg tablet Take 1 tablet (5 mg total) by mouth 2 (two) times a day before meals 180 tablet 1    insulin detemir (LEVEMIR FLEXTOUCH) 100 Units/mL injection pen Inject 25 Units under the skin daily at bedtime 45 mL 1    insulin lispro (HUMALOG KWIKPEN) 100 units/mL injection pen Inject 8 Units under the skin 3 (three) times a day 30 mL 1    Insulin Pen Needle (BD PEN NEEDLE URSULA U/F) 32G X 4 MM MISC QID as directed 360 each 1    levothyroxine 75 mcg tablet TAKE 1 TABLET BY MOUTH  DAILY 90 tablet 1    lisinopril (ZESTRIL) 5 mg tablet TAKE 1 TABLET BY MOUTH  EVERY DAY 90 tablet 3    metoprolol succinate (TOPROL-XL) 25 mg 24 hr tablet TAKE 1 TABLET BY MOUTH  DAILY 90 tablet 2    multivitamin (THERAGRAN) TABS Take 1 tablet by mouth every morning      pantoprazole (PROTONIX) 40 mg tablet TAKE 1 TABLET BY MOUTH  DAILY 90 tablet 1    ezetimibe (ZETIA) 10 mg tablet Take 1 tablet (10 mg total) by mouth daily (Patient not taking: Reported on 10/24/2019) 30 tablet 5    olmesartan (BENICAR) 5 mg tablet TAKE ONE TABLET BY MOUTH EVERY DAY (Patient not taking: Reported on 10/24/2019) 90 tablet 3     No current facility-administered medications for this visit  He has No Known Allergies       Review of Systems  Review of Systems   Constitutional: Negative for chills, fatigue and fever  HENT: Negative for congestion, nosebleeds and postnasal drip  Respiratory: Negative for cough, chest tightness and shortness of breath  Cardiovascular: Positive for chest pain  Negative for palpitations and leg swelling  Gastrointestinal: Negative for abdominal distention, abdominal pain, diarrhea, nausea and vomiting  Endocrine: Negative for polydipsia, polyphagia and polyuria  Musculoskeletal: Positive for arthralgias, back pain and myalgias  Negative for gait problem  Skin: Negative for color change, pallor and rash  Allergic/Immunologic: Negative for environmental allergies, food allergies and immunocompromised state  Neurological: Negative for dizziness, seizures, syncope and light-headedness  Hematological: Negative for adenopathy  Does not bruise/bleed easily  Psychiatric/Behavioral: Negative for dysphoric mood  The patient is not nervous/anxious  Objective:      Physical Exam  /62 (BP Location: Right arm, Patient Position: Sitting, Cuff Size: Standard)   Pulse 62 Comment: apical  Ht 5' 9" (1 753 m)   Wt 91 2 kg (201 lb)   SpO2 97%   BMI 29 68 kg/m²   Physical Exam   Constitutional: He appears healthy  No distress  HENT:   Nose: Nose normal    Mouth/Throat: Oropharynx is clear  Eyes: Pupils are equal, round, and reactive to light  Conjunctivae are normal    Neck: Normal range of motion  Neck supple  No JVD present  Cardiovascular: Normal rate and regular rhythm  Exam reveals no gallop and no friction rub  Murmur heard  Systolic murmur is present with a grade of 2/6  Pulmonary/Chest: Effort normal and breath sounds normal  He has no wheezes   He has no rales    Abdominal: Soft  He exhibits no distension  There is no tenderness  Musculoskeletal: He exhibits no edema  Neurological: He is alert and oriented to person, place, and time  Skin: Skin is warm and dry  Cardiographics  ECG: Normal sinus rhythm with left axis deviation  Lab Review   Lab Results   Component Value Date     12/12/2017    K 4 4 10/15/2019     (H) 10/15/2019    CO2 24 10/15/2019    BUN 25 10/15/2019    CREATININE 1 49 (H) 10/15/2019    CREATININE 1 60 (H) 12/12/2017    GLUCOSE 132 (H) 12/12/2017    CALCIUM 9 4 12/12/2017     Lab Results   Component Value Date    WBC 6 7 07/12/2019    WBC 5 6 12/12/2017    HGB 13 7 07/12/2019    HGB 13 9 12/12/2017    HCT 40 9 07/12/2019    HCT 40 1 12/12/2017    MCV 93 07/12/2019    MCV 91 12/12/2017     07/12/2019     12/12/2017     Lab Results   Component Value Date    CHOL 153 12/12/2017    TRIG 62 10/15/2019    HDL 53 10/15/2019          Assessment:      1  CAD in native artery    2  S/P primary angioplasty with coronary stent    3  Essential hypertension    4  Chronic diastolic (congestive) heart failure ([facility] [address] )    5  History of myocardial infarction    6  Mixed hyperlipidemia    7  Chronic diastolic congestive heart failure ([facility] [address] )    8  Type 2 diabetes mellitus with stage 3 chronic kidney disease, with long-term current use of insulin ([facility] [address] )    9  Coronary artery disease involving native coronary artery of native heart without angina pectoris         Plan:       - previous LDL was  70  Discussed diet and exercise along with weight loss  Continue atorvastatin  He did not take Zetia due to cost   Will add if LDL increases  - continue ASA  - BP well controlled with lisinopril  Creatinine is normal     - Continue furosemide 40 mg daily  - Consider switch of glipizide to Jardiance (generic name empagliflozin) or Victoza (liraglutide) for cardiovascular benefit    He will discuss with Dr Maral Esparza during his next visit

## 2019-11-04 DIAGNOSIS — E78.2 MIXED HYPERLIPIDEMIA: ICD-10-CM

## 2019-11-04 DIAGNOSIS — Z79.4 TYPE 2 DIABETES MELLITUS WITH STAGE 3 CHRONIC KIDNEY DISEASE, WITH LONG-TERM CURRENT USE OF INSULIN (HCC): ICD-10-CM

## 2019-11-04 DIAGNOSIS — I10 ESSENTIAL HYPERTENSION: ICD-10-CM

## 2019-11-04 DIAGNOSIS — E11.22 TYPE 2 DIABETES MELLITUS WITH STAGE 3 CHRONIC KIDNEY DISEASE, WITH LONG-TERM CURRENT USE OF INSULIN (HCC): ICD-10-CM

## 2019-11-04 DIAGNOSIS — N18.30 TYPE 2 DIABETES MELLITUS WITH STAGE 3 CHRONIC KIDNEY DISEASE, WITH LONG-TERM CURRENT USE OF INSULIN (HCC): ICD-10-CM

## 2019-11-04 RX ORDER — LISINOPRIL 5 MG/1
5 TABLET ORAL DAILY
Qty: 90 TABLET | Refills: 0 | Status: SHIPPED | OUTPATIENT
Start: 2019-11-04 | End: 2020-03-04

## 2019-11-04 RX ORDER — GLIPIZIDE 5 MG/1
5 TABLET ORAL
Qty: 180 TABLET | Refills: 0 | Status: SHIPPED | OUTPATIENT
Start: 2019-11-04 | End: 2020-01-29 | Stop reason: SDUPTHER

## 2019-11-04 RX ORDER — ATORVASTATIN CALCIUM 40 MG/1
40 TABLET, FILM COATED ORAL DAILY
Qty: 90 TABLET | Refills: 0 | Status: SHIPPED | OUTPATIENT
Start: 2019-11-04 | End: 2020-03-04

## 2019-11-04 NOTE — TELEPHONE ENCOUNTER
Pt asking for 30 day supply of glip[facility]ide to [facility], Pt does not need anything else at this time    Pt does not take olmesartan   Other scripts that went to [facility] for  lisinopril and atorvastatin were canceled    Jorge Barbosa MA

## 2019-11-04 NOTE — TELEPHONE ENCOUNTER
Patient called and left message to call back  Please check with patient is he taking lisinopril and olmesartan both? Thanks   LUCAS Davis

## 2019-11-12 ENCOUNTER — OFFICE VISIT (OUTPATIENT)
Dept: PODIATRY | Facility: CLINIC | Age: 74
End: 2019-11-12
Payer: MEDICARE

## 2019-11-12 VITALS
HEART RATE: 77 BPM | HEIGHT: 69 IN | SYSTOLIC BLOOD PRESSURE: 137 MMHG | DIASTOLIC BLOOD PRESSURE: 75 MMHG | BODY MASS INDEX: 29.77 KG/M2 | WEIGHT: 201 LBS | RESPIRATION RATE: 17 BRPM

## 2019-11-12 DIAGNOSIS — M79.671 PAIN IN BOTH FEET: ICD-10-CM

## 2019-11-12 DIAGNOSIS — B35.1 ONYCHOMYCOSIS: ICD-10-CM

## 2019-11-12 DIAGNOSIS — I70.209 PERIPHERAL ARTERIOSCLEROSIS (HCC): ICD-10-CM

## 2019-11-12 DIAGNOSIS — M79.672 PAIN IN BOTH FEET: ICD-10-CM

## 2019-11-12 DIAGNOSIS — E11.42 DIABETIC POLYNEUROPATHY ASSOCIATED WITH TYPE 2 DIABETES MELLITUS (HCC): Primary | ICD-10-CM

## 2019-11-12 PROCEDURE — 11721 DEBRIDE NAIL 6 OR MORE: CPT | Performed by: PODIATRIST

## 2019-11-12 NOTE — PROGRESS NOTES
Assessment/Plan:  Pain upon ambulation  Peripheral artery disease  Diabetic neuropathy  Mycosis of nail  Plan  Foot exam performed  Diabetic foot exam performed  Patient educated on care of the diabetic foot  Mycotic nails debrided without pain or complication  No problem-specific Assessment & Plan notes found for this encounter  Diagnoses and all orders for this visit:    Diabetic polyneuropathy associated with type 2 diabetes mellitus ([address] )    Peripheral arteriosclerosis ([address] )    Onychomycosis    Pain in both feet          Subjective:  Patient has pain in his feet with ambulation  No history of trauma  Patient has pain with shoe wear  Past Medical History:   Diagnosis Date    Aortic narrowing     Last Assessed:  9/28/15    Arthritis     Hammond esophagus     Bilateral leg edema     Bulla, lung (HCC)     CHF (congestive heart failure) (HCC)     Chronic kidney disease     stage 3    Diabetes mellitus (HCC)     Enlarged prostate     Heel pain     right heel slipped in the garage-landed on the right heel    Herniated lumbar intervertebral disc     x 4 discs-fell off a roof    Hiatal hernia     High cholesterol     History of kidney problems     Hypertension     Old myocardial infarction     x2-pt was unaware of the MI-one stent    Thyroid disease     hypothyroid    Transient cerebral ischemia     Last Assessed:  8/27/15    Wears dentures     full upper, partial lower    Wears glasses        Past Surgical History:   Procedure Laterality Date    CATARACT EXTRACTION      COLONOSCOPY      Resolved:  2015    CORONARY ANGIOPLASTY WITH STENT PLACEMENT      Stent implanted early 1990's,     ESOPHAGOGASTRODUODENOSCOPY      KNEE SURGERY      right knee cartilage surgery-left meniscus repair    OK REMV CATARACT EXTRACAP,INSERT LENS Right 1/24/2019    Procedure: EXTRACTION EXTRACAPSULAR CATARACT PHACO INTRAOCULAR LENS (IOL);   Surgeon: Joeann Cogan, MD;  Location: [facility] MAIN OR; Service: Ophthalmology     [address] CATARACT EXTRACAP,INSERT LENS Left 2/21/2019    Procedure: EXTRACTION EXTRACAPSULAR CATARACT PHACO INTRAOCULAR LENS (IOL);   Surgeon: Otf Barton MD;  Location: [facility] MAIN OR;  Service: Ophthalmology    TONSILLECTOMY      UMBILICAL HERNIA REPAIR      1980's,    UPPER GASTROINTESTINAL ENDOSCOPY      Last Assessed:  8/27/15       No Known Allergies      Current Outpatient Medications:     aspirin (ASPIR-81) 81 mg EC tablet, Take 81 mg by mouth every morning  , Disp: , Rfl:     atorvastatin (LIPITOR) 40 mg tablet, Take 1 tablet (40 mg total) by mouth daily, Disp: 90 tablet, Rfl: 0    clotrimazole-betamethasone (LOTRISONE) 1-0 05 % cream, Apply topically 2 (two) times a day, Disp: 30 g, Rfl: 0    Coenzyme Q10 (COQ-10) 100 MG CAPS, Take 300 mg by mouth daily at bedtime  , Disp: , Rfl:     ezetimibe (ZETIA) 10 mg tablet, Take 1 tablet (10 mg total) by mouth daily (Patient not taking: Reported on 10/24/2019), Disp: 30 tablet, Rfl: 5    furosemide (LASIX) 40 mg tablet, TAKE 1 TABLET BY MOUTH  DAILY, Disp: 90 tablet, Rfl: 1    glipiZIDE (GLUCOTROL) 5 mg tablet, Take 1 tablet (5 mg total) by mouth 2 (two) times a day before meals, Disp: 180 tablet, Rfl: 0    insulin detemir (LEVEMIR FLEXTOUCH) 100 Units/mL injection pen, Inject 25 Units under the skin daily at bedtime, Disp: 45 mL, Rfl: 1    insulin lispro (HUMALOG KWIKPEN) 100 units/mL injection pen, Inject 8 Units under the skin 3 (three) times a day, Disp: 30 mL, Rfl: 1    Insulin Pen Needle (BD PEN NEEDLE URSULA U/F) 32G X 4 MM MISC, QID as directed, Disp: 360 each, Rfl: 1    levothyroxine 75 mcg tablet, TAKE 1 TABLET BY MOUTH  DAILY, Disp: 90 tablet, Rfl: 1    lisinopril (ZESTRIL) 5 mg tablet, Take 1 tablet (5 mg total) by mouth daily, Disp: 90 tablet, Rfl: 0    metoprolol succinate (TOPROL-XL) 25 mg 24 hr tablet, TAKE 1 TABLET BY MOUTH  DAILY, Disp: 90 tablet, Rfl: 2    multivitamin (THERAGRAN) TABS, Take 1 tablet by mouth every morning, Disp: , Rfl:     pantoprazole (PROTONIX) 40 mg tablet, TAKE 1 TABLET BY MOUTH  DAILY, Disp: 90 tablet, Rfl: 1    Patient Active Problem List   Diagnosis    Hammond's esophagus with dysplasia    Chronic kidney disease (CKD) stage G3a/A1, moderately decreased glomerular filtration rate (GFR) between 45-59 mL/min/1 73 square meter and albuminuria creatinine ratio less than 30 mg/g (HCC)    Colon, diverticulosis    Chronic constipation    Coronary artery disease involving native coronary artery of native heart without angina pectoris    Type 2 diabetes mellitus with stage 3 chronic kidney disease, with long-term current use of insulin (HCC)    Disc degeneration, lumbar    Enlarged prostate    Hiatal hernia    History of myocardial infarction    Essential hypertension    Congenital hypothyroidism without goiter    Lumbar radiculopathy    Mixed hyperlipidemia    Chronic diastolic congestive heart failure (HCC)    Age-related incipient cataract of both eyes    Pain of right heel    Primary osteoarthritis of left foot    Plantar fasciitis    Left foot pain    Right foot pain    Arthralgia of left foot    Onychomycosis    Peripheral arteriosclerosis ([facility])    Pain in both feet    Diabetic polyneuropathy associated with type 2 diabetes mellitus ([facility] )          Patient ID: Gail Ball is a 75 y o  male  HPI    The following portions of the patient's history were reviewed and updated as appropriate: He  has a past medical history of Aortic narrowing, Arthritis, Hammond esophagus, Bilateral leg edema, Bulla, lung (HCC), CHF (congestive heart failure) ([facility] ), Chronic kidney disease, Diabetes mellitus ([facility] ), Enlarged prostate, Heel pain, Herniated lumbar intervertebral disc, Hiatal hernia, High cholesterol, History of kidney problems, Hypertension, Old myocardial infarction, Thyroid disease, Transient cerebral ischemia, Wears dentures, and Wears glasses    He   Patient Active Problem List    Diagnosis Date Noted    Diabetic polyneuropathy associated with type 2 diabetes mellitus ([place] [address] ) 05/14/2019    Peripheral arteriosclerosis ([place] [address] ) 03/05/2019    Pain in both feet 03/05/2019    Plantar fasciitis 01/29/2019    Left foot pain 01/29/2019    Right foot pain 01/29/2019    Arthralgia of left foot 01/29/2019    Onychomycosis 01/29/2019    Primary osteoarthritis of left foot 01/16/2019    Pain of right heel 01/11/2019    Age-related incipient cataract of both eyes 12/20/2018    Chronic diastolic congestive heart failure ([place] [address] ) 06/29/2018    Chronic kidney disease (CKD) stage G3a/A1, moderately decreased glomerular filtration rate (GFR) between 45-59 mL/min/1 73 square meter and albuminuria creatinine ratio less than 30 mg/g ([facility]) 01/23/2017    Chronic constipation 07/11/2016    Enlarged prostate 05/31/2016    Type 2 diabetes mellitus with stage 3 chronic kidney disease, with long-term current use of insulin ([place] [address] ) 09/10/2015    Lumbar radiculopathy 09/10/2015    Hammond's esophagus with dysplasia 08/27/2015    Colon, diverticulosis 08/27/2015    Coronary artery disease involving native coronary artery of native heart without angina pectoris 08/27/2015    Disc degeneration, lumbar 08/27/2015    Hiatal hernia 08/27/2015    History of myocardial infarction 08/27/2015    Essential hypertension 08/27/2015    Congenital hypothyroidism without goiter 08/27/2015    Mixed hyperlipidemia 08/27/2015     He  has a past surgical history that includes Coronary angioplasty with stent; Colonoscopy; Knee surgery; Upper gastrointestinal endoscopy; Tonsillectomy; Umbilical hernia repair; Esophagogastroduodenoscopy; pr remv cataract extracap,insert lens (Right, 1/24/2019); Cataract extraction; and pr remv cataract extracap,insert lens (Left, 2/21/2019)    His family history includes Arthritis in his mother; Cancer in his mother, sister, and sister; Colon cancer in his mother; Diabetes in his mother and other; Hernia in his father and son; Hyperlipidemia in his mother; Hypertension in his mother and other; Kidney disease in his brother and sister; Other in his brother  He  reports that he quit smoking about 43 years ago  He smoked 4 00 packs per day  He has never used smokeless tobacco  He reports that he does not drink alcohol or use drugs  Current Outpatient Medications   Medication Sig Dispense Refill    aspirin (ASPIR-81) 81 mg EC tablet Take 81 mg by mouth every morning        atorvastatin (LIPITOR) 40 mg tablet Take 1 tablet (40 mg total) by mouth daily 90 tablet 0    clotrimazole-betamethasone (LOTRISONE) 1-0 05 % cream Apply topically 2 (two) times a day 30 g 0    Coenzyme Q10 (COQ-10) 100 MG CAPS Take 300 mg by mouth daily at bedtime        ezetimibe (ZETIA) 10 mg tablet Take 1 tablet (10 mg total) by mouth daily (Patient not taking: Reported on 10/24/2019) 30 tablet 5    furosemide (LASIX) 40 mg tablet TAKE 1 TABLET BY MOUTH  DAILY 90 tablet 1    glipiZIDE (GLUCOTROL) 5 mg tablet Take 1 tablet (5 mg total) by mouth 2 (two) times a day before meals 180 tablet 0    insulin detemir (LEVEMIR FLEXTOUCH) 100 Units/mL injection pen Inject 25 Units under the skin daily at bedtime 45 mL 1    insulin lispro (HUMALOG KWIKPEN) 100 units/mL injection pen Inject 8 Units under the skin 3 (three) times a day 30 mL 1    Insulin Pen Needle (BD PEN NEEDLE URSULA U/F) 32G X 4 MM MISC QID as directed 360 each 1    levothyroxine 75 mcg tablet TAKE 1 TABLET BY MOUTH  DAILY 90 tablet 1    lisinopril (ZESTRIL) 5 mg tablet Take 1 tablet (5 mg total) by mouth daily 90 tablet 0    metoprolol succinate (TOPROL-XL) 25 mg 24 hr tablet TAKE 1 TABLET BY MOUTH  DAILY 90 tablet 2    multivitamin (THERAGRAN) TABS Take 1 tablet by mouth every morning      pantoprazole (PROTONIX) 40 mg tablet TAKE 1 TABLET BY MOUTH  DAILY 90 tablet 1     No current facility-administered medications for this visit        Current Outpatient Medications on File Prior to Visit   Medication Sig    aspirin (ASPIR-81) 81 mg EC tablet Take 81 mg by mouth every morning      atorvastatin (LIPITOR) 40 mg tablet Take 1 tablet (40 mg total) by mouth daily    clotrimazole-betamethasone (LOTRISONE) 1-0 05 % cream Apply topically 2 (two) times a day    Coenzyme Q10 (COQ-10) 100 MG CAPS Take 300 mg by mouth daily at bedtime      ezetimibe (ZETIA) 10 mg tablet Take 1 tablet (10 mg total) by mouth daily (Patient not taking: Reported on 10/24/2019)    furosemide (LASIX) 40 mg tablet TAKE 1 TABLET BY MOUTH  DAILY    glipiZIDE (GLUCOTROL) 5 mg tablet Take 1 tablet (5 mg total) by mouth 2 (two) times a day before meals    insulin detemir (LEVEMIR FLEXTOUCH) 100 Units/mL injection pen Inject 25 Units under the skin daily at bedtime    insulin lispro (HUMALOG KWIKPEN) 100 units/mL injection pen Inject 8 Units under the skin 3 (three) times a day    Insulin Pen Needle (BD PEN NEEDLE URSULA U/F) 32G X 4 MM MISC QID as directed    levothyroxine 75 mcg tablet TAKE 1 TABLET BY MOUTH  DAILY    lisinopril (ZESTRIL) 5 mg tablet Take 1 tablet (5 mg total) by mouth daily    metoprolol succinate (TOPROL-XL) 25 mg 24 hr tablet TAKE 1 TABLET BY MOUTH  DAILY    multivitamin (THERAGRAN) TABS Take 1 tablet by mouth every morning    pantoprazole (PROTONIX) 40 mg tablet TAKE 1 TABLET BY MOUTH  DAILY     No current facility-administered medications on file prior to visit  He has No Known Allergies       Vitals:    11/12/19 0850   BP: 137/75   Pulse: 77   Resp: 17       Review of Systems      Objective:  Patient's shoes and socks removed     Foot Exam    General  General Appearance: appears stated age and healthy   Orientation: alert and oriented to person, place, and time   Affect: appropriate   Gait: unimpaired       Right Foot/Ankle     Inspection and Palpation  Tenderness: metatarsals   Swelling: dorsum and metatarsals   Arch: pes planus  Hammertoes: fifth toe  Hallux valgus: no  Hallux limitus: yes  Skin Exam: dry skin;     Neurovascular  Dorsalis pedis: 1+  Saphenous nerve sensation: diminished  Tibial nerve sensation: diminished  Superficial peroneal nerve sensation: diminished  Deep peroneal nerve sensation: diminished  Sural nerve sensation: diminished      Left Foot/Ankle      Inspection and Palpation  Tenderness: metatarsals   Swelling: dorsum and metatarsals   Arch: pes planus  Hammertoes: fifth toe  Hallux valgus: no  Hallux limitus: yes  Skin Exam: dry skin;     Neurovascular  Dorsalis pedis: 1+  Saphenous nerve sensation: diminished  Tibial nerve sensation: diminished  Superficial peroneal nerve sensation: diminished  Deep peroneal nerve sensation: diminished  Sural nerve sensation: diminished        Physical Exam   Constitutional: He is oriented to person, place, and time  He appears well-developed and well-nourished  Cardiovascular: Normal rate and regular rhythm  PMI displaced:   Positive abnormal cooling  Pulses are weak pulses  Pulses:       Dorsalis pedis pulses are 1+ on the right side, and 1+ on the left side  Q, 9 findings bilateral   Negative digital hair   Musculoskeletal: He exhibits edema and tenderness  Feet:   Right Foot:   Skin Integrity: Positive for dry skin  Left Foot:   Skin Integrity: Positive for dry skin  Neurological: He is alert and oriented to person, place, and time  Skin: Skin is warm and dry  Capillary refill takes 2 to 3 seconds  Xerosis of skin noted bilateral     All nails demonstrate mycosis  They are thick with debris  Positive malodor  They are yellowed  Psychiatric: He has a normal mood and affect  His behavior is normal  Judgment and thought content normal    Vitals reviewed  Patient's shoes and socks removed  Right Foot/Ankle   Right Foot Inspection  Skin Exam: dry skin                              Vascular    The right DP pulse is 1+     Right Toe  - Comprehensive Exam  Arch: pes planus  Hammertoes: fifth toe  Hallux valgus: no  Hallux limitus: yes  Swelling: dorsum and metatarsals   Tenderness: metatarsals         Left Foot/Ankle  Left Foot Inspection  Skin Exam: dry skin                                           Vascular    The left DP pulse is 1+  Left Toe  - Comprehensive Exam  Arch: pes planus  Hammertoes: fifth toe  Hallux valgus: no  Hallux limitus: yes  Swelling: dorsum and metatarsals   Tenderness: metatarsals       Assign Risk Category:  Deformity present;  Loss of protective sensation; Weak pulses       Risk: 2        Procedures

## 2019-11-20 DIAGNOSIS — E11.22 TYPE 2 DIABETES MELLITUS WITH STAGE 3 CHRONIC KIDNEY DISEASE, WITH LONG-TERM CURRENT USE OF INSULIN (HCC): ICD-10-CM

## 2019-11-20 DIAGNOSIS — N18.30 TYPE 2 DIABETES MELLITUS WITH STAGE 3 CHRONIC KIDNEY DISEASE, WITH LONG-TERM CURRENT USE OF INSULIN (HCC): ICD-10-CM

## 2019-11-20 DIAGNOSIS — Z79.4 TYPE 2 DIABETES MELLITUS WITH STAGE 3 CHRONIC KIDNEY DISEASE, WITH LONG-TERM CURRENT USE OF INSULIN (HCC): ICD-10-CM

## 2019-11-23 ENCOUNTER — TELEPHONE (OUTPATIENT)
Dept: FAMILY MEDICINE CLINIC | Facility: CLINIC | Age: 74
End: 2019-11-23

## 2019-11-23 ENCOUNTER — HOSPITAL ENCOUNTER (EMERGENCY)
Facility: HOSPITAL | Age: 74
Discharge: HOME | End: 2019-11-23
Attending: EMERGENCY MEDICINE
Payer: MEDICARE

## 2019-11-23 ENCOUNTER — APPOINTMENT (EMERGENCY)
Dept: RADIOLOGY | Facility: HOSPITAL | Age: 74
End: 2019-11-23
Payer: MEDICARE

## 2019-11-23 VITALS
OXYGEN SATURATION: 99 % | HEIGHT: 69 IN | WEIGHT: 205 LBS | SYSTOLIC BLOOD PRESSURE: 134 MMHG | TEMPERATURE: 97.8 F | HEART RATE: 58 BPM | DIASTOLIC BLOOD PRESSURE: 66 MMHG | RESPIRATION RATE: 15 BRPM | BODY MASS INDEX: 30.36 KG/M2

## 2019-11-23 DIAGNOSIS — R53.1 WEAKNESS: Primary | ICD-10-CM

## 2019-11-23 DIAGNOSIS — R73.9 HYPERGLYCEMIA: ICD-10-CM

## 2019-11-23 LAB
ALBUMIN SERPL BCP-MCNC: 3.5 G/DL (ref 3.5–5)
ALP SERPL-CCNC: 96 U/L (ref 46–116)
ALT SERPL W P-5'-P-CCNC: 22 U/L (ref 12–78)
ANION GAP SERPL CALCULATED.3IONS-SCNC: 7 MMOL/L (ref 4–13)
AST SERPL W P-5'-P-CCNC: 14 U/L (ref 5–45)
BACTERIA UR QL AUTO: ABNORMAL /HPF
BASOPHILS # BLD AUTO: 0.05 THOUSANDS/ΜL (ref 0–0.1)
BASOPHILS NFR BLD AUTO: 1 % (ref 0–1)
BILIRUB SERPL-MCNC: 0.4 MG/DL (ref 0.2–1)
BILIRUB UR QL STRIP: NEGATIVE
BUN SERPL-MCNC: 26 MG/DL (ref 5–25)
CALCIUM SERPL-MCNC: 8.6 MG/DL (ref 8.3–10.1)
CHLORIDE SERPL-SCNC: 102 MMOL/L (ref 100–108)
CLARITY UR: CLEAR
CO2 SERPL-SCNC: 25 MMOL/L (ref 21–32)
COLOR UR: ABNORMAL
CREAT SERPL-MCNC: 1.48 MG/DL (ref 0.6–1.3)
EOSINOPHIL # BLD AUTO: 0.03 THOUSAND/ΜL (ref 0–0.61)
EOSINOPHIL NFR BLD AUTO: 1 % (ref 0–6)
ERYTHROCYTE [DISTWIDTH] IN BLOOD BY AUTOMATED COUNT: 12.2 % (ref 11.6–15.1)
GFR SERPL CREATININE-BSD FRML MDRD: 46 ML/MIN/1.73SQ M
GLUCOSE SERPL-MCNC: 290 MG/DL (ref 65–140)
GLUCOSE UR STRIP-MCNC: ABNORMAL MG/DL
HCT VFR BLD AUTO: 42 % (ref 36.5–49.3)
HGB BLD-MCNC: 13.8 G/DL (ref 12–17)
HGB UR QL STRIP.AUTO: ABNORMAL
HYALINE CASTS #/AREA URNS LPF: ABNORMAL /LPF
IMM GRANULOCYTES # BLD AUTO: 0.02 THOUSAND/UL (ref 0–0.2)
IMM GRANULOCYTES NFR BLD AUTO: 0 % (ref 0–2)
KETONES UR STRIP-MCNC: NEGATIVE MG/DL
LEUKOCYTE ESTERASE UR QL STRIP: NEGATIVE
LYMPHOCYTES # BLD AUTO: 1.14 THOUSANDS/ΜL (ref 0.6–4.47)
LYMPHOCYTES NFR BLD AUTO: 18 % (ref 14–44)
MCH RBC QN AUTO: 31.2 PG (ref 26.8–34.3)
MCHC RBC AUTO-ENTMCNC: 32.9 G/DL (ref 31.4–37.4)
MCV RBC AUTO: 95 FL (ref 82–98)
MONOCYTES # BLD AUTO: 0.48 THOUSAND/ΜL (ref 0.17–1.22)
MONOCYTES NFR BLD AUTO: 8 % (ref 4–12)
NEUTROPHILS # BLD AUTO: 4.7 THOUSANDS/ΜL (ref 1.85–7.62)
NEUTS SEG NFR BLD AUTO: 72 % (ref 43–75)
NITRITE UR QL STRIP: NEGATIVE
NON-SQ EPI CELLS URNS QL MICRO: ABNORMAL /HPF
NRBC BLD AUTO-RTO: 0 /100 WBCS
NT-PROBNP SERPL-MCNC: 123 PG/ML
PH UR STRIP.AUTO: 5.5 [PH]
PLATELET # BLD AUTO: 255 THOUSANDS/UL (ref 149–390)
PMV BLD AUTO: 9.2 FL (ref 8.9–12.7)
POTASSIUM SERPL-SCNC: 4.5 MMOL/L (ref 3.5–5.3)
PROT SERPL-MCNC: 6.5 G/DL (ref 6.4–8.2)
PROT UR STRIP-MCNC: NEGATIVE MG/DL
RBC # BLD AUTO: 4.42 MILLION/UL (ref 3.88–5.62)
RBC #/AREA URNS AUTO: ABNORMAL /HPF
SODIUM SERPL-SCNC: 134 MMOL/L (ref 136–145)
SP GR UR STRIP.AUTO: 1.01 (ref 1–1.03)
TROPONIN I SERPL-MCNC: <0.02 NG/ML
TROPONIN I SERPL-MCNC: <0.02 NG/ML
UROBILINOGEN UR QL STRIP.AUTO: 0.2 E.U./DL
WBC # BLD AUTO: 6.42 THOUSAND/UL (ref 4.31–10.16)
WBC #/AREA URNS AUTO: ABNORMAL /HPF

## 2019-11-23 PROCEDURE — 93005 ELECTROCARDIOGRAM TRACING: CPT

## 2019-11-23 PROCEDURE — 85025 COMPLETE CBC W/AUTO DIFF WBC: CPT | Performed by: EMERGENCY MEDICINE

## 2019-11-23 PROCEDURE — 84484 ASSAY OF TROPONIN QUANT: CPT | Performed by: EMERGENCY MEDICINE

## 2019-11-23 PROCEDURE — 81001 URINALYSIS AUTO W/SCOPE: CPT | Performed by: EMERGENCY MEDICINE

## 2019-11-23 PROCEDURE — 36415 COLL VENOUS BLD VENIPUNCTURE: CPT | Performed by: EMERGENCY MEDICINE

## 2019-11-23 PROCEDURE — 99285 EMERGENCY DEPT VISIT HI MDM: CPT

## 2019-11-23 PROCEDURE — 71045 X-RAY EXAM CHEST 1 VIEW: CPT

## 2019-11-23 PROCEDURE — 80053 COMPREHEN METABOLIC PANEL: CPT | Performed by: EMERGENCY MEDICINE

## 2019-11-23 PROCEDURE — 83880 ASSAY OF NATRIURETIC PEPTIDE: CPT | Performed by: EMERGENCY MEDICINE

## 2019-11-23 NOTE — ED NOTES
Patient presents to ED for weakness and episodes of flushing, sweating, and indigestion       Jensen Cedillo RN  11/23/19 0795

## 2019-11-23 NOTE — TELEPHONE ENCOUNTER
Wife callling regarding [place]  Sugar is 207 this morning after eating    Since last night he has been having cold sweats, light headed, weakness    Triage

## 2019-11-23 NOTE — ED PROVIDER NOTES
History  Chief Complaint   Patient presents with    Weakness - Generalized     States started last night with episodes where he becomes weak all over and wife states becomes pale  lasts about 5 minutes, checked his blood sugar and was fine  Maybe he gets a indigestion     Patient states he had a bad night was unable to sleep much  He had episodes of diaphoresis cold sweats  He denies chest pain shortness of breath  He had no abdominal pain or nausea, but describes a feeling of being drained  When he got up this morning still did not feel well blood sugars have been controlled was 222 this morning  Called his doctor referred him to the emergency department  Prior to Admission Medications   Prescriptions Last Dose Informant Patient Reported? Taking?    Coenzyme Q10 (COQ-10) 100 MG CAPS 11/22/2019 at Unknown time Self Yes Yes   Sig: Take 300 mg by mouth daily at bedtime     Insulin Pen Needle (BD PEN NEEDLE URSULA U/F) 32G X 4 MM MISC   No No   Sig: USE 4 TIMES DAILY AS  DIRECTED   aspirin (ASPIR-81) 81 mg EC tablet 11/23/2019 at Unknown time Self Yes Yes   Sig: Take 81 mg by mouth every morning     atorvastatin (LIPITOR) 40 mg tablet 11/23/2019 at Unknown time  No Yes   Sig: Take 1 tablet (40 mg total) by mouth daily   clotrimazole-betamethasone (LOTRISONE) 1-0 05 % cream Not Taking at Unknown time Self No No   Sig: Apply topically 2 (two) times a day   Patient not taking: Reported on 11/23/2019   ezetimibe (ZETIA) 10 mg tablet Not Taking at Unknown time Self No No   Sig: Take 1 tablet (10 mg total) by mouth daily   Patient not taking: Reported on 10/24/2019   furosemide (LASIX) 40 mg tablet 11/23/2019 at Unknown time Self No Yes   Sig: TAKE 1 TABLET BY MOUTH  DAILY   glipiZIDE (GLUCOTROL) 5 mg tablet 11/23/2019 at 0730  No Yes   Sig: Take 1 tablet (5 mg total) by mouth 2 (two) times a day before meals   insulin detemir (LEVEMIR FLEXTOUCH) 100 Units/mL injection pen 11/22/2019 at Unknown time Self No Yes Sig: Inject 25 Units under the skin daily at bedtime   insulin lispro (HUMALOG KWIKPEN) 100 units/mL injection pen 11/23/2019 at 0730 Self No Yes   Sig: Inject 8 Units under the skin 3 (three) times a day   Patient taking differently: Inject 5 Units under the skin 3 (three) times a day Took 6 units this morning   levothyroxine 75 mcg tablet 11/23/2019 at Unknown time Self No Yes   Sig: TAKE 1 TABLET BY MOUTH  DAILY   lisinopril (ZESTRIL) 5 mg tablet 11/23/2019 at Unknown time  No Yes   Sig: Take 1 tablet (5 mg total) by mouth daily   metoprolol succinate (TOPROL-XL) 25 mg 24 hr tablet 11/23/2019 at Unknown time Self No Yes   Sig: TAKE 1 TABLET BY MOUTH  DAILY   multivitamin (THERAGRAN) TABS 11/23/2019 at Unknown time Self Yes Yes   Sig: Take 1 tablet by mouth every morning   pantoprazole (PROTONIX) 40 mg tablet 11/23/2019 at Unknown time Self No Yes   Sig: TAKE 1 TABLET BY MOUTH  DAILY      Facility-Administered Medications: None       Past Medical History:   Diagnosis Date    Aortic narrowing     Last Assessed:  9/28/15    Arthritis     [place] esophagus     Bilateral leg edema     [place], lung (HCC)     CHF (congestive heart failure) (HCC)     Chronic kidney disease     stage 3    Diabetes mellitus (HCC)     Enlarged prostate     Heel pain     right heel slipped in the garage-landed on the right heel    Herniated lumbar intervertebral disc     x 4 discs-fell off a roof    Hiatal hernia     High cholesterol     History of kidney problems     Hypertension     Old myocardial infarction     x2-pt was unaware of the MI-one stent    Thyroid disease     hypothyroid    Transient cerebral ischemia     Last Assessed:  8/27/15    Wears dentures     full upper, partial lower    Wears glasses        Past Surgical History:   Procedure Laterality Date    CATARACT EXTRACTION      COLONOSCOPY      Resolved:  2015    CORONARY ANGIOPLASTY WITH STENT PLACEMENT      Stent implanted early 1990's,     ESOPHAGOGASTRODUODENOSCOPY      KNEE SURGERY      right knee cartilage surgery-left meniscus repair    [place] REMV CATARACT EXTRACAP,INSERT LENS Right 2019    Procedure: EXTRACTION EXTRACAPSULAR CATARACT PHACO INTRAOCULAR LENS (IOL); Surgeon: Martin Jordan MD;  Location: [facility] MAIN OR;  Service: Ophthalmology     [address] CATARACT EXTRACAP,INSERT LENS Left 2019    Procedure: EXTRACTION EXTRACAPSULAR CATARACT PHACO INTRAOCULAR LENS (IOL); Surgeon: Martin Jordan MD;  Location: [facility] MAIN OR;  Service: Ophthalmology    TONSILLECTOMY      UMBILICAL HERNIA REPAIR      ,    UPPER GASTROINTESTINAL ENDOSCOPY      Last Assessed:  8/27/15       Family History   Problem Relation Age of Onset    Colon cancer Mother     Arthritis Mother     Diabetes Mother     Hypertension Mother     Cancer Mother         Breast    Hyperlipidemia Mother     Cancer Sister         ovarian    Other Brother         Cerebrovascular accident (CVA)    Diabetes Other         Sibling    Hypertension Other         Sibling    Hernia Father         umbilical    Hernia Son     Cancer Sister         liver    Kidney disease Sister     Kidney disease Brother     Mental illness Neg Hx      I have reviewed and agree with the history as documented  Social History     Tobacco Use    Smoking status: Former Smoker     Packs/day: 4 00     Last attempt to quit:      Years since quittin 9    Smokeless tobacco: Never Used   Substance Use Topics    Alcohol use: No    Drug use: No        Review of Systems   Constitutional: Positive for diaphoresis  Negative for chills and fever  HENT: Negative for congestion and sore throat  Eyes: Negative for visual disturbance  Respiratory: Negative for cough and shortness of breath  Cardiovascular: Positive for leg swelling  Negative for chest pain and palpitations  Gastrointestinal: Negative for abdominal pain and vomiting  Genitourinary: Negative for dysuria  Musculoskeletal: Negative for arthralgias, back pain and neck pain  Skin: Negative for rash and wound  Neurological: Positive for weakness  Negative for syncope, numbness and headaches  Hematological: Does not bruise/bleed easily  Psychiatric/Behavioral: Positive for sleep disturbance  All other systems reviewed and are negative  Physical Exam  Physical Exam   Constitutional: He is oriented to person, place, and time  He appears well-developed and well-nourished  HENT:   Head: Normocephalic and atraumatic  Mouth/Throat: Oropharynx is clear and moist    Eyes: Conjunctivae are normal    Neck: Normal range of motion  Neck supple  Cardiovascular: Normal rate, regular rhythm and normal heart sounds  Pulmonary/Chest: Effort normal and breath sounds normal    Abdominal: Soft  Bowel sounds are normal  There is no tenderness  Musculoskeletal: Normal range of motion  He exhibits edema  He exhibits no tenderness  Neurological: He is alert and oriented to person, place, and time  Skin: Skin is warm and dry  Capillary refill takes less than 2 seconds  Psychiatric: He has a normal mood and affect  His behavior is normal    Nursing note and vitals reviewed        Vital Signs  ED Triage Vitals [11/23/19 1140]   Temperature Pulse Respirations Blood Pressure SpO2   97 8 °F (36 6 °C) 70 18 (!) 173/74 98 %      Temp Source Heart Rate Source Patient Position - Orthostatic VS BP Location FiO2 (%)   Tympanic Monitor Sitting Right arm --      Pain Score       No Pain           Vitals:    11/23/19 1140 11/23/19 1215   BP: (!) 173/74 157/71   Pulse: 70 66   Patient Position - Orthostatic VS: Sitting          Visual Acuity      ED Medications  Medications - No data to display    Diagnostic Studies  Results Reviewed     Procedure Component Value Units Date/Time    CBC and differential [[id number]]     Lab Status:  No result Specimen:  Blood     Comprehensive metabolic panel [[id number]]     Lab Status:  No result Specimen:  Blood     Troponin I [[id number]]     Lab Status:  No result Specimen:  Blood     NT-BNP PRO [[id number]]     Lab Status:  No result Specimen:  Blood     UA (URINE) with reflex to Scope [[id number]]     Lab Status:  No result Specimen:  Urine                  XR chest 1 view portable    (Results Pending)              Procedures  ECG 12 Lead Documentation Only  Date/Time: 11/23/2019 11:51 AM  Performed by: Celia Orona MD  Authorized by: Celia Orona MD     Indications / Diagnosis:  Weakness  ECG reviewed by me, the ED Provider: yes    Patient location:  ED  Interpretation:     Interpretation: abnormal    Rate:     ECG rate:  61    ECG rate assessment: normal    Rhythm:     Rhythm: sinus rhythm    Ectopy:     Ectopy: none    QRS:     QRS axis:  Normal    QRS intervals:  Normal  Conduction:     Conduction: normal    ST segments:     ST segments:  Normal  T waves:     T waves: normal    Q waves:     Q waves:  III and aVF           ED Course                               MDM  Number of Diagnoses or Management Options  Diagnosis management comments: Patient states he has had 2 MIs and is PTCA x1  He states he did not have classic symptoms for either of those MIs      Disposition  Final diagnoses:   None     ED Disposition     None      Follow-up Information    None         Patient's Medications   Discharge Prescriptions    No medications on file     No discharge procedures on file      ED Provider  Electronically Signed by           Celia Orona MD  11/23/19 4667

## 2019-11-23 NOTE — TELEPHONE ENCOUNTER
Spoke to pts wife, Jeannine Stevens sugar is low, feeling weak, can't stand for too long, feeling light headed  Pt was advised to go to the ER       Ayala Fuentes MA

## 2019-11-24 DIAGNOSIS — I10 ESSENTIAL HYPERTENSION: ICD-10-CM

## 2019-11-24 DIAGNOSIS — K22.719 BARRETT'S ESOPHAGUS WITH DYSPLASIA: ICD-10-CM

## 2019-11-25 LAB
ATRIAL RATE: 60 BPM
ATRIAL RATE: 61 BPM
P AXIS: 70 DEGREES
P AXIS: 77 DEGREES
PR INTERVAL: 168 MS
PR INTERVAL: 170 MS
QRS AXIS: -11 DEGREES
QRS AXIS: -24 DEGREES
QRSD INTERVAL: 94 MS
QRSD INTERVAL: 94 MS
QT INTERVAL: 372 MS
QT INTERVAL: 382 MS
QTC INTERVAL: 372 MS
QTC INTERVAL: 384 MS
T WAVE AXIS: -15 DEGREES
T WAVE AXIS: 16 DEGREES
VENTRICULAR RATE: 60 BPM
VENTRICULAR RATE: 61 BPM

## 2019-11-25 PROCEDURE — 93010 ELECTROCARDIOGRAM REPORT: CPT | Performed by: INTERNAL MEDICINE

## 2019-11-25 RX ORDER — FUROSEMIDE 40 MG/1
TABLET ORAL
Qty: 90 TABLET | Refills: 0 | Status: SHIPPED | OUTPATIENT
Start: 2019-11-25 | End: 2020-01-29 | Stop reason: SDUPTHER

## 2019-11-25 RX ORDER — PANTOPRAZOLE SODIUM 40 MG/1
TABLET, DELAYED RELEASE ORAL
Qty: 90 TABLET | Refills: 0 | Status: SHIPPED | OUTPATIENT
Start: 2019-11-25 | End: 2020-06-22

## 2019-12-16 ENCOUNTER — OFFICE VISIT (OUTPATIENT)
Dept: FAMILY MEDICINE CLINIC | Facility: CLINIC | Age: 74
End: 2019-12-16

## 2019-12-16 VITALS
DIASTOLIC BLOOD PRESSURE: 82 MMHG | HEIGHT: 69 IN | BODY MASS INDEX: 30.36 KG/M2 | TEMPERATURE: 98.2 F | RESPIRATION RATE: 16 BRPM | SYSTOLIC BLOOD PRESSURE: 130 MMHG | HEART RATE: 68 BPM | WEIGHT: 205 LBS

## 2019-12-16 DIAGNOSIS — R93.89 ABNORMAL CHEST X-RAY: ICD-10-CM

## 2019-12-16 DIAGNOSIS — E11.22 TYPE 2 DIABETES MELLITUS WITH STAGE 3 CHRONIC KIDNEY DISEASE, WITH LONG-TERM CURRENT USE OF INSULIN (HCC): ICD-10-CM

## 2019-12-16 DIAGNOSIS — I25.10 CORONARY ARTERY DISEASE INVOLVING NATIVE CORONARY ARTERY OF NATIVE HEART WITHOUT ANGINA PECTORIS: ICD-10-CM

## 2019-12-16 DIAGNOSIS — N18.31 CHRONIC KIDNEY DISEASE (CKD) STAGE G3A/A1, MODERATELY DECREASED GLOMERULAR FILTRATION RATE (GFR) BETWEEN 45-59 ML/MIN/1.73 SQUARE METER AND ALBUMINURIA CREATININE RATIO LESS THAN 30 MG/G (HCC): ICD-10-CM

## 2019-12-16 DIAGNOSIS — E11.42 DIABETIC POLYNEUROPATHY ASSOCIATED WITH TYPE 2 DIABETES MELLITUS (HCC): ICD-10-CM

## 2019-12-16 DIAGNOSIS — I10 ESSENTIAL HYPERTENSION: ICD-10-CM

## 2019-12-16 DIAGNOSIS — N18.30 TYPE 2 DIABETES MELLITUS WITH STAGE 3 CHRONIC KIDNEY DISEASE, WITH LONG-TERM CURRENT USE OF INSULIN (HCC): ICD-10-CM

## 2019-12-16 DIAGNOSIS — Z79.4 TYPE 2 DIABETES MELLITUS WITH STAGE 3 CHRONIC KIDNEY DISEASE, WITH LONG-TERM CURRENT USE OF INSULIN (HCC): ICD-10-CM

## 2019-12-16 DIAGNOSIS — R07.9 CHEST PAIN, UNSPECIFIED TYPE: Primary | ICD-10-CM

## 2019-12-16 PROCEDURE — 99214 OFFICE O/P EST MOD 30 MIN: CPT | Performed by: FAMILY MEDICINE

## 2019-12-16 PROCEDURE — 93000 ELECTROCARDIOGRAM COMPLETE: CPT | Performed by: FAMILY MEDICINE

## 2019-12-16 NOTE — PROGRESS NOTES
Assessment/Plan:    1  Chest pain, unspecified type  -     POCT ECG    2  Type 2 diabetes mellitus with stage 3 chronic kidney disease, with long-term current use of insulin ([facility] [address] )    3  Diabetic polyneuropathy associated with type 2 diabetes mellitus ([facility] )    4  Essential hypertension    5  Coronary artery disease involving native coronary artery of native heart without angina pectoris    6  Chronic kidney disease (CKD) stage G3a/A1, moderately decreased glomerular filtration rate (GFR) between 45-59 mL/min/1 73 square meter and albuminuria creatinine ratio less than 30 mg/g ([facility])    7  Abnormal chest x-ray  -     XR chest pa & lateral; Future; Expected date: 12/16/2019            There are no Patient Instructions on file for this visit  Return for Next scheduled follow up labs  Subjective:      Patient ID: Josse Becerra is a 75 y o  male  Chief Complaint   Patient presents with    Fatigue     prcma    Dizziness    Discuss ED visit       Pt is here to follow up   Pt states 4 weeks ago - he had a bout of lightheadedness  States it happened while he was sleeping  States the feeling would come and go  States it happened 3-4 times  Pt called here was advised to go to the ed  Happened while he was there  PT was evaluated and states nothing was diagnosed    2-3 hrs later he was called and was told he may need another chest x ray and pt states he told them he waould wait till I came back to the office  Pt states since those episodes the symptoms have faded and he has not had symptoms for two weeks    Pt states at night while he sleeps starting last week he has been getting left sided chest pain states he thinks he may have strained a muscle        The following portions of the patient's history were reviewed and updated as appropriate: allergies, current medications, past family history, past medical history, past social history, past surgical history and problem list     Review of Systems Constitutional: Negative for activity change, appetite change, chills, diaphoresis, fatigue, fever and unexpected weight change  HENT: Negative for congestion, dental problem, ear pain, mouth sores, sinus pressure, sinus pain, sore throat and trouble swallowing  Eyes: Negative for photophobia, discharge and itching  Respiratory: Negative for apnea, chest tightness and shortness of breath  Cardiovascular: Positive for chest pain  Negative for palpitations and leg swelling  Gastrointestinal: Negative for abdominal distention, abdominal pain, blood in stool, nausea and vomiting  Endocrine: Negative for cold intolerance, heat intolerance, polydipsia, polyphagia and polyuria  Genitourinary: Negative for difficulty urinating  Musculoskeletal: Negative for arthralgias  Skin: Negative for color change and wound  Neurological: Positive for dizziness  Negative for syncope, speech difficulty and headaches  Hematological: Negative for adenopathy  Psychiatric/Behavioral: Negative for agitation and behavioral problems           Current Outpatient Medications   Medication Sig Dispense Refill    aspirin (ASPIR-81) 81 mg EC tablet Take 81 mg by mouth every morning        atorvastatin (LIPITOR) 40 mg tablet Take 1 tablet (40 mg total) by mouth daily 90 tablet 0    Coenzyme Q10 (COQ-10) 100 MG CAPS Take 300 mg by mouth daily at bedtime        furosemide (LASIX) 40 mg tablet TAKE 1 TABLET BY MOUTH  DAILY 90 tablet 0    glipiZIDE (GLUCOTROL) 5 mg tablet Take 1 tablet (5 mg total) by mouth 2 (two) times a day before meals 180 tablet 0    insulin detemir (LEVEMIR FLEXTOUCH) 100 Units/mL injection pen Inject 25 Units under the skin daily at bedtime 45 mL 1    insulin lispro (HUMALOG KWIKPEN) 100 units/mL injection pen Inject 8 Units under the skin 3 (three) times a day (Patient taking differently: Inject 5 Units under the skin 3 (three) times a day Took 6 units this morning) 30 mL 1    Insulin Pen Needle (BD PEN NEEDLE URSULA U/F) 32G X 4 MM MISC USE 4 TIMES DAILY AS  DIRECTED 360 each 1    levothyroxine 75 mcg tablet TAKE 1 TABLET BY MOUTH  DAILY 90 tablet 1    lisinopril (ZESTRIL) 5 mg tablet Take 1 tablet (5 mg total) by mouth daily 90 tablet 0    metoprolol succinate (TOPROL-XL) 25 mg 24 hr tablet TAKE 1 TABLET BY MOUTH  DAILY 90 tablet 2    multivitamin (THERAGRAN) TABS Take 1 tablet by mouth every morning      pantoprazole (PROTONIX) 40 mg tablet TAKE 1 TABLET BY MOUTH  DAILY 90 tablet 0    clotrimazole-betamethasone (LOTRISONE) 1-0 05 % cream Apply topically 2 (two) times a day (Patient not taking: Reported on 11/23/2019) 30 g 0    ezetimibe (ZETIA) 10 mg tablet Take 1 tablet (10 mg total) by mouth daily (Patient not taking: Reported on 10/24/2019) 30 tablet 5     No current facility-administered medications for this visit  Objective:    /82   Pulse 68   Temp 98 2 °F (36 8 °C)   Resp 16   Ht 5' 9" (1 753 m)   Wt 93 kg (205 lb)   BMI 30 27 kg/m²        Physical Exam   Constitutional: He appears well-developed and well-nourished  No distress  HENT:   Head: Normocephalic and atraumatic  Right Ear: External ear normal    Left Ear: External ear normal    Nose: Nose normal    Mouth/Throat: Oropharynx is clear and moist  No oropharyngeal exudate  Eyes: Pupils are equal, round, and reactive to light  EOM are normal  Right eye exhibits no discharge  Left eye exhibits no discharge  No scleral icterus  Neck: No thyromegaly present  Cardiovascular: Normal rate and normal heart sounds  No murmur heard  Pulmonary/Chest: Effort normal and breath sounds normal  No respiratory distress  He has no wheezes  Abdominal: Soft  Bowel sounds are normal  He exhibits no distension and no mass  There is no tenderness  There is no rebound and no guarding  Musculoskeletal: Normal range of motion  Neurological: He is alert  He displays normal reflexes   Coordination normal    Skin: Skin is warm and dry  No rash noted  He is not diaphoretic  No erythema  Psychiatric: He has a normal mood and affect  His behavior is normal    Nursing note and vitals reviewed             Recent Results (from the past 672 hour(s))   ECG 12 lead    Collection Time: 11/23/19 11:50 AM   Result Value Ref Range    Ventricular Rate 61 BPM    Atrial Rate 61 BPM    NC Interval 168 ms    QRSD Interval 94 ms    QT Interval 382 ms    QTC Interval 384 ms    P [place] 77 degrees    QRS Axis -11 degrees    T Wave Axis 16 degrees   CBC and differential    Collection Time: 11/23/19 12:20 PM   Result Value Ref Range    WBC 6 42 4 31 - 10 16 Thousand/uL    RBC 4 42 3 88 - 5 62 Million/uL    Hemoglobin 13 8 12 0 - 17 0 g/dL    Hematocrit 42 0 36 5 - 49 3 %    MCV 95 82 - 98 fL    MCH 31 2 26 8 - 34 3 pg    MCHC 32 9 31 4 - 37 4 g/dL    RDW 12 2 11 6 - 15 1 %    MPV 9 2 8 9 - 12 7 fL    Platelets 792 564 - 159 Thousands/uL    nRBC 0 /100 WBCs    Neutrophils Relative 72 43 - 75 %    Immat GRANS % 0 0 - 2 %    Lymphocytes Relative 18 14 - 44 %    Monocytes Relative 8 4 - 12 %    Eosinophils Relative 1 0 - 6 %    Basophils Relative 1 0 - 1 %    Neutrophils Absolute 4 70 1 85 - 7 62 Thousands/µL    Immature Grans Absolute 0 02 0 00 - 0 20 Thousand/uL    Lymphocytes Absolute 1 14 0 60 - 4 47 Thousands/µL    Monocytes Absolute 0 48 0 17 - 1 22 Thousand/µL    Eosinophils Absolute 0 03 0 00 - 0 61 Thousand/µL    Basophils Absolute 0 05 0 00 - 0 10 Thousands/µL   Comprehensive metabolic panel    Collection Time: 11/23/19 12:20 PM   Result Value Ref Range    Sodium 134 (L) 136 - 145 mmol/L    Potassium 4 5 3 5 - 5 3 mmol/L    Chloride 102 100 - 108 mmol/L    CO2 25 21 - 32 mmol/L    ANION GAP 7 4 - 13 mmol/L    BUN 26 (H) 5 - 25 mg/dL    Creatinine 1 48 (H) 0 60 - 1 30 mg/dL    Glucose 290 (H) 65 - 140 mg/dL    Calcium 8 6 8 3 - 10 1 mg/dL    AST 14 5 - 45 U/L    ALT 22 12 - 78 U/L    Alkaline Phosphatase 96 46 - 116 U/L    Total Protein 6 5 6 4 - 8 2 g/dL Albumin 3 5 3 5 - 5 0 g/dL    Total Bilirubin 0 40 0 20 - 1 00 mg/dL    eGFR 46 ml/min/1 73sq m   Troponin I    Collection Time: 11/23/19 12:20 PM   Result Value Ref Range    Troponin I <0 02 <=0 04 ng/mL   NT-BNP PRO    Collection Time: 11/23/19 12:20 PM   Result Value Ref Range    NT-proBNP 123 <450 pg/mL   ECG 12 lead    Collection Time: 11/23/19  1:33 PM   Result Value Ref Range    Ventricular Rate 60 BPM    Atrial Rate 60 BPM    IA Interval 170 ms    QRSD Interval 94 ms    QT Interval 372 ms    QTC Interval 372 ms    P Axis 70 degrees    QRS Axis -24 degrees    T Wave Axis -15 degrees   UA (URINE) with reflex to Scope    Collection Time: 11/23/19  1:41 PM   Result Value Ref Range    Color, UA Light Yellow     Clarity, UA Clear     Specific [place], UA 1 010 1 000 - 1 030    pH, UA 5 5 5 0, 5 5, 6 0, 6 5, 7 0, 7 5, 8 0, 8 5, 9 0    Leukocytes, UA Negative Negative    Nitrite, UA Negative Negative    Protein, UA Negative Negative mg/dl    Glucose,  (1/4%) (A) Negative mg/dl    Ketones, UA Negative Negative mg/dl    Urobilinogen, UA 0 2 0 2, 1 0 E U /dl E U /dl    Bilirubin, UA Negative Negative    Blood, UA Trace-Intact (A) Negative   Urine Microscopic    Collection Time: 11/23/19  1:41 PM   Result Value Ref Range    RBC, UA 0-1 (A) None Seen, 0-5 /hpf    WBC, UA 0-1 (A) None Seen, 0-5, 5-55, 5-65 /hpf    Epithelial Cells Occasional None Seen, Occasional /hpf    Bacteria, UA None Seen None Seen, Occasional /hpf    Hyaline Casts, UA 0-1 (A) (none) /lpf   Troponin I    Collection Time: 11/23/19  2:22 PM   Result Value Ref Range    Troponin I <0 02 <=0 04 ng/mL     EKG NSR no signs of ischemia      Franci Curry DO

## 2019-12-17 ENCOUNTER — TELEPHONE (OUTPATIENT)
Dept: FAMILY MEDICINE CLINIC | Facility: CLINIC | Age: 74
End: 2019-12-17

## 2019-12-17 ENCOUNTER — HOSPITAL ENCOUNTER (OUTPATIENT)
Dept: RADIOLOGY | Facility: HOSPITAL | Age: 74
Discharge: HOME | End: 2019-12-17
Attending: FAMILY MEDICINE
Payer: MEDICARE

## 2019-12-17 ENCOUNTER — TRANSCRIBE ORDERS (OUTPATIENT)
Dept: ADMINISTRATIVE | Facility: HOSPITAL | Age: 74
End: 2019-12-17

## 2019-12-17 DIAGNOSIS — R93.89 ABNORMAL CHEST X-RAY: ICD-10-CM

## 2019-12-17 PROCEDURE — 71046 X-RAY EXAM CHEST 2 VIEWS: CPT

## 2019-12-26 ENCOUNTER — TELEPHONE (OUTPATIENT)
Dept: FAMILY MEDICINE CLINIC | Facility: CLINIC | Age: 74
End: 2019-12-26

## 2019-12-26 ENCOUNTER — OFFICE VISIT (OUTPATIENT)
Dept: FAMILY MEDICINE CLINIC | Facility: CLINIC | Age: 74
End: 2019-12-26
Payer: MEDICARE

## 2019-12-26 VITALS
HEART RATE: 74 BPM | TEMPERATURE: 98.9 F | HEIGHT: 69 IN | WEIGHT: 206.2 LBS | OXYGEN SATURATION: 95 % | BODY MASS INDEX: 30.54 KG/M2 | DIASTOLIC BLOOD PRESSURE: 70 MMHG | SYSTOLIC BLOOD PRESSURE: 134 MMHG | RESPIRATION RATE: 16 BRPM

## 2019-12-26 DIAGNOSIS — E03.1 CONGENITAL HYPOTHYROIDISM WITHOUT GOITER: ICD-10-CM

## 2019-12-26 DIAGNOSIS — R10.814 ABDOMINAL TENDERNESS OF LEFT LOWER QUADRANT, REBOUND TENDERNESS PRESENCE NOT SPECIFIED: ICD-10-CM

## 2019-12-26 DIAGNOSIS — R53.83 FATIGUE, UNSPECIFIED TYPE: ICD-10-CM

## 2019-12-26 DIAGNOSIS — R42 DIZZINESS: Primary | ICD-10-CM

## 2019-12-26 DIAGNOSIS — I10 ESSENTIAL HYPERTENSION: ICD-10-CM

## 2019-12-26 PROCEDURE — 99214 OFFICE O/P EST MOD 30 MIN: CPT | Performed by: FAMILY MEDICINE

## 2019-12-26 RX ORDER — AMLODIPINE BESYLATE 2.5 MG/1
2.5 TABLET ORAL DAILY
Qty: 90 TABLET | Refills: 3 | Status: SHIPPED | OUTPATIENT
Start: 2019-12-26 | End: 2020-06-16 | Stop reason: SDUPTHER

## 2019-12-26 NOTE — PROGRESS NOTES
Assessment/Plan:    1  Dizziness  -     US thyroid; Future; Expected date: 12/26/2019  -     CT abdomen pelvis wo contrast; Future; Expected date: 12/26/2019  -     CBC; Future  -     Comprehensive metabolic panel; Future  -     TSH, 3rd generation; Future  -     T4, free; Future  -     Lyme Antibody Profile with reflex to WB; Future  -     EBV acute panel; Future  -     Amylase; Future  -     Lipase; Future    2  Fatigue, unspecified type  -     US thyroid; Future; Expected date: 12/26/2019  -     CT abdomen pelvis wo contrast; Future; Expected date: 12/26/2019  -     CBC; Future  -     Comprehensive metabolic panel; Future  -     TSH, 3rd generation; Future  -     T4, free; Future  -     Lyme Antibody Profile with reflex to WB; Future  -     EBV acute panel; Future  -     Amylase; Future  -     Lipase; Future    3  Congenital hypothyroidism without goiter  -     US thyroid; Future; Expected date: 12/26/2019  -     CBC; Future  -     Comprehensive metabolic panel; Future  -     TSH, 3rd generation; Future  -     T4, free; Future  -     Lyme Antibody Profile with reflex to WB; Future  -     EBV acute panel; Future    4  Abdominal tenderness of left lower quadrant, rebound tenderness presence not specified  -     CT abdomen pelvis wo contrast; Future; Expected date: 12/26/2019  -     CBC; Future  -     Comprehensive metabolic panel; Future  -     TSH, 3rd generation; Future  -     T4, free; Future  -     Lyme Antibody Profile with reflex to WB; Future  -     EBV acute panel; Future  -     Amylase; Future  -     Lipase; Future    5  Essential hypertension  -     amLODIPine (NORVASC) 2 5 mg tablet; Take 1 tablet (2 5 mg total) by mouth daily            There are no Patient Instructions on file for this visit  Return in about 3 weeks (around 1/16/2020) for Recheck  Subjective:      Patient ID: Du Saleem is a 75 y o  male      Chief Complaint   Patient presents with    Fatigue     symptoms started 2 months ago, but yesterday happended 10 times  vfrma    Dizziness       Pt called today to be seen for dizziness and weakness  Pt states a few weeks ago he had dizziness and felt drained went to the ed, was seen here since  Pt states it happened ten times yesterday  States it startes in his head and goes down through his body and drains him  They did not take the BP during the episode - but states the BP was all over the map yesterday - 150/80, 126/94, 163/130  Today his BP's were 160/91, 157/97  Pt states he went to bed and he woke up and he felt fine  Pt states his face head feel warm and then he looses energy  States he has been constipated  States he has had a lot of gas  Has to lie down  Wife given him ciold compresses  Inside of body feels warm, no chest pain  They have sugar 12/25 - 170 AM afternoon 200's night was 133    The episodes last 10 to 15 min and go away  Food does not seem to affect it      The following portions of the patient's history were reviewed and updated as appropriate: allergies, current medications, past family history, past medical history, past social history, past surgical history and problem list     Review of Systems   Constitutional: Positive for fatigue  Negative for activity change, appetite change, chills, diaphoresis, fever and unexpected weight change  HENT: Negative for congestion, dental problem, ear pain, mouth sores, sinus pressure, sinus pain, sore throat and trouble swallowing  Eyes: Negative for photophobia, discharge and itching  Respiratory: Negative for apnea, chest tightness and shortness of breath  Cardiovascular: Negative for chest pain, palpitations and leg swelling  Gastrointestinal: Negative for abdominal distention, abdominal pain, blood in stool, nausea and vomiting  Endocrine: Negative for cold intolerance, heat intolerance, polydipsia, polyphagia and polyuria  Genitourinary: Negative for difficulty urinating     Musculoskeletal: Negative for arthralgias  Skin: Negative for color change and wound  Neurological: Positive for dizziness  Negative for syncope, speech difficulty and headaches  Hematological: Negative for adenopathy  Psychiatric/Behavioral: Negative for agitation and behavioral problems           Current Outpatient Medications   Medication Sig Dispense Refill    aspirin (ASPIR-81) 81 mg EC tablet Take 81 mg by mouth every morning        atorvastatin (LIPITOR) 40 mg tablet Take 1 tablet (40 mg total) by mouth daily 90 tablet 0    Coenzyme Q10 (COQ-10) 100 MG CAPS Take 300 mg by mouth daily at bedtime        furosemide (LASIX) 40 mg tablet TAKE 1 TABLET BY MOUTH  DAILY 90 tablet 0    glipiZIDE (GLUCOTROL) 5 mg tablet Take 1 tablet (5 mg total) by mouth 2 (two) times a day before meals 180 tablet 0    insulin detemir (LEVEMIR FLEXTOUCH) 100 Units/mL injection pen Inject 25 Units under the skin daily at bedtime 45 mL 1    insulin lispro (HUMALOG KWIKPEN) 100 units/mL injection pen Inject 8 Units under the skin 3 (three) times a day (Patient taking differently: Inject 5 Units under the skin 3 (three) times a day Took 6 units this morning) 30 mL 1    Insulin Pen Needle (BD PEN NEEDLE URSULA U/F) 32G X 4 MM MISC USE 4 TIMES DAILY AS  DIRECTED 360 each 1    levothyroxine 75 mcg tablet TAKE 1 TABLET BY MOUTH  DAILY 90 tablet 1    lisinopril (ZESTRIL) 5 mg tablet Take 1 tablet (5 mg total) by mouth daily 90 tablet 0    metoprolol succinate (TOPROL-XL) 25 mg 24 hr tablet TAKE 1 TABLET BY MOUTH  DAILY 90 tablet 2    multivitamin (THERAGRAN) TABS Take 1 tablet by mouth every morning      pantoprazole (PROTONIX) 40 mg tablet TAKE 1 TABLET BY MOUTH  DAILY 90 tablet 0    amLODIPine (NORVASC) 2 5 mg tablet Take 1 tablet (2 5 mg total) by mouth daily 90 tablet 3    clotrimazole-betamethasone (LOTRISONE) 1-0 05 % cream Apply topically 2 (two) times a day (Patient not taking: Reported on 11/23/2019) 30 g 0     No current facility-administered medications for this visit  Objective:    /70   Pulse 74   Temp 98 9 °F (37 2 °C)   Resp 16   Ht 5' 9" (1 753 m)   Wt 93 5 kg (206 lb 3 2 oz)   SpO2 95%   BMI 30 45 kg/m²        Physical Exam   Constitutional: He appears well-developed and well-nourished  No distress  HENT:   Head: Normocephalic and atraumatic  Right Ear: External ear normal    Left Ear: External ear normal    Nose: Nose normal    Mouth/Throat: Oropharynx is clear and moist  No oropharyngeal exudate  Eyes: Pupils are equal, round, and reactive to light  EOM are normal  Right eye exhibits no discharge  Left eye exhibits no discharge  No scleral icterus  Neck: Thyromegaly present  Cardiovascular: Normal rate and normal heart sounds  No murmur heard  Pulmonary/Chest: Effort normal and breath sounds normal  No respiratory distress  He has no wheezes  Abdominal: Soft  Bowel sounds are normal  He exhibits no distension and no mass  There is tenderness  There is no rebound and no guarding  Musculoskeletal: Normal range of motion  Neurological: He is alert  He displays normal reflexes  Coordination normal    Skin: Skin is warm and dry  No rash noted  He is not diaphoretic  No erythema  Psychiatric: He has a normal mood and affect  His behavior is normal    Nursing note and vitals reviewed               Shayna Juarez DO

## 2019-12-26 NOTE — TELEPHONE ENCOUNTER
Patient scheduled to see Dr Shabbir Hills at noon today 12/26/2019   No further action needed Natalya Armstrong

## 2019-12-26 NOTE — TELEPHONE ENCOUNTER
Spoke to patient, he says he had about 8 episodes of feeling flushed,pale, weak and [place]  Says he 2 yogurts because his daughter believes he may have low potasium and that after having the yogurts he felt slightly better  patient refuses to go to ER and does not want to see anyone other than Dr Claudia Hendricks  Patients wife is demanding to speak with Dr Claudia Hendricks  Patient denies chest pain or SOB  Dr Claudia Hendricks please advise   Wilver Morales MA

## 2019-12-26 NOTE — TELEPHONE ENCOUNTER
Wife is calling demanding to speak with Dr Shawna Redd yesterday was sweating, weak, pale, couldn't function  Today he is alittle better  Wife said she refuses to go to ER because they never do anything for them  Lydia Guzman is going to call wife back to triage and speak with dr lombardi

## 2020-01-02 ENCOUNTER — HOSPITAL ENCOUNTER (OUTPATIENT)
Dept: RADIOLOGY | Facility: HOSPITAL | Age: 75
Discharge: HOME | End: 2020-01-02
Attending: FAMILY MEDICINE
Payer: MEDICARE

## 2020-01-02 DIAGNOSIS — R42 DIZZINESS: ICD-10-CM

## 2020-01-02 DIAGNOSIS — R10.814 ABDOMINAL TENDERNESS OF LEFT LOWER QUADRANT, REBOUND TENDERNESS PRESENCE NOT SPECIFIED: ICD-10-CM

## 2020-01-02 DIAGNOSIS — R53.83 FATIGUE, UNSPECIFIED TYPE: ICD-10-CM

## 2020-01-02 DIAGNOSIS — E03.1 CONGENITAL HYPOTHYROIDISM WITHOUT GOITER: ICD-10-CM

## 2020-01-02 LAB
ALBUMIN SERPL-MCNC: 4.1 G/DL (ref 3.5–4.8)
ALBUMIN/GLOB SERPL: 2.2 {RATIO} (ref 1.2–2.2)
ALP SERPL-CCNC: 79 IU/L (ref 39–117)
ALT SERPL-CCNC: 19 IU/L (ref 0–44)
AMYLASE SERPL-CCNC: 50 U/L (ref 31–124)
AST SERPL-CCNC: 17 IU/L (ref 0–40)
B BURGDOR IGG+IGM SER-ACNC: <0.91 ISR (ref 0–0.9)
B BURGDOR IGM SER IA-ACNC: <0.8 INDEX (ref 0–0.79)
BASOPHILS # BLD AUTO: 0.1 X10E3/UL (ref 0–0.2)
BASOPHILS NFR BLD AUTO: 1 %
BILIRUB SERPL-MCNC: 0.4 MG/DL (ref 0–1.2)
BUN SERPL-MCNC: 25 MG/DL (ref 8–27)
BUN/CREAT SERPL: 16 (ref 10–24)
CALCIUM SERPL-MCNC: 9.6 MG/DL (ref 8.6–10.2)
CHLORIDE SERPL-SCNC: 106 MMOL/L (ref 96–106)
CO2 SERPL-SCNC: 21 MMOL/L (ref 20–29)
CREAT SERPL-MCNC: 1.6 MG/DL (ref 0.76–1.27)
EBV EA IGG SER-ACNC: <9 U/ML (ref 0–8.9)
EBV NA IGG SER IA-ACNC: 243 U/ML (ref 0–17.9)
EBV PATRN SPEC IB-IMP: ABNORMAL
EBV VCA IGG SER IA-ACNC: 311 U/ML (ref 0–17.9)
EBV VCA IGM SER IA-ACNC: <36 U/ML (ref 0–35.9)
EOSINOPHIL # BLD AUTO: 0.2 X10E3/UL (ref 0–0.4)
EOSINOPHIL NFR BLD AUTO: 3 %
ERYTHROCYTE [DISTWIDTH] IN BLOOD BY AUTOMATED COUNT: 12.6 % (ref 12.3–15.4)
GLOBULIN SER-MCNC: 1.9 G/DL (ref 1.5–4.5)
GLUCOSE SERPL-MCNC: 145 MG/DL (ref 65–99)
HCT VFR BLD AUTO: 40.5 % (ref 37.5–51)
HGB BLD-MCNC: 14.4 G/DL (ref 13–17.7)
IMM GRANULOCYTES # BLD: 0 X10E3/UL (ref 0–0.1)
IMM GRANULOCYTES NFR BLD: 0 %
LIPASE SERPL-CCNC: 34 U/L (ref 13–78)
LYMPHOCYTES # BLD AUTO: 2.4 X10E3/UL (ref 0.7–3.1)
LYMPHOCYTES NFR BLD AUTO: 33 %
MCH RBC QN AUTO: 32.2 PG (ref 26.6–33)
MCHC RBC AUTO-ENTMCNC: 35.6 G/DL (ref 31.5–35.7)
MCV RBC AUTO: 91 FL (ref 79–97)
MICRODELETION SYND BLD/T FISH: NORMAL
MONOCYTES # BLD AUTO: 0.8 X10E3/UL (ref 0.1–0.9)
MONOCYTES NFR BLD AUTO: 10 %
NEUTROPHILS # BLD AUTO: 3.9 X10E3/UL (ref 1.4–7)
NEUTROPHILS NFR BLD AUTO: 53 %
PLATELET # BLD AUTO: 273 X10E3/UL (ref 150–450)
POTASSIUM SERPL-SCNC: 4.4 MMOL/L (ref 3.5–5.2)
PROT SERPL-MCNC: 6 G/DL (ref 6–8.5)
RBC # BLD AUTO: 4.47 X10E6/UL (ref 4.14–5.8)
SL AMB EGFR AFRICAN AMERICAN: 48 ML/MIN/1.73
SL AMB EGFR NON AFRICAN AMERICAN: 42 ML/MIN/1.73
SODIUM SERPL-SCNC: 144 MMOL/L (ref 134–144)
T4 FREE SERPL-MCNC: 1.03 NG/DL (ref 0.82–1.77)
TSH SERPL DL<=0.005 MIU/L-ACNC: 4.03 UIU/ML (ref 0.45–4.5)
WBC # BLD AUTO: 7.3 X10E3/UL (ref 3.4–10.8)

## 2020-01-02 PROCEDURE — 76536 US EXAM OF HEAD AND NECK: CPT

## 2020-01-02 PROCEDURE — 74176 CT ABD & PELVIS W/O CONTRAST: CPT

## 2020-01-03 ENCOUNTER — TELEPHONE (OUTPATIENT)
Dept: FAMILY MEDICINE CLINIC | Facility: CLINIC | Age: 75
End: 2020-01-03

## 2020-01-03 NOTE — TELEPHONE ENCOUNTER
Called pt to discuss his labs, left a message for pt to call back to discuss with family member who answered the phone

## 2020-01-07 NOTE — TELEPHONE ENCOUNTER
Called pt to discuss resulst of his labs and imaging  Advisd of the prostate and the signs of backflow with the bladder - pt does see urology and sees them regularly  Pt also had a US of thyroid Nodule is benign the gland is slightly hetrogenouse - advised we could have him evaluated by endo, he is aware and will think about it    Oveall he is feeling better and really had not had a lot of symptoms since being seen

## 2020-01-16 ENCOUNTER — OFFICE VISIT (OUTPATIENT)
Dept: FAMILY MEDICINE CLINIC | Facility: CLINIC | Age: 75
End: 2020-01-16
Payer: MEDICARE

## 2020-01-16 ENCOUNTER — OFFICE VISIT (OUTPATIENT)
Dept: PODIATRY | Facility: CLINIC | Age: 75
End: 2020-01-16
Payer: MEDICARE

## 2020-01-16 VITALS
BODY MASS INDEX: 30.51 KG/M2 | SYSTOLIC BLOOD PRESSURE: 124 MMHG | HEIGHT: 69 IN | WEIGHT: 206 LBS | DIASTOLIC BLOOD PRESSURE: 80 MMHG | RESPIRATION RATE: 17 BRPM | HEART RATE: 78 BPM

## 2020-01-16 VITALS
RESPIRATION RATE: 18 BRPM | HEART RATE: 78 BPM | WEIGHT: 204 LBS | DIASTOLIC BLOOD PRESSURE: 84 MMHG | SYSTOLIC BLOOD PRESSURE: 132 MMHG | TEMPERATURE: 97.9 F | BODY MASS INDEX: 30.21 KG/M2 | HEIGHT: 69 IN | OXYGEN SATURATION: 96 %

## 2020-01-16 DIAGNOSIS — M79.672 PAIN IN BOTH FEET: ICD-10-CM

## 2020-01-16 DIAGNOSIS — E11.42 DIABETIC POLYNEUROPATHY ASSOCIATED WITH TYPE 2 DIABETES MELLITUS (HCC): ICD-10-CM

## 2020-01-16 DIAGNOSIS — Z79.4 TYPE 2 DIABETES MELLITUS WITH STAGE 3 CHRONIC KIDNEY DISEASE, WITH LONG-TERM CURRENT USE OF INSULIN (HCC): ICD-10-CM

## 2020-01-16 DIAGNOSIS — I70.209 PERIPHERAL ARTERIOSCLEROSIS (HCC): ICD-10-CM

## 2020-01-16 DIAGNOSIS — I50.32 CHRONIC DIASTOLIC CONGESTIVE HEART FAILURE (HCC): ICD-10-CM

## 2020-01-16 DIAGNOSIS — E03.1 CONGENITAL HYPOTHYROIDISM WITHOUT GOITER: Primary | ICD-10-CM

## 2020-01-16 DIAGNOSIS — N18.31 CHRONIC KIDNEY DISEASE (CKD) STAGE G3A/A1, MODERATELY DECREASED GLOMERULAR FILTRATION RATE (GFR) BETWEEN 45-59 ML/MIN/1.73 SQUARE METER AND ALBUMINURIA CREATININE RATIO LESS THAN 30 MG/G (HCC): ICD-10-CM

## 2020-01-16 DIAGNOSIS — M79.671 PAIN IN BOTH FEET: ICD-10-CM

## 2020-01-16 DIAGNOSIS — E11.42 DIABETIC POLYNEUROPATHY ASSOCIATED WITH TYPE 2 DIABETES MELLITUS (HCC): Primary | ICD-10-CM

## 2020-01-16 DIAGNOSIS — N18.30 TYPE 2 DIABETES MELLITUS WITH STAGE 3 CHRONIC KIDNEY DISEASE, WITH LONG-TERM CURRENT USE OF INSULIN (HCC): ICD-10-CM

## 2020-01-16 DIAGNOSIS — I10 ESSENTIAL HYPERTENSION: ICD-10-CM

## 2020-01-16 DIAGNOSIS — E11.22 TYPE 2 DIABETES MELLITUS WITH STAGE 3 CHRONIC KIDNEY DISEASE, WITH LONG-TERM CURRENT USE OF INSULIN (HCC): ICD-10-CM

## 2020-01-16 DIAGNOSIS — B35.1 ONYCHOMYCOSIS: ICD-10-CM

## 2020-01-16 PROBLEM — N40.0 BENIGN PROSTATIC HYPERPLASIA WITHOUT LOWER URINARY TRACT SYMPTOMS: Status: ACTIVE | Noted: 2020-01-16

## 2020-01-16 PROCEDURE — 11721 DEBRIDE NAIL 6 OR MORE: CPT | Performed by: PODIATRIST

## 2020-01-16 PROCEDURE — 99214 OFFICE O/P EST MOD 30 MIN: CPT | Performed by: FAMILY MEDICINE

## 2020-01-16 NOTE — PROGRESS NOTES
Assessment/Plan:    1  Congenital hypothyroidism without goiter  -     TSH, 3rd generation; Future  -     TSH, 3rd generation; Future; Expected date: 03/31/2020    2  Chronic kidney disease (CKD) stage G3a/A1, moderately decreased glomerular filtration rate (GFR) between 45-59 mL/min/1 73 square meter and albuminuria creatinine ratio less than 30 mg/g ([facility])  -     Comprehensive metabolic panel; Future; Expected date: 03/31/2020    3  Essential hypertension    4  Type 2 diabetes mellitus with stage 3 chronic kidney disease, with long-term current use of insulin ([facility])  -     Hemoglobin A1C; Future; Expected date: 03/31/2020  -     Microalbumin / creatinine urine ratio; Future; Expected date: 03/31/2020  -     Lipid Panel with Direct LDL reflex; Future; Expected date: 03/31/2020    5  Diabetic polyneuropathy associated with type 2 diabetes mellitus ([facility] [address] )    6  Chronic diastolic congestive heart failure ([facility] )            There are no Patient Instructions on file for this visit  Return in about 4 months (around 5/16/2020) for Recheck  Subjective:      Patient ID: Sheryle Quirk is a 75 y o  male  Chief Complaint   Patient presents with    Follow-up     [facility] lpn       Pt is here fopr a three week follow up  Pt states he has not had further symptoms since being seem last  No CP, no SOB  No passing    Pt was seen by urology for the enlarged prostate - he was ok with his exam and current symptoms      The following portions of the patient's history were reviewed and updated as appropriate: allergies, current medications, past family history, past medical history, past social history, past surgical history and problem list     Review of Systems   Constitutional: Negative for activity change, appetite change, chills, diaphoresis, fatigue, fever and unexpected weight change  HENT: Negative for congestion, dental problem, ear pain, mouth sores, sinus pressure, sinus pain, sore throat and trouble swallowing  Eyes: Negative for photophobia, discharge and itching  Respiratory: Negative for apnea, chest tightness and shortness of breath  Cardiovascular: Negative for chest pain, palpitations and leg swelling  Gastrointestinal: Negative for abdominal distention, abdominal pain, blood in stool, nausea and vomiting  Endocrine: Negative for cold intolerance, heat intolerance, polydipsia, polyphagia and polyuria  Genitourinary: Negative for difficulty urinating  Musculoskeletal: Negative for arthralgias  Skin: Negative for color change and wound  Neurological: Negative for dizziness, syncope, speech difficulty and headaches  Hematological: Negative for adenopathy  Psychiatric/Behavioral: Negative for agitation and behavioral problems           Current Outpatient Medications   Medication Sig Dispense Refill    amLODIPine (NORVASC) 2 5 mg tablet Take 1 tablet (2 5 mg total) by mouth daily 90 tablet 3    aspirin (ASPIR-81) 81 mg EC tablet Take 81 mg by mouth every morning        atorvastatin (LIPITOR) 40 mg tablet Take 1 tablet (40 mg total) by mouth daily 90 tablet 0    Coenzyme Q10 (COQ-10) 100 MG CAPS Take 300 mg by mouth daily at bedtime        furosemide (LASIX) 40 mg tablet TAKE 1 TABLET BY MOUTH  DAILY 90 tablet 0    glipiZIDE (GLUCOTROL) 5 mg tablet Take 1 tablet (5 mg total) by mouth 2 (two) times a day before meals 180 tablet 0    insulin detemir (LEVEMIR FLEXTOUCH) 100 Units/mL injection pen Inject 25 Units under the skin daily at bedtime 45 mL 1    insulin lispro (HUMALOG KWIKPEN) 100 units/mL injection pen Inject 8 Units under the skin 3 (three) times a day (Patient taking differently: Inject 5 Units under the skin 3 (three) times a day Took 6 units this morning) 30 mL 1    Insulin Pen Needle (BD PEN NEEDLE URSULA U/F) 32G X 4 MM MISC USE 4 TIMES DAILY AS  DIRECTED 360 each 1    levothyroxine 75 mcg tablet TAKE 1 TABLET BY MOUTH  DAILY 90 tablet 1    lisinopril (ZESTRIL) 5 mg tablet Take 1 tablet (5 mg total) by mouth daily 90 tablet 0    metoprolol succinate (TOPROL-XL) 25 mg 24 hr tablet TAKE 1 TABLET BY MOUTH  DAILY 90 tablet 2    multivitamin (THERAGRAN) TABS Take 1 tablet by mouth every morning      pantoprazole (PROTONIX) 40 mg tablet TAKE 1 TABLET BY MOUTH  DAILY 90 tablet 0    clotrimazole-betamethasone (LOTRISONE) 1-0 05 % cream Apply topically 2 (two) times a day (Patient not taking: Reported on 11/23/2019) 30 g 0     No current facility-administered medications for this visit  Objective:    /84   Pulse 78   Temp 97 9 °F (36 6 °C)   Resp 18   Ht 5' 9" (1 753 m)   Wt 92 5 kg (204 lb)   SpO2 96%   BMI 30 13 kg/m²        Physical Exam   Constitutional: He appears well-developed and well-nourished  No distress  HENT:   Head: Normocephalic and atraumatic  Right Ear: External ear normal    Left Ear: External ear normal    Nose: Nose normal    Mouth/Throat: Oropharynx is clear and moist  No oropharyngeal exudate  Eyes: Pupils are equal, round, and reactive to light  EOM are normal  Right eye exhibits no discharge  Left eye exhibits no discharge  No scleral icterus  Neck: No thyromegaly present  Cardiovascular: Normal rate and normal heart sounds  No murmur heard  Pulmonary/Chest: Effort normal and breath sounds normal  No respiratory distress  He has no wheezes  Abdominal: Soft  Bowel sounds are normal  He exhibits no distension and no mass  There is no tenderness  There is no rebound and no guarding  Musculoskeletal: Normal range of motion  Neurological: He is alert  He displays normal reflexes  Coordination normal    Skin: Skin is warm and dry  No rash noted  He is not diaphoretic  No erythema  Psychiatric: He has a normal mood and affect  His behavior is normal    Nursing note and vitals reviewed           CT abdomen pelvis wo contrast   Status: Final result   PACS Images      Show images for CT abdomen pelvis wo contrast   Study Result     CT ABDOMEN AND PELVIS WITHOUT IV CONTRAST     INDICATION: R10 814: Left lower quadrant abdominal tenderness  R53 83: Other fatigue  R42: Dizziness and giddiness      COMPARISON:  Renal ultrasound 5/3/2019     TECHNIQUE:  CT examination of the abdomen and pelvis was performed without intravenous contrast   Axial, sagittal, and coronal 2D reformatted images were created from the source data and submitted for interpretation       Radiation dose length product (DLP) for this visit:  752 99 mGy-cm   This examination, like all CT scans performed in the [facility], was performed utilizing techniques to minimize radiation dose exposure, including the use of iterative   reconstruction and automated exposure control       Enteric contrast was administered       FINDINGS:     ABDOMEN  Evaluation of the solid organs is limited without IV contrast      LOWER CHEST:  Lung bases are clear  5 7 cm area of bullous change seen in the medial left lung base      LIVER/BILIARY TREE:  Unremarkable      GALLBLADDER:  No calcified gallstones  No pericholecystic inflammatory change      SPLEEN:  Unremarkable      PANCREAS:  Mild fatty involution of the pancreas without acute findings      ADRENAL GLANDS:  Unremarkable      KIDNEYS/URETERS:  Left renal cysts are again identified, incompletely evaluated without IV contrast   No hydronephrosis or nephrolithiasis seen  Mild nonspecific bilateral perinephric edema      STOMACH AND BOWEL:   The stomach is grossly unremarkable  The small bowel is normal caliber  Scattered colonic diverticulosis without evidence of diverticulitis      APPENDIX:  No findings to suggest appendicitis      ABDOMINOPELVIC CAVITY:  No ascites or free intraperitoneal air   No lymphadenopathy      VESSELS: Atherosclerotic changes abdominal aorta without evidence of aneurysm       PELVIS     REPRODUCTIVE ORGANS:  Pronounced prostatomegaly with invagination into the base of the bladder      URINARY BLADDER:  There is mild circumferential bladder wall thickening which may reflect an element of chronic bladder obstruction  No perivesical inflammatory stranding      ABDOMINAL WALL/INGUINAL REGIONS:  Fat-containing bilateral inguinal hernias, right larger than left  The right side is predominantly direct, the left side has both direct and indirect components      OSSEOUS STRUCTURES:  No acute fracture or destructive osseous lesion  Multilevel degenerative changes of the spine with disc disease most pronounced at the L2-3 level  Grade 1 anterolisthesis L5 on S1 secondary to degenerative facet arthropathy at this   level      IMPRESSION:     No acute intra-abdominal abnormality identified      Diffuse colonic diverticulosis without evidence of diverticulitis      Pronounced prostatomegaly with invagination into the base of the bladder  Mild circumferential bladder wall thickening suggesting element of chronic bladder obstruction      Additional incidental findings as above      Limited study without IV contrast      Workstation performed: [id number]          US thyroid   Status: Final result   PACS Images      Show images for US thyroid   Study Result     THYROID ULTRASOUND     INDICATION: Hypothyroidism      COMPARISON:  Ultrasound of the thyroid from February 10, 2012      TECHNIQUE:   Ultrasound of the thyroid was performed with a high frequency linear transducer in transverse and sagittal planes including volumetric imaging sweeps as well as traditional still imaging technique      FINDINGS:  Thyroid parenchyma is diffusely heterogeneous in echotexture      Right lobe:  4 7 x 1 2 x 1 4 cm  Volume of 3 67 mL  Left lobe:  4 1 x 0 9 x 1 3 cm with a volume of 2 4 mL      Isthmus:  0 3 cm      Nodule #1  Image 13  Right inferior thyroid lobe nodule, medially located, measuring 0 8 x 0 7 x 0 8 cm  Given differences in measuring technique, no significant change from prior     COMPOSITION:  2 points, solid or almost completely solid   ECHOGENICITY:  1 point, hyperechoic or isoechoic  SHAPE:  0 points, wider-than-tall  MARGIN: 0 points, smooth  ECHOGENIC FOCI:  0 points, none or large comet-tail artifacts  TI-RADS Classification: TR 3 (3 points), Mildly suspicious  FNA if >2 5 cm  Follow if >1 5 cm            IMPRESSION:     Mildly heterogeneous thyroid gland with a stable right inferior thyroid solid nodule measuring less than 1 cm    This nodule has been stable for more than 5 years and is thought to be benign      No nodule meets current ACR criteria for requiring biopsy or followup ultrasounds          Recent Results (from the past 672 hour(s))   Joselin Sneed Default    Collection Time: 12/31/19  8:22 AM   Result Value Ref Range    White Blood Cell Count 7 3 3 4 - 10 8 x10E3/uL    Red Blood Cell Count 4 47 4 14 - 5 80 x10E6/uL    Hemoglobin 14 4 13 0 - 17 7 g/dL    HCT 40 5 37 5 - 51 0 %    MCV 91 79 - 97 fL    MCH 32 2 26 6 - 33 0 pg    MCHC 35 6 31 5 - 35 7 g/dL    RDW 12 6 12 3 - 15 4 %    Platelet Count 718 563 - 450 x10E3/uL    Neutrophils 53 Not Estab  %    Lymphocytes 33 Not Estab  %    Monocytes 10 Not Estab  %    Eosinophils 3 Not Estab  %    Basophils PCT 1 Not Estab  %    Neutrophils (Absolute) 3 9 1 4 - 7 0 x10E3/uL    Lymphocytes (Absolute) 2 4 0 7 - 3 1 x10E3/uL    Monocytes (Absolute) 0 8 0 1 - 0 9 x10E3/uL    Eosinophils (Absolute) 0 2 0 0 - 0 4 x10E3/uL    Basophils ABS 0 1 0 0 - 0 2 x10E3/uL    Immature Granulocytes 0 Not Estab  %    Immature Granulocytes (Absolute) 0 0 0 0 - 0 1 x10E3/uL   Comprehensive metabolic panel    Collection Time: 12/31/19  8:22 AM   Result Value Ref Range    Glucose, Random 145 (H) 65 - 99 mg/dL    BUN 25 8 - 27 mg/dL    Creatinine 1 60 (H) 0 76 - 1 27 mg/dL    eGFR Non  42 (L) >59 mL/min/1 73    eGFR  48 (L) >59 mL/min/1 73    SL AMB BUN/CREATININE RATIO 16 10 - 24    Sodium 144 134 - 144 mmol/L    Potassium 4 4 3 5 - 5 2 mmol/L Chloride 106 96 - 106 mmol/L    CO2 21 20 - 29 mmol/L    CALCIUM 9 6 8 6 - 10 2 mg/dL    Protein, Total 6 0 6 0 - 8 5 g/dL    Albumin 4 1 3 5 - 4 8 g/dL    Globulin, Total 1 9 1 5 - 4 5 g/dL    Albumin/Globulin Ratio 2 2 1 2 - 2 2    TOTAL BILIRUBIN 0 4 0 0 - 1 2 mg/dL    Alk Phos Isoenzymes 79 39 - 117 IU/L    AST 17 0 - 40 IU/L    ALT 19 0 - 44 IU/L   EBV acute panel    Collection Time: 12/31/19  8:22 AM   Result Value Ref Range    EBV VCA IgM <36 0 0 0 - 35 9 U/mL    EBV Early Antigen Ab, IgG <9 0 0 0 - 8 9 U/mL    EBV VCA IgG 311 0 (H) 0 0 - 17 9 U/mL    EBV Nuclear Ag Ab 243 0 (H) 0 0 - 17 9 U/mL    EBV Interp   Comment    [facility] Kidney Stone Panel    Collection Time: 12/31/19  8:22 AM   Result Value Ref Range    Interpretation Note    Lyme Ab/Western Blot Reflex    Collection Time: 12/31/19  8:22 AM   Result Value Ref Range    Lyme IgG/IgM Ab <0 91 0 00 - 0 90 ISR    Lyme Disease Ab, Quant, IgM <0 80 0 00 - 0 79 index   T4, free    Collection Time: 12/31/19  8:22 AM   Result Value Ref Range    Free t4 1 03 0 82 - 1 77 ng/dL   TSH, 3rd generation    Collection Time: 12/31/19  8:22 AM   Result Value Ref Range    TSH 4 030 0 450 - 4 500 uIU/mL   Amylase    Collection Time: 12/31/19  8:22 AM   Result Value Ref Range    Amylase, Serum 50 31 - 124 U/L   Lipase    Collection Time: 12/31/19  8:22 AM   Result Value Ref Range    Lipase, Serum 34 13 - 78 U/L         Brett Montana DO

## 2020-01-29 ENCOUNTER — TELEPHONE (OUTPATIENT)
Dept: FAMILY MEDICINE CLINIC | Facility: CLINIC | Age: 75
End: 2020-01-29

## 2020-01-29 DIAGNOSIS — N18.30 TYPE 2 DIABETES MELLITUS WITH STAGE 3 CHRONIC KIDNEY DISEASE, WITH LONG-TERM CURRENT USE OF INSULIN (HCC): ICD-10-CM

## 2020-01-29 DIAGNOSIS — I10 ESSENTIAL HYPERTENSION: ICD-10-CM

## 2020-01-29 DIAGNOSIS — E11.22 TYPE 2 DIABETES MELLITUS WITH STAGE 3 CHRONIC KIDNEY DISEASE, WITH LONG-TERM CURRENT USE OF INSULIN (HCC): ICD-10-CM

## 2020-01-29 DIAGNOSIS — Z79.4 TYPE 2 DIABETES MELLITUS WITH STAGE 3 CHRONIC KIDNEY DISEASE, WITH LONG-TERM CURRENT USE OF INSULIN (HCC): ICD-10-CM

## 2020-01-29 PROBLEM — E03.9 HYPOTHYROIDISM: Status: RESOLVED | Noted: 2020-01-29 | Resolved: 2020-01-29

## 2020-01-29 PROBLEM — E03.9 HYPOTHYROIDISM: Status: ACTIVE | Noted: 2020-01-29

## 2020-01-29 PROBLEM — Z95.5 PRESENCE OF STENT IN CORONARY ARTERY: Status: ACTIVE | Noted: 2020-01-29

## 2020-01-29 RX ORDER — GLIPIZIDE 5 MG/1
5 TABLET ORAL
Qty: 180 TABLET | Refills: 0 | Status: SHIPPED | OUTPATIENT
Start: 2020-01-29 | End: 2020-04-22 | Stop reason: SDUPTHER

## 2020-01-29 RX ORDER — FUROSEMIDE 20 MG/1
20 TABLET ORAL DAILY
Start: 2020-01-29 | End: 2020-03-03 | Stop reason: SDUPTHER

## 2020-01-29 NOTE — TELEPHONE ENCOUNTER
Sure have him cut it to 20 mg daily and we will see if that chnages the symptoms  Pt needs to take daily weights at the same time in the am every morning to see if he has a sig rise in weight as he has CHF

## 2020-01-29 NOTE — TELEPHONE ENCOUNTER
Calling pt know  Dizziness for the past few months at times  Pt had these symptoms yrs ago    Wants Dr Rogelio Armendariz advise  Gail Tong

## 2020-01-29 NOTE — TELEPHONE ENCOUNTER
Pascual Tai is calling to speak with Dr Roth Bias  He thinks his dizziness is from his furosemide  He is wondering if he can lower the dosage of this medication to see if it helps      Please call patient back     Thank you

## 2020-03-03 ENCOUNTER — APPOINTMENT (OUTPATIENT)
Dept: RADIOLOGY | Facility: CLINIC | Age: 75
End: 2020-03-03
Payer: MEDICARE

## 2020-03-03 ENCOUNTER — OFFICE VISIT (OUTPATIENT)
Dept: FAMILY MEDICINE CLINIC | Facility: CLINIC | Age: 75
End: 2020-03-03
Payer: MEDICARE

## 2020-03-03 VITALS
RESPIRATION RATE: 6 BRPM | BODY MASS INDEX: 30.21 KG/M2 | WEIGHT: 204 LBS | HEART RATE: 72 BPM | HEIGHT: 69 IN | TEMPERATURE: 95.8 F | SYSTOLIC BLOOD PRESSURE: 126 MMHG | DIASTOLIC BLOOD PRESSURE: 70 MMHG

## 2020-03-03 DIAGNOSIS — Z79.4 TYPE 2 DIABETES MELLITUS WITH STAGE 3 CHRONIC KIDNEY DISEASE, WITH LONG-TERM CURRENT USE OF INSULIN (HCC): ICD-10-CM

## 2020-03-03 DIAGNOSIS — R42 DIZZINESS: ICD-10-CM

## 2020-03-03 DIAGNOSIS — N18.30 TYPE 2 DIABETES MELLITUS WITH STAGE 3 CHRONIC KIDNEY DISEASE, WITH LONG-TERM CURRENT USE OF INSULIN (HCC): ICD-10-CM

## 2020-03-03 DIAGNOSIS — M53.3 SACRAL BACK PAIN: Primary | ICD-10-CM

## 2020-03-03 DIAGNOSIS — I50.32 CHRONIC DIASTOLIC CONGESTIVE HEART FAILURE (HCC): ICD-10-CM

## 2020-03-03 DIAGNOSIS — E11.42 DIABETIC POLYNEUROPATHY ASSOCIATED WITH TYPE 2 DIABETES MELLITUS (HCC): ICD-10-CM

## 2020-03-03 DIAGNOSIS — M53.3 SACRAL BACK PAIN: ICD-10-CM

## 2020-03-03 DIAGNOSIS — E11.22 TYPE 2 DIABETES MELLITUS WITH STAGE 3 CHRONIC KIDNEY DISEASE, WITH LONG-TERM CURRENT USE OF INSULIN (HCC): ICD-10-CM

## 2020-03-03 DIAGNOSIS — I10 ESSENTIAL HYPERTENSION: ICD-10-CM

## 2020-03-03 PROCEDURE — 3008F BODY MASS INDEX DOCD: CPT | Performed by: FAMILY MEDICINE

## 2020-03-03 PROCEDURE — 99214 OFFICE O/P EST MOD 30 MIN: CPT | Performed by: FAMILY MEDICINE

## 2020-03-03 PROCEDURE — 1160F RVW MEDS BY RX/DR IN RCRD: CPT | Performed by: FAMILY MEDICINE

## 2020-03-03 PROCEDURE — 72220 X-RAY EXAM SACRUM TAILBONE: CPT

## 2020-03-03 PROCEDURE — 1036F TOBACCO NON-USER: CPT | Performed by: FAMILY MEDICINE

## 2020-03-03 PROCEDURE — 2022F DILAT RTA XM EVC RTNOPTHY: CPT | Performed by: FAMILY MEDICINE

## 2020-03-03 PROCEDURE — 4040F PNEUMOC VAC/ADMIN/RCVD: CPT | Performed by: FAMILY MEDICINE

## 2020-03-03 PROCEDURE — 3074F SYST BP LT 130 MM HG: CPT | Performed by: FAMILY MEDICINE

## 2020-03-03 PROCEDURE — 3078F DIAST BP <80 MM HG: CPT | Performed by: FAMILY MEDICINE

## 2020-03-03 RX ORDER — FUROSEMIDE 20 MG/1
10 TABLET ORAL DAILY
Qty: 90 TABLET | Refills: 1 | Status: SHIPPED | OUTPATIENT
Start: 2020-03-03 | End: 2020-07-16

## 2020-03-03 NOTE — ASSESSMENT & PLAN NOTE
Wt Readings from Last 3 Encounters:   03/03/20 92 5 kg (204 lb)   01/16/20 92 5 kg (204 lb)   01/16/20 93 4 kg (206 lb)       As pt does seem to connect the dizziness to the lasix and since decreasing the lasix from 40 to 20 he was ok for 4 months and the pt states he did not gain weight on his home scale - I will change him to ten and again advise him to weigh himself every day in the am naked

## 2020-03-03 NOTE — PROGRESS NOTES
Assessment/Plan:    1  Sacral back pain  -     Ambulatory referral to Physical Therapy; Future  -     XR sacrum and coccyx; Future; Expected date: 03/03/2020    2  Type 2 diabetes mellitus with stage 3 chronic kidney disease, with long-term current use of insulin ([facility] [address] )    3  Diabetic polyneuropathy associated with type 2 diabetes mellitus ([facility] [address] )    4  Chronic diastolic congestive heart failure (HCC)  Assessment & Plan:  Wt Readings from Last 3 Encounters:   03/03/20 92 5 kg (204 lb)   01/16/20 92 5 kg (204 lb)   01/16/20 93 4 kg (206 lb)       As pt does seem to connect the dizziness to the lasix and since decreasing the lasix from 40 to 20 he was ok for 4 months and the pt states he did not gain weight on his home scale - I will change him to ten and again advise him to weigh himself every day in the am naked        5  Dizziness  -     Ambulatory referral to Neurology; Future    6  Essential hypertension  -     furosemide (LASIX) 20 mg tablet; Take 0 5 tablets (10 mg total) by mouth daily          There are no Patient Instructions on file for this visit  No follow-ups on file  Subjective:      Patient ID: Soco Millan is a 75 y o  male  Chief Complaint   Patient presents with    Back Pain     right side for 1 month prcma    Dizziness       Pt has a scheduled appt for back pain  Pt states its in the rt base of the sacrum  States its not worse in the am  It gets bad during the day - can be relieved with his twisting his back and it feels like something popps in and the pain relieves  Pain can be from a 5/10 to 10/10  No loss of bowel or bladder control  ni injury    Pt states 3 days ago and two days ago in the am he had bouts of dizzines and feeling washed out  He thought it was related to his diuretic and states that was cut down in [place] he was good for two months        Pt states he has not gained any weight since switching to lasix 20 from the 40      The following portions of the patient's history were reviewed and updated as appropriate: allergies, current medications, past family history, past medical history, past social history, past surgical history and problem list     Review of Systems   Constitutional: Negative for activity change, appetite change, chills, diaphoresis, fatigue, fever and unexpected weight change  HENT: Negative for congestion, dental problem, ear pain, mouth sores, sinus pressure, sinus pain, sore throat and trouble swallowing  Eyes: Negative for photophobia, discharge and itching  Respiratory: Negative for apnea, chest tightness and shortness of breath  Cardiovascular: Negative for chest pain, palpitations and leg swelling  Gastrointestinal: Negative for abdominal distention, abdominal pain, blood in stool, nausea and vomiting  Endocrine: Negative for cold intolerance, heat intolerance, polydipsia, polyphagia and polyuria  Genitourinary: Negative for difficulty urinating  Musculoskeletal: Positive for back pain  Negative for arthralgias  Skin: Negative for color change and wound  Neurological: Positive for dizziness  Negative for syncope, speech difficulty and headaches  Hematological: Negative for adenopathy  Psychiatric/Behavioral: Negative for agitation and behavioral problems           Current Outpatient Medications   Medication Sig Dispense Refill    amLODIPine (NORVASC) 2 5 mg tablet Take 1 tablet (2 5 mg total) by mouth daily 90 tablet 3    aspirin (ASPIR-81) 81 mg EC tablet Take 81 mg by mouth every morning        atorvastatin (LIPITOR) 40 mg tablet Take 1 tablet (40 mg total) by mouth daily 90 tablet 0    Coenzyme Q10 (COQ-10) 100 MG CAPS Take 300 mg by mouth daily at bedtime        furosemide (LASIX) 20 mg tablet Take 0 5 tablets (10 mg total) by mouth daily 90 tablet 1    glipiZIDE (GLUCOTROL) 5 mg tablet Take 1 tablet (5 mg total) by mouth 2 (two) times a day before meals 180 tablet 0    insulin detemir (LEVEMIR FLEXTOUCH) 100 Units/mL injection pen Inject 25 Units under the skin daily at bedtime 45 mL 1    insulin lispro (HUMALOG KWIKPEN) 100 units/mL injection pen Inject 8 Units under the skin 3 (three) times a day (Patient taking differently: Inject 5 Units under the skin 3 (three) times a day Took 6 units this morning) 30 mL 1    Insulin Pen Needle (BD PEN NEEDLE URSULA U/F) 32G X 4 MM MISC USE 4 TIMES DAILY AS  DIRECTED 360 each 1    levothyroxine 75 mcg tablet TAKE 1 TABLET BY MOUTH  DAILY 90 tablet 1    lisinopril (ZESTRIL) 5 mg tablet Take 1 tablet (5 mg total) by mouth daily 90 tablet 0    metoprolol succinate (TOPROL-XL) 25 mg 24 hr tablet TAKE 1 TABLET BY MOUTH  DAILY 90 tablet 2    multivitamin (THERAGRAN) TABS Take 1 tablet by mouth every morning      pantoprazole (PROTONIX) 40 mg tablet TAKE 1 TABLET BY MOUTH  DAILY 90 tablet 0    clotrimazole-betamethasone (LOTRISONE) 1-0 05 % cream Apply topically 2 (two) times a day (Patient not taking: Reported on 11/23/2019) 30 g 0     No current facility-administered medications for this visit  Objective:    /70 Comment: recheck  Pulse 72   Temp (!) 95 8 °F (35 4 °C)   Resp (!) 6   Ht 5' 9" (1 753 m)   Wt 92 5 kg (204 lb)   BMI 30 13 kg/m²        Physical Exam   Constitutional: He appears well-developed and well-nourished  No distress  HENT:   Head: Normocephalic and atraumatic  Right Ear: External ear normal    Left Ear: External ear normal    Nose: Nose normal    Mouth/Throat: Oropharynx is clear and moist  No oropharyngeal exudate  Eyes: Pupils are equal, round, and reactive to light  EOM are normal  Right eye exhibits no discharge  Left eye exhibits no discharge  No scleral icterus  Neck: No thyromegaly present  Cardiovascular: Normal rate and normal heart sounds  No murmur heard  Pulmonary/Chest: Effort normal and breath sounds normal  No respiratory distress  He has no wheezes  Abdominal: Soft   Bowel sounds are normal  He exhibits no distension and no mass  There is no tenderness  There is no rebound and no guarding  Musculoskeletal: Normal range of motion  PVMS l spine  Creps at sacral base with int and ext rotation of rt lower ext   Neurological: He is alert  He displays normal reflexes  Coordination normal    Skin: Skin is warm and dry  No rash noted  He is not diaphoretic  No erythema  Psychiatric: He has a normal mood and affect  His behavior is normal    Nursing note and vitals reviewed               Louis Teran DO

## 2020-03-04 ENCOUNTER — TELEPHONE (OUTPATIENT)
Dept: NEUROLOGY | Facility: CLINIC | Age: 75
End: 2020-03-04

## 2020-03-04 DIAGNOSIS — N18.30 TYPE 2 DIABETES MELLITUS WITH STAGE 3 CHRONIC KIDNEY DISEASE, WITH LONG-TERM CURRENT USE OF INSULIN (HCC): ICD-10-CM

## 2020-03-04 DIAGNOSIS — E03.1 CONGENITAL HYPOTHYROIDISM WITHOUT GOITER: ICD-10-CM

## 2020-03-04 DIAGNOSIS — I10 ESSENTIAL HYPERTENSION: ICD-10-CM

## 2020-03-04 DIAGNOSIS — Z79.4 TYPE 2 DIABETES MELLITUS WITH STAGE 3 CHRONIC KIDNEY DISEASE, WITH LONG-TERM CURRENT USE OF INSULIN (HCC): ICD-10-CM

## 2020-03-04 DIAGNOSIS — E78.2 MIXED HYPERLIPIDEMIA: ICD-10-CM

## 2020-03-04 DIAGNOSIS — E11.22 TYPE 2 DIABETES MELLITUS WITH STAGE 3 CHRONIC KIDNEY DISEASE, WITH LONG-TERM CURRENT USE OF INSULIN (HCC): ICD-10-CM

## 2020-03-04 RX ORDER — INSULIN LISPRO 100 [IU]/ML
INJECTION, SOLUTION INTRAVENOUS; SUBCUTANEOUS
Qty: 30 ML | Refills: 5 | Status: SHIPPED | OUTPATIENT
Start: 2020-03-04 | End: 2020-05-18 | Stop reason: SDUPTHER

## 2020-03-04 RX ORDER — ATORVASTATIN CALCIUM 40 MG/1
TABLET, FILM COATED ORAL
Qty: 90 TABLET | Refills: 0 | Status: SHIPPED | OUTPATIENT
Start: 2020-03-04 | End: 2020-05-29

## 2020-03-04 RX ORDER — INSULIN DETEMIR 100 [IU]/ML
INJECTION, SOLUTION SUBCUTANEOUS
Qty: 45 ML | Refills: 1 | Status: SHIPPED | OUTPATIENT
Start: 2020-03-04 | End: 2020-05-18 | Stop reason: SDUPTHER

## 2020-03-04 RX ORDER — LISINOPRIL 5 MG/1
TABLET ORAL
Qty: 90 TABLET | Refills: 0 | Status: SHIPPED | OUTPATIENT
Start: 2020-03-04 | End: 2020-05-18 | Stop reason: SDUPTHER

## 2020-03-04 RX ORDER — LEVOTHYROXINE SODIUM 0.07 MG/1
TABLET ORAL
Qty: 90 TABLET | Refills: 1 | Status: SHIPPED | OUTPATIENT
Start: 2020-03-04 | End: 2020-07-13

## 2020-03-04 NOTE — TELEPHONE ENCOUNTER
Best contact number for patient: [phone]     Emergency Contact name and number:    Referring provider and telephone number:    Primary Care Provider Name and if affiliated with [facility]: Dr Roseline Arias     Reason for Appointment/Dx: Dizziness     Neurology Location patient would like to be seen: [facility]     Order received? Yes                                                 Records Received? Yes    Have you ever seen another Neurologist?       No    Insurance Information    Insurance Name: [employer]     ID/Policy #:    Secondary Insurance:    ID/Policy#: Workman's Comp/ Accident/ School  Information      Workman's Comp/Accident/School related?        No    If yes name of Insurance company:    Date of Injury:    Type of Injury:    [address] Name and Telephone Number:    Notes:                   Appointment date: 07/15/2020

## 2020-04-16 ENCOUNTER — TELEMEDICINE (OUTPATIENT)
Dept: CARDIOLOGY CLINIC | Facility: CLINIC | Age: 75
End: 2020-04-16
Payer: MEDICARE

## 2020-04-16 VITALS — HEIGHT: 69 IN | BODY MASS INDEX: 29.62 KG/M2 | WEIGHT: 200 LBS

## 2020-04-16 DIAGNOSIS — I10 ESSENTIAL HYPERTENSION: ICD-10-CM

## 2020-04-16 DIAGNOSIS — I25.10 CORONARY ARTERY DISEASE INVOLVING NATIVE CORONARY ARTERY OF NATIVE HEART WITHOUT ANGINA PECTORIS: Primary | ICD-10-CM

## 2020-04-16 DIAGNOSIS — I50.32 CHRONIC DIASTOLIC CONGESTIVE HEART FAILURE (HCC): ICD-10-CM

## 2020-04-16 PROCEDURE — 99441 PR PHYS/QHP TELEPHONE EVALUATION 5-10 MIN: CPT | Performed by: INTERNAL MEDICINE

## 2020-04-22 DIAGNOSIS — N18.30 TYPE 2 DIABETES MELLITUS WITH STAGE 3 CHRONIC KIDNEY DISEASE, WITH LONG-TERM CURRENT USE OF INSULIN (HCC): ICD-10-CM

## 2020-04-22 DIAGNOSIS — E11.22 TYPE 2 DIABETES MELLITUS WITH STAGE 3 CHRONIC KIDNEY DISEASE, WITH LONG-TERM CURRENT USE OF INSULIN (HCC): ICD-10-CM

## 2020-04-22 DIAGNOSIS — Z79.4 TYPE 2 DIABETES MELLITUS WITH STAGE 3 CHRONIC KIDNEY DISEASE, WITH LONG-TERM CURRENT USE OF INSULIN (HCC): ICD-10-CM

## 2020-04-22 RX ORDER — GLIPIZIDE 5 MG/1
5 TABLET ORAL
Qty: 180 TABLET | Refills: 1 | Status: SHIPPED | OUTPATIENT
Start: 2020-04-22 | End: 2020-07-13

## 2020-04-24 ENCOUNTER — TELEPHONE (OUTPATIENT)
Dept: NEUROLOGY | Facility: CLINIC | Age: 75
End: 2020-04-24

## 2020-05-18 ENCOUNTER — OFFICE VISIT (OUTPATIENT)
Dept: FAMILY MEDICINE CLINIC | Facility: CLINIC | Age: 75
End: 2020-05-18
Payer: MEDICARE

## 2020-05-18 VITALS
HEIGHT: 69 IN | TEMPERATURE: 98.1 F | HEART RATE: 74 BPM | SYSTOLIC BLOOD PRESSURE: 92 MMHG | OXYGEN SATURATION: 98 % | BODY MASS INDEX: 30.21 KG/M2 | RESPIRATION RATE: 16 BRPM | WEIGHT: 204 LBS | DIASTOLIC BLOOD PRESSURE: 50 MMHG

## 2020-05-18 DIAGNOSIS — E11.22 TYPE 2 DIABETES MELLITUS WITH STAGE 3 CHRONIC KIDNEY DISEASE, WITH LONG-TERM CURRENT USE OF INSULIN (HCC): Primary | ICD-10-CM

## 2020-05-18 DIAGNOSIS — N18.30 TYPE 2 DIABETES MELLITUS WITH STAGE 3 CHRONIC KIDNEY DISEASE, WITH LONG-TERM CURRENT USE OF INSULIN (HCC): Primary | ICD-10-CM

## 2020-05-18 DIAGNOSIS — N18.31 CHRONIC KIDNEY DISEASE (CKD) STAGE G3A/A1, MODERATELY DECREASED GLOMERULAR FILTRATION RATE (GFR) BETWEEN 45-59 ML/MIN/1.73 SQUARE METER AND ALBUMINURIA CREATININE RATIO LESS THAN 30 MG/G (HCC): ICD-10-CM

## 2020-05-18 DIAGNOSIS — Z95.5 PRESENCE OF STENT IN CORONARY ARTERY: ICD-10-CM

## 2020-05-18 DIAGNOSIS — E03.1 CONGENITAL HYPOTHYROIDISM WITHOUT GOITER: ICD-10-CM

## 2020-05-18 DIAGNOSIS — I25.2 HISTORY OF MYOCARDIAL INFARCTION: ICD-10-CM

## 2020-05-18 DIAGNOSIS — I10 ESSENTIAL HYPERTENSION: ICD-10-CM

## 2020-05-18 DIAGNOSIS — I25.10 CORONARY ARTERY DISEASE INVOLVING NATIVE CORONARY ARTERY OF NATIVE HEART WITHOUT ANGINA PECTORIS: ICD-10-CM

## 2020-05-18 DIAGNOSIS — Z79.4 TYPE 2 DIABETES MELLITUS WITH STAGE 3 CHRONIC KIDNEY DISEASE, WITH LONG-TERM CURRENT USE OF INSULIN (HCC): Primary | ICD-10-CM

## 2020-05-18 DIAGNOSIS — N40.0 BENIGN PROSTATIC HYPERPLASIA WITHOUT LOWER URINARY TRACT SYMPTOMS: ICD-10-CM

## 2020-05-18 DIAGNOSIS — E78.2 MIXED HYPERLIPIDEMIA: ICD-10-CM

## 2020-05-18 DIAGNOSIS — E11.42 DIABETIC POLYNEUROPATHY ASSOCIATED WITH TYPE 2 DIABETES MELLITUS (HCC): ICD-10-CM

## 2020-05-18 PROBLEM — M79.671 PAIN OF RIGHT HEEL: Status: RESOLVED | Noted: 2019-01-11 | Resolved: 2020-05-18

## 2020-05-18 PROBLEM — B35.1 ONYCHOMYCOSIS: Status: RESOLVED | Noted: 2019-01-29 | Resolved: 2020-05-18

## 2020-05-18 PROBLEM — M79.671 PAIN IN BOTH FEET: Status: RESOLVED | Noted: 2019-03-05 | Resolved: 2020-05-18

## 2020-05-18 PROBLEM — M79.672 PAIN IN BOTH FEET: Status: RESOLVED | Noted: 2019-03-05 | Resolved: 2020-05-18

## 2020-05-18 PROBLEM — M79.671 RIGHT FOOT PAIN: Status: RESOLVED | Noted: 2019-01-29 | Resolved: 2020-05-18

## 2020-05-18 PROBLEM — M25.572 ARTHRALGIA OF LEFT FOOT: Status: RESOLVED | Noted: 2019-01-29 | Resolved: 2020-05-18

## 2020-05-18 PROBLEM — M79.672 LEFT FOOT PAIN: Status: RESOLVED | Noted: 2019-01-29 | Resolved: 2020-05-18

## 2020-05-18 LAB — SL AMB POCT HEMOGLOBIN AIC: 7.8 (ref ?–6.5)

## 2020-05-18 PROCEDURE — 1160F RVW MEDS BY RX/DR IN RCRD: CPT | Performed by: FAMILY MEDICINE

## 2020-05-18 PROCEDURE — 83036 HEMOGLOBIN GLYCOSYLATED A1C: CPT | Performed by: FAMILY MEDICINE

## 2020-05-18 PROCEDURE — 1036F TOBACCO NON-USER: CPT | Performed by: FAMILY MEDICINE

## 2020-05-18 PROCEDURE — 3074F SYST BP LT 130 MM HG: CPT | Performed by: FAMILY MEDICINE

## 2020-05-18 PROCEDURE — 99214 OFFICE O/P EST MOD 30 MIN: CPT | Performed by: FAMILY MEDICINE

## 2020-05-18 PROCEDURE — 2022F DILAT RTA XM EVC RTNOPTHY: CPT | Performed by: FAMILY MEDICINE

## 2020-05-18 PROCEDURE — 3078F DIAST BP <80 MM HG: CPT | Performed by: FAMILY MEDICINE

## 2020-05-18 PROCEDURE — 4040F PNEUMOC VAC/ADMIN/RCVD: CPT | Performed by: FAMILY MEDICINE

## 2020-05-18 PROCEDURE — 3008F BODY MASS INDEX DOCD: CPT | Performed by: FAMILY MEDICINE

## 2020-05-18 RX ORDER — LISINOPRIL 2.5 MG/1
2.5 TABLET ORAL DAILY
Qty: 90 TABLET | Refills: 1 | Status: SHIPPED | OUTPATIENT
Start: 2020-05-18 | End: 2020-11-11 | Stop reason: SDUPTHER

## 2020-05-29 DIAGNOSIS — E78.2 MIXED HYPERLIPIDEMIA: ICD-10-CM

## 2020-05-29 DIAGNOSIS — I10 ESSENTIAL HYPERTENSION: ICD-10-CM

## 2020-05-29 RX ORDER — ATORVASTATIN CALCIUM 40 MG/1
TABLET, FILM COATED ORAL
Qty: 90 TABLET | Refills: 0 | Status: SHIPPED | OUTPATIENT
Start: 2020-05-29 | End: 2020-08-03

## 2020-05-29 RX ORDER — METOPROLOL SUCCINATE 25 MG/1
TABLET, EXTENDED RELEASE ORAL
Qty: 90 TABLET | Refills: 2 | Status: SHIPPED | OUTPATIENT
Start: 2020-05-29 | End: 2021-03-04

## 2020-06-15 DIAGNOSIS — E11.22 TYPE 2 DIABETES MELLITUS WITH STAGE 3 CHRONIC KIDNEY DISEASE, WITH LONG-TERM CURRENT USE OF INSULIN (HCC): ICD-10-CM

## 2020-06-15 DIAGNOSIS — N18.30 TYPE 2 DIABETES MELLITUS WITH STAGE 3 CHRONIC KIDNEY DISEASE, WITH LONG-TERM CURRENT USE OF INSULIN (HCC): ICD-10-CM

## 2020-06-15 DIAGNOSIS — Z79.4 TYPE 2 DIABETES MELLITUS WITH STAGE 3 CHRONIC KIDNEY DISEASE, WITH LONG-TERM CURRENT USE OF INSULIN (HCC): ICD-10-CM

## 2020-06-16 DIAGNOSIS — I10 ESSENTIAL HYPERTENSION: ICD-10-CM

## 2020-06-16 RX ORDER — AMLODIPINE BESYLATE 2.5 MG/1
2.5 TABLET ORAL DAILY
Qty: 90 TABLET | Refills: 3 | Status: SHIPPED | OUTPATIENT
Start: 2020-06-16 | End: 2020-07-16

## 2020-06-22 DIAGNOSIS — K22.719 BARRETT'S ESOPHAGUS WITH DYSPLASIA: ICD-10-CM

## 2020-06-22 RX ORDER — PANTOPRAZOLE SODIUM 40 MG/1
TABLET, DELAYED RELEASE ORAL
Qty: 90 TABLET | Refills: 0 | Status: SHIPPED | OUTPATIENT
Start: 2020-06-22 | End: 2020-08-31

## 2020-07-13 DIAGNOSIS — N18.30 TYPE 2 DIABETES MELLITUS WITH STAGE 3 CHRONIC KIDNEY DISEASE, WITH LONG-TERM CURRENT USE OF INSULIN (HCC): ICD-10-CM

## 2020-07-13 DIAGNOSIS — Z79.4 TYPE 2 DIABETES MELLITUS WITH STAGE 3 CHRONIC KIDNEY DISEASE, WITH LONG-TERM CURRENT USE OF INSULIN (HCC): ICD-10-CM

## 2020-07-13 DIAGNOSIS — E03.1 CONGENITAL HYPOTHYROIDISM WITHOUT GOITER: ICD-10-CM

## 2020-07-13 DIAGNOSIS — E11.22 TYPE 2 DIABETES MELLITUS WITH STAGE 3 CHRONIC KIDNEY DISEASE, WITH LONG-TERM CURRENT USE OF INSULIN (HCC): ICD-10-CM

## 2020-07-13 RX ORDER — GLIPIZIDE 5 MG/1
TABLET ORAL
Qty: 180 TABLET | Refills: 1 | Status: SHIPPED | OUTPATIENT
Start: 2020-07-13 | End: 2020-10-23 | Stop reason: SDUPTHER

## 2020-07-13 RX ORDER — LEVOTHYROXINE SODIUM 0.07 MG/1
TABLET ORAL
Qty: 90 TABLET | Refills: 1 | Status: SHIPPED | OUTPATIENT
Start: 2020-07-13 | End: 2021-01-11

## 2020-07-16 ENCOUNTER — OFFICE VISIT (OUTPATIENT)
Dept: CARDIOLOGY CLINIC | Facility: CLINIC | Age: 75
End: 2020-07-16
Payer: MEDICARE

## 2020-07-16 VITALS
SYSTOLIC BLOOD PRESSURE: 128 MMHG | DIASTOLIC BLOOD PRESSURE: 64 MMHG | WEIGHT: 205 LBS | HEIGHT: 69 IN | HEART RATE: 71 BPM | OXYGEN SATURATION: 96 % | TEMPERATURE: 98.6 F | BODY MASS INDEX: 30.36 KG/M2

## 2020-07-16 DIAGNOSIS — E78.2 MIXED HYPERLIPIDEMIA: ICD-10-CM

## 2020-07-16 DIAGNOSIS — I10 ESSENTIAL HYPERTENSION: ICD-10-CM

## 2020-07-16 DIAGNOSIS — Z95.5 PRESENCE OF STENT IN CORONARY ARTERY: ICD-10-CM

## 2020-07-16 DIAGNOSIS — I25.10 CORONARY ARTERY DISEASE INVOLVING NATIVE CORONARY ARTERY OF NATIVE HEART WITHOUT ANGINA PECTORIS: Primary | ICD-10-CM

## 2020-07-16 DIAGNOSIS — I50.32 CHRONIC DIASTOLIC CONGESTIVE HEART FAILURE (HCC): ICD-10-CM

## 2020-07-16 DIAGNOSIS — I25.2 HISTORY OF MYOCARDIAL INFARCTION: ICD-10-CM

## 2020-07-16 PROCEDURE — 3074F SYST BP LT 130 MM HG: CPT | Performed by: INTERNAL MEDICINE

## 2020-07-16 PROCEDURE — 3008F BODY MASS INDEX DOCD: CPT | Performed by: INTERNAL MEDICINE

## 2020-07-16 PROCEDURE — 93000 ELECTROCARDIOGRAM COMPLETE: CPT | Performed by: INTERNAL MEDICINE

## 2020-07-16 PROCEDURE — 3078F DIAST BP <80 MM HG: CPT | Performed by: INTERNAL MEDICINE

## 2020-07-16 PROCEDURE — 99214 OFFICE O/P EST MOD 30 MIN: CPT | Performed by: INTERNAL MEDICINE

## 2020-07-16 PROCEDURE — 2022F DILAT RTA XM EVC RTNOPTHY: CPT | Performed by: INTERNAL MEDICINE

## 2020-07-16 PROCEDURE — 1036F TOBACCO NON-USER: CPT | Performed by: INTERNAL MEDICINE

## 2020-07-16 PROCEDURE — 1160F RVW MEDS BY RX/DR IN RCRD: CPT | Performed by: INTERNAL MEDICINE

## 2020-07-16 PROCEDURE — 4040F PNEUMOC VAC/ADMIN/RCVD: CPT | Performed by: INTERNAL MEDICINE

## 2020-07-16 RX ORDER — FUROSEMIDE 20 MG/1
20 TABLET ORAL DAILY
Qty: 90 TABLET | Refills: 1
Start: 2020-07-16 | End: 2020-12-21 | Stop reason: SDUPTHER

## 2020-07-16 NOTE — PROGRESS NOTES
Cardiology   Grzegorz Biswas DO, Padma Prater MD, Fernando Hensley MD, Esther Ramirez MD, [facility] [place]  -------------------------------------------------------------------  [facility]  [address], [address] [facility],[address], [address] [place] [address], [facility] [address]  [phone]    Cardiology Follow Up  Sonia Daiz  1945  [id number]          Assessment/Plan:    1  Coronary artery disease involving native coronary artery of native heart without angina pectoris    2  Essential hypertension    3  Chronic diastolic congestive heart failure ([address] )    4  Mixed hyperlipidemia    5  Presence of stent in coronary artery    6  History of myocardial infarction      - LE edema present today may be due to amlodipine or fluid overload  He has no other symptoms that would suggest acute congestion  Last echocardiogram was in 2016 which showed a normal EF with diastolic dysfunction  - Will stop amlodipine  - Increase furosemide to 20 mg daily  - keep a log of blood pressure which he should monitor twice a day in morning before medications and at dinner time  Should also log his daily weight and whether he has symptoms of dizziness  - will follow-up in 1-2 months to review log and to assess edema  If no improvement, will obtain repeat echocardiogram to reassess LV function  Interval History:     Sonia Diaz is 67 y o  male here for followup of CAD  Since his last visit, he has bilateral lower extremity edema and occasional episodes of lightheadedness/dizziness  Lasix was decreased to 10 mg daily  Edema has increased since then but lightheadedness has improved  He denies any chest pain, shortness of breath, orthopnea or paroxysmal nocturnal dyspnea  The patient has a history of CAD  He has a h/o PCI to the circumflex vessel in 1992  His last nuclear stress test was done in 5/2015 which showed inferior/inferolateral infarct c/w attenuation  EF 63%   He exercised for 6:38    He had a treadmill stress in 2018 which was normal   He exercised for 7 minutes without ECG changes         Past Medical History:   Diagnosis Date    Aortic narrowing     Last Assessed:  9/28/15    Arthritis     Hammond esophagus     Bilateral leg edema     Bulla, lung (HCC)     CHF (congestive heart failure) (HCC)     Chronic kidney disease     stage 3    Diabetes mellitus (HCC)     Enlarged prostate     Heel pain     right heel slipped in the garage-landed on the right heel    Herniated lumbar intervertebral disc     x 4 discs-fell off a roof    Hiatal hernia     High cholesterol     History of kidney problems     Hypertension     Old myocardial infarction     x2-pt was unaware of the MI-one stent    Thyroid disease     hypothyroid    Transient cerebral ischemia     Last Assessed:  8/27/15    Wears dentures     full upper, partial lower    Wears glasses      Social History     Socioeconomic History    Marital status: /Civil Union     Spouse name: Not on file    Number of children: Not on file    Years of education: Not on file    Highest education level: Not on file   Occupational History    Not on file   Social Needs    Financial resource strain: Not on file    Food insecurity:     Worry: Not on file     Inability: Not on file    Transportation needs:     Medical: Not on file     Non-medical: Not on file   Tobacco Use    Smoking status: Former Smoker     Packs/day: 4 00     Last attempt to quit:      Years since quittin 5    Smokeless tobacco: Never Used   Substance and Sexual Activity    Alcohol use: No    Drug use: No    Sexual activity: Not on file   Lifestyle    Physical activity:     Days per week: Not on file     Minutes per session: Not on file    Stress: Not on file   Relationships    Social connections:     Talks on phone: Not on file     Gets together: Not on file     Attends Jew service: Not on file     Active member of club or organization: Not on file     Attends meetings of clubs or organizations: Not on file     Relationship status: Not on file    Intimate partner violence:     Fear of current or ex partner: Not on file     Emotionally abused: Not on file     Physically abused: Not on file     Forced sexual activity: Not on file   Other Topics Concern    Not on file   Social History Narrative    Lack of exercise    Sleeps 6-7 hours a day      Family History   Problem Relation Age of Onset    Colon cancer Mother     Arthritis Mother     Diabetes Mother     Hypertension Mother     Cancer Mother         Breast    Hyperlipidemia Mother     Cancer Sister         ovarian    Other Brother         Cerebrovascular accident (CVA)    Diabetes Other         Sibling    Hypertension Other         Sibling    Hernia Father         umbilical    Hernia Son     Cancer Sister         liver    Kidney disease Sister     Kidney disease Brother     Mental illness Neg Hx      Past Surgical History:   Procedure Laterality Date    CATARACT EXTRACTION      COLONOSCOPY      Resolved:  2015    CORONARY ANGIOPLASTY WITH STENT PLACEMENT      Stent implanted early 1990's,     ESOPHAGOGASTRODUODENOSCOPY      KNEE SURGERY      right knee cartilage surgery-left meniscus repair    MN XCAPSL CTRC RMVL INSJ IO LENS PROSTH W/O ECP Right 1/24/2019    Procedure: EXTRACTION EXTRACAPSULAR CATARACT PHACO INTRAOCULAR LENS (IOL); Surgeon: Agustin Tineo MD;  [facility] MAIN OR;  Service: Ophthalmology    MN XCAPSL CTRC RMVL INSJ IO LENS PROSTH W/O ECP Left 2/21/2019    Procedure: EXTRACTION EXTRACAPSULAR CATARACT PHACO INTRAOCULAR LENS (IOL);   Surgeon: Agustin Tineo MD;  Location: [facility] OR;  Service: Ophthalmology    TONSILLECTOMY      UMBILICAL HERNIA REPAIR      1980's,    UPPER GASTROINTESTINAL ENDOSCOPY      Last Assessed:  8/27/15       Current Outpatient Medications:     amLODIPine (NORVASC) 2 5 mg tablet, Take 1 tablet (2 5 mg total) by mouth daily, Disp: 90 tablet, Rfl: 3    aspirin (ASPIR-81) 81 mg EC tablet, Take 81 mg by mouth every morning  , Disp: , Rfl:     atorvastatin (LIPITOR) 40 mg tablet, TAKE 1 TABLET BY MOUTH  EVERY DAY, Disp: 90 tablet, Rfl: 0    Coenzyme Q10 (COQ-10) 100 MG CAPS, Take 200 mg by mouth daily at bedtime , Disp: , Rfl:     furosemide (LASIX) 20 mg tablet, Take 0 5 tablets (10 mg total) by mouth daily, Disp: 90 tablet, Rfl: 1    glipiZIDE (GLUCOTROL) 5 mg tablet, TAKE 1 TABLET BY MOUTH TWO  TIMES A DAY BEFORE MEALS, Disp: 180 tablet, Rfl: 1    insulin detemir (Levemir FlexTouch) 100 Units/mL injection pen, Inject 35 Units under the skin daily at bedtime, Disp: 45 mL, Rfl: 1    insulin lispro (HUMALOG KWIKPEN) 100 units/mL injection pen, Inject 8 Units under the skin 3 (three) times a day (Patient taking differently: Inject 5 Units under the skin 3 (three) times a day Took 6 units this morning), Disp: 30 mL, Rfl: 1    Insulin Pen Needle (BD Pen Needle Sherie U/F) 32G X 4 MM MISC, USE 4 TIMES DAILY AS  DIRECTED, Disp: 360 each, Rfl: 1    levothyroxine 75 mcg tablet, TAKE 1 TABLET BY MOUTH  DAILY, Disp: 90 tablet, Rfl: 1    lisinopril (ZESTRIL) 2 5 mg tablet, Take 1 tablet (2 5 mg total) by mouth daily, Disp: 90 tablet, Rfl: 1    metoprolol succinate (TOPROL-XL) 25 mg 24 hr tablet, TAKE 1 TABLET BY MOUTH  DAILY, Disp: 90 tablet, Rfl: 2    multivitamin (THERAGRAN) TABS, Take 1 tablet by mouth every morning, Disp: , Rfl:     pantoprazole (PROTONIX) 40 mg tablet, TAKE 1 TABLET BY MOUTH  DAILY, Disp: 90 tablet, Rfl: 0    clotrimazole-betamethasone (LOTRISONE) 1-0 05 % cream, Apply topically 2 (two) times a day (Patient not taking: Reported on 7/16/2020), Disp: 30 g, Rfl: 0        Review of Systems:  Review of Systems   Constitutional: Negative for chills, fatigue and fever  HENT: Negative for congestion, nosebleeds and postnasal drip      Respiratory: Negative for cough, chest tightness and shortness of breath  Cardiovascular: Positive for leg swelling  Negative for chest pain and palpitations  Gastrointestinal: Negative for abdominal distention, abdominal pain, diarrhea, nausea and vomiting  Endocrine: Negative for polydipsia, polyphagia and polyuria  Musculoskeletal: Negative for gait problem and myalgias  Skin: Negative for color change, pallor and rash  Allergic/Immunologic: Negative for environmental allergies, food allergies and immunocompromised state  Neurological: Positive for dizziness and light-headedness  Negative for seizures and syncope  Hematological: Negative for adenopathy  Does not bruise/bleed easily  Psychiatric/Behavioral: Negative for dysphoric mood  The patient is not nervous/anxious  Physical Exam:  Vitals:  Vitals:    07/16/20 1117   BP: 128/64   BP Location: Left arm   Patient Position: Sitting   Cuff Size: Standard   Pulse: 71   Temp: 98 6 °F (37 °C)   TempSrc: Temporal   SpO2: 96%   Weight: 93 kg (205 lb)   Height: 5' 9" (1 753 m)     Physical Exam   Constitutional: He appears healthy  No distress  Eyes: Pupils are equal, round, and reactive to light  Conjunctivae are normal    Neck: Normal range of motion  Neck supple  No JVD present  Cardiovascular: Normal rate, regular rhythm and normal heart sounds  Exam reveals no gallop and no friction rub  No murmur heard  Pulmonary/Chest: Effort normal and breath sounds normal  He has no wheezes  He has no rales  Abdominal: Soft  He exhibits no distension  There is no tenderness  Musculoskeletal: He exhibits edema  He exhibits no tenderness or deformity  Neurological: He is alert and oriented to person, place, and time  Skin: Skin is warm and dry  Cardiographics:  EKG: Personally reviewed NSR with PVC  Low voltage  Last known EF: 60% (2016)    This note was completed in part utilizing [employer] Software    Grammatical errors, random word insertions, spelling mistakes, and incomplete sentences can be an occasional consequence of this system secondary to software limitations, ambient noise, and hardware issues  If you have any questions or concerns about the content, text, or information contained within the body of this dictation, please contact the provider for clarification

## 2020-07-22 LAB
LEFT EYE DIABETIC RETINOPATHY: NORMAL
RIGHT EYE DIABETIC RETINOPATHY: NORMAL

## 2020-08-03 DIAGNOSIS — E78.2 MIXED HYPERLIPIDEMIA: ICD-10-CM

## 2020-08-03 RX ORDER — ATORVASTATIN CALCIUM 40 MG/1
TABLET, FILM COATED ORAL
Qty: 90 TABLET | Refills: 3 | Status: SHIPPED | OUTPATIENT
Start: 2020-08-03 | End: 2021-09-07

## 2020-08-07 LAB
ALBUMIN SERPL-MCNC: 4.3 G/DL (ref 3.7–4.7)
ALBUMIN/CREAT UR: 15 MG/G CREAT (ref 0–29)
ALBUMIN/GLOB SERPL: 2.2 {RATIO} (ref 1.2–2.2)
ALP SERPL-CCNC: 90 IU/L (ref 39–117)
ALT SERPL-CCNC: 22 IU/L (ref 0–44)
AST SERPL-CCNC: 22 IU/L (ref 0–40)
BILIRUB SERPL-MCNC: 0.4 MG/DL (ref 0–1.2)
BUN SERPL-MCNC: 26 MG/DL (ref 8–27)
BUN/CREAT SERPL: 16 (ref 10–24)
CALCIUM SERPL-MCNC: 9.4 MG/DL (ref 8.6–10.2)
CHLORIDE SERPL-SCNC: 106 MMOL/L (ref 96–106)
CHOLEST SERPL-MCNC: 143 MG/DL (ref 100–199)
CO2 SERPL-SCNC: 22 MMOL/L (ref 20–29)
CREAT SERPL-MCNC: 1.62 MG/DL (ref 0.76–1.27)
CREAT UR-MCNC: 93.6 MG/DL
GLOBULIN SER-MCNC: 2 G/DL (ref 1.5–4.5)
GLUCOSE SERPL-MCNC: 107 MG/DL (ref 65–99)
HBA1C MFR BLD: 7.5 % (ref 4.8–5.6)
HDLC SERPL-MCNC: 53 MG/DL
LDLC SERPL CALC-MCNC: 78 MG/DL (ref 0–99)
MICROALBUMIN UR-MCNC: 14.3 UG/ML
MICRODELETION SYND BLD/T FISH: NORMAL
MICRODELETION SYND BLD/T FISH: NORMAL
POTASSIUM SERPL-SCNC: 4.1 MMOL/L (ref 3.5–5.2)
PROT SERPL-MCNC: 6.3 G/DL (ref 6–8.5)
SL AMB EGFR AFRICAN AMERICAN: 47 ML/MIN/1.73
SL AMB EGFR NON AFRICAN AMERICAN: 41 ML/MIN/1.73
SL AMB PDF IMAGE: NORMAL
SODIUM SERPL-SCNC: 141 MMOL/L (ref 134–144)
TRIGL SERPL-MCNC: 62 MG/DL (ref 0–149)
TSH SERPL DL<=0.005 MIU/L-ACNC: 2.13 UIU/ML (ref 0.45–4.5)

## 2020-08-10 ENCOUNTER — TELEPHONE (OUTPATIENT)
Dept: FAMILY MEDICINE CLINIC | Facility: CLINIC | Age: 75
End: 2020-08-10

## 2020-08-10 NOTE — TELEPHONE ENCOUNTER
Left message with wife, she stated he will call us back
Pt's labs are improved, I believe he is due for an AWV to review labs in October, can we call to sched
appt made for October    No further action needed
yes

## 2020-08-31 DIAGNOSIS — K22.719 BARRETT'S ESOPHAGUS WITH DYSPLASIA: ICD-10-CM

## 2020-08-31 RX ORDER — PANTOPRAZOLE SODIUM 40 MG/1
TABLET, DELAYED RELEASE ORAL
Qty: 90 TABLET | Refills: 3 | Status: SHIPPED | OUTPATIENT
Start: 2020-08-31 | End: 2021-09-07

## 2020-09-24 ENCOUNTER — OFFICE VISIT (OUTPATIENT)
Dept: CARDIOLOGY CLINIC | Facility: CLINIC | Age: 75
End: 2020-09-24
Payer: MEDICARE

## 2020-09-24 VITALS
OXYGEN SATURATION: 98 % | HEART RATE: 73 BPM | HEIGHT: 69 IN | BODY MASS INDEX: 29.92 KG/M2 | WEIGHT: 202 LBS | DIASTOLIC BLOOD PRESSURE: 52 MMHG | SYSTOLIC BLOOD PRESSURE: 130 MMHG | TEMPERATURE: 98.4 F

## 2020-09-24 DIAGNOSIS — I10 ESSENTIAL HYPERTENSION: ICD-10-CM

## 2020-09-24 DIAGNOSIS — E78.2 MIXED HYPERLIPIDEMIA: ICD-10-CM

## 2020-09-24 DIAGNOSIS — I25.10 CORONARY ARTERY DISEASE INVOLVING NATIVE CORONARY ARTERY OF NATIVE HEART WITHOUT ANGINA PECTORIS: Primary | ICD-10-CM

## 2020-09-24 PROCEDURE — 99214 OFFICE O/P EST MOD 30 MIN: CPT | Performed by: INTERNAL MEDICINE

## 2020-09-24 PROCEDURE — 93000 ELECTROCARDIOGRAM COMPLETE: CPT | Performed by: INTERNAL MEDICINE

## 2020-09-24 NOTE — PROGRESS NOTES
Cardiology   Yara Davidson DO, Luz De Jesus MD, Otilio Kunz MD, Nabor Bella MD, [facility] - [place]  -------------------------------------------------------------------  Formerly [facility], [facility]  [address], [facility], [address] [place] [address], [address] [place] [address]  [phone]    Cardiology Follow Up  eJsus Orellana  1945  [id number]          Assessment/Plan:    1  Coronary artery disease involving native coronary artery of native heart without angina pectoris    2  Essential hypertension    3  Mixed hyperlipidemia      - LE edema resolved with discontinuation of amlodipine and increasing furosemide to 20 mg daily  Continue monitoring lower extremity and if edema returns, he should call  Last echocardiogram was in 2016 which showed a normal EF with diastolic dysfunction  - blood pressure is stable off amlodipine  - continue furosemide 20 mg daily  Interval History:     Jesus Orellana is 67 y o  male here for followup of CAD and CHF  He was last seen 1 month ago  He was noted to have LE edema at that time  Amlopine was stopped and furosemide was increased to 20 mg daily  Since his last visit, edema has resolved  He denies any shortness of breath or chest pain  He has shoulder pain but no chest pain  He denies any lightheadedness  The patient has a history of CAD  He has a h/o PCI to the circumflex vessel in 1992  His last nuclear stress test was done in 5/2015 which showed inferior/inferolateral infarct c/w attenuation  EF 63%  He exercised for 6:38    He had a treadmill stress in July 2018 which was normal   He exercised for 7 minutes without ECG changes         Past Medical History:   Diagnosis Date    Aortic narrowing     Last Assessed:  9/28/15    Arthritis     Hammond esophagus     Bilateral leg edema     Bulla, lung (HCC)     CHF (congestive heart failure) (HCC)     Chronic kidney disease     stage 3    Diabetes mellitus ([facility] [address] )     Enlarged prostate     Heel pain     right heel slipped in the garage-landed on the right heel    Herniated lumbar intervertebral disc     x 4 discs-fell off a roof    Hiatal hernia     High cholesterol     History of kidney problems     Hypertension     Old myocardial infarction     x2-pt was unaware of the MI-one stent    Thyroid disease     hypothyroid    Transient cerebral ischemia     Last Assessed:  8/27/15    Wears dentures     full upper, partial lower    Wears glasses      Social History     Socioeconomic History    Marital status: /Civil Union     Spouse name: Not on file    Number of children: Not on file    Years of education: Not on file    Highest education level: Not on file   Occupational History    Not on file   Social Needs    Financial resource strain: Not on file    Food insecurity     Worry: Not on file     Inability: Not on file   [employer] needs     Medical: Not on file     Non-medical: Not on file   Tobacco Use    Smoking status: Former Smoker     Packs/day: 4 00     Last attempt to quit:      Years since quittin 7    Smokeless tobacco: Never Used   Substance and Sexual Activity    Alcohol use: No    Drug use: No    Sexual activity: Not on file   Lifestyle    Physical activity     Days per week: Not on file     Minutes per session: Not on file    Stress: Not on file   Relationships    Social connections     Talks on phone: Not on file     Gets together: Not on file     Attends Rastafarian service: Not on file     Active member of club or organization: Not on file     Attends meetings of clubs or organizations: Not on file     Relationship status: Not on file    Intimate partner violence     Fear of current or ex partner: Not on file     Emotionally abused: Not on file     Physically abused: Not on file     Forced sexual activity: Not on file   Other Topics Concern    Not on file   Social History Narrative    Lack of exercise    Sleeps 6-7 hours a day      Family History   Problem Relation Age of Onset    Colon cancer Mother     Arthritis Mother     Diabetes Mother     Hypertension Mother     Cancer Mother         Breast    Hyperlipidemia Mother    Kaveh Lemme Cancer Sister         ovarian    Other Brother         Cerebrovascular accident (CVA)    Diabetes Other         Sibling    Hypertension Other         Sibling    Hernia Father         umbilical   Kaveh Lemme Hernia Son     Cancer Sister         liver    Kidney disease Sister     Kidney disease Brother     Mental illness Neg Hx      Past Surgical History:   Procedure Laterality Date    CATARACT EXTRACTION      COLONOSCOPY      Resolved:  2015    CORONARY ANGIOPLASTY WITH STENT PLACEMENT      Stent implanted early 1990's,     ESOPHAGOGASTRODUODENOSCOPY      KNEE SURGERY      right knee cartilage surgery-left meniscus repair    VA XCAPSL CTRC RMVL INSJ IO LENS PROSTH W/O ECP Right 1/24/2019    Procedure: EXTRACTION EXTRACAPSULAR CATARACT PHACO INTRAOCULAR LENS (IOL); Surgeon: Tanya Hansen MD;  Location: [facility] OR;  Service: Ophthalmology    VA XCAPSL CTRC RMVL INSJ IO LENS PROSTH W/O ECP Left 2/21/2019    Procedure: EXTRACTION EXTRACAPSULAR CATARACT PHACO INTRAOCULAR LENS (IOL);   Surgeon: Tanya Hansen MD;  Location: [facility] OR;  Service: Ophthalmology    TONSILLECTOMY      UMBILICAL HERNIA REPAIR      1980's,    UPPER GASTROINTESTINAL ENDOSCOPY      Last Assessed:  8/27/15       Current Outpatient Medications:     aspirin (ASPIR-81) 81 mg EC tablet, Take 81 mg by mouth every morning  , Disp: , Rfl:     atorvastatin (LIPITOR) 40 mg tablet, TAKE 1 TABLET BY MOUTH  DAILY, Disp: 90 tablet, Rfl: 3    clotrimazole-betamethasone (LOTRISONE) 1-0 05 % cream, Apply topically 2 (two) times a day, Disp: 30 g, Rfl: 0    Coenzyme Q10 (COQ-10) 100 MG CAPS, Take 200 mg by mouth daily at bedtime , Disp: , Rfl:     furosemide (LASIX) 20 mg tablet, Take 1 tablet (20 mg total) by mouth daily, Disp: 90 tablet, Rfl: 1    glipiZIDE (GLUCOTROL) 5 mg tablet, TAKE 1 TABLET BY MOUTH TWO  TIMES A DAY BEFORE MEALS, Disp: 180 tablet, Rfl: 1    insulin detemir (Levemir FlexTouch) 100 Units/mL injection pen, Inject 35 Units under the skin daily at bedtime, Disp: 45 mL, Rfl: 1    insulin lispro (HUMALOG KWIKPEN) 100 units/mL injection pen, Inject 8 Units under the skin 3 (three) times a day (Patient taking differently: Inject 5 Units under the skin 3 (three) times a day Took 6 units this morning), Disp: 30 mL, Rfl: 1    Insulin Pen Needle (BD Pen Needle Sherie U/F) 32G X 4 MM MISC, USE 4 TIMES DAILY AS  DIRECTED, Disp: 360 each, Rfl: 1    levothyroxine 75 mcg tablet, TAKE 1 TABLET BY MOUTH  DAILY, Disp: 90 tablet, Rfl: 1    lisinopril (ZESTRIL) 2 5 mg tablet, Take 1 tablet (2 5 mg total) by mouth daily, Disp: 90 tablet, Rfl: 1    metoprolol succinate (TOPROL-XL) 25 mg 24 hr tablet, TAKE 1 TABLET BY MOUTH  DAILY, Disp: 90 tablet, Rfl: 2    multivitamin (THERAGRAN) TABS, Take 1 tablet by mouth every morning, Disp: , Rfl:     pantoprazole (PROTONIX) 40 mg tablet, TAKE 1 TABLET BY MOUTH  DAILY, Disp: 90 tablet, Rfl: 3        Review of Systems:  Review of Systems   Constitutional: Negative for chills, fatigue and fever  HENT: Negative for congestion, nosebleeds and postnasal drip  Respiratory: Negative for cough, chest tightness and shortness of breath  Cardiovascular: Negative for chest pain, palpitations and leg swelling  Gastrointestinal: Negative for abdominal distention, abdominal pain, diarrhea, nausea and vomiting  Endocrine: Negative for polydipsia, polyphagia and polyuria  Musculoskeletal: Positive for arthralgias  Negative for gait problem and myalgias  Skin: Negative for color change, pallor and rash  Allergic/Immunologic: Negative for environmental allergies, food allergies and immunocompromised state  Neurological: Negative for dizziness, seizures, syncope and light-headedness  Hematological: Negative for adenopathy  Does not bruise/bleed easily  Psychiatric/Behavioral: Negative for dysphoric mood  The patient is not nervous/anxious  Physical Exam:  Vitals:  Vitals:    09/24/20 1334   BP: 130/52   BP Location: Left arm   Patient Position: Sitting   Cuff Size: Standard   Pulse: 73   Temp: 98 4 °F (36 9 °C)   TempSrc: Temporal   SpO2: 98%   Weight: 91 6 kg (202 lb)   Height: 5' 9" (1 753 m)     Physical Exam   Constitutional: He appears healthy  No distress  Eyes: Pupils are equal, round, and reactive to light  Conjunctivae are normal    Neck: Normal range of motion  Neck supple  No JVD present  Cardiovascular: Normal rate and regular rhythm  Exam reveals no gallop and no friction rub  Murmur heard  Pulmonary/Chest: Effort normal and breath sounds normal  He has no wheezes  He has no rales  Musculoskeletal:         General: Edema (trace right leg) present  Neurological: He is alert and oriented to person, place, and time  Skin: Skin is warm and dry  Cardiographics:  EKG: Personally reviewed NSR with q waves inferiorly  Last known EF: 60% (2016)    This note was completed in part utilizing [employer] Fluency Direct Software  Grammatical errors, random word insertions, spelling mistakes, and incomplete sentences can be an occasional consequence of this system secondary to software limitations, ambient noise, and hardware issues  If you have any questions or concerns about the content, text, or information contained within the body of this dictation, please contact the provider for clarification

## 2020-10-23 ENCOUNTER — OFFICE VISIT (OUTPATIENT)
Dept: FAMILY MEDICINE CLINIC | Facility: CLINIC | Age: 75
End: 2020-10-23
Payer: MEDICARE

## 2020-10-23 VITALS
WEIGHT: 201 LBS | RESPIRATION RATE: 16 BRPM | DIASTOLIC BLOOD PRESSURE: 60 MMHG | SYSTOLIC BLOOD PRESSURE: 130 MMHG | HEART RATE: 86 BPM | HEIGHT: 69 IN | BODY MASS INDEX: 29.77 KG/M2 | TEMPERATURE: 98.1 F | OXYGEN SATURATION: 96 %

## 2020-10-23 DIAGNOSIS — N18.30 TYPE 2 DIABETES MELLITUS WITH STAGE 3 CHRONIC KIDNEY DISEASE, WITH LONG-TERM CURRENT USE OF INSULIN (HCC): ICD-10-CM

## 2020-10-23 DIAGNOSIS — E78.2 MIXED HYPERLIPIDEMIA: ICD-10-CM

## 2020-10-23 DIAGNOSIS — E11.22 TYPE 2 DIABETES MELLITUS WITH STAGE 3 CHRONIC KIDNEY DISEASE, WITH LONG-TERM CURRENT USE OF INSULIN, UNSPECIFIED WHETHER STAGE 3A OR 3B CKD (HCC): ICD-10-CM

## 2020-10-23 DIAGNOSIS — N18.30 TYPE 2 DIABETES MELLITUS WITH STAGE 3 CHRONIC KIDNEY DISEASE, WITH LONG-TERM CURRENT USE OF INSULIN, UNSPECIFIED WHETHER STAGE 3A OR 3B CKD (HCC): ICD-10-CM

## 2020-10-23 DIAGNOSIS — N40.0 BENIGN PROSTATIC HYPERPLASIA WITHOUT LOWER URINARY TRACT SYMPTOMS: ICD-10-CM

## 2020-10-23 DIAGNOSIS — E11.22 TYPE 2 DIABETES MELLITUS WITH STAGE 3 CHRONIC KIDNEY DISEASE, WITH LONG-TERM CURRENT USE OF INSULIN (HCC): ICD-10-CM

## 2020-10-23 DIAGNOSIS — E11.42 DIABETIC POLYNEUROPATHY ASSOCIATED WITH TYPE 2 DIABETES MELLITUS (HCC): ICD-10-CM

## 2020-10-23 DIAGNOSIS — N18.31 CHRONIC KIDNEY DISEASE (CKD) STAGE G3A/A1, MODERATELY DECREASED GLOMERULAR FILTRATION RATE (GFR) BETWEEN 45-59 ML/MIN/1.73 SQUARE METER AND ALBUMINURIA CREATININE RATIO LESS THAN 30 MG/G (HCC): ICD-10-CM

## 2020-10-23 DIAGNOSIS — Z79.4 TYPE 2 DIABETES MELLITUS WITH STAGE 3 CHRONIC KIDNEY DISEASE, WITH LONG-TERM CURRENT USE OF INSULIN (HCC): ICD-10-CM

## 2020-10-23 DIAGNOSIS — Z79.4 TYPE 2 DIABETES MELLITUS WITH STAGE 3 CHRONIC KIDNEY DISEASE, WITH LONG-TERM CURRENT USE OF INSULIN, UNSPECIFIED WHETHER STAGE 3A OR 3B CKD (HCC): ICD-10-CM

## 2020-10-23 DIAGNOSIS — Z00.00 MEDICARE ANNUAL WELLNESS VISIT, SUBSEQUENT: Primary | ICD-10-CM

## 2020-10-23 DIAGNOSIS — Z12.5 SCREENING FOR PROSTATE CANCER: ICD-10-CM

## 2020-10-23 DIAGNOSIS — E03.1 CONGENITAL HYPOTHYROIDISM WITHOUT GOITER: ICD-10-CM

## 2020-10-23 DIAGNOSIS — I10 ESSENTIAL HYPERTENSION: ICD-10-CM

## 2020-10-23 DIAGNOSIS — I25.10 CORONARY ARTERY DISEASE INVOLVING NATIVE CORONARY ARTERY OF NATIVE HEART WITHOUT ANGINA PECTORIS: ICD-10-CM

## 2020-10-23 LAB — SL AMB POCT HEMOGLOBIN AIC: 7.2 (ref ?–6.5)

## 2020-10-23 PROCEDURE — 83036 HEMOGLOBIN GLYCOSYLATED A1C: CPT | Performed by: FAMILY MEDICINE

## 2020-10-23 PROCEDURE — G0439 PPPS, SUBSEQ VISIT: HCPCS | Performed by: FAMILY MEDICINE

## 2020-10-23 RX ORDER — INSULIN DETEMIR 100 [IU]/ML
35 INJECTION, SOLUTION SUBCUTANEOUS
Qty: 5 PEN | Refills: 0 | Status: SHIPPED | OUTPATIENT
Start: 2020-10-23 | End: 2021-01-12 | Stop reason: SDUPTHER

## 2020-10-23 RX ORDER — GLIPIZIDE 5 MG/1
5 TABLET ORAL
Qty: 90 TABLET | Refills: 1
Start: 2020-10-23 | End: 2021-04-19 | Stop reason: SDUPTHER

## 2020-10-23 RX ORDER — INSULIN LISPRO 100 [IU]/ML
5 INJECTION, SOLUTION INTRAVENOUS; SUBCUTANEOUS
Qty: 5 PEN | Refills: 0 | Status: SHIPPED | OUTPATIENT
Start: 2020-10-23 | End: 2021-01-12 | Stop reason: SDUPTHER

## 2020-11-11 DIAGNOSIS — I10 ESSENTIAL HYPERTENSION: ICD-10-CM

## 2020-11-11 RX ORDER — LISINOPRIL 2.5 MG/1
2.5 TABLET ORAL DAILY
Qty: 90 TABLET | Refills: 1 | Status: SHIPPED | OUTPATIENT
Start: 2020-11-11 | End: 2021-03-29 | Stop reason: SDUPTHER

## 2020-12-14 DIAGNOSIS — Z79.4 TYPE 2 DIABETES MELLITUS WITH STAGE 3 CHRONIC KIDNEY DISEASE, WITH LONG-TERM CURRENT USE OF INSULIN (HCC): ICD-10-CM

## 2020-12-14 DIAGNOSIS — E11.22 TYPE 2 DIABETES MELLITUS WITH STAGE 3 CHRONIC KIDNEY DISEASE, WITH LONG-TERM CURRENT USE OF INSULIN (HCC): ICD-10-CM

## 2020-12-14 DIAGNOSIS — N18.30 TYPE 2 DIABETES MELLITUS WITH STAGE 3 CHRONIC KIDNEY DISEASE, WITH LONG-TERM CURRENT USE OF INSULIN (HCC): ICD-10-CM

## 2020-12-14 RX ORDER — PEN NEEDLE, DIABETIC 32GX 5/32"
NEEDLE, DISPOSABLE MISCELLANEOUS
Qty: 360 EACH | Refills: 1 | Status: SHIPPED | OUTPATIENT
Start: 2020-12-14 | End: 2021-06-14

## 2020-12-18 ENCOUNTER — TELEPHONE (OUTPATIENT)
Dept: FAMILY MEDICINE CLINIC | Facility: CLINIC | Age: 75
End: 2020-12-18

## 2020-12-21 DIAGNOSIS — I10 ESSENTIAL HYPERTENSION: ICD-10-CM

## 2020-12-21 RX ORDER — FUROSEMIDE 20 MG/1
20 TABLET ORAL DAILY
Qty: 90 TABLET | Refills: 0 | Status: SHIPPED | OUTPATIENT
Start: 2020-12-21 | End: 2021-03-22 | Stop reason: SDUPTHER

## 2020-12-22 ENCOUNTER — TELEPHONE (OUTPATIENT)
Dept: FAMILY MEDICINE CLINIC | Facility: CLINIC | Age: 75
End: 2020-12-22

## 2021-01-11 DIAGNOSIS — E03.1 CONGENITAL HYPOTHYROIDISM WITHOUT GOITER: ICD-10-CM

## 2021-01-11 RX ORDER — LEVOTHYROXINE SODIUM 0.07 MG/1
TABLET ORAL
Qty: 90 TABLET | Refills: 1 | Status: SHIPPED | OUTPATIENT
Start: 2021-01-11 | End: 2021-08-05

## 2021-01-12 DIAGNOSIS — Z79.4 TYPE 2 DIABETES MELLITUS WITH STAGE 3 CHRONIC KIDNEY DISEASE, WITH LONG-TERM CURRENT USE OF INSULIN (HCC): ICD-10-CM

## 2021-01-12 DIAGNOSIS — E11.22 TYPE 2 DIABETES MELLITUS WITH STAGE 3 CHRONIC KIDNEY DISEASE, WITH LONG-TERM CURRENT USE OF INSULIN (HCC): ICD-10-CM

## 2021-01-12 DIAGNOSIS — Z79.4 TYPE 2 DIABETES MELLITUS WITH STAGE 3 CHRONIC KIDNEY DISEASE, WITH LONG-TERM CURRENT USE OF INSULIN, UNSPECIFIED WHETHER STAGE 3A OR 3B CKD (HCC): ICD-10-CM

## 2021-01-12 DIAGNOSIS — N18.30 TYPE 2 DIABETES MELLITUS WITH STAGE 3 CHRONIC KIDNEY DISEASE, WITH LONG-TERM CURRENT USE OF INSULIN (HCC): ICD-10-CM

## 2021-01-12 DIAGNOSIS — E11.22 TYPE 2 DIABETES MELLITUS WITH STAGE 3 CHRONIC KIDNEY DISEASE, WITH LONG-TERM CURRENT USE OF INSULIN, UNSPECIFIED WHETHER STAGE 3A OR 3B CKD (HCC): ICD-10-CM

## 2021-01-12 DIAGNOSIS — N18.30 TYPE 2 DIABETES MELLITUS WITH STAGE 3 CHRONIC KIDNEY DISEASE, WITH LONG-TERM CURRENT USE OF INSULIN, UNSPECIFIED WHETHER STAGE 3A OR 3B CKD (HCC): ICD-10-CM

## 2021-01-12 LAB
ALBUMIN SERPL-MCNC: 4.1 G/DL (ref 3.7–4.7)
ALBUMIN/CREAT UR: 9 MG/G CREAT (ref 0–29)
ALBUMIN/GLOB SERPL: 2 {RATIO} (ref 1.2–2.2)
ALP SERPL-CCNC: 97 IU/L (ref 39–117)
ALT SERPL-CCNC: 17 IU/L (ref 0–44)
AST SERPL-CCNC: 17 IU/L (ref 0–40)
BASOPHILS # BLD AUTO: 0.1 X10E3/UL (ref 0–0.2)
BASOPHILS NFR BLD AUTO: 1 %
BILIRUB SERPL-MCNC: 0.4 MG/DL (ref 0–1.2)
BUN SERPL-MCNC: 25 MG/DL (ref 8–27)
BUN/CREAT SERPL: 17 (ref 10–24)
CALCIUM SERPL-MCNC: 9.3 MG/DL (ref 8.6–10.2)
CHLORIDE SERPL-SCNC: 107 MMOL/L (ref 96–106)
CHOLEST SERPL-MCNC: 152 MG/DL (ref 100–199)
CO2 SERPL-SCNC: 24 MMOL/L (ref 20–29)
CREAT SERPL-MCNC: 1.44 MG/DL (ref 0.76–1.27)
CREAT UR-MCNC: 93.3 MG/DL
EOSINOPHIL # BLD AUTO: 0.2 X10E3/UL (ref 0–0.4)
EOSINOPHIL NFR BLD AUTO: 3 %
ERYTHROCYTE [DISTWIDTH] IN BLOOD BY AUTOMATED COUNT: 12.2 % (ref 11.6–15.4)
GLOBULIN SER-MCNC: 2.1 G/DL (ref 1.5–4.5)
GLUCOSE SERPL-MCNC: 87 MG/DL (ref 65–99)
HBA1C MFR BLD: 7.5 % (ref 4.8–5.6)
HCT VFR BLD AUTO: 42.5 % (ref 37.5–51)
HDLC SERPL-MCNC: 53 MG/DL
HGB BLD-MCNC: 14.6 G/DL (ref 13–17.7)
IMM GRANULOCYTES # BLD: 0 X10E3/UL (ref 0–0.1)
IMM GRANULOCYTES NFR BLD: 0 %
LDLC SERPL CALC-MCNC: 85 MG/DL (ref 0–99)
LYMPHOCYTES # BLD AUTO: 2.7 X10E3/UL (ref 0.7–3.1)
LYMPHOCYTES NFR BLD AUTO: 36 %
MCH RBC QN AUTO: 31.7 PG (ref 26.6–33)
MCHC RBC AUTO-ENTMCNC: 34.4 G/DL (ref 31.5–35.7)
MCV RBC AUTO: 92 FL (ref 79–97)
MICROALBUMIN UR-MCNC: 8.8 UG/ML
MICRODELETION SYND BLD/T FISH: NORMAL
MICRODELETION SYND BLD/T FISH: NORMAL
MONOCYTES # BLD AUTO: 0.7 X10E3/UL (ref 0.1–0.9)
MONOCYTES NFR BLD AUTO: 9 %
NEUTROPHILS # BLD AUTO: 3.7 X10E3/UL (ref 1.4–7)
NEUTROPHILS NFR BLD AUTO: 51 %
PLATELET # BLD AUTO: 278 X10E3/UL (ref 150–450)
POTASSIUM SERPL-SCNC: 3.9 MMOL/L (ref 3.5–5.2)
PROT SERPL-MCNC: 6.2 G/DL (ref 6–8.5)
PSA SERPL-MCNC: 3.8 NG/ML (ref 0–4)
RBC # BLD AUTO: 4.6 X10E6/UL (ref 4.14–5.8)
SL AMB EGFR AFRICAN AMERICAN: 54 ML/MIN/1.73
SL AMB EGFR NON AFRICAN AMERICAN: 47 ML/MIN/1.73
SL AMB PDF IMAGE: NORMAL
SODIUM SERPL-SCNC: 143 MMOL/L (ref 134–144)
TRIGL SERPL-MCNC: 74 MG/DL (ref 0–149)
TSH SERPL DL<=0.005 MIU/L-ACNC: 2.29 UIU/ML (ref 0.45–4.5)
WBC # BLD AUTO: 7.3 X10E3/UL (ref 3.4–10.8)

## 2021-01-12 RX ORDER — INSULIN DETEMIR 100 [IU]/ML
35 INJECTION, SOLUTION SUBCUTANEOUS
Qty: 5 PEN | Refills: 0 | Status: SHIPPED | OUTPATIENT
Start: 2021-01-12 | End: 2021-01-28 | Stop reason: SDUPTHER

## 2021-01-12 RX ORDER — INSULIN LISPRO 100 [IU]/ML
5 INJECTION, SOLUTION INTRAVENOUS; SUBCUTANEOUS
Qty: 5 PEN | Refills: 0 | Status: ON HOLD | OUTPATIENT
Start: 2021-01-12 | End: 2021-02-12

## 2021-01-25 ENCOUNTER — TELEPHONE (OUTPATIENT)
Dept: FAMILY MEDICINE CLINIC | Facility: CLINIC | Age: 76
End: 2021-01-25

## 2021-01-28 ENCOUNTER — OFFICE VISIT (OUTPATIENT)
Dept: FAMILY MEDICINE CLINIC | Facility: CLINIC | Age: 76
End: 2021-01-28
Payer: MEDICARE

## 2021-01-28 VITALS
RESPIRATION RATE: 16 BRPM | TEMPERATURE: 96.7 F | DIASTOLIC BLOOD PRESSURE: 64 MMHG | OXYGEN SATURATION: 97 % | HEIGHT: 69 IN | WEIGHT: 201 LBS | SYSTOLIC BLOOD PRESSURE: 128 MMHG | HEART RATE: 80 BPM | BODY MASS INDEX: 29.77 KG/M2

## 2021-01-28 DIAGNOSIS — I25.10 CORONARY ARTERY DISEASE INVOLVING NATIVE CORONARY ARTERY OF NATIVE HEART WITHOUT ANGINA PECTORIS: ICD-10-CM

## 2021-01-28 DIAGNOSIS — I50.32 CHRONIC DIASTOLIC CONGESTIVE HEART FAILURE (HCC): ICD-10-CM

## 2021-01-28 DIAGNOSIS — E11.22 TYPE 2 DIABETES MELLITUS WITH STAGE 3 CHRONIC KIDNEY DISEASE, WITH LONG-TERM CURRENT USE OF INSULIN (HCC): ICD-10-CM

## 2021-01-28 DIAGNOSIS — I70.209 PERIPHERAL ARTERIOSCLEROSIS (HCC): ICD-10-CM

## 2021-01-28 DIAGNOSIS — N18.30 TYPE 2 DIABETES MELLITUS WITH STAGE 3 CHRONIC KIDNEY DISEASE, WITH LONG-TERM CURRENT USE OF INSULIN (HCC): ICD-10-CM

## 2021-01-28 DIAGNOSIS — Z79.4 TYPE 2 DIABETES MELLITUS WITH STAGE 3 CHRONIC KIDNEY DISEASE, WITH LONG-TERM CURRENT USE OF INSULIN (HCC): ICD-10-CM

## 2021-01-28 DIAGNOSIS — E11.22 TYPE 2 DIABETES MELLITUS WITH STAGE 3 CHRONIC KIDNEY DISEASE, WITH LONG-TERM CURRENT USE OF INSULIN, UNSPECIFIED WHETHER STAGE 3A OR 3B CKD (HCC): Primary | ICD-10-CM

## 2021-01-28 DIAGNOSIS — Z95.5 PRESENCE OF STENT IN CORONARY ARTERY: ICD-10-CM

## 2021-01-28 DIAGNOSIS — Z79.4 TYPE 2 DIABETES MELLITUS WITH STAGE 3 CHRONIC KIDNEY DISEASE, WITH LONG-TERM CURRENT USE OF INSULIN, UNSPECIFIED WHETHER STAGE 3A OR 3B CKD (HCC): Primary | ICD-10-CM

## 2021-01-28 DIAGNOSIS — I10 ESSENTIAL HYPERTENSION: ICD-10-CM

## 2021-01-28 DIAGNOSIS — E03.1 CONGENITAL HYPOTHYROIDISM WITHOUT GOITER: ICD-10-CM

## 2021-01-28 DIAGNOSIS — E11.42 DIABETIC POLYNEUROPATHY ASSOCIATED WITH TYPE 2 DIABETES MELLITUS (HCC): ICD-10-CM

## 2021-01-28 DIAGNOSIS — E78.2 MIXED HYPERLIPIDEMIA: ICD-10-CM

## 2021-01-28 DIAGNOSIS — N18.30 TYPE 2 DIABETES MELLITUS WITH STAGE 3 CHRONIC KIDNEY DISEASE, WITH LONG-TERM CURRENT USE OF INSULIN, UNSPECIFIED WHETHER STAGE 3A OR 3B CKD (HCC): Primary | ICD-10-CM

## 2021-01-28 DIAGNOSIS — N18.31 CHRONIC KIDNEY DISEASE (CKD) STAGE G3A/A1, MODERATELY DECREASED GLOMERULAR FILTRATION RATE (GFR) BETWEEN 45-59 ML/MIN/1.73 SQUARE METER AND ALBUMINURIA CREATININE RATIO LESS THAN 30 MG/G (HCC): ICD-10-CM

## 2021-01-28 PROCEDURE — 99214 OFFICE O/P EST MOD 30 MIN: CPT | Performed by: FAMILY MEDICINE

## 2021-01-28 RX ORDER — INSULIN DETEMIR 100 [IU]/ML
39 INJECTION, SOLUTION SUBCUTANEOUS
Qty: 5 PEN | Refills: 0 | Status: ON HOLD | OUTPATIENT
Start: 2021-01-28 | End: 2021-02-11

## 2021-01-28 NOTE — PROGRESS NOTES
Pt is here for a follow upAssessment/Plan:    1  Type 2 diabetes mellitus with stage 3 chronic kidney disease, with long-term current use of insulin, unspecified whether stage 3a or 3b CKD ([place] [address] )  -     Hemoglobin A1C; Future; Expected date: 04/13/2021    2  Diabetic polyneuropathy associated with type 2 diabetes mellitus ([place] [address] )    3  Chronic diastolic congestive heart failure ([place] [address] )    4  Coronary artery disease involving native coronary artery of native heart without angina pectoris    5  Essential hypertension  Assessment & Plan:  stable    Orders:  -     Comprehensive metabolic panel; Future; Expected date: 04/13/2021    6  Chronic kidney disease (CKD) stage G3a/A1, moderately decreased glomerular filtration rate (GFR) between 45-59 mL/min/1 73 square meter and albuminuria creatinine ratio less than 30 mg/g ([facility])    7  Mixed hyperlipidemia    8  Presence of stent in coronary artery    9  Type 2 diabetes mellitus with stage 3 chronic kidney disease, with long-term current use of insulin ([facility])  -     insulin detemir (Levemir FlexTouch) 100 Units/mL injection pen; Inject 39 Units under the skin daily at bedtime    10  Peripheral arteriosclerosis ([place] [address] )    11  Congenital hypothyroidism without goiter  -     TSH, 3rd generation; Future; Expected date: 04/13/2021          There are no Patient Instructions on file for this visit  Return in about 4 months (around 5/28/2021) for Recheck  Subjective:      Patient ID: Raj Dunlap is a 67 y o  male      Chief Complaint   Patient presents with    Follow-up     follow up on meds [id number]       Pt is here follow up appt  Pt denies CP, no SOB  No polyuria no polydipsia    Pt states he gets up twicxe at night to urinate - one of those times is the cat waking him up  Urinary stream is fine no urgency      The following portions of the patient's history were reviewed and updated as appropriate: allergies, current medications, past family history, past medical history, past social history, past surgical history and problem list     Review of Systems   Constitutional: Negative for activity change, appetite change, chills, diaphoresis, fatigue, fever and unexpected weight change  HENT: Negative for congestion, dental problem, ear pain, mouth sores, sinus pressure, sinus pain, sore throat and trouble swallowing  Eyes: Negative for photophobia, discharge and itching  Respiratory: Negative for apnea, chest tightness and shortness of breath  Cardiovascular: Negative for chest pain, palpitations and leg swelling  Gastrointestinal: Negative for abdominal distention, abdominal pain, blood in stool, nausea and vomiting  Endocrine: Negative for cold intolerance, heat intolerance, polydipsia, polyphagia and polyuria  Genitourinary: Negative for difficulty urinating  Musculoskeletal: Negative for arthralgias  Skin: Negative for color change and wound  Neurological: Negative for dizziness, syncope, speech difficulty and headaches  Hematological: Negative for adenopathy  Psychiatric/Behavioral: Negative for agitation and behavioral problems           Current Outpatient Medications   Medication Sig Dispense Refill    aspirin (ASPIR-81) 81 mg EC tablet Take 81 mg by mouth every morning        atorvastatin (LIPITOR) 40 mg tablet TAKE 1 TABLET BY MOUTH  DAILY 90 tablet 3    clotrimazole-betamethasone (LOTRISONE) 1-0 05 % cream Apply topically 2 (two) times a day 30 g 0    Coenzyme Q10 (COQ-10) 100 MG CAPS Take 200 mg by mouth daily at bedtime       furosemide (LASIX) 20 mg tablet Take 1 tablet (20 mg total) by mouth daily 90 tablet 0    glipiZIDE (GLUCOTROL) 5 mg tablet Take 1 tablet (5 mg total) by mouth daily with breakfast 90 tablet 1    insulin detemir (Levemir FlexTouch) 100 Units/mL injection pen Inject 39 Units under the skin daily at bedtime 5 pen 0    insulin lispro (HumaLOG KwikPen) 100 units/mL injection pen Inject 5 Units under the skin 3 (three) times a day with meals 5 pen 0    Insulin Pen Needle (BD Pen Needle Sherie U/F) 32G X 4 MM MISC USE 4 TIMES DAILY AS  DIRECTED 360 each 1    levothyroxine 75 mcg tablet TAKE 1 TABLET BY MOUTH  DAILY 90 tablet 1    lisinopril (ZESTRIL) 2 5 mg tablet Take 1 tablet (2 5 mg total) by mouth daily 90 tablet 1    metoprolol succinate (TOPROL-XL) 25 mg 24 hr tablet TAKE 1 TABLET BY MOUTH  DAILY 90 tablet 2    multivitamin (THERAGRAN) TABS Take 1 tablet by mouth every morning      pantoprazole (PROTONIX) 40 mg tablet TAKE 1 TABLET BY MOUTH  DAILY 90 tablet 3     No current facility-administered medications for this visit  Objective:    /64   Pulse 80   Temp (!) 96 7 °F (35 9 °C)   Resp 16   Ht 5' 9" (1 753 m)   Wt 91 2 kg (201 lb)   SpO2 97%   BMI 29 68 kg/m²        Physical Exam  Vitals signs and nursing note reviewed  Constitutional:       General: He is not in acute distress  Appearance: He is well-developed  He is not diaphoretic  HENT:      Head: Normocephalic and atraumatic  Right Ear: External ear normal       Left Ear: External ear normal       Nose: Nose normal       Mouth/Throat:      Pharynx: No oropharyngeal exudate  Eyes:      General: No scleral icterus  Right eye: No discharge  Left eye: No discharge  Pupils: Pupils are equal, round, and reactive to light  Neck:      Thyroid: No thyromegaly  Cardiovascular:      Rate and Rhythm: Normal rate  Heart sounds: Normal heart sounds  No murmur  Pulmonary:      Effort: Pulmonary effort is normal  No respiratory distress  Breath sounds: Normal breath sounds  No wheezing  Abdominal:      General: Bowel sounds are normal  There is no distension  Palpations: Abdomen is soft  There is no mass  Tenderness: There is no abdominal tenderness  There is no guarding or rebound  Musculoskeletal: Normal range of motion  Skin:     General: Skin is warm and dry  Findings: No erythema or rash  Neurological:      Mental Status: He is alert        Coordination: Coordination normal       Deep Tendon Reflexes: Reflexes normal    Psychiatric:         Behavior: Behavior normal               Recent Results (from the past 2016 hour(s))   Joselin Sneed Default    Collection Time: 01/11/21  9:34 AM   Result Value Ref Range    White Blood Cell Count 7 3 3 4 - 10 8 x10E3/uL    Red Blood Cell Count 4 60 4 14 - 5 80 x10E6/uL    Hemoglobin 14 6 13 0 - 17 7 g/dL    HCT 42 5 37 5 - 51 0 %    MCV 92 79 - 97 fL    MCH 31 7 26 6 - 33 0 pg    MCHC 34 4 31 5 - 35 7 g/dL    RDW 12 2 11 6 - 15 4 %    Platelet Count 715 509 - 450 x10E3/uL    Neutrophils 51 Not Estab  %    Lymphocytes 36 Not Estab  %    Monocytes 9 Not Estab  %    Eosinophils 3 Not Estab  %    Basophils PCT 1 Not Estab  %    Neutrophils (Absolute) 3 7 1 4 - 7 0 x10E3/uL    Lymphocytes (Absolute) 2 7 0 7 - 3 1 x10E3/uL    Monocytes (Absolute) 0 7 0 1 - 0 9 x10E3/uL    Eosinophils (Absolute) 0 2 0 0 - 0 4 x10E3/uL    Basophils ABS 0 1 0 0 - 0 2 x10E3/uL    Immature Granulocytes 0 Not Estab  %    Immature Granulocytes (Absolute) 0 0 0 0 - 0 1 x10E3/uL   Comprehensive metabolic panel    Collection Time: 01/11/21  9:34 AM   Result Value Ref Range    Glucose, Random 87 65 - 99 mg/dL    BUN 25 8 - 27 mg/dL    Creatinine 1 44 (H) 0 76 - 1 27 mg/dL    eGFR Non  47 (L) >59 mL/min/1 73    eGFR  54 (L) >59 mL/min/1 73    SL AMB BUN/CREATININE RATIO 17 10 - 24    Sodium 143 134 - 144 mmol/L    Potassium 3 9 3 5 - 5 2 mmol/L    Chloride 107 (H) 96 - 106 mmol/L    CO2 24 20 - 29 mmol/L    CALCIUM 9 3 8 6 - 10 2 mg/dL    Protein, Total 6 2 6 0 - 8 5 g/dL    Albumin 4 1 3 7 - 4 7 g/dL    Globulin, Total 2 1 1 5 - 4 5 g/dL    Albumin/Globulin Ratio 2 0 1 2 - 2 2    TOTAL BILIRUBIN 0 4 0 0 - 1 2 mg/dL    Alk Phos Isoenzymes 97 39 - 117 IU/L    AST 17 0 - 40 IU/L    ALT 17 0 - 44 IU/L   Lipid panel    Collection Time: 01/11/21  9:34 AM   Result Value Ref Range    Cholesterol, Total 152 100 - 199 mg/dL    Triglycerides 74 0 - 149 mg/dL    HDL 53 >39 mg/dL    LDL Calculated 85 0 - 99 mg/dL   Microalbumin / creatinine urine ratio    Collection Time: 01/11/21  9:34 AM   Result Value Ref Range    Creatinine, Urine 93 3 Not Estab  mg/dL    Microalbum  ,U,Random 8 8 Not Estab  ug/mL    Microalb/Creat Ratio 9 0 - 29 mg/g creat   Cardiovascular Report    Collection Time: 01/11/21  9:34 AM   Result Value Ref Range    Interpretation Note     PDF Image Not applicable    [facility] Kidney Stone Panel    Collection Time: 01/11/21  9:34 AM   Result Value Ref Range    Interpretation Note    Hemoglobin A1c (w/out EAG)    Collection Time: 01/11/21  9:34 AM   Result Value Ref Range    Hemoglobin A1C 7 5 (H) 4 8 - 5 6 %   TSH, 3rd generation    Collection Time: 01/11/21  9:34 AM   Result Value Ref Range    TSH 2 290 0 450 - 4 500 uIU/mL   PSA Total, Diagnostic    Collection Time: 01/11/21  9:34 AM   Result Value Ref Range    Prostate Specific Antigen Total 3 8 0 0 - 4 0 ng/mL         Indira Valenzuela DO

## 2021-02-06 ENCOUNTER — TELEPHONE (OUTPATIENT)
Dept: FAMILY MEDICINE CLINIC | Facility: CLINIC | Age: 76
End: 2021-02-06

## 2021-02-06 ENCOUNTER — TELEMEDICINE (OUTPATIENT)
Dept: FAMILY MEDICINE CLINIC | Facility: CLINIC | Age: 76
End: 2021-02-06
Payer: MEDICARE

## 2021-02-06 DIAGNOSIS — U07.1 COVID-19 VIRUS INFECTION: Primary | ICD-10-CM

## 2021-02-06 PROCEDURE — 99441 PR PHYS/QHP TELEPHONE EVALUATION 5-10 MIN: CPT | Performed by: NURSE PRACTITIONER

## 2021-02-06 NOTE — PROGRESS NOTES
COVID-19 Virtual Visit     Assessment/Plan:    Problem List Items Addressed This Visit     None      Visit Diagnoses     COVID-19 virus infection    -  Primary         Disposition:     I recommended continued isolation until at least 24 hours have passed since recovery defined as resolution of fever without the use of fever-reducing medications AND improvement in COVID symptoms AND 10 days have passed since onset of symptoms (or 10 days have passed since date of first positive viral diagnostic test for asymptomatic patients)  Discussed w/ pt's family that he is eligible for monoclonal antibody infusion based on his risk factors  They will discuss with patient and call office Monday if he is agreeable  ER for any shortness of breath or increased weakness or fatigue  I have spent 8 minutes directly with the patient  Encounter provider LUCAS Cerrato    Provider located at  [address] [address]  [address] [id number]    Recent Visits  No visits were found meeting these conditions  Showing recent visits within past 7 days and meeting all other requirements     Today's Visits  Date Type Provider Dept   02/06/21 Telemedicine Lynette Cerrato 113   02/06/21 Telephone [facility] today's visits and meeting all other requirements     Future Appointments  No visits were found meeting these conditions  Showing future appointments within next 150 days and meeting all other requirements        Patient agrees to participate in a virtual check in via telephone or video visit instead of presenting to the office to address urgent/immediate medical needs  Patient is aware this is a billable service  After connecting through Telephone, the patient was identified by name and date of birth   Reanna Scott was informed that this was a telemedicine visit and that the exam was being conducted confidentially over secure lines  My office door was closed  No one else was in the room  Sheryle Quirk acknowledged consent and understanding of privacy and security of the telemedicine visit  I informed the patient that I have reviewed his record in Epic and presented the opportunity for him to ask any questions regarding the visit today  The patient agreed to participate  Subjective:   Sheryle Quirk is a 67 y o  male who has been screened for COVID-19  Patient's symptoms include fatigue, anosmia, loss of taste, cough, abdominal pain, myalgias and headache  Patient denies fever, chills, malaise, congestion, rhinorrhea, sore throat, shortness of breath, chest tightness, nausea, vomiting and diarrhea  Date of symptom onset: 2/2/2021  Date of positive COVID-19 PCR: 2/4/2021    Pt had a positive test done at [facility]   He was experiencing gas pains initially  He has loss of sense of taste and smell  Notes a dry cough and feels very fatigued  Test was done 2/4  Household members have tested positive as well      No results found for: ANTONY [address]  Past Medical History:   Diagnosis Date    Aortic narrowing     Last Assessed:  9/28/15    Arthritis     Hammond esophagus     Bilateral leg edema     Bulla, lung (HCC)     CHF (congestive heart failure) (HCC)     Chronic kidney disease     stage 3    Diabetes mellitus (HCC)     Enlarged prostate     Heel pain     right heel slipped in the garage-landed on the right heel    Herniated lumbar intervertebral disc     x 4 discs-fell off a roof    Hiatal hernia     High cholesterol     History of kidney problems     Hypertension     Old myocardial infarction     x2-pt was unaware of the MI-one stent    Thyroid disease     hypothyroid    Transient cerebral ischemia     Last Assessed:  8/27/15    Wears dentures     full upper, partial lower    Wears glasses      Past Surgical History:   Procedure Laterality Date    CATARACT EXTRACTION      COLONOSCOPY      Resolved:  2015    CORONARY ANGIOPLASTY WITH STENT PLACEMENT      Stent implanted early 1990's,     ESOPHAGOGASTRODUODENOSCOPY      KNEE SURGERY      right knee cartilage surgery-left meniscus repair    [facility] XCAPSL CTRC RMVL INSJ IO LENS PROSTH W/O ECP Right 1/24/2019    Procedure: EXTRACTION EXTRACAPSULAR CATARACT PHACO INTRAOCULAR LENS (IOL); Surgeon: Martin Jordan MD;  Location: [facility] MAIN OR;  Service: Ophthalmology    VA XCAPSL CTRC RMVL INSJ IO LENS PROSTH W/O ECP Left 2/21/2019    Procedure: EXTRACTION EXTRACAPSULAR CATARACT PHACO INTRAOCULAR LENS (IOL);   Surgeon: Martin Jordan MD;  Location: [facility] MAIN OR;  Service: Ophthalmology    TONSILLECTOMY      UMBILICAL HERNIA REPAIR      1980's,    UPPER GASTROINTESTINAL ENDOSCOPY      Last Assessed:  8/27/15     Current Outpatient Medications   Medication Sig Dispense Refill    aspirin (ASPIR-81) 81 mg EC tablet Take 81 mg by mouth every morning        atorvastatin (LIPITOR) 40 mg tablet TAKE 1 TABLET BY MOUTH  DAILY 90 tablet 3    clotrimazole-betamethasone (LOTRISONE) 1-0 05 % cream Apply topically 2 (two) times a day 30 g 0    Coenzyme Q10 (COQ-10) 100 MG CAPS Take 200 mg by mouth daily at bedtime       furosemide (LASIX) 20 mg tablet Take 1 tablet (20 mg total) by mouth daily 90 tablet 0    glipiZIDE (GLUCOTROL) 5 mg tablet Take 1 tablet (5 mg total) by mouth daily with breakfast 90 tablet 1    insulin detemir (Levemir FlexTouch) 100 Units/mL injection pen Inject 39 Units under the skin daily at bedtime 5 pen 0    insulin lispro (HumaLOG KwikPen) 100 units/mL injection pen Inject 5 Units under the skin 3 (three) times a day with meals 5 pen 0    Insulin Pen Needle (BD Pen Needle Sherie U/F) 32G X 4 MM MISC USE 4 TIMES DAILY AS  DIRECTED 360 each 1    levothyroxine 75 mcg tablet TAKE 1 TABLET BY MOUTH  DAILY 90 tablet 1    lisinopril (ZESTRIL) 2 5 mg tablet Take 1 tablet (2 5 mg total) by mouth daily 90 tablet 1    metoprolol succinate (TOPROL-XL) 25 mg 24 hr tablet TAKE 1 TABLET BY MOUTH  DAILY 90 tablet 2    multivitamin (THERAGRAN) TABS Take 1 tablet by mouth every morning      pantoprazole (PROTONIX) 40 mg tablet TAKE 1 TABLET BY MOUTH  DAILY 90 tablet 3     No current facility-administered medications for this visit  No Known Allergies    Review of Systems   Constitutional: Positive for fatigue  Negative for chills and fever  HENT: Negative for congestion, rhinorrhea and sore throat  Respiratory: Positive for cough  Negative for chest tightness and shortness of breath  Gastrointestinal: Positive for abdominal pain  Negative for diarrhea, nausea and vomiting  Musculoskeletal: Positive for myalgias  Neurological: Positive for headaches  Objective: There were no vitals filed for this visit  Physical Exam  VIRTUAL VISIT DISCLAIMER    Shaan Zhang acknowledges that he has consented to an online visit or consultation  He understands that the online visit is based solely on information provided by him, and that, in the absence of a face-to-face physical evaluation by the physician, the diagnosis he receives is both limited and provisional in terms of accuracy and completeness  This is not intended to replace a full medical face-to-face evaluation by the physician  Shaan Zhang understands and accepts these terms

## 2021-02-06 NOTE — TELEPHONE ENCOUNTER
Had a test done at [facility] not a rapid  Wife is calling because Nestor West has heart issues , diabetic and she's worried  Spoke with Janice and put patient in with [address] [place] [address],[address] for virtual apt    NFA

## 2021-02-06 NOTE — TELEPHONE ENCOUNTER
POS COVID  Found out today  He stated he had test done in [place]  He would like to know protocol  He stated he is not having any symptoms only no taste and some back pain  Can we call him back and triage please       Thank you

## 2021-02-08 ENCOUNTER — TELEMEDICINE (OUTPATIENT)
Dept: FAMILY MEDICINE CLINIC | Facility: CLINIC | Age: 76
End: 2021-02-08
Payer: MEDICARE

## 2021-02-08 DIAGNOSIS — U07.1 COVID-19 VIRUS INFECTION: Primary | ICD-10-CM

## 2021-02-08 PROCEDURE — 99213 OFFICE O/P EST LOW 20 MIN: CPT | Performed by: FAMILY MEDICINE

## 2021-02-08 RX ORDER — ACETAMINOPHEN 325 MG/1
650 TABLET ORAL ONCE AS NEEDED
Status: CANCELLED | OUTPATIENT
Start: 2021-02-09

## 2021-02-08 RX ORDER — SODIUM CHLORIDE 9 MG/ML
20 INJECTION, SOLUTION INTRAVENOUS ONCE
Status: CANCELLED | OUTPATIENT
Start: 2021-02-09

## 2021-02-08 RX ORDER — ALBUTEROL SULFATE 90 UG/1
3 AEROSOL, METERED RESPIRATORY (INHALATION) ONCE AS NEEDED
Status: CANCELLED | OUTPATIENT
Start: 2021-02-09

## 2021-02-08 NOTE — PROGRESS NOTES
Virtual Regular Visit      Assessment/Plan:    Problem List Items Addressed This Visit        Other    COVID-19 virus infection - Primary        Spoke with pt and his daughter about [facility] treatment  Reminded them about the emergency use auth  Advised on supplemets  Already aware of quarentine status  Advised if he gets worse he should be evaluated in the ed       Reason for visit is   Chief Complaint   Patient presents with    Virtual Regular Visit        Encounter provider Louis Teran DO    Provider located at  [address] [address]  [address] [place] [address]  [address]  [place] [id number]      Recent Visits  Date Type Provider Dept   02/06/21 Telemedicine Lynette [facility]   02/06/21 Telephone [facility] recent visits within past 7 days and meeting all other requirements     Today's Visits  Date Type Provider Dept   02/08/21 [address], [address] today's visits and meeting all other requirements     Future Appointments  No visits were found meeting these conditions  Showing future appointments within next 150 days and meeting all other requirements        The patient was identified by name and date of birth  Elaine Palma was informed that this is a telemedicine visit and that the visit is being conducted through [address] [place] [address] and patient was informed that this is not a secure, HIPAA-compliant platform  He agrees to proceed     My office door was closed  No one else was in the room  He acknowledged consent and understanding of privacy and security of the video platform  The patient has agreed to participate and understands they can discontinue the visit at any time  Patient is aware this is a billable service  Mynor Palma is a 67 y o  male          Pt is Covid positive  Pt states he had chest pain and back pain, that subsided - tired  Cough and congested  Sense of taste and smell was terrible  SOB was mild    Date of onset was Tuesday the second       Past Medical History:   Diagnosis Date    Aortic narrowing     Last Assessed:  9/28/15    Arthritis     Hammond esophagus     Bilateral leg edema     Bulla, lung (HCC)     CHF (congestive heart failure) (HCC)     Chronic kidney disease     stage 3    Diabetes mellitus (HCC)     Enlarged prostate     Heel pain     right heel slipped in the garage-landed on the right heel    Herniated lumbar intervertebral disc     x 4 discs-fell off a roof    Hiatal hernia     High cholesterol     History of kidney problems     Hypertension     Old myocardial infarction     x2-pt was unaware of the MI-one stent    Thyroid disease     hypothyroid    Transient cerebral ischemia     Last Assessed:  8/27/15    Wears dentures     full upper, partial lower    Wears glasses        Past Surgical History:   Procedure Laterality Date    CATARACT EXTRACTION      COLONOSCOPY      Resolved:  2015    CORONARY ANGIOPLASTY WITH STENT PLACEMENT      Stent implanted early 1990's,     ESOPHAGOGASTRODUODENOSCOPY      KNEE SURGERY      right knee cartilage surgery-left meniscus repair    [place] XCAPSL CTRC RMVL INSJ IO LENS PROSTH W/O ECP Right 1/24/2019    Procedure: EXTRACTION EXTRACAPSULAR CATARACT PHACO INTRAOCULAR LENS (IOL); Surgeon: Martin Jordan MD;  Location: [facility] MAIN OR;  Service: Ophthalmology    [place] XCAPSL CTRC RMVL INSJ IO LENS PROSTH W/O ECP Left 2/21/2019    Procedure: EXTRACTION EXTRACAPSULAR CATARACT PHACO INTRAOCULAR LENS (IOL);   Surgeon: Martin Jordan MD;  Location: [facility] OR;  Service: Ophthalmology    TONSILLECTOMY      UMBILICAL HERNIA REPAIR      [id number],    UPPER GASTROINTESTINAL ENDOSCOPY      Last Assessed:  8/27/15       Current Outpatient Medications   Medication Sig Dispense Refill    aspirin (ASPIR-81) 81 mg EC tablet Take 81 mg by mouth every morning        atorvastatin (LIPITOR) 40 mg tablet TAKE 1 TABLET BY MOUTH  DAILY 90 tablet 3    clotrimazole-betamethasone (LOTRISONE) 1-0 05 % cream Apply topically 2 (two) times a day 30 g 0    Coenzyme Q10 (COQ-10) 100 MG CAPS Take 200 mg by mouth daily at bedtime       furosemide (LASIX) 20 mg tablet Take 1 tablet (20 mg total) by mouth daily 90 tablet 0    glipiZIDE (GLUCOTROL) 5 mg tablet Take 1 tablet (5 mg total) by mouth daily with breakfast 90 tablet 1    insulin detemir (Levemir FlexTouch) 100 Units/mL injection pen Inject 39 Units under the skin daily at bedtime 5 pen 0    insulin lispro (HumaLOG KwikPen) 100 units/mL injection pen Inject 5 Units under the skin 3 (three) times a day with meals 5 pen 0    Insulin Pen Needle (BD Pen Needle Sherie U/F) 32G X 4 MM MISC USE 4 TIMES DAILY AS  DIRECTED 360 each 1    levothyroxine 75 mcg tablet TAKE 1 TABLET BY MOUTH  DAILY 90 tablet 1    lisinopril (ZESTRIL) 2 5 mg tablet Take 1 tablet (2 5 mg total) by mouth daily 90 tablet 1    metoprolol succinate (TOPROL-XL) 25 mg 24 hr tablet TAKE 1 TABLET BY MOUTH  DAILY 90 tablet 2    multivitamin (THERAGRAN) TABS Take 1 tablet by mouth every morning      pantoprazole (PROTONIX) 40 mg tablet TAKE 1 TABLET BY MOUTH  DAILY 90 tablet 3     No current facility-administered medications for this visit  No Known Allergies    Review of Systems   Constitutional: Positive for chills and fatigue  Negative for activity change, appetite change, diaphoresis, fever and unexpected weight change  HENT: Negative for congestion, dental problem, ear pain, mouth sores, sinus pressure, sinus pain, sore throat and trouble swallowing  Loss of taste and smaell   Eyes: Negative for photophobia, discharge and itching  Respiratory: Positive for cough and shortness of breath  Negative for apnea and chest tightness  Cardiovascular: Negative for chest pain, palpitations and leg swelling     Gastrointestinal: Negative for abdominal distention, abdominal pain, blood in stool, nausea and vomiting  Endocrine: Negative for cold intolerance, heat intolerance, polydipsia, polyphagia and polyuria  Genitourinary: Negative for difficulty urinating  Musculoskeletal: Negative for arthralgias  Skin: Negative for color change and wound  Neurological: Negative for dizziness, syncope, speech difficulty and headaches  Hematological: Negative for adenopathy  Psychiatric/Behavioral: Negative for agitation and behavioral problems  Video Exam    There were no vitals filed for this visit  Physical Exam  Vitals signs and nursing note reviewed  Constitutional:       General: He is not in acute distress  Appearance: He is well-developed  He is ill-appearing  He is not diaphoretic  HENT:      Head: Normocephalic and atraumatic  Right Ear: External ear normal       Left Ear: External ear normal       Nose: Nose normal       Mouth/Throat:      Pharynx: No oropharyngeal exudate  Eyes:      General: No scleral icterus  Right eye: No discharge  Left eye: No discharge  Pupils: Pupils are equal, round, and reactive to light  Neck:      Thyroid: No thyromegaly  Cardiovascular:      Rate and Rhythm: Normal rate  Heart sounds: Normal heart sounds  No murmur  Pulmonary:      Effort: Pulmonary effort is normal  No respiratory distress  Breath sounds: Normal breath sounds  No wheezing  Abdominal:      General: Bowel sounds are normal  There is no distension  Palpations: Abdomen is soft  There is no mass  Tenderness: There is no abdominal tenderness  There is no guarding or rebound  Musculoskeletal: Normal range of motion  Skin:     General: Skin is warm and dry  Findings: No erythema or rash  Neurological:      Mental Status: He is alert        Coordination: Coordination normal       Deep Tendon Reflexes: Reflexes normal    Psychiatric:         Behavior: Behavior normal           I spent 10 minutes directly with the patient during this visit      VIRTUAL VISIT [address] [facility] Barbozalawanda Barakat acknowledges that he has consented to an online visit or consultation  He understands that the online visit is based solely on information provided by him, and that, in the absence of a face-to-face physical evaluation by the physician, the diagnosis he receives is both limited and provisional in terms of accuracy and completeness  This is not intended to replace a full medical face-to-face evaluation by the physician  Mago Angulo understands and accepts these terms

## 2021-02-09 ENCOUNTER — HOSPITAL ENCOUNTER (INPATIENT)
Facility: HOSPITAL | Age: 76
LOS: 3 days | Discharge: HOME | DRG: 177 | End: 2021-02-12
Attending: EMERGENCY MEDICINE | Admitting: INTERNAL MEDICINE
Payer: MEDICARE

## 2021-02-09 ENCOUNTER — APPOINTMENT (EMERGENCY)
Dept: RADIOLOGY | Facility: HOSPITAL | Age: 76
DRG: 177 | End: 2021-02-09
Payer: MEDICARE

## 2021-02-09 ENCOUNTER — TELEPHONE (OUTPATIENT)
Dept: FAMILY MEDICINE CLINIC | Facility: CLINIC | Age: 76
End: 2021-02-09

## 2021-02-09 ENCOUNTER — HOSPITAL ENCOUNTER (OUTPATIENT)
Dept: INFUSION CENTER | Facility: HOSPITAL | Age: 76
Discharge: HOME | DRG: 177 | End: 2021-02-09
Attending: FAMILY MEDICINE
Payer: MEDICARE

## 2021-02-09 VITALS
TEMPERATURE: 99.5 F | DIASTOLIC BLOOD PRESSURE: 66 MMHG | SYSTOLIC BLOOD PRESSURE: 143 MMHG | HEART RATE: 72 BPM | RESPIRATION RATE: 19 BRPM | OXYGEN SATURATION: 98 %

## 2021-02-09 DIAGNOSIS — U07.1 COVID-19 VIRUS INFECTION: Primary | ICD-10-CM

## 2021-02-09 DIAGNOSIS — E11.22 TYPE 2 DIABETES MELLITUS WITH STAGE 3 CHRONIC KIDNEY DISEASE, WITH LONG-TERM CURRENT USE OF INSULIN, UNSPECIFIED WHETHER STAGE 3A OR 3B CKD (HCC): ICD-10-CM

## 2021-02-09 DIAGNOSIS — N18.30 TYPE 2 DIABETES MELLITUS WITH STAGE 3 CHRONIC KIDNEY DISEASE, WITH LONG-TERM CURRENT USE OF INSULIN, UNSPECIFIED WHETHER STAGE 3A OR 3B CKD (HCC): ICD-10-CM

## 2021-02-09 DIAGNOSIS — N17.9 AKI (ACUTE KIDNEY INJURY) (HCC): ICD-10-CM

## 2021-02-09 DIAGNOSIS — Z79.4 TYPE 2 DIABETES MELLITUS WITH STAGE 3 CHRONIC KIDNEY DISEASE, WITH LONG-TERM CURRENT USE OF INSULIN, UNSPECIFIED WHETHER STAGE 3A OR 3B CKD (HCC): ICD-10-CM

## 2021-02-09 DIAGNOSIS — R09.02 HYPOXIA: ICD-10-CM

## 2021-02-09 DIAGNOSIS — J18.9 PNEUMONIA: ICD-10-CM

## 2021-02-09 PROBLEM — N18.9 ACUTE-ON-CHRONIC KIDNEY INJURY (HCC): Status: ACTIVE | Noted: 2021-02-09

## 2021-02-09 LAB
ALBUMIN SERPL BCP-MCNC: 3 G/DL (ref 3.5–5)
ALP SERPL-CCNC: 60 U/L (ref 46–116)
ALT SERPL W P-5'-P-CCNC: 33 U/L (ref 12–78)
ANION GAP SERPL CALCULATED.3IONS-SCNC: 8 MMOL/L (ref 4–13)
APTT PPP: 37 SECONDS (ref 23–37)
AST SERPL W P-5'-P-CCNC: 35 U/L (ref 5–45)
BASOPHILS # BLD AUTO: 0.01 THOUSANDS/ΜL (ref 0–0.1)
BASOPHILS NFR BLD AUTO: 0 % (ref 0–1)
BILIRUB SERPL-MCNC: 0.4 MG/DL (ref 0.2–1)
BUN SERPL-MCNC: 30 MG/DL (ref 5–25)
CALCIUM ALBUM COR SERPL-MCNC: 9.3 MG/DL (ref 8.3–10.1)
CALCIUM SERPL-MCNC: 8.5 MG/DL (ref 8.3–10.1)
CHLORIDE SERPL-SCNC: 105 MMOL/L (ref 100–108)
CO2 SERPL-SCNC: 26 MMOL/L (ref 21–32)
CREAT SERPL-MCNC: 1.99 MG/DL (ref 0.6–1.3)
CRP SERPL QL: 73.5 MG/L
D DIMER PPP FEU-MCNC: 0.96 UG/ML FEU
EOSINOPHIL # BLD AUTO: 0 THOUSAND/ΜL (ref 0–0.61)
EOSINOPHIL NFR BLD AUTO: 0 % (ref 0–6)
ERYTHROCYTE [DISTWIDTH] IN BLOOD BY AUTOMATED COUNT: 11.9 % (ref 11.6–15.1)
GFR SERPL CREATININE-BSD FRML MDRD: 32 ML/MIN/1.73SQ M
GLUCOSE SERPL-MCNC: 121 MG/DL (ref 65–140)
GLUCOSE SERPL-MCNC: 134 MG/DL (ref 65–140)
HCT VFR BLD AUTO: 44.3 % (ref 36.5–49.3)
HGB BLD-MCNC: 14 G/DL (ref 12–17)
IMM GRANULOCYTES # BLD AUTO: 0.03 THOUSAND/UL (ref 0–0.2)
IMM GRANULOCYTES NFR BLD AUTO: 1 % (ref 0–2)
INR PPP: 1.06 (ref 0.84–1.19)
LACTATE SERPL-SCNC: 1.7 MMOL/L (ref 0.5–2)
LIPASE SERPL-CCNC: 213 U/L (ref 73–393)
LYMPHOCYTES # BLD AUTO: 1.14 THOUSANDS/ΜL (ref 0.6–4.47)
LYMPHOCYTES NFR BLD AUTO: 18 % (ref 14–44)
MAGNESIUM SERPL-MCNC: 1.8 MG/DL (ref 1.6–2.6)
MCH RBC QN AUTO: 30.3 PG (ref 26.8–34.3)
MCHC RBC AUTO-ENTMCNC: 31.6 G/DL (ref 31.4–37.4)
MCV RBC AUTO: 96 FL (ref 82–98)
MONOCYTES # BLD AUTO: 0.34 THOUSAND/ΜL (ref 0.17–1.22)
MONOCYTES NFR BLD AUTO: 5 % (ref 4–12)
NEUTROPHILS # BLD AUTO: 5 THOUSANDS/ΜL (ref 1.85–7.62)
NEUTS SEG NFR BLD AUTO: 76 % (ref 43–75)
NRBC BLD AUTO-RTO: 0 /100 WBCS
PLATELET # BLD AUTO: 210 THOUSANDS/UL (ref 149–390)
PMV BLD AUTO: 9.9 FL (ref 8.9–12.7)
POTASSIUM SERPL-SCNC: 4.2 MMOL/L (ref 3.5–5.3)
PROT SERPL-MCNC: 6.7 G/DL (ref 6.4–8.2)
PROTHROMBIN TIME: 13.7 SECONDS (ref 11.6–14.5)
RBC # BLD AUTO: 4.62 MILLION/UL (ref 3.88–5.62)
SODIUM SERPL-SCNC: 139 MMOL/L (ref 136–145)
TROPONIN I SERPL-MCNC: <0.02 NG/ML
WBC # BLD AUTO: 6.52 THOUSAND/UL (ref 4.31–10.16)

## 2021-02-09 PROCEDURE — 96375 TX/PRO/DX INJ NEW DRUG ADDON: CPT

## 2021-02-09 PROCEDURE — 82728 ASSAY OF FERRITIN: CPT | Performed by: EMERGENCY MEDICINE

## 2021-02-09 PROCEDURE — 85379 FIBRIN DEGRADATION QUANT: CPT | Performed by: EMERGENCY MEDICINE

## 2021-02-09 PROCEDURE — 99285 EMERGENCY DEPT VISIT HI MDM: CPT | Performed by: EMERGENCY MEDICINE

## 2021-02-09 PROCEDURE — M0239 BAMLANIVIMAB-XXXX INFUSION: HCPCS | Performed by: FAMILY MEDICINE

## 2021-02-09 PROCEDURE — 86140 C-REACTIVE PROTEIN: CPT | Performed by: EMERGENCY MEDICINE

## 2021-02-09 PROCEDURE — 85610 PROTHROMBIN TIME: CPT | Performed by: EMERGENCY MEDICINE

## 2021-02-09 PROCEDURE — 36415 COLL VENOUS BLD VENIPUNCTURE: CPT | Performed by: EMERGENCY MEDICINE

## 2021-02-09 PROCEDURE — 85025 COMPLETE CBC W/AUTO DIFF WBC: CPT | Performed by: EMERGENCY MEDICINE

## 2021-02-09 PROCEDURE — 1124F ACP DISCUSS-NO DSCNMKR DOCD: CPT | Performed by: EMERGENCY MEDICINE

## 2021-02-09 PROCEDURE — 80053 COMPREHEN METABOLIC PANEL: CPT | Performed by: EMERGENCY MEDICINE

## 2021-02-09 PROCEDURE — 71045 X-RAY EXAM CHEST 1 VIEW: CPT

## 2021-02-09 PROCEDURE — 84484 ASSAY OF TROPONIN QUANT: CPT | Performed by: EMERGENCY MEDICINE

## 2021-02-09 PROCEDURE — 83690 ASSAY OF LIPASE: CPT | Performed by: EMERGENCY MEDICINE

## 2021-02-09 PROCEDURE — 93005 ELECTROCARDIOGRAM TRACING: CPT

## 2021-02-09 PROCEDURE — 99285 EMERGENCY DEPT VISIT HI MDM: CPT

## 2021-02-09 PROCEDURE — 87040 BLOOD CULTURE FOR BACTERIA: CPT | Performed by: EMERGENCY MEDICINE

## 2021-02-09 PROCEDURE — 82948 REAGENT STRIP/BLOOD GLUCOSE: CPT

## 2021-02-09 PROCEDURE — 96361 HYDRATE IV INFUSION ADD-ON: CPT

## 2021-02-09 PROCEDURE — 83605 ASSAY OF LACTIC ACID: CPT | Performed by: EMERGENCY MEDICINE

## 2021-02-09 PROCEDURE — 85730 THROMBOPLASTIN TIME PARTIAL: CPT | Performed by: EMERGENCY MEDICINE

## 2021-02-09 PROCEDURE — 83735 ASSAY OF MAGNESIUM: CPT | Performed by: EMERGENCY MEDICINE

## 2021-02-09 PROCEDURE — 96374 THER/PROPH/DIAG INJ IV PUSH: CPT

## 2021-02-09 PROCEDURE — 99222 1ST HOSP IP/OBS MODERATE 55: CPT | Performed by: PHYSICIAN ASSISTANT

## 2021-02-09 RX ORDER — MELATONIN
2000 DAILY
Status: DISCONTINUED | OUTPATIENT
Start: 2021-02-10 | End: 2021-02-12 | Stop reason: HOSPADM

## 2021-02-09 RX ORDER — DEXAMETHASONE SODIUM PHOSPHATE 4 MG/ML
6 INJECTION, SOLUTION INTRA-ARTICULAR; INTRALESIONAL; INTRAMUSCULAR; INTRAVENOUS; SOFT TISSUE EVERY 24 HOURS
Status: DISCONTINUED | OUTPATIENT
Start: 2021-02-10 | End: 2021-02-12 | Stop reason: HOSPADM

## 2021-02-09 RX ORDER — ASCORBIC ACID 500 MG
1000 TABLET ORAL EVERY 12 HOURS SCHEDULED
Status: DISCONTINUED | OUTPATIENT
Start: 2021-02-09 | End: 2021-02-12 | Stop reason: HOSPADM

## 2021-02-09 RX ORDER — METOPROLOL SUCCINATE 25 MG/1
25 TABLET, EXTENDED RELEASE ORAL DAILY
Status: DISCONTINUED | OUTPATIENT
Start: 2021-02-10 | End: 2021-02-12 | Stop reason: HOSPADM

## 2021-02-09 RX ORDER — ALBUTEROL SULFATE 90 UG/1
3 AEROSOL, METERED RESPIRATORY (INHALATION) ONCE AS NEEDED
Status: DISCONTINUED | OUTPATIENT
Start: 2021-02-09 | End: 2021-02-12 | Stop reason: HOSPADM

## 2021-02-09 RX ORDER — DEXAMETHASONE SODIUM PHOSPHATE 4 MG/ML
6 INJECTION, SOLUTION INTRA-ARTICULAR; INTRALESIONAL; INTRAMUSCULAR; INTRAVENOUS; SOFT TISSUE ONCE
Status: COMPLETED | OUTPATIENT
Start: 2021-02-09 | End: 2021-02-09

## 2021-02-09 RX ORDER — DEXAMETHASONE SODIUM PHOSPHATE 4 MG/ML
6 INJECTION, SOLUTION INTRA-ARTICULAR; INTRALESIONAL; INTRAMUSCULAR; INTRAVENOUS; SOFT TISSUE EVERY 24 HOURS
Status: DISCONTINUED | OUTPATIENT
Start: 2021-02-09 | End: 2021-02-09

## 2021-02-09 RX ORDER — ACETAMINOPHEN 325 MG/1
650 TABLET ORAL ONCE AS NEEDED
Status: CANCELLED | OUTPATIENT
Start: 2021-02-09

## 2021-02-09 RX ORDER — ALBUTEROL SULFATE 90 UG/1
3 AEROSOL, METERED RESPIRATORY (INHALATION) ONCE AS NEEDED
Status: CANCELLED | OUTPATIENT
Start: 2021-02-09

## 2021-02-09 RX ORDER — ATORVASTATIN CALCIUM 40 MG/1
40 TABLET, FILM COATED ORAL DAILY
Status: DISCONTINUED | OUTPATIENT
Start: 2021-02-10 | End: 2021-02-12 | Stop reason: HOSPADM

## 2021-02-09 RX ORDER — SODIUM CHLORIDE 9 MG/ML
75 INJECTION, SOLUTION INTRAVENOUS CONTINUOUS
Status: DISCONTINUED | OUTPATIENT
Start: 2021-02-09 | End: 2021-02-11

## 2021-02-09 RX ORDER — ONDANSETRON 2 MG/ML
4 INJECTION INTRAMUSCULAR; INTRAVENOUS ONCE
Status: COMPLETED | OUTPATIENT
Start: 2021-02-09 | End: 2021-02-09

## 2021-02-09 RX ORDER — ACETAMINOPHEN 325 MG/1
650 TABLET ORAL ONCE AS NEEDED
Status: DISCONTINUED | OUTPATIENT
Start: 2021-02-09 | End: 2021-02-12 | Stop reason: HOSPADM

## 2021-02-09 RX ORDER — ACETAMINOPHEN 325 MG/1
650 TABLET ORAL ONCE
Status: COMPLETED | OUTPATIENT
Start: 2021-02-09 | End: 2021-02-09

## 2021-02-09 RX ORDER — LEVOTHYROXINE SODIUM 0.07 MG/1
75 TABLET ORAL DAILY
Status: DISCONTINUED | OUTPATIENT
Start: 2021-02-10 | End: 2021-02-12 | Stop reason: HOSPADM

## 2021-02-09 RX ORDER — HEPARIN SODIUM 5000 [USP'U]/ML
5000 INJECTION, SOLUTION INTRAVENOUS; SUBCUTANEOUS EVERY 8 HOURS SCHEDULED
Status: DISCONTINUED | OUTPATIENT
Start: 2021-02-09 | End: 2021-02-12 | Stop reason: HOSPADM

## 2021-02-09 RX ORDER — ACETAMINOPHEN 325 MG/1
650 TABLET ORAL EVERY 6 HOURS PRN
Status: DISCONTINUED | OUTPATIENT
Start: 2021-02-09 | End: 2021-02-12 | Stop reason: HOSPADM

## 2021-02-09 RX ORDER — ASPIRIN 81 MG/1
81 TABLET ORAL EVERY MORNING
Status: DISCONTINUED | OUTPATIENT
Start: 2021-02-10 | End: 2021-02-12 | Stop reason: HOSPADM

## 2021-02-09 RX ORDER — MULTIVITAMIN/IRON/FOLIC ACID 18MG-0.4MG
1 TABLET ORAL DAILY
Status: DISCONTINUED | OUTPATIENT
Start: 2021-02-17 | End: 2021-02-12 | Stop reason: HOSPADM

## 2021-02-09 RX ORDER — SODIUM CHLORIDE 9 MG/ML
20 INJECTION, SOLUTION INTRAVENOUS ONCE
Status: CANCELLED | OUTPATIENT
Start: 2021-02-09

## 2021-02-09 RX ORDER — SODIUM CHLORIDE 9 MG/ML
20 INJECTION, SOLUTION INTRAVENOUS ONCE
Status: COMPLETED | OUTPATIENT
Start: 2021-02-09 | End: 2021-02-09

## 2021-02-09 RX ORDER — ZINC SULFATE 50(220)MG
220 CAPSULE ORAL DAILY
Status: DISCONTINUED | OUTPATIENT
Start: 2021-02-10 | End: 2021-02-12 | Stop reason: HOSPADM

## 2021-02-09 RX ADMIN — DEXAMETHASONE SODIUM PHOSPHATE 6 MG: 4 INJECTION, SOLUTION INTRAMUSCULAR; INTRAVENOUS at 17:24

## 2021-02-09 RX ADMIN — SODIUM CHLORIDE 20 ML/HR: 9 INJECTION, SOLUTION INTRAVENOUS at 13:16

## 2021-02-09 RX ADMIN — ONDANSETRON 4 MG: 2 INJECTION INTRAMUSCULAR; INTRAVENOUS at 17:23

## 2021-02-09 RX ADMIN — SODIUM CHLORIDE 700 MG: 9 INJECTION, SOLUTION INTRAVENOUS at 13:16

## 2021-02-09 RX ADMIN — ACETAMINOPHEN 650 MG: 325 TABLET, FILM COATED ORAL at 17:56

## 2021-02-09 RX ADMIN — SODIUM CHLORIDE 500 ML: 0.9 INJECTION, SOLUTION INTRAVENOUS at 17:24

## 2021-02-09 NOTE — TELEPHONE ENCOUNTER
Patients daughter called, she said the patient just got home from the monoclonal antibody infusion center  Patient is shaking uncontrollably , temp was 99 6, and says the patients hand was turning blue  As per Dr Gonzalez Jasso, patient is to go to local ER for evaluation     Rosario Bey MA

## 2021-02-09 NOTE — NURSING NOTE
Patient tolerated infusion without signs of reaction  Patient provided post infusion information packet and notified that provider should be calling the patient tomorrow to follow up

## 2021-02-09 NOTE — PLAN OF CARE
Problem: Potential for Falls  Goal: Patient will remain free of falls  Description: INTERVENTIONS:  - Assess patient frequently for physical needs  -  Identify cognitive and physical deficits and behaviors that affect risk of falls    -  [place] fall precautions as indicated by assessment   - Educate patient/family on patient safety including physical limitations  - Instruct patient to call for assistance with activity based on assessment  - Modify environment to reduce risk of injury  - Consider OT/PT consult to assist with strengthening/mobility  Outcome: Progressing

## 2021-02-09 NOTE — ED PROCEDURE NOTE
PROCEDURE  ECG 12 Lead Documentation Only    Date/Time: 2/9/2021 5:36 PM  Performed by: Mary Odom DO  Authorized by: Mary Odom DO     ECG reviewed by me, the ED Provider: yes    Patient location:  ED  Interpretation:     Interpretation: abnormal    Rate:     ECG rate:  82    ECG rate assessment: normal    Rhythm:     Rhythm: sinus rhythm    Ectopy:     Ectopy: none    ST segments:     ST segments:  Normal  T waves:     T waves: normal    Q waves:     Q waves:  III and aVF         Mary Odom DO  02/09/21 1737

## 2021-02-10 LAB
ALBUMIN SERPL BCP-MCNC: 2.5 G/DL (ref 3.5–5)
ALP SERPL-CCNC: 52 U/L (ref 46–116)
ALT SERPL W P-5'-P-CCNC: 30 U/L (ref 12–78)
ANION GAP SERPL CALCULATED.3IONS-SCNC: 9 MMOL/L (ref 4–13)
AST SERPL W P-5'-P-CCNC: 33 U/L (ref 5–45)
ATRIAL RATE: 82 BPM
BACTERIA UR QL AUTO: ABNORMAL /HPF
BASOPHILS # BLD AUTO: 0 THOUSANDS/ΜL (ref 0–0.1)
BASOPHILS NFR BLD AUTO: 0 % (ref 0–1)
BILIRUB SERPL-MCNC: 0.3 MG/DL (ref 0.2–1)
BILIRUB UR QL STRIP: NEGATIVE
BUN SERPL-MCNC: 37 MG/DL (ref 5–25)
CALCIUM ALBUM COR SERPL-MCNC: 9.4 MG/DL (ref 8.3–10.1)
CALCIUM SERPL-MCNC: 8.2 MG/DL (ref 8.3–10.1)
CHLORIDE SERPL-SCNC: 107 MMOL/L (ref 100–108)
CLARITY UR: CLEAR
CO2 SERPL-SCNC: 23 MMOL/L (ref 21–32)
COARSE GRAN CASTS URNS QL MICRO: ABNORMAL /LPF
COLOR UR: YELLOW
CREAT SERPL-MCNC: 1.88 MG/DL (ref 0.6–1.3)
CRP SERPL QL: 99.7 MG/L
D DIMER PPP FEU-MCNC: 0.75 UG/ML FEU
EOSINOPHIL # BLD AUTO: 0 THOUSAND/ΜL (ref 0–0.61)
EOSINOPHIL NFR BLD AUTO: 0 % (ref 0–6)
ERYTHROCYTE [DISTWIDTH] IN BLOOD BY AUTOMATED COUNT: 11.9 % (ref 11.6–15.1)
FERRITIN SERPL-MCNC: 459 NG/ML (ref 8–388)
GFR SERPL CREATININE-BSD FRML MDRD: 34 ML/MIN/1.73SQ M
GLUCOSE SERPL-MCNC: 239 MG/DL (ref 65–140)
GLUCOSE SERPL-MCNC: 239 MG/DL (ref 65–140)
GLUCOSE SERPL-MCNC: 280 MG/DL (ref 65–140)
GLUCOSE UR STRIP-MCNC: NEGATIVE MG/DL
HCT VFR BLD AUTO: 41.4 % (ref 36.5–49.3)
HGB BLD-MCNC: 12.9 G/DL (ref 12–17)
HGB UR QL STRIP.AUTO: NEGATIVE
HYALINE CASTS #/AREA URNS LPF: ABNORMAL /LPF
IMM GRANULOCYTES # BLD AUTO: 0.08 THOUSAND/UL (ref 0–0.2)
IMM GRANULOCYTES NFR BLD AUTO: 1 % (ref 0–2)
KETONES UR STRIP-MCNC: NEGATIVE MG/DL
LEUKOCYTE ESTERASE UR QL STRIP: NEGATIVE
LYMPHOCYTES # BLD AUTO: 0.64 THOUSANDS/ΜL (ref 0.6–4.47)
LYMPHOCYTES NFR BLD AUTO: 7 % (ref 14–44)
MCH RBC QN AUTO: 30.2 PG (ref 26.8–34.3)
MCHC RBC AUTO-ENTMCNC: 31.2 G/DL (ref 31.4–37.4)
MCV RBC AUTO: 97 FL (ref 82–98)
MONOCYTES # BLD AUTO: 0.42 THOUSAND/ΜL (ref 0.17–1.22)
MONOCYTES NFR BLD AUTO: 4 % (ref 4–12)
NEUTROPHILS # BLD AUTO: 8.32 THOUSANDS/ΜL (ref 1.85–7.62)
NEUTS SEG NFR BLD AUTO: 88 % (ref 43–75)
NITRITE UR QL STRIP: NEGATIVE
NON-SQ EPI CELLS URNS QL MICRO: ABNORMAL /HPF
NRBC BLD AUTO-RTO: 0 /100 WBCS
P AXIS: 64 DEGREES
PH UR STRIP.AUTO: 5.5 [PH]
PLATELET # BLD AUTO: 217 THOUSANDS/UL (ref 149–390)
PMV BLD AUTO: 10.1 FL (ref 8.9–12.7)
POTASSIUM SERPL-SCNC: 4.9 MMOL/L (ref 3.5–5.3)
PR INTERVAL: 150 MS
PROCALCITONIN SERPL-MCNC: 12.33 NG/ML
PROT SERPL-MCNC: 6 G/DL (ref 6.4–8.2)
PROT UR STRIP-MCNC: ABNORMAL MG/DL
QRS AXIS: -21 DEGREES
QRSD INTERVAL: 92 MS
QT INTERVAL: 346 MS
QTC INTERVAL: 404 MS
RBC # BLD AUTO: 4.27 MILLION/UL (ref 3.88–5.62)
RBC #/AREA URNS AUTO: ABNORMAL /HPF
SODIUM SERPL-SCNC: 139 MMOL/L (ref 136–145)
SP GR UR STRIP.AUTO: 1.02 (ref 1–1.03)
T WAVE AXIS: 10 DEGREES
UROBILINOGEN UR QL STRIP.AUTO: 0.2 E.U./DL
VENTRICULAR RATE: 82 BPM
WBC # BLD AUTO: 9.46 THOUSAND/UL (ref 4.31–10.16)
WBC #/AREA URNS AUTO: ABNORMAL /HPF

## 2021-02-10 PROCEDURE — 85379 FIBRIN DEGRADATION QUANT: CPT | Performed by: PHYSICIAN ASSISTANT

## 2021-02-10 PROCEDURE — 82948 REAGENT STRIP/BLOOD GLUCOSE: CPT

## 2021-02-10 PROCEDURE — 81001 URINALYSIS AUTO W/SCOPE: CPT | Performed by: INTERNAL MEDICINE

## 2021-02-10 PROCEDURE — 93010 ELECTROCARDIOGRAM REPORT: CPT | Performed by: INTERNAL MEDICINE

## 2021-02-10 PROCEDURE — 86140 C-REACTIVE PROTEIN: CPT | Performed by: PHYSICIAN ASSISTANT

## 2021-02-10 PROCEDURE — 80053 COMPREHEN METABOLIC PANEL: CPT | Performed by: PHYSICIAN ASSISTANT

## 2021-02-10 PROCEDURE — 87449 NOS EACH ORGANISM AG IA: CPT | Performed by: INTERNAL MEDICINE

## 2021-02-10 PROCEDURE — 97162 PT EVAL MOD COMPLEX 30 MIN: CPT

## 2021-02-10 PROCEDURE — 85025 COMPLETE CBC W/AUTO DIFF WBC: CPT | Performed by: PHYSICIAN ASSISTANT

## 2021-02-10 PROCEDURE — 99232 SBSQ HOSP IP/OBS MODERATE 35: CPT | Performed by: INTERNAL MEDICINE

## 2021-02-10 PROCEDURE — XW033E5 INTRODUCTION OF REMDESIVIR ANTI-INFECTIVE INTO PERIPHERAL VEIN, PERCUTANEOUS APPROACH, NEW TECHNOLOGY GROUP 5: ICD-10-PCS | Performed by: INTERNAL MEDICINE

## 2021-02-10 PROCEDURE — 84145 PROCALCITONIN (PCT): CPT | Performed by: PHYSICIAN ASSISTANT

## 2021-02-10 RX ORDER — AZITHROMYCIN 250 MG/1
500 TABLET, FILM COATED ORAL EVERY 24 HOURS
Status: DISCONTINUED | OUTPATIENT
Start: 2021-02-10 | End: 2021-02-11

## 2021-02-10 RX ORDER — CEFTRIAXONE 1 G/50ML
1000 INJECTION, SOLUTION INTRAVENOUS EVERY 24 HOURS
Status: DISCONTINUED | OUTPATIENT
Start: 2021-02-10 | End: 2021-02-12 | Stop reason: HOSPADM

## 2021-02-10 RX ADMIN — HEPARIN SODIUM 5000 UNITS: 5000 INJECTION INTRAVENOUS; SUBCUTANEOUS at 13:21

## 2021-02-10 RX ADMIN — INSULIN LISPRO 3 UNITS: 100 INJECTION, SOLUTION INTRAVENOUS; SUBCUTANEOUS at 22:56

## 2021-02-10 RX ADMIN — HEPARIN SODIUM 5000 UNITS: 5000 INJECTION INTRAVENOUS; SUBCUTANEOUS at 22:30

## 2021-02-10 RX ADMIN — REMDESIVIR 100 MG: 100 INJECTION, POWDER, LYOPHILIZED, FOR SOLUTION INTRAVENOUS at 14:05

## 2021-02-10 RX ADMIN — LEVOTHYROXINE SODIUM 75 MCG: 75 TABLET ORAL at 07:00

## 2021-02-10 RX ADMIN — INSULIN DETEMIR 39 UNITS: 100 INJECTION, SOLUTION SUBCUTANEOUS at 22:30

## 2021-02-10 RX ADMIN — DEXAMETHASONE SODIUM PHOSPHATE 6 MG: 4 INJECTION, SOLUTION INTRA-ARTICULAR; INTRALESIONAL; INTRAMUSCULAR; INTRAVENOUS; SOFT TISSUE at 20:36

## 2021-02-10 RX ADMIN — AZITHROMYCIN MONOHYDRATE 500 MG: 250 TABLET ORAL at 15:32

## 2021-02-10 RX ADMIN — ATORVASTATIN CALCIUM 40 MG: 40 TABLET, FILM COATED ORAL at 09:14

## 2021-02-10 RX ADMIN — ASPIRIN 81 MG: 81 TABLET, COATED ORAL at 09:14

## 2021-02-10 RX ADMIN — METOPROLOL SUCCINATE 25 MG: 25 TABLET, EXTENDED RELEASE ORAL at 09:14

## 2021-02-10 RX ADMIN — HEPARIN SODIUM 5000 UNITS: 5000 INJECTION INTRAVENOUS; SUBCUTANEOUS at 07:00

## 2021-02-10 RX ADMIN — ZINC SULFATE 220 MG (50 MG) CAPSULE 220 MG: CAPSULE at 09:14

## 2021-02-10 RX ADMIN — REMDESIVIR 200 MG: 100 INJECTION, POWDER, LYOPHILIZED, FOR SOLUTION INTRAVENOUS at 00:06

## 2021-02-10 RX ADMIN — Medication 2000 UNITS: at 09:14

## 2021-02-10 RX ADMIN — OXYCODONE HYDROCHLORIDE AND ACETAMINOPHEN 1000 MG: 500 TABLET ORAL at 20:36

## 2021-02-10 RX ADMIN — HEPARIN SODIUM 5000 UNITS: 5000 INJECTION INTRAVENOUS; SUBCUTANEOUS at 00:07

## 2021-02-10 RX ADMIN — SODIUM CHLORIDE 75 ML/HR: 0.9 INJECTION, SOLUTION INTRAVENOUS at 22:58

## 2021-02-10 RX ADMIN — SODIUM CHLORIDE 75 ML/HR: 0.9 INJECTION, SOLUTION INTRAVENOUS at 09:14

## 2021-02-10 RX ADMIN — OXYCODONE HYDROCHLORIDE AND ACETAMINOPHEN 1000 MG: 500 TABLET ORAL at 09:14

## 2021-02-10 RX ADMIN — CEFTRIAXONE 1000 MG: 1 INJECTION, SOLUTION INTRAVENOUS at 15:33

## 2021-02-10 RX ADMIN — SODIUM CHLORIDE 75 ML/HR: 0.9 INJECTION, SOLUTION INTRAVENOUS at 00:09

## 2021-02-10 RX ADMIN — INSULIN DETEMIR 39 UNITS: 100 INJECTION, SOLUTION SUBCUTANEOUS at 00:30

## 2021-02-10 RX ADMIN — OXYCODONE HYDROCHLORIDE AND ACETAMINOPHEN 1000 MG: 500 TABLET ORAL at 00:06

## 2021-02-10 NOTE — ASSESSMENT & PLAN NOTE
Lab Results   Component Value Date    EGFR 32 02/09/2021    EGFR 46 11/23/2019    CREATININE 1 99 (H) 02/09/2021    CREATININE 1 44 (H) 01/11/2021    CREATININE 1 62 (H) 08/06/2020     Baseline 1 4-1 6  Cr 1 99 on admission  Likely prerenal in setting of poor PO intake, mild dehydration  Receiving IVF  Repeat BMP in AM  Avoid nephrotoxins

## 2021-02-10 NOTE — PLAN OF CARE
Problem: Potential for Falls  Goal: Patient will remain free of falls  Description: INTERVENTIONS:  - Assess patient frequently for physical needs  -  Identify cognitive and physical deficits and behaviors that affect risk of falls    -  [place] fall precautions as indicated by assessment   - Educate patient/family on patient safety including physical limitations  - Instruct patient to call for assistance with activity based on assessment  - Modify environment to reduce risk of injury  - Consider OT/PT consult to assist with strengthening/mobility  Outcome: Progressing     Problem: RESPIRATORY - ADULT  Goal: Achieves optimal ventilation and oxygenation  Description: INTERVENTIONS:  - Assess for changes in respiratory status  - Assess for changes in mentation and behavior  - Position to facilitate oxygenation and minimize respiratory effort  - Oxygen administered by appropriate delivery if ordered  - Initiate smoking cessation education as indicated  - Encourage broncho-pulmonary hygiene including cough, deep breathe, Incentive Spirometry  - Assess the need for suctioning and aspirate as needed  - Assess and instruct to report SOB or any respiratory difficulty  - Respiratory Therapy support as indicated  Outcome: Progressing     Problem: METABOLIC, FLUID AND ELECTROLYTES - ADULT  Goal: Electrolytes maintained within normal limits  Description: INTERVENTIONS:  - Monitor labs and assess patient for signs and symptoms of electrolyte imbalances  - Administer electrolyte replacement as ordered  - Monitor response to electrolyte replacements, including repeat lab results as appropriate  - Instruct patient on fluid and nutrition as appropriate  Outcome: Progressing  Goal: Fluid balance maintained  Description: INTERVENTIONS:  - Monitor labs   - Monitor I/O and WT  - Instruct patient on fluid and nutrition as appropriate  - Assess for signs & symptoms of volume excess or deficit  Outcome: Progressing  Goal: Glucose maintained within target range  Description: INTERVENTIONS:  - Monitor Blood Glucose as ordered  - Assess for signs and symptoms of hyperglycemia and hypoglycemia  - Administer ordered medications to maintain glucose within target range  - Assess nutritional intake and initiate nutrition service referral as needed  Outcome: Progressing     Problem: HEMATOLOGIC - ADULT  Goal: Maintains hematologic stability  Description: INTERVENTIONS  - Assess for signs and symptoms of bleeding or hemorrhage  - Monitor labs  - Administer supportive blood products/factors as ordered and appropriate  Outcome: Progressing

## 2021-02-10 NOTE — PHYSICAL THERAPY NOTE
PT EVALUATION     Pt is not in need of skilled OT services - pt is independent with ADLs  PT will continue to follow pt      02/10/21 1450   PT Last Visit   PT Visit Date 02/10/21   Note Type   Note type Evaluation   Pain Assessment   Pain Assessment Tool Pain Assessment not indicated - pt denies pain   Home Living   Type of Home House  (1 ANNE)   Home Layout Two level;Bed/bath upstairs; Able to live on main level with bedroom/bathroom   Prior Function   Level of [place] Independent with ADLs and functional mobility   Lives With Spouse   Receives Help From Family   ADL Assistance Independent   Restrictions/Precautions   Other Precautions Agitated;Contact/isolation   General   Additional Pertinent History Pt adm with fever, chills and SOB  Pt tested COVID-19 (+) on 2/4/21  Cognition   Overall Cognitive Status WFL   Arousal/Participation Cooperative   Orientation Level Oriented X4   Following Commands Follows all commands and directions without difficulty   RLE Assessment   RLE Assessment WFL  (4/5)   LLE Assessment   LLE Assessment WFL  (4/5)   Bed Mobility   Supine to Sit 7  Independent   Sit to Supine 7  Independent   Transfers   Sit to Stand 7  Independent   Stand to Sit 7  Independent   Ambulation/Elevation   Gait Assistance   (S/I)   Assistive Device None   Distance 50 feet in room x 2 reps with rest inbetween due to SOB  SAO2 94-96% with gait and mobility  Balance   Static Sitting Good   Static Standing Good   Dynamic Standing Good   Ambulatory Good   Activity Tolerance   Activity Tolerance Patient tolerated treatment well;Patient limited by fatigue;Treatment limited secondary to medical complications (Comment)  (SOB)   Assessment   Problem List Decreased endurance;Decreased mobility   Assessment Patient seen for Physical Therapy evaluation  Patient admitted with COVID-19 virus infection  Comorbidities affecting patient's physical performance include: DM2, HTN, HLD, CHF, acute on CKD, OA,     Personal factors affecting patient at time of initial evaluation include: lives in two story house, stairs to enter home, inability to navigate community distances, inability to navigate level surfaces without external assistance and inability to perform dynamic tasks in community  Prior to admission, patient was independent with functional mobility without assistive device, independent with ADLS, independent with IADLS, living with spouse in a two level home with 1 steps to enter, ambulating household distance, ambulating community distances and lives in a multilevel house but has 1st floor setup  Please find objective findings from Physical Therapy assessment regarding body systems outlined above with impairments and limitations including decreased endurance, decreased activity tolerance, decreased functional mobility tolerance and SOB upon exertion  The Barthel Index was used as a functional outcome tool presenting with a score of 90 today indicating minimal limitations of functional mobility and ADLS  Patient's clinical presentation is currently evolving as seen in patient's presentation of vital sign response, increased fall risk, new onset of impairment of functional mobility, decreased endurance and new onset of weakness  Pt would benefit from continued Physical Therapy treatment to address deficits as defined above and maximize level of functional mobility  As demonstrated by objective findings, the assigned level of complexity for this evaluation is moderate  The patient's -[facility] Basic Mobility Inpatient Short Form Raw Score is 21, Standardized Score is 45 55  A standardized score greater than 42 9 suggests the patient may benefit from discharge to home  Please also refer to the recommendation of the Physical Therapist for safe discharge planning     Goals   Patient Goals go home   STG Expiration Date 02/17/21   Short Term Goal #1 pt will return to independent functional mobility household distances   LTG Expiration Date 02/24/21   Long Term Goal #1 pt will return to independent functional mobility, home and community, levels and stairs   Plan   Treatment/Interventions ADL retraining;Functional transfer training;LE strengthening/ROM; Elevations; Therapeutic exercise; Endurance training;Patient/family training;Equipment eval/education; Bed mobility;Gait training; Compensatory technique education   PT Frequency 2-3x/wk   Recommendation   PT Discharge Recommendation Return to previous environment with social support   Additional Comments Educated pt on importance of gait and mobility in his room and sitting OOB in chair;pt with good understanding and will increase his gait and mobility and spend increased time OOB in chair  Additional Comments 2 Pt also reports independence with ADLs in bathroom - no OT needs     AM-PAC Basic Mobility Inpatient   Turning in Bed Without Bedrails 4   Lying on Back to Sitting on Edge of Flat Bed 4   Moving Bed to Chair 3   Standing Up From Chair 4   Walk in Room 3   Climb 3-5 Stairs 3   Basic Mobility Inpatient Raw Score 21   Basic Mobility Standardized Score 45 55   Barthel Index   Feeding 10   Bathing 5   Grooming Score 5   Dressing Score 10   Bladder Score 10   Bowels Score 10   Toilet Use Score 10   Transfers (Bed/Chair) Score 15   Mobility (Level Surface) Score 10   Stairs Score 5   Barthel Index Score 80   Licensure   [place] License Number  Matthias, [facility] [id number]

## 2021-02-10 NOTE — ASSESSMENT & PLAN NOTE
Patient tested positive on 2/04  He has been feeling generally well at home up until this afternoon, shortly after his MAB infusion  He had SpO2 88-91% on RA in the ED and was placed on 3L NC O2    - Mild covid treatment pathway  Patient is currently on 02/01 of liters oxygen  Continue vitamin-D, vitamin-C and zinc  Continue remdesivir and Decadron 6 milligram IV daily with Pepcid prophylaxis  Ferritin 459  CRP 73 5-99 7  D-dimer 0 96-0 75  Procalcitonin level 12 33  Patient will be started on Rocephin and Zithromax as patient noted to have significant elevation of procalcitonin  Check sputum cultures, urine for Legionella pneumococcal antigens  Monitor procalcitonin level  Repeat CBC, CMP along with CRP in a m

## 2021-02-10 NOTE — H&P
[address] Internal Medicine  H&P- Reanna Scott 1945, 67 y o  male MRN: [id number]  Unit/Bed#: [address] [place] [address] Encounter: [id number]  Primary Care Provider: Calderon Guajardo DO   Date and time admitted to hospital: 2/9/2021  5:03 PM    * COVID-19 virus infection  Assessment & Plan  Patient tested positive on 2/04  He has been feeling generally well at home up until this afternoon, shortly after his MAB infusion   He had SpO2 88-91% on RA in the ED and was placed on 3L NC O2    - Mild covid treatment pathway  - Trend CBC, CMP daily  - Vit D, Vit C, Zinc  - Discussed Remdesivir with patient and provided with fact sheet, patient agreeable to starting   - PT/OT  - DVT ppx with heparin    Acute-on-chronic kidney injury [facility])  Assessment & Plan  Lab Results   Component Value Date    EGFR 32 02/09/2021    EGFR 46 11/23/2019    CREATININE 1 99 (H) 02/09/2021    CREATININE 1 44 (H) 01/11/2021    CREATININE 1 62 (H) 08/06/2020     Baseline 1 4-1 6  Cr 1 99 on admission  Likely prerenal in setting of poor PO intake, mild dehydration  Receiving IVF  Repeat BMP in AM  Avoid nephrotoxins    Chronic diastolic congestive heart failure (HCC)  Assessment & Plan  Wt Readings from Last 3 Encounters:   02/09/21 91 8 kg (202 lb 6 1 oz)   01/28/21 91 2 kg (201 lb)   10/23/20 91 2 kg (201 lb)     Last echo from 2016 with EF of 60%, abnormal left ventricular relaxation  Patient denies any recent weight changes, LE edema, orthopnea  Do not suspect SOB to be related to CHF  Does not appear to be volume overloaded on exam  No evidence of acute on chronic CHF at this time  Holding home dose of Lasix due to NEO      Mixed hyperlipidemia  Assessment & Plan  Continue atorvastatin     Congenital hypothyroidism without goiter  Assessment & Plan  Continue levothyroxine 75 mcg    Essential hypertension  Assessment & Plan  Continue Toprol XL 25 mg daily  Hold lisinopril and Lasix in setting of NEO    Type 2 diabetes mellitus with stage 3 chronic kidney disease, with long-term current use of insulin (HCC)  Assessment & Plan  Lab Results   Component Value Date    HGBA1C 7 5 (H) 01/11/2021       Recent Labs     02/09/21  1703 02/10/21  0022   POCGLU 121 239*       Blood Sugar Average: Last 72 hrs:  (P) 180     Hold home PO medications  Continue Levemir 39 U QHS  Add SSI coverage with meals and QHS  DM2 diet      VTE Prophylaxis: Heparin  / sequential compression device   Code Status: Level 1  POLST: POLST is not applicable to this patient  Discussion with family: Yes, Patient's daughter reached at home phone listed for spouse - all live at home together    Anticipated Length of Stay:  Patient will be admitted on an Inpatient basis with an anticipated length of stay of  > 2 midnights  Justification for Hospital Stay: Covid with hypoxia, NEO    Total Time for Visit, including Counseling / Coordination of Care: 30 minutes  Greater than 50% of this total time spent on direct patient counseling and coordination of care  Chief Complaint:   fever    History of Present Illness:    Jessy Rouse is a 67 y o  male who presents with history of HTN, HLD, DM2, CKD, hypothyroidism, CAD s/p stent  He presented to the ED today fevers, chills, shortness of breath  He tested COVID positive on 02/04 prior to he had been doing well at home and only experienced mild fatigue  Today he went for outpatient MAB infusion and shortly after he got home he started with a fever, chills, SOB, and states he was having trouble talking  He did complain of chest pain with cough  He denies any SOB prior to today, denies LE edema, orthopnea, palpitations, abdominal pain  He denies headaches, lightheadedness, dizziness, numbness, tingling, gait abnormalities, weakness otherwise  In the ED he was noted to have an NEO and required 3L of NC O2  He did also have 1 episode of vomiting in the ED but denies any abdominal pain or nausea otherwise   Spoke with patient's daughter who reports that he has had a few moments of mild confusion in the past few days and would sometimes seem confused about what was going on and would need gentle reminders  She states at baseline he is AAOx3  Review of Systems:    Review of Systems   Constitutional: Positive for chills, fatigue and fever  Negative for activity change and appetite change  HENT: Negative for congestion, rhinorrhea, sore throat and trouble swallowing  Eyes: Negative for visual disturbance  Respiratory: Positive for cough and shortness of breath  Negative for wheezing  Cardiovascular: Positive for chest pain (w/ cough)  Negative for palpitations and leg swelling  Gastrointestinal: Negative for abdominal distention, abdominal pain, diarrhea, nausea and vomiting  Genitourinary: Negative for dysuria, frequency, hematuria and urgency  Musculoskeletal: Negative for gait problem and myalgias  Skin: Negative for color change and rash  Neurological: Positive for speech difficulty  Negative for dizziness, tremors, facial asymmetry, weakness, light-headedness, numbness and headaches  Psychiatric/Behavioral: Positive for confusion          Past Medical and Surgical History:     Past Medical History:   Diagnosis Date    Aortic narrowing     Last Assessed:  9/28/15    Arthritis     [place] esophagus     Bilateral leg edema     [place], lung (HCC)     CHF (congestive heart failure) (HCC)     Chronic kidney disease     stage 3    Diabetes mellitus (HCC)     Enlarged prostate     Heel pain     right heel slipped in the garage-landed on the right heel    Herniated lumbar intervertebral disc     x 4 discs-fell off a roof    Hiatal hernia     High cholesterol     History of kidney problems     Hypertension     Old myocardial infarction     x2-pt was unaware of the MI-one stent    Thyroid disease     hypothyroid    Transient cerebral ischemia     Last Assessed:  8/27/15    Wears dentures     full upper, partial lower  Wears glasses        Past Surgical History:   Procedure Laterality Date    CATARACT EXTRACTION      COLONOSCOPY      Resolved:  2015    CORONARY ANGIOPLASTY WITH STENT PLACEMENT      Stent implanted early 1990's,     ESOPHAGOGASTRODUODENOSCOPY      KNEE SURGERY      right knee cartilage surgery-left meniscus repair    NC XCAPSL CTRC RMVL INSJ IO LENS PROSTH W/O ECP Right 1/24/2019    Procedure: EXTRACTION EXTRACAPSULAR CATARACT PHACO INTRAOCULAR LENS (IOL); Surgeon: Salas Becerra MD;  Location: [facility] MAIN OR;  Service: Ophthalmology    NC XCAPSL CTRC RMVL INSJ IO LENS PROSTH W/O ECP Left 2/21/2019    Procedure: EXTRACTION EXTRACAPSULAR CATARACT PHACO INTRAOCULAR LENS (IOL); Surgeon: Salas Becerra MD;  Location: [facility] MAIN OR;  Service: Ophthalmology    TONSILLECTOMY      UMBILICAL HERNIA REPAIR      1980's,    UPPER GASTROINTESTINAL ENDOSCOPY      Last Assessed:  8/27/15       Meds/Allergies:    Prior to Admission medications    Medication Sig Start Date End Date Taking?  Authorizing Provider   aspirin (ASPIR-81) 81 mg EC tablet Take 81 mg by mouth every morning   9/28/15  Yes Historical Provider, MD   atorvastatin (LIPITOR) 40 mg tablet TAKE 1 TABLET BY MOUTH  DAILY 8/3/20  Yes Maykel Santamaria DO   clotrimazole-betamethasone (LOTRISONE) 1-0 05 % cream Apply topically 2 (two) times a day 5/10/19  Yes Maykel Santamaria DO   Coenzyme Q10 (COQ-10) 100 MG CAPS Take 200 mg by mouth daily at bedtime  12/19/17  Yes Historical Provider, MD   furosemide (LASIX) 20 mg tablet Take 1 tablet (20 mg total) by mouth daily 12/21/20  Yes Maykel Santamaria DO   glipiZIDE (GLUCOTROL) 5 mg tablet Take 1 tablet (5 mg total) by mouth daily with breakfast 10/23/20 4/21/21 Yes Maykel Santamaria DO   insulin detemir (Levemir FlexTouch) 100 Units/mL injection pen Inject 39 Units under the skin daily at bedtime 1/28/21 2/27/21 Yes Maykel Santamaria DO   insulin lispro (HumaLOG KwikPen) 100 units/mL injection pen Inject 5 Units under the skin 3 (three) times a day with meals  Patient taking differently: Inject 5 Units under the skin 3 (three) times a day with meals Sliding scale 21 Yes Celi Jerry, DO   levothyroxine 75 mcg tablet TAKE 1 TABLET BY MOUTH  DAILY 21  Yes Celi Jerry, DO   lisinopril (ZESTRIL) 2 5 mg tablet Take 1 tablet (2 5 mg total) by mouth daily 20  Yes Celi Jerry, DO   metoprolol succinate (TOPROL-XL) 25 mg 24 hr tablet TAKE 1 TABLET BY MOUTH  DAILY 20  Yes Morgan Henley,    multivitamin (THERAGRAN) TABS Take 1 tablet by mouth every morning   Yes Historical Provider, MD   pantoprazole (PROTONIX) 40 mg tablet TAKE 1 TABLET BY MOUTH  DAILY 20  Yes LUCAS Gomes   Insulin Pen Needle (BD Pen Needle Sherie U/F) 32G X 4 MM MISC USE 4 TIMES DAILY AS  DIRECTED  Patient not taking: Reported on 20   Reggie Gilbert MD     I have reviewed home medications with patient personally      Allergies: No Known Allergies    Social History:    Social History     Substance and Sexual Activity   Alcohol Use Not Currently     Social History     Tobacco Use   Smoking Status Former Smoker    Packs/day: 4 00    Years: 20 00    Pack years: 80 00    Quit date: 12    Years since quittin 1   Smokeless Tobacco Never Used     Social History     Substance and Sexual Activity   Drug Use No       Family History:    Family History   Problem Relation Age of Onset    Colon cancer Mother     Arthritis Mother     Diabetes Mother     Hypertension Mother     Cancer Mother         Breast    Hyperlipidemia Mother     Cancer Sister         ovarian    Other Brother         Cerebrovascular accident (CVA)    Diabetes Other         Sibling    Hypertension Other         Sibling    Hernia Father         umbilical    Hernia Son     Cancer Sister         liver    Kidney disease Sister     Kidney disease Brother     Mental illness Neg Hx        Physical Exam:     Vitals:   Blood Pressure: 103/56 (02/09/21 2139)  Pulse: 66 (02/09/21 2139)  Temperature: (!) 97 2 °F (36 2 °C) (02/09/21 2139)  Temp Source: Temporal (02/09/21 2139)  Respirations: 14 (02/09/21 2139)  Height: 5' 8" (172 7 cm) (02/09/21 2139)  Weight - Scale: 91 8 kg (202 lb 6 1 oz) (02/09/21 2139)  SpO2: 98 % (02/09/21 2139)    Physical Exam  Vitals signs reviewed  Constitutional:       General: He is not in acute distress  Appearance: Normal appearance  He is well-developed  He is not ill-appearing  HENT:      Head: Normocephalic and atraumatic  Cardiovascular:      Rate and Rhythm: Normal rate and regular rhythm  Heart sounds: Normal heart sounds  No murmur  Pulmonary:      Effort: Pulmonary effort is normal  No respiratory distress  Breath sounds: Normal breath sounds  No wheezing, rhonchi or rales  Abdominal:      General: Bowel sounds are normal  There is no distension  Palpations: Abdomen is soft  Tenderness: There is no abdominal tenderness  There is no guarding  Musculoskeletal:         General: No swelling or tenderness  Right lower leg: No edema  Left lower leg: No edema  Skin:     General: Skin is warm and dry  Findings: No erythema or rash  Neurological:      Mental Status: He is alert and oriented to person, place, and time  Psychiatric:         Mood and Affect: Mood normal          Behavior: Behavior normal            Additional Data:     Lab Results: I have personally reviewed pertinent reports        Results from last 7 days   Lab Units 02/09/21  1710   WBC Thousand/uL 6 52   HEMOGLOBIN g/dL 14 0   HEMATOCRIT % 44 3   PLATELETS Thousands/uL 210   NEUTROS PCT % 76*   LYMPHS PCT % 18   MONOS PCT % 5   EOS PCT % 0     Results from last 7 days   Lab Units 02/09/21  1710   SODIUM mmol/L 139   POTASSIUM mmol/L 4 2   CHLORIDE mmol/L 105   CO2 mmol/L 26   BUN mg/dL 30*   CREATININE mg/dL 1 99*   ANION GAP mmol/L 8   CALCIUM mg/dL 8 5   ALBUMIN g/dL 3 0*   TOTAL BILIRUBIN mg/dL 0 40   ALK PHOS U/L 60   ALT U/L 33   AST U/L 35   GLUCOSE RANDOM mg/dL 134     Results from last 7 days   Lab Units 02/09/21  1710   INR  1 06     Results from last 7 days   Lab Units 02/10/21  0022 02/09/21  1703   POC GLUCOSE mg/dl 239* 121         Results from last 7 days   Lab Units 02/09/21  1710   LACTIC ACID mmol/L 1 7       Imaging: I have personally reviewed pertinent reports  XR chest 1 view portable   Final Result by Alissa Cunha MD (02/09 2008)   Lower lung groundglass opacity likely related to known Covid 19 pneumonia  Workstation performed: [id number]             EKG, Pathology, and Other Studies Reviewed on Admission:   · EKG: NSR, 82 bpm    Allscripts / Epic Records Reviewed: Yes     ** Please Note: This note has been constructed using a voice recognition system   **

## 2021-02-10 NOTE — PLAN OF CARE
Problem: Potential for Falls  Goal: Patient will remain free of falls  Description: INTERVENTIONS:  - Assess patient frequently for physical needs  -  Identify cognitive and physical deficits and behaviors that affect risk of falls    -  [place] fall precautions as indicated by assessment   - Educate patient/family on patient safety including physical limitations  - Instruct patient to call for assistance with activity based on assessment  - Modify environment to reduce risk of injury  - Consider OT/PT consult to assist with strengthening/mobility  Outcome: Progressing     Problem: RESPIRATORY - ADULT  Goal: Achieves optimal ventilation and oxygenation  Description: INTERVENTIONS:  - Assess for changes in respiratory status  - Assess for changes in mentation and behavior  - Position to facilitate oxygenation and minimize respiratory effort  - Oxygen administered by appropriate delivery if ordered  - Initiate smoking cessation education as indicated  - Encourage broncho-pulmonary hygiene including cough, deep breathe, Incentive Spirometry  - Assess the need for suctioning and aspirate as needed  - Assess and instruct to report SOB or any respiratory difficulty  - Respiratory Therapy support as indicated  Outcome: Progressing     Problem: METABOLIC, FLUID AND ELECTROLYTES - ADULT  Goal: Electrolytes maintained within normal limits  Description: INTERVENTIONS:  - Monitor labs and assess patient for signs and symptoms of electrolyte imbalances  - Administer electrolyte replacement as ordered  - Monitor response to electrolyte replacements, including repeat lab results as appropriate  - Instruct patient on fluid and nutrition as appropriate  Outcome: Progressing  Goal: Fluid balance maintained  Description: INTERVENTIONS:  - Monitor labs   - Monitor I/O and WT  - Instruct patient on fluid and nutrition as appropriate  - Assess for signs & symptoms of volume excess or deficit  Outcome: Progressing  Goal: Glucose maintained within target range  Description: INTERVENTIONS:  - Monitor Blood Glucose as ordered  - Assess for signs and symptoms of hyperglycemia and hypoglycemia  - Administer ordered medications to maintain glucose within target range  - Assess nutritional intake and initiate nutrition service referral as needed  Outcome: Progressing     Problem: HEMATOLOGIC - ADULT  Goal: Maintains hematologic stability  Description: INTERVENTIONS  - Assess for signs and symptoms of bleeding or hemorrhage  - Monitor labs  - Administer supportive blood products/factors as ordered and appropriate  Outcome: Progressing

## 2021-02-10 NOTE — ASSESSMENT & PLAN NOTE
Patient tested positive on 2/04  He has been feeling generally well at home up until this afternoon, shortly after his MAB infusion   He had SpO2 88-91% on RA in the ED and was placed on 3L NC O2    - Mild covid treatment pathway  - Trend CBC, CMP daily  - Vit D, Vit C, Zinc  - Discussed Remdesivir with patient and provided with fact sheet, patient agreeable to starting   - PT/OT  - DVT ppx with heparin

## 2021-02-10 NOTE — PROGRESS NOTES
Progress Note - Fan Parikh 1945, 67 y o  male MRN: [id number]    Unit/Bed#: [address] [place] [address] Encounter: [id number]    Primary Care Provider: Johanny Hancock DO   Date and time admitted to hospital: 2/9/2021  5:03 PM        * COVID-19 virus infection  Assessment & Plan  Patient tested positive on 2/04  He has been feeling generally well at home up until this afternoon, shortly after his MAB infusion  He had SpO2 88-91% on RA in the ED and was placed on 3L NC O2    - Mild covid treatment pathway  Patient is currently on 02/01 of liters oxygen  Continue vitamin-D, vitamin-C and zinc  Continue remdesivir and Decadron 6 milligram IV daily with Pepcid prophylaxis  Ferritin 459  CRP 73 5-99 7  D-dimer 0 96-0 75  Procalcitonin level 12 33  Patient will be started on Rocephin and Zithromax as patient noted to have significant elevation of procalcitonin  Check sputum cultures, urine for Legionella pneumococcal antigens  Monitor procalcitonin level  Repeat CBC, CMP along with CRP in a m  Acute-on-chronic kidney injury [facility])  Assessment & Plan  Lab Results   Component Value Date    EGFR 34 02/10/2021    EGFR 32 02/09/2021    EGFR 46 11/23/2019    CREATININE 1 88 (H) 02/10/2021    CREATININE 1 99 (H) 02/09/2021    CREATININE 1 44 (H) 01/11/2021     Baseline 1 4-1 6  Cr 1 99 on admission  In the setting of poor oral intake and dehydration  Improving with gentle IV hydration  Continue to hold Lasix and lisinopril  Avoid nephrotoxic agents and hypotension    Type 2 diabetes mellitus with stage 3 chronic kidney disease, with long-term current use of insulin [facility])  Assessment & Plan  Lab Results   Component Value Date    HGBA1C 7 5 (H) 01/11/2021       Recent Labs     02/09/21  1703 02/10/21  0022   POCGLU 121 239*       Blood Sugar Average: Last 72 hrs:  (P) 180     Hold home PO medications  Continue Levemir 39 U QHS  Monitored blood sugars as patient is going to be on steroids    Continue Humalog sliding scale with Accu-Cheks q a c  And hs  Chronic diastolic congestive heart failure (HCC)  Assessment & Plan  Wt Readings from Last 3 Encounters:   21 91 8 kg (202 lb 6 1 oz)   21 91 2 kg (201 lb)   10/23/20 91 2 kg (201 lb)     Last echo from  with EF of 60%, abnormal left ventricular relaxation  Patient denies any recent weight changes, LE edema, orthopnea  Do not suspect SOB to be related to CHF  No evidence of volume overload  Hold Lasix I      Mixed hyperlipidemia  Assessment & Plan  Continue atorvastatin     Congenital hypothyroidism without goiter  Assessment & Plan  Continue levothyroxine 75 mcg    Essential hypertension  Assessment & Plan  Continue Toprol XL 25 mg daily  Continue to hold lisinopril and Lasix in the setting of NEO        VTE Pharmacologic Prophylaxis:   Pharmacologic: Heparin  Mechanical VTE Prophylaxis in Place: Yes    Patient Centered Rounds: I have performed bedside rounds with nursing staff today  Discussions with Specialists or Other Care Team Provider: No    Education and Discussions with Family / Patient: yes    Time Spent for Care: 45 minutes  More than 50% of total time spent on counseling and coordination of care as described above  Current Length of Stay: 1 day(s)    Current Patient Status: Inpatient   Certification Statement: The patient will continue to require additional inpatient hospital stay due to COVID-19 infection, suspected pneumonia, acute kidney injury    Discharge Plan:  Home    Code Status: Level 1 - Full Code      Subjective:   Patient still having some generalized weakness  Denies any shortness of breath, cough or chills  Objective:     Vitals:   Temp (24hrs), Av °F (37 8 °C), Min:97 2 °F (36 2 °C), Max:102 °F (38 9 °C)    Temp:  [97 2 °F (36 2 °C)-102 °F (38 9 °C)] 97 4 °F (36 3 °C)  HR:  [61-85] 61  Resp:  [14-25] 16  BP: (103-158)/(56-78) 141/63  SpO2:  [89 %-98 %] 92 %  Body mass index is 30 77 kg/m²       Input and Output Summary (last 24 hours): Intake/Output Summary (Last 24 hours) at 2/10/2021 1429  Last data filed at 2/9/2021 1756  Gross per 24 hour   Intake 500 ml   Output --   Net 500 ml       Physical Exam:     Physical Exam  Constitutional:       General: He is not in acute distress  HENT:      Head: Normocephalic and atraumatic  Eyes:      Conjunctiva/sclera: Conjunctivae normal       Pupils: Pupils are equal, round, and reactive to light  Neck:      Musculoskeletal: Normal range of motion and neck supple  Cardiovascular:      Rate and Rhythm: Normal rate and regular rhythm  Heart sounds: Normal heart sounds  Pulmonary:      Effort: Pulmonary effort is normal  No respiratory distress  Breath sounds: No wheezing, rhonchi or rales  Chest:      Chest wall: No tenderness  Abdominal:      General: Bowel sounds are normal  There is no distension  Palpations: Abdomen is soft  Tenderness: There is no abdominal tenderness  There is no guarding or rebound  Skin:     General: Skin is warm and dry  Findings: No rash  Neurological:      Mental Status: He is alert  Cranial Nerves: No cranial nerve deficit  Additional Data:     Labs:    Results from last 7 days   Lab Units 02/10/21  0646   WBC Thousand/uL 9 46   HEMOGLOBIN g/dL 12 9   HEMATOCRIT % 41 4   PLATELETS Thousands/uL 217   NEUTROS PCT % 88*   LYMPHS PCT % 7*   MONOS PCT % 4   EOS PCT % 0     Results from last 7 days   Lab Units 02/10/21  0646   POTASSIUM mmol/L 4 9   CHLORIDE mmol/L 107   CO2 mmol/L 23   BUN mg/dL 37*   CREATININE mg/dL 1 88*   CALCIUM mg/dL 8 2*   ALK PHOS U/L 52   ALT U/L 30   AST U/L 33     Results from last 7 days   Lab Units 02/09/21  1710   INR  1 06       * I Have Reviewed All Lab Data Listed Above  * Additional Pertinent Lab Tests Reviewed:  Lyn 66 Admission Reviewed    Imaging:    Imaging Reports Reviewed Today Include:  Chest x-ray      Recent Cultures (last 7 days):     Results from last 7 days   Lab Units 02/09/21  1711 02/09/21  1710   BLOOD CULTURE  Received in Microbiology Lab  Culture in Progress  Received in Microbiology Lab  Culture in Progress  Last 24 Hours Medication List:   Current Facility-Administered Medications   Medication Dose Route Frequency Provider Last Rate    acetaminophen  650 mg Oral Q6H PRN Bailey Robles PA-C      ascorbic acid  1,000 mg Oral Q12H Gail Jaeger PA-C      aspirin  81 mg Oral QAM Bailey Robles PA-C      atorvastatin  40 mg Oral Daily Bailey Robles PA-C      azithromycin  500 mg Oral Q24H Vickie Naranjo MD      cefTRIAXone  1,000 mg Intravenous Q24H Vickie Naranjo MD      cholecalciferol  2,000 Units Oral Daily Bailey Robles PA-C      dexamethasone  6 mg Intravenous Q24H Bailey Robles PA-C      heparin (porcine)  5,000 Units Subcutaneous [facility] Jaydon Hazel      insulin detemir  39 Units Subcutaneous HS Bailey Robles PA-C      levothyroxine  75 mcg Oral Daily Bailey Robles PA-C      metoprolol succinate  25 mg Oral Daily Bailey Robles PA-C      zinc sulfate  220 mg Oral Daily Bailey Robles PA-C      Followed by   Norah Howe ON 2/17/2021] multivitamin-minerals  1 tablet Oral Daily Bailey Robles PA-C      remdesivir  100 mg Intravenous Q24H Bailey Robles PA-C      sodium chloride  75 mL/hr Intravenous Continuous CHELA HazelC 75 mL/hr (02/10/21 0914)     Facility-Administered Medications Ordered in Other Encounters   Medication Dose Route Frequency Provider Last Rate    acetaminophen  650 mg Oral Once PRN Charito Keen DO      albuterol  3 puff Inhalation Once PRN Charito Keen DO          Today, Patient Was Seen By: Vickie Naranjo MD    ** Please Note: Dictation voice to text software may have been used in the creation of this document   **

## 2021-02-10 NOTE — ASSESSMENT & PLAN NOTE
Wt Readings from Last 3 Encounters:   02/09/21 91 8 kg (202 lb 6 1 oz)   01/28/21 91 2 kg (201 lb)   10/23/20 91 2 kg (201 lb)     Last echo from 2016 with EF of 60%, abnormal left ventricular relaxation  Patient denies any recent weight changes, LE edema, orthopnea  Do not suspect SOB to be related to CHF  No evidence of volume overload  Hold Lasix I

## 2021-02-10 NOTE — ASSESSMENT & PLAN NOTE
Lab Results   Component Value Date    HGBA1C 7 5 (H) 01/11/2021       Recent Labs     02/09/21  1703 02/10/21  0022   POCGLU 121 239*       Blood Sugar Average: Last 72 hrs:  (P) 180     Hold home PO medications  Continue Levemir 39 U QHS  Monitored blood sugars as patient is going to be on steroids  Continue Humalog sliding scale with Accu-Cheks q a c  And hs

## 2021-02-10 NOTE — ED PROVIDER NOTES
History  Chief Complaint   Patient presents with    Fever - 75 years or older     Pt had MAB infusion at 200 and grandson reports, chills, shaking, SOB and "unable to talk"  Patient presents for evaluation of worsening shortness of breath chest pain with chills and shaking  States he was having trouble talking as%  His speech is clear there is no slurring his words  Patient have MAB infusion today he has known COVID positive on day 10 of illness  History provided by:  Patient   used: No    Fever - 75 years or older  Associated symptoms: chest pain, chills and cough    Associated symptoms: no congestion, no dysuria, no headaches, no nausea, no sore throat and no vomiting        Prior to Admission Medications   Prescriptions Last Dose Informant Patient Reported? Taking?    Coenzyme Q10 (COQ-10) 100 MG CAPS 2/9/2021 at Unknown time Self Yes Yes   Sig: Take 200 mg by mouth daily at bedtime    Insulin Pen Needle (BD Pen Needle Sherie U/F) 32G X 4 MM MISC Not Taking at Unknown time  No No   Sig: USE 4 TIMES DAILY AS  DIRECTED   Patient not taking: Reported on 2/9/2021   aspirin (ASPIR-81) 81 mg EC tablet 2/9/2021 at Unknown time Self Yes Yes   Sig: Take 81 mg by mouth every morning     atorvastatin (LIPITOR) 40 mg tablet 2/9/2021 at Unknown time Self No Yes   Sig: TAKE 1 TABLET BY MOUTH  DAILY   clotrimazole-betamethasone (LOTRISONE) 1-0 05 % cream 2/9/2021 at Unknown time Self No Yes   Sig: Apply topically 2 (two) times a day   furosemide (LASIX) 20 mg tablet 2/9/2021 at Unknown time  No Yes   Sig: Take 1 tablet (20 mg total) by mouth daily   glipiZIDE (GLUCOTROL) 5 mg tablet 2/9/2021 at Unknown time  No Yes   Sig: Take 1 tablet (5 mg total) by mouth daily with breakfast   insulin detemir (Levemir FlexTouch) 100 Units/mL injection pen 2/8/2021 at Unknown time  No Yes   Sig: Inject 39 Units under the skin daily at bedtime   insulin lispro (HumaLOG KwikPen) 100 units/mL injection pen 2/9/2021 at Unknown time  No Yes   Sig: Inject 5 Units under the skin 3 (three) times a day with meals   levothyroxine 75 mcg tablet 2/9/2021 at Unknown time  No Yes   Sig: TAKE 1 TABLET BY MOUTH  DAILY   lisinopril (ZESTRIL) 2 5 mg tablet 2/9/2021 at Unknown time  No Yes   Sig: Take 1 tablet (2 5 mg total) by mouth daily   metoprolol succinate (TOPROL-XL) 25 mg 24 hr tablet 2/9/2021 at Unknown time Self No Yes   Sig: TAKE 1 TABLET BY MOUTH  DAILY   multivitamin (THERAGRAN) TABS 2/9/2021 at Unknown time Self Yes Yes   Sig: Take 1 tablet by mouth every morning   pantoprazole (PROTONIX) 40 mg tablet 2/9/2021 at Unknown time Self No Yes   Sig: TAKE 1 TABLET BY MOUTH  DAILY      Facility-Administered Medications: None       Past Medical History:   Diagnosis Date    Aortic narrowing     Last Assessed:  9/28/15    Arthritis     Hammond esophagus     Bilateral leg edema     Bull[place], lung (HCC)     CHF (congestive heart failure) (HCC)     Chronic kidney disease     stage 3    Diabetes mellitus (HCC)     Enlarged prostate     Heel pain     right heel slipped in the garage-landed on the right heel    Herniated lumbar intervertebral disc     x 4 discs-fell off a roof    Hiatal hernia     High cholesterol     History of kidney problems     Hypertension     Old myocardial infarction     x2-pt was unaware of the MI-one stent    Thyroid disease     hypothyroid    Transient cerebral ischemia     Last Assessed:  8/27/15    Wears dentures     full upper, partial lower    Wears glasses        Past Surgical History:   Procedure Laterality Date    CATARACT EXTRACTION      COLONOSCOPY      Resolved:  2015    CORONARY ANGIOPLASTY WITH STENT PLACEMENT      Stent implanted early 1990's,     ESOPHAGOGASTRODUODENOSCOPY      KNEE SURGERY      right knee cartilage surgery-left meniscus repair    WI XCAPSL CTRC RMVL INSJ IO LENS PROSTH W/O ECP Right 1/24/2019    Procedure: EXTRACTION EXTRACAPSULAR CATARACT PHACO INTRAOCULAR LENS (IOL); Surgeon: Venu Antonio MD;  Location: [facility] MAIN OR;  Service: Ophthalmology    DC XCAPSL CTRC RMVL INSJ IO LENS PROSTH W/O ECP Left 2019    Procedure: EXTRACTION EXTRACAPSULAR CATARACT PHACO INTRAOCULAR LENS (IOL); Surgeon: Venu Antonio MD;  Location: [facility] MAIN OR;  Service: Ophthalmology    TONSILLECTOMY      UMBILICAL HERNIA REPAIR      ,    UPPER GASTROINTESTINAL ENDOSCOPY      Last Assessed:  8/27/15       Family History   Problem Relation Age of Onset    Colon cancer Mother     Arthritis Mother     Diabetes Mother     Hypertension Mother     Cancer Mother         Breast    Hyperlipidemia Mother     Cancer Sister         ovarian    Other Brother         Cerebrovascular accident (CVA)    Diabetes Other         Sibling    Hypertension Other         Sibling    Hernia Father         umbilical    Hernia Son     Cancer Sister         liver    Kidney disease Sister     Kidney disease Brother     Mental illness Neg Hx      I have reviewed and agree with the history as documented  E-Cigarette/Vaping     E-Cigarette/Vaping Substances     Social History     Tobacco Use    Smoking status: Former Smoker     Packs/day: 4 00     Years: 20 00     Pack years: 80 00     Quit date:      Years since quittin 1    Smokeless tobacco: Never Used   Substance Use Topics    Alcohol use: No    Drug use: No       Review of Systems   Constitutional: Positive for chills, fatigue and fever  HENT: Negative for congestion and sore throat  Respiratory: Positive for cough and shortness of breath  Cardiovascular: Positive for chest pain  Gastrointestinal: Negative for abdominal pain, nausea and vomiting  Genitourinary: Negative for difficulty urinating and dysuria  Musculoskeletal: Negative for back pain and neck pain  Neurological: Negative for weakness, numbness and headaches  All other systems reviewed and are negative        Physical Exam  Physical Exam  Vitals signs and nursing note reviewed  Constitutional:       General: He is not in acute distress  Appearance: Normal appearance  He is ill-appearing  HENT:      Head: Atraumatic  Mouth/Throat:      Mouth: Mucous membranes are moist       Pharynx: Oropharynx is clear  Uvula midline  No pharyngeal swelling, oropharyngeal exudate or uvula swelling  Eyes:      Extraocular Movements: Extraocular movements intact  Pupils: Pupils are equal, round, and reactive to light  Neck:      Musculoskeletal: Normal range of motion and neck supple  Cardiovascular:      Rate and Rhythm: Normal rate and regular rhythm  Pulses: Normal pulses  Pulmonary:      Effort: Pulmonary effort is normal  No respiratory distress  Breath sounds: Normal breath sounds  No wheezing, rhonchi or rales  Abdominal:      General: Abdomen is flat  Bowel sounds are normal  There is no distension  Palpations: Abdomen is soft  Tenderness: There is no abdominal tenderness  There is no guarding or rebound  Musculoskeletal: Normal range of motion  Right lower leg: No edema  Left lower leg: No edema  Skin:     Capillary Refill: Capillary refill takes less than 2 seconds  Findings: No rash  Neurological:      General: No focal deficit present  Mental Status: He is alert and oriented to person, place, and time           Vital Signs  ED Triage Vitals [02/09/21 1705]   Temperature Pulse Respirations Blood Pressure SpO2   (!) 101 4 °F (38 6 °C) 85 20 158/77 95 %      Temp Source Heart Rate Source Patient Position - Orthostatic VS BP Location FiO2 (%)   Tympanic Monitor Lying Left arm --      Pain Score       No Pain           Vitals:    02/09/21 2000 02/09/21 2015 02/09/21 2030 02/09/21 2045   BP:    126/78   Pulse: 76 72 68 66   Patient Position - Orthostatic VS:             Visual Acuity      ED Medications  Medications   sodium chloride 0 9 % bolus 500 mL (0 mL Intravenous Stopped 2/9/21 1756)   ondansetron (ZOFRAN) injection 4 mg (4 mg Intravenous Given 2/9/21 1723)   dexamethasone (DECADRON) injection 6 mg (6 mg Intravenous Given 2/9/21 1724)   acetaminophen (TYLENOL) tablet 650 mg (650 mg Oral Given 2/9/21 1756)       Diagnostic Studies  Results Reviewed     Procedure Component Value Units Date/Time    Lactic acid [[id number]]  (Normal) Collected: 02/09/21 1710    Lab Status: Final result Specimen: Blood from Arm, Right Updated: 02/09/21 1747     LACTIC ACID 1 7 mmol/L     Narrative:      Result may be elevated if tourniquet was used during collection      Troponin I [[id number]]  (Normal) Collected: 02/09/21 1710    Lab Status: Final result Specimen: Blood from Arm, Right Updated: 02/09/21 1747     Troponin I <0 02 ng/mL     Comprehensive metabolic panel [[id number]]  (Abnormal) Collected: 02/09/21 1710    Lab Status: Final result Specimen: Blood from Arm, Right Updated: 02/09/21 1742     Sodium 139 mmol/L      Potassium 4 2 mmol/L      Chloride 105 mmol/L      CO2 26 mmol/L      ANION GAP 8 mmol/L      BUN 30 mg/dL      Creatinine 1 99 mg/dL      Glucose 134 mg/dL      Calcium 8 5 mg/dL      Corrected Calcium 9 3 mg/dL      AST 35 U/L      ALT 33 U/L      Alkaline Phosphatase 60 U/L      Total Protein 6 7 g/dL      Albumin 3 0 g/dL      Total Bilirubin 0 40 mg/dL      eGFR 32 ml/min/1 73sq m     Narrative:      [place] guidelines for Chronic Kidney Disease (CKD):     Stage 1 with normal or high GFR (GFR > 90 mL/min/1 73 square meters)    Stage 2 Mild CKD (GFR = 60-89 mL/min/1 73 square meters)    Stage 3A Moderate CKD (GFR = 45-59 mL/min/1 73 square meters)    Stage 3B Moderate CKD (GFR = 30-44 mL/min/1 73 square meters)    Stage 4 Severe CKD (GFR = 15-29 mL/min/1 73 square meters)    Stage 5 End Stage CKD (GFR <15 mL/min/1 73 square meters)  Note: GFR calculation is accurate only with a steady state creatinine    C-reactive protein [[id number]]  (Abnormal) Collected: 02/09/21 1710    Lab Status: Final result Specimen: Blood from Arm, Right Updated: 02/09/21 1742     CRP 73 5 mg/L     Magnesium [[id number]]  (Normal) Collected: 02/09/21 1710    Lab Status: Final result Specimen: Blood from Arm, Right Updated: 02/09/21 1742     Magnesium 1 8 mg/dL     Lipase [[id number]]  (Normal) Collected: 02/09/21 1710    Lab Status: Final result Specimen: Blood from Arm, Right Updated: 02/09/21 1742     Lipase 213 u/L     D-Dimer [[id number]]  (Abnormal) Collected: 02/09/21 1710    Lab Status: Final result Specimen: Blood from Arm, Right Updated: 02/09/21 1741     D-Dimer, Quant 0 96 ug/ml FEU     Protime-INR [[id number]]  (Normal) Collected: 02/09/21 1710    Lab Status: Final result Specimen: Blood from Arm, Right Updated: 02/09/21 1737     Protime 13 7 seconds      INR 1 06    APTT [[id number]]  (Normal) Collected: 02/09/21 1710    Lab Status: Final result Specimen: Blood from Arm, Right Updated: 02/09/21 1737     PTT 37 seconds     CBC and differential [[id number]]  (Abnormal) Collected: 02/09/21 1710    Lab Status: Final result Specimen: Blood from Arm, Right Updated: 02/09/21 1724     WBC 6 52 Thousand/uL      RBC 4 62 Million/uL      Hemoglobin 14 0 g/dL      Hematocrit 44 3 %      MCV 96 fL      MCH 30 3 pg      MCHC 31 6 g/dL      RDW 11 9 %      MPV 9 9 fL      Platelets 743 Thousands/uL      nRBC 0 /100 WBCs      Neutrophils Relative 76 %      Immat GRANS % 1 %      Lymphocytes Relative 18 %      Monocytes Relative 5 %      Eosinophils Relative 0 %      Basophils Relative 0 %      Neutrophils Absolute 5 00 Thousands/µL      Immature Grans Absolute 0 03 Thousand/uL      Lymphocytes Absolute 1 14 Thousands/µL      Monocytes Absolute 0 34 Thousand/µL      Eosinophils Absolute 0 00 Thousand/µL      Basophils Absolute 0 01 Thousands/µL     Blood culture #1 [[id number]] Collected: 02/09/21 1711    Lab Status:  In process Specimen: Blood from Arm, Left Updated: 02/09/21 1721    Blood culture #2 [[id number]] Collected: 02/09/21 1710    Lab Status: In process Specimen: Blood from Arm, Right Updated: 02/09/21 1721    Ferritin [[id number]] Collected: 02/09/21 1710    Lab Status: In process Specimen: Blood from Arm, Right Updated: 02/09/21 1720    UA (URINE) with reflex to Scope [[id number]]     Lab Status: No result Specimen: Urine                  XR chest 1 view portable   Final Result by Lester Barlow MD (02/09 2008)   Lower lung groundglass opacity likely related to known Covid 19 pneumonia  Workstation performed: [id number]                    Procedures  Procedures         ED Course                             SBIRT 22yo+      Most Recent Value   SBIRT (25 yo +)   In order to provide better care to our patients, we are screening all of our patients for alcohol and drug use  Would it be okay to ask you these screening questions? No Filed at: 02/09/2021 1728                    MDM  Number of Diagnoses or Management Options  NEO (acute kidney injury) ([facility] [address] ):   COVID-19 virus infection:   Hypoxia:   Diagnosis management comments: Pulse ox 93% on room air indicating adequate oxygenation  CXR:  Lower lobe ground-glass infiltrates as read by me    Unsure this is a reaction to infusion therapy that the patient got this could be a stronger and the response or just progression of patient's COVID illness  A treat the fever provided supplemental oxygen for the hypoxia in giving Decadron given the patient being admitted 1 require supplemental oxygen  As per protocol was admitted to the medical service  Patient had 1 episode of vomiting in the ER was not complaining of nauseousness  On repeat exam after medications he was feeling better         Amount and/or Complexity of Data Reviewed  Clinical lab tests: ordered and reviewed  Tests in the radiology section of CPT®: ordered and reviewed  Decide to obtain previous medical records or to obtain history from someone other than the patient: yes  Review and summarize past medical records: yes  Independent visualization of images, tracings, or specimens: yes    Patient Progress  Patient progress: stable      Disposition  Final diagnoses:   COVID-19 virus infection   Hypoxia   NEO (acute kidney injury) ([facility] [address] )     Time reflects when diagnosis was documented in both MDM as applicable and the Disposition within this note     Time User Action Codes Description Comment    2/9/2021  7:27 PM Liam Steele Add [U07 1] COVID-19 virus infection     2/9/2021  7:27 PM Rebekah BUCIO Add [R09 02] Hypoxia     2/9/2021  7:27 PM Rajwinder Gallardo [address] [N17 9] NEO (acute kidney injury) [facility])       ED Disposition     ED Disposition Condition Date/Time Comment    Admit Stable Tue Feb 9, 2021  7:27 PM Case was discussed with Ruben Portillo and the patient's admission status was agreed to be Admission Status: inpatient status to the service of Dr Yoshi Tijerina  Follow-up Information    None         Patient's Medications   Discharge Prescriptions    No medications on file     No discharge procedures on file      PDMP Review     None          ED Provider  Electronically Signed by           Sharri Daley DO  02/09/21 5510

## 2021-02-10 NOTE — ASSESSMENT & PLAN NOTE
Lab Results   Component Value Date    EGFR 34 02/10/2021    EGFR 32 02/09/2021    EGFR 46 11/23/2019    CREATININE 1 88 (H) 02/10/2021    CREATININE 1 99 (H) 02/09/2021    CREATININE 1 44 (H) 01/11/2021     Baseline 1 4-1 6  Cr 1 99 on admission  In the setting of poor oral intake and dehydration  Improving with gentle IV hydration  Continue to hold Lasix and lisinopril  Avoid nephrotoxic agents and hypotension

## 2021-02-10 NOTE — OCCUPATIONAL THERAPY NOTE
Occupational Therapy Screen       02/10/21 9770   Note Type   Note type Screen   Cancel Reasons   (Patient independent   NO OT needs)   Licensure   [place] License Number  Stephen Powell OTR/MIO [id number]

## 2021-02-10 NOTE — ASSESSMENT & PLAN NOTE
Lab Results   Component Value Date    HGBA1C 7 5 (H) 01/11/2021       Recent Labs     02/09/21  1703 02/10/21  0022   POCGLU 121 239*       Blood Sugar Average: Last 72 hrs:  (P) 180     Hold home PO medications  Continue Levemir 39 U QHS  Add SSI coverage with meals and QHS  DM2 diet

## 2021-02-11 DIAGNOSIS — Z79.4 TYPE 2 DIABETES MELLITUS WITH STAGE 3 CHRONIC KIDNEY DISEASE, WITH LONG-TERM CURRENT USE OF INSULIN (HCC): ICD-10-CM

## 2021-02-11 DIAGNOSIS — N18.30 TYPE 2 DIABETES MELLITUS WITH STAGE 3 CHRONIC KIDNEY DISEASE, WITH LONG-TERM CURRENT USE OF INSULIN (HCC): ICD-10-CM

## 2021-02-11 DIAGNOSIS — E11.22 TYPE 2 DIABETES MELLITUS WITH STAGE 3 CHRONIC KIDNEY DISEASE, WITH LONG-TERM CURRENT USE OF INSULIN (HCC): ICD-10-CM

## 2021-02-11 LAB
ALBUMIN SERPL BCP-MCNC: 2.3 G/DL (ref 3.5–5)
ALP SERPL-CCNC: 48 U/L (ref 46–116)
ALT SERPL W P-5'-P-CCNC: 31 U/L (ref 12–78)
ANION GAP SERPL CALCULATED.3IONS-SCNC: 8 MMOL/L (ref 4–13)
AST SERPL W P-5'-P-CCNC: 33 U/L (ref 5–45)
BASOPHILS # BLD AUTO: 0.01 THOUSANDS/ΜL (ref 0–0.1)
BASOPHILS NFR BLD AUTO: 0 % (ref 0–1)
BILIRUB SERPL-MCNC: 0.2 MG/DL (ref 0.2–1)
BUN SERPL-MCNC: 45 MG/DL (ref 5–25)
CALCIUM ALBUM COR SERPL-MCNC: 9.6 MG/DL (ref 8.3–10.1)
CALCIUM SERPL-MCNC: 8.2 MG/DL (ref 8.3–10.1)
CHLORIDE SERPL-SCNC: 109 MMOL/L (ref 100–108)
CO2 SERPL-SCNC: 24 MMOL/L (ref 21–32)
CREAT SERPL-MCNC: 1.63 MG/DL (ref 0.6–1.3)
CRP SERPL QL: 71.2 MG/L
EOSINOPHIL # BLD AUTO: 0 THOUSAND/ΜL (ref 0–0.61)
EOSINOPHIL NFR BLD AUTO: 0 % (ref 0–6)
ERYTHROCYTE [DISTWIDTH] IN BLOOD BY AUTOMATED COUNT: 12 % (ref 11.6–15.1)
GFR SERPL CREATININE-BSD FRML MDRD: 40 ML/MIN/1.73SQ M
GLUCOSE SERPL-MCNC: 163 MG/DL (ref 65–140)
GLUCOSE SERPL-MCNC: 177 MG/DL (ref 65–140)
GLUCOSE SERPL-MCNC: 191 MG/DL (ref 65–140)
GLUCOSE SERPL-MCNC: 192 MG/DL (ref 65–140)
GLUCOSE SERPL-MCNC: 196 MG/DL (ref 65–140)
GLUCOSE SERPL-MCNC: 209 MG/DL (ref 65–140)
GLUCOSE SERPL-MCNC: 233 MG/DL (ref 65–140)
HCT VFR BLD AUTO: 41.1 % (ref 36.5–49.3)
HGB BLD-MCNC: 13 G/DL (ref 12–17)
IMM GRANULOCYTES # BLD AUTO: 0.05 THOUSAND/UL (ref 0–0.2)
IMM GRANULOCYTES NFR BLD AUTO: 1 % (ref 0–2)
L PNEUMO1 AG UR QL IA.RAPID: NEGATIVE
LYMPHOCYTES # BLD AUTO: 0.89 THOUSANDS/ΜL (ref 0.6–4.47)
LYMPHOCYTES NFR BLD AUTO: 11 % (ref 14–44)
MCH RBC QN AUTO: 30.4 PG (ref 26.8–34.3)
MCHC RBC AUTO-ENTMCNC: 31.6 G/DL (ref 31.4–37.4)
MCV RBC AUTO: 96 FL (ref 82–98)
MONOCYTES # BLD AUTO: 0.4 THOUSAND/ΜL (ref 0.17–1.22)
MONOCYTES NFR BLD AUTO: 5 % (ref 4–12)
NEUTROPHILS # BLD AUTO: 6.88 THOUSANDS/ΜL (ref 1.85–7.62)
NEUTS SEG NFR BLD AUTO: 83 % (ref 43–75)
NRBC BLD AUTO-RTO: 0 /100 WBCS
PLATELET # BLD AUTO: 251 THOUSANDS/UL (ref 149–390)
PMV BLD AUTO: 10.2 FL (ref 8.9–12.7)
POTASSIUM SERPL-SCNC: 5 MMOL/L (ref 3.5–5.3)
PROCALCITONIN SERPL-MCNC: 10.18 NG/ML
PROT SERPL-MCNC: 5.9 G/DL (ref 6.4–8.2)
RBC # BLD AUTO: 4.28 MILLION/UL (ref 3.88–5.62)
S PNEUM AG UR QL: NEGATIVE
SODIUM SERPL-SCNC: 141 MMOL/L (ref 136–145)
WBC # BLD AUTO: 8.23 THOUSAND/UL (ref 4.31–10.16)

## 2021-02-11 PROCEDURE — 80053 COMPREHEN METABOLIC PANEL: CPT | Performed by: INTERNAL MEDICINE

## 2021-02-11 PROCEDURE — 82948 REAGENT STRIP/BLOOD GLUCOSE: CPT

## 2021-02-11 PROCEDURE — 86140 C-REACTIVE PROTEIN: CPT | Performed by: INTERNAL MEDICINE

## 2021-02-11 PROCEDURE — 99232 SBSQ HOSP IP/OBS MODERATE 35: CPT | Performed by: INTERNAL MEDICINE

## 2021-02-11 PROCEDURE — 85025 COMPLETE CBC W/AUTO DIFF WBC: CPT | Performed by: INTERNAL MEDICINE

## 2021-02-11 PROCEDURE — 84145 PROCALCITONIN (PCT): CPT | Performed by: PHYSICIAN ASSISTANT

## 2021-02-11 RX ORDER — INSULIN DETEMIR 100 [IU]/ML
INJECTION, SOLUTION SUBCUTANEOUS
Qty: 15 ML | Refills: 8 | Status: SHIPPED | OUTPATIENT
Start: 2021-02-11 | End: 2021-12-10

## 2021-02-11 RX ADMIN — INSULIN LISPRO 2 UNITS: 100 INJECTION, SOLUTION INTRAVENOUS; SUBCUTANEOUS at 03:06

## 2021-02-11 RX ADMIN — HEPARIN SODIUM 5000 UNITS: 5000 INJECTION INTRAVENOUS; SUBCUTANEOUS at 06:33

## 2021-02-11 RX ADMIN — ATORVASTATIN CALCIUM 40 MG: 40 TABLET, FILM COATED ORAL at 09:57

## 2021-02-11 RX ADMIN — CEFTRIAXONE 1000 MG: 1 INJECTION, SOLUTION INTRAVENOUS at 14:56

## 2021-02-11 RX ADMIN — HEPARIN SODIUM 5000 UNITS: 5000 INJECTION INTRAVENOUS; SUBCUTANEOUS at 23:24

## 2021-02-11 RX ADMIN — INSULIN DETEMIR 39 UNITS: 100 INJECTION, SOLUTION SUBCUTANEOUS at 23:24

## 2021-02-11 RX ADMIN — INSULIN LISPRO 2 UNITS: 100 INJECTION, SOLUTION INTRAVENOUS; SUBCUTANEOUS at 23:25

## 2021-02-11 RX ADMIN — REMDESIVIR 100 MG: 100 INJECTION, POWDER, LYOPHILIZED, FOR SOLUTION INTRAVENOUS at 12:42

## 2021-02-11 RX ADMIN — METOPROLOL SUCCINATE 25 MG: 25 TABLET, EXTENDED RELEASE ORAL at 09:57

## 2021-02-11 RX ADMIN — INSULIN LISPRO 2 UNITS: 100 INJECTION, SOLUTION INTRAVENOUS; SUBCUTANEOUS at 16:25

## 2021-02-11 RX ADMIN — OXYCODONE HYDROCHLORIDE AND ACETAMINOPHEN 1000 MG: 500 TABLET ORAL at 21:53

## 2021-02-11 RX ADMIN — OXYCODONE HYDROCHLORIDE AND ACETAMINOPHEN 1000 MG: 500 TABLET ORAL at 09:57

## 2021-02-11 RX ADMIN — DEXAMETHASONE SODIUM PHOSPHATE 6 MG: 4 INJECTION, SOLUTION INTRA-ARTICULAR; INTRALESIONAL; INTRAMUSCULAR; INTRAVENOUS; SOFT TISSUE at 21:54

## 2021-02-11 RX ADMIN — LEVOTHYROXINE SODIUM 75 MCG: 75 TABLET ORAL at 06:33

## 2021-02-11 RX ADMIN — INSULIN LISPRO 1 UNITS: 100 INJECTION, SOLUTION INTRAVENOUS; SUBCUTANEOUS at 08:14

## 2021-02-11 RX ADMIN — ASPIRIN 81 MG: 81 TABLET, COATED ORAL at 09:57

## 2021-02-11 RX ADMIN — INSULIN LISPRO 3 UNITS: 100 INJECTION, SOLUTION INTRAVENOUS; SUBCUTANEOUS at 12:09

## 2021-02-11 RX ADMIN — Medication 2000 UNITS: at 09:57

## 2021-02-11 RX ADMIN — HEPARIN SODIUM 5000 UNITS: 5000 INJECTION INTRAVENOUS; SUBCUTANEOUS at 14:52

## 2021-02-11 RX ADMIN — ZINC SULFATE 220 MG (50 MG) CAPSULE 220 MG: CAPSULE at 09:57

## 2021-02-11 NOTE — PLAN OF CARE
Problem: Potential for Falls  Goal: Patient will remain free of falls  Description: INTERVENTIONS:  - Assess patient frequently for physical needs  -  Identify cognitive and physical deficits and behaviors that affect risk of falls    -  [place] fall precautions as indicated by assessment   - Educate patient/family on patient safety including physical limitations  - Instruct patient to call for assistance with activity based on assessment  - Modify environment to reduce risk of injury  - Consider OT/PT consult to assist with strengthening/mobility  2/11/2021 1147 by Brooklyn Sellers RN  Outcome: Progressing  2/11/2021 1147 by Brooklyn Sellers RN  Outcome: Progressing     Problem: RESPIRATORY - ADULT  Goal: Achieves optimal ventilation and oxygenation  Description: INTERVENTIONS:  - Assess for changes in respiratory status  - Assess for changes in mentation and behavior  - Position to facilitate oxygenation and minimize respiratory effort  - Oxygen administered by appropriate delivery if ordered  - Initiate smoking cessation education as indicated  - Encourage broncho-pulmonary hygiene including cough, deep breathe, Incentive Spirometry  - Assess the need for suctioning and aspirate as needed  - Assess and instruct to report SOB or any respiratory difficulty  - Respiratory Therapy support as indicated  2/11/2021 1147 by Brooklyn Sellers RN  Outcome: Progressing  2/11/2021 1147 by Brooklyn Sellers RN  Outcome: Progressing     Problem: METABOLIC, FLUID AND ELECTROLYTES - ADULT  Goal: Electrolytes maintained within normal limits  Description: INTERVENTIONS:  - Monitor labs and assess patient for signs and symptoms of electrolyte imbalances  - Administer electrolyte replacement as ordered  - Monitor response to electrolyte replacements, including repeat lab results as appropriate  - Instruct patient on fluid and nutrition as appropriate  2/11/2021 1147 by Brooklyn Sellers RN  Outcome: Progressing  2/11/2021 1147 by Olga Cottrell RN  Outcome: Progressing  Goal: Fluid balance maintained  Description: INTERVENTIONS:  - Monitor labs   - Monitor I/O and WT  - Instruct patient on fluid and nutrition as appropriate  - Assess for signs & symptoms of volume excess or deficit  2/11/2021 1147 by Olag Cottrell RN  Outcome: Progressing  2/11/2021 1147 by Olga Cottrell RN  Outcome: Progressing  Goal: Glucose maintained within target range  Description: INTERVENTIONS:  - Monitor Blood Glucose as ordered  - Assess for signs and symptoms of hyperglycemia and hypoglycemia  - Administer ordered medications to maintain glucose within target range  - Assess nutritional intake and initiate nutrition service referral as needed  2/11/2021 1147 by Olga Cottrell RN  Outcome: Progressing  2/11/2021 1147 by Olga Cottrell RN  Outcome: Progressing     Problem: HEMATOLOGIC - ADULT  Goal: Maintains hematologic stability  Description: INTERVENTIONS  - Assess for signs and symptoms of bleeding or hemorrhage  - Monitor labs  - Administer supportive blood products/factors as ordered and appropriate  2/11/2021 1147 by Olga Cottrell RN  Outcome: Progressing  2/11/2021 1147 by Olga Cottrell RN  Outcome: Progressing     Problem: PAIN - ADULT  Goal: Verbalizes/displays adequate comfort level or baseline comfort level  Description: Interventions:  - Encourage patient to monitor pain and request assistance  - Assess pain using appropriate pain scale  - Administer analgesics based on type and severity of pain and evaluate response  - Implement non-pharmacological measures as appropriate and evaluate response  - Consider cultural and social influences on pain and pain management  - Notify physician/advanced practitioner if interventions unsuccessful or patient reports new pain  2/11/2021 1147 by Olga Cottrell RN  Outcome: Progressing  2/11/2021 1147 by Olga Cottrell RN  Outcome: Progressing     Problem: INFECTION - ADULT  Goal: Absence or prevention of progression during hospitalization  Description: INTERVENTIONS:  - Assess and monitor for signs and symptoms of infection  - Monitor lab/diagnostic results  - Monitor all insertion sites, i e  indwelling lines, tubes, and drains  - [place] appropriate cooling/warming therapies per order  - Administer medications as ordered  - Instruct and encourage patient and family to use good hand hygiene technique  - Identify and instruct in appropriate isolation precautions for identified infection/condition  2/11/2021 1147 by Gaetano Dougherty RN  Outcome: Progressing  2/11/2021 1147 by Gaetano Dougherty RN  Outcome: Progressing     Problem: SAFETY ADULT  Goal: Patient will remain free of falls  Description: INTERVENTIONS:  - Assess patient frequently for physical needs  -  Identify cognitive and physical deficits and behaviors that affect risk of falls    -  [place] fall precautions as indicated by assessment   - Educate patient/family on patient safety including physical limitations  - Instruct patient to call for assistance with activity based on assessment  - Modify environment to reduce risk of injury  - Consider OT/PT consult to assist with strengthening/mobility  2/11/2021 1147 by Gaetano Dougherty RN  Outcome: Progressing  2/11/2021 1147 by Gaetano Dougherty RN  Outcome: Progressing  Goal: Maintain or return to baseline ADL function  Description: INTERVENTIONS:  -  Assess patient's ability to carry out ADLs; assess patient's baseline for ADL function and identify physical deficits which impact ability to perform ADLs (bathing, care of mouth/teeth, toileting, grooming, dressing, etc )  - Assess/evaluate cause of self-care deficits   - Assess range of motion  - Assess patient's mobility; develop plan if impaired  - Assess patient's need for assistive devices and provide as appropriate  - Encourage maximum independence but intervene and supervise when necessary  - Involve family in performance of ADLs  - Assess for home care needs following discharge   - Consider OT consult to assist with ADL evaluation and planning for discharge  - Provide patient education as appropriate  2/11/2021 1147 by Ivonne Oliveros RN  Outcome: Progressing  2/11/2021 1147 by Ivonne Oliveros RN  Outcome: Progressing  Goal: Maintain or return mobility status to optimal level  Description: INTERVENTIONS:  - Assess patient's baseline mobility status (ambulation, transfers, stairs, etc )    - Identify cognitive and physical deficits and behaviors that affect mobility  - Identify mobility aids required to assist with transfers and/or ambulation (gait belt, sit-to-stand, lift, walker, cane, etc )  - [place] fall precautions as indicated by assessment  - Record patient progress and toleration of activity level on Mobility SBAR; progress patient to next Phase/Stage  - Instruct patient to call for assistance with activity based on assessment  - Consider rehabilitation consult to assist with strengthening/weightbearing, etc   2/11/2021 1147 by Ivonne Oliveros RN  Outcome: Progressing  2/11/2021 1147 by Ivonne Oliveros RN  Outcome: Progressing     Problem: DISCHARGE PLANNING  Goal: Discharge to home or other facility with appropriate resources  Description: INTERVENTIONS:  - Identify barriers to discharge w/patient and caregiver  - Arrange for needed discharge resources and transportation as appropriate  - Identify discharge learning needs (meds, wound care, etc )  - Arrange for interpretive services to assist at discharge as needed  - Refer to Case Management Department for coordinating discharge planning if the patient needs post-hospital services based on physician/advanced practitioner order or complex needs related to functional status, cognitive ability, or social support system  2/11/2021 1147 by Ivonne Oliveros RN  Outcome: Progressing  2/11/2021 1147 by Ivonne Oliveros RN  Outcome: Progressing     Problem: Knowledge Deficit  Goal: Patient/family/caregiver demonstrates understanding of disease process, treatment plan, medications, and discharge instructions  Description: Complete learning assessment and assess knowledge base    Interventions:  - Provide teaching at level of understanding  - Provide teaching via preferred learning methods  2/11/2021 1147 by Brooklyn Sellers RN  Outcome: Progressing  2/11/2021 1147 by Brooklyn Sellers RN  Outcome: Progressing     Problem: CARDIOVASCULAR - ADULT  Goal: Maintains optimal cardiac output and hemodynamic stability  Description: INTERVENTIONS:  - Monitor I/O, vital signs and rhythm  - Monitor for S/S and trends of decreased cardiac output  - Administer and titrate ordered vasoactive medications to optimize hemodynamic stability  - Assess quality of pulses, skin color and temperature  - Assess for signs of decreased coronary artery perfusion  - Instruct patient to report change in severity of symptoms  2/11/2021 1147 by Brooklyn Sellers RN  Outcome: Progressing  2/11/2021 1147 by Brooklyn Sellers RN  Outcome: Progressing  Goal: Absence of cardiac dysrhythmias or at baseline rhythm  Description: INTERVENTIONS:  - Continuous cardiac monitoring, vital signs, obtain 12 lead EKG if ordered  - Administer antiarrhythmic and heart rate control medications as ordered  - Monitor electrolytes and administer replacement therapy as ordered  2/11/2021 1147 by Brooklyn Sellers RN  Outcome: Progressing  2/11/2021 1147 by Brooklyn Sellers RN  Outcome: Progressing

## 2021-02-11 NOTE — PLAN OF CARE
Problem: Potential for Falls  Goal: Patient will remain free of falls  Description: INTERVENTIONS:  - Assess patient frequently for physical needs  -  Identify cognitive and physical deficits and behaviors that affect risk of falls    -  [place] fall precautions as indicated by assessment   - Educate patient/family on patient safety including physical limitations  - Instruct patient to call for assistance with activity based on assessment  - Modify environment to reduce risk of injury  - Consider OT/PT consult to assist with strengthening/mobility  Outcome: Progressing     Problem: RESPIRATORY - ADULT  Goal: Achieves optimal ventilation and oxygenation  Description: INTERVENTIONS:  - Assess for changes in respiratory status  - Assess for changes in mentation and behavior  - Position to facilitate oxygenation and minimize respiratory effort  - Oxygen administered by appropriate delivery if ordered  - Initiate smoking cessation education as indicated  - Encourage broncho-pulmonary hygiene including cough, deep breathe, Incentive Spirometry  - Assess the need for suctioning and aspirate as needed  - Assess and instruct to report SOB or any respiratory difficulty  - Respiratory Therapy support as indicated  Outcome: Progressing     Problem: METABOLIC, FLUID AND ELECTROLYTES - ADULT  Goal: Electrolytes maintained within normal limits  Description: INTERVENTIONS:  - Monitor labs and assess patient for signs and symptoms of electrolyte imbalances  - Administer electrolyte replacement as ordered  - Monitor response to electrolyte replacements, including repeat lab results as appropriate  - Instruct patient on fluid and nutrition as appropriate  Outcome: Progressing  Goal: Fluid balance maintained  Description: INTERVENTIONS:  - Monitor labs   - Monitor I/O and WT  - Instruct patient on fluid and nutrition as appropriate  - Assess for signs & symptoms of volume excess or deficit  Outcome: Progressing  Goal: Glucose maintained within target range  Description: INTERVENTIONS:  - Monitor Blood Glucose as ordered  - Assess for signs and symptoms of hyperglycemia and hypoglycemia  - Administer ordered medications to maintain glucose within target range  - Assess nutritional intake and initiate nutrition service referral as needed  Outcome: Progressing     Problem: HEMATOLOGIC - ADULT  Goal: Maintains hematologic stability  Description: INTERVENTIONS  - Assess for signs and symptoms of bleeding or hemorrhage  - Monitor labs  - Administer supportive blood products/factors as ordered and appropriate  Outcome: Progressing     Problem: PAIN - ADULT  Goal: Verbalizes/displays adequate comfort level or baseline comfort level  Description: Interventions:  - Encourage patient to monitor pain and request assistance  - Assess pain using appropriate pain scale  - Administer analgesics based on type and severity of pain and evaluate response  - Implement non-pharmacological measures as appropriate and evaluate response  - Consider cultural and social influences on pain and pain management  - Notify physician/advanced practitioner if interventions unsuccessful or patient reports new pain  Outcome: Progressing     Problem: INFECTION - ADULT  Goal: Absence or prevention of progression during hospitalization  Description: INTERVENTIONS:  - Assess and monitor for signs and symptoms of infection  - Monitor lab/diagnostic results  - Monitor all insertion sites, i e  indwelling lines, tubes, and drains  - [place] appropriate cooling/warming therapies per order  - Administer medications as ordered  - Instruct and encourage patient and family to use good hand hygiene technique  - Identify and instruct in appropriate isolation precautions for identified infection/condition  Outcome: Progressing     Problem: SAFETY ADULT  Goal: Patient will remain free of falls  Description: INTERVENTIONS:  - Assess patient frequently for physical needs  -  Identify cognitive and physical deficits and behaviors that affect risk of falls    -  [place] fall precautions as indicated by assessment   - Educate patient/family on patient safety including physical limitations  - Instruct patient to call for assistance with activity based on assessment  - Modify environment to reduce risk of injury  - Consider OT/PT consult to assist with strengthening/mobility  Outcome: Progressing  Goal: Maintain or return to baseline ADL function  Description: INTERVENTIONS:  -  Assess patient's ability to carry out ADLs; assess patient's baseline for ADL function and identify physical deficits which impact ability to perform ADLs (bathing, care of mouth/teeth, toileting, grooming, dressing, etc )  - Assess/evaluate cause of self-care deficits   - Assess range of motion  - Assess patient's mobility; develop plan if impaired  - Assess patient's need for assistive devices and provide as appropriate  - Encourage maximum independence but intervene and supervise when necessary  - Involve family in performance of ADLs  - Assess for home care needs following discharge   - Consider OT consult to assist with ADL evaluation and planning for discharge  - Provide patient education as appropriate  Outcome: Progressing  Goal: Maintain or return mobility status to optimal level  Description: INTERVENTIONS:  - Assess patient's baseline mobility status (ambulation, transfers, stairs, etc )    - Identify cognitive and physical deficits and behaviors that affect mobility  - Identify mobility aids required to assist with transfers and/or ambulation (gait belt, sit-to-stand, lift, walker, cane, etc )  - [place] fall precautions as indicated by assessment  - Record patient progress and toleration of activity level on Mobility SBAR; progress patient to next Phase/Stage  - Instruct patient to call for assistance with activity based on assessment  - Consider rehabilitation consult to assist with strengthening/weightbearing, etc   Outcome: Progressing     Problem: DISCHARGE PLANNING  Goal: Discharge to home or other facility with appropriate resources  Description: INTERVENTIONS:  - Identify barriers to discharge w/patient and caregiver  - Arrange for needed discharge resources and transportation as appropriate  - Identify discharge learning needs (meds, wound care, etc )  - Arrange for interpretive services to assist at discharge as needed  - Refer to Case Management Department for coordinating discharge planning if the patient needs post-hospital services based on physician/advanced practitioner order or complex needs related to functional status, cognitive ability, or social support system  Outcome: Progressing     Problem: Knowledge Deficit  Goal: Patient/family/caregiver demonstrates understanding of disease process, treatment plan, medications, and discharge instructions  Description: Complete learning assessment and assess knowledge base    Interventions:  - Provide teaching at level of understanding  - Provide teaching via preferred learning methods  Outcome: Progressing     Problem: CARDIOVASCULAR - ADULT  Goal: Maintains optimal cardiac output and hemodynamic stability  Description: INTERVENTIONS:  - Monitor I/O, vital signs and rhythm  - Monitor for S/S and trends of decreased cardiac output  - Administer and titrate ordered vasoactive medications to optimize hemodynamic stability  - Assess quality of pulses, skin color and temperature  - Assess for signs of decreased coronary artery perfusion  - Instruct patient to report change in severity of symptoms  Outcome: Progressing  Goal: Absence of cardiac dysrhythmias or at baseline rhythm  Description: INTERVENTIONS:  - Continuous cardiac monitoring, vital signs, obtain 12 lead EKG if ordered  - Administer antiarrhythmic and heart rate control medications as ordered  - Monitor electrolytes and administer replacement therapy as ordered  Outcome: Progressing

## 2021-02-11 NOTE — PLAN OF CARE
Problem: Potential for Falls  Goal: Patient will remain free of falls  Description: INTERVENTIONS:  - Assess patient frequently for physical needs  -  Identify cognitive and physical deficits and behaviors that affect risk of falls    -  [place] fall precautions as indicated by assessment   - Educate patient/family on patient safety including physical limitations  - Instruct patient to call for assistance with activity based on assessment  - Modify environment to reduce risk of injury  - Consider OT/PT consult to assist with strengthening/mobility  Outcome: Progressing     Problem: RESPIRATORY - ADULT  Goal: Achieves optimal ventilation and oxygenation  Description: INTERVENTIONS:  - Assess for changes in respiratory status  - Assess for changes in mentation and behavior  - Position to facilitate oxygenation and minimize respiratory effort  - Oxygen administered by appropriate delivery if ordered  - Initiate smoking cessation education as indicated  - Encourage broncho-pulmonary hygiene including cough, deep breathe, Incentive Spirometry  - Assess the need for suctioning and aspirate as needed  - Assess and instruct to report SOB or any respiratory difficulty  - Respiratory Therapy support as indicated  Outcome: Progressing     Problem: METABOLIC, FLUID AND ELECTROLYTES - ADULT  Goal: Electrolytes maintained within normal limits  Description: INTERVENTIONS:  - Monitor labs and assess patient for signs and symptoms of electrolyte imbalances  - Administer electrolyte replacement as ordered  - Monitor response to electrolyte replacements, including repeat lab results as appropriate  - Instruct patient on fluid and nutrition as appropriate  Outcome: Progressing  Goal: Fluid balance maintained  Description: INTERVENTIONS:  - Monitor labs   - Monitor I/O and WT  - Instruct patient on fluid and nutrition as appropriate  - Assess for signs & symptoms of volume excess or deficit  Outcome: Progressing  Goal: Glucose maintained within target range  Description: INTERVENTIONS:  - Monitor Blood Glucose as ordered  - Assess for signs and symptoms of hyperglycemia and hypoglycemia  - Administer ordered medications to maintain glucose within target range  - Assess nutritional intake and initiate nutrition service referral as needed  Outcome: Progressing     Problem: HEMATOLOGIC - ADULT  Goal: Maintains hematologic stability  Description: INTERVENTIONS  - Assess for signs and symptoms of bleeding or hemorrhage  - Monitor labs  - Administer supportive blood products/factors as ordered and appropriate  Outcome: Progressing     Problem: PAIN - ADULT  Goal: Verbalizes/displays adequate comfort level or baseline comfort level  Description: Interventions:  - Encourage patient to monitor pain and request assistance  - Assess pain using appropriate pain scale  - Administer analgesics based on type and severity of pain and evaluate response  - Implement non-pharmacological measures as appropriate and evaluate response  - Consider cultural and social influences on pain and pain management  - Notify physician/advanced practitioner if interventions unsuccessful or patient reports new pain  Outcome: Progressing     Problem: INFECTION - ADULT  Goal: Absence or prevention of progression during hospitalization  Description: INTERVENTIONS:  - Assess and monitor for signs and symptoms of infection  - Monitor lab/diagnostic results  - Monitor all insertion sites, i e  indwelling lines, tubes, and drains  - [place] appropriate cooling/warming therapies per order  - Administer medications as ordered  - Instruct and encourage patient and family to use good hand hygiene technique  - Identify and instruct in appropriate isolation precautions for identified infection/condition  Outcome: Progressing     Problem: SAFETY ADULT  Goal: Patient will remain free of falls  Description: INTERVENTIONS:  - Assess patient frequently for physical needs  -  Identify cognitive and physical deficits and behaviors that affect risk of falls    -  [place] fall precautions as indicated by assessment   - Educate patient/family on patient safety including physical limitations  - Instruct patient to call for assistance with activity based on assessment  - Modify environment to reduce risk of injury  - Consider OT/PT consult to assist with strengthening/mobility  Outcome: Progressing  Goal: Maintain or return to baseline ADL function  Description: INTERVENTIONS:  -  Assess patient's ability to carry out ADLs; assess patient's baseline for ADL function and identify physical deficits which impact ability to perform ADLs (bathing, care of mouth/teeth, toileting, grooming, dressing, etc )  - Assess/evaluate cause of self-care deficits   - Assess range of motion  - Assess patient's mobility; develop plan if impaired  - Assess patient's need for assistive devices and provide as appropriate  - Encourage maximum independence but intervene and supervise when necessary  - Involve family in performance of ADLs  - Assess for home care needs following discharge   - Consider OT consult to assist with ADL evaluation and planning for discharge  - Provide patient education as appropriate  Outcome: Progressing  Goal: Maintain or return mobility status to optimal level  Description: INTERVENTIONS:  - Assess patient's baseline mobility status (ambulation, transfers, stairs, etc )    - Identify cognitive and physical deficits and behaviors that affect mobility  - Identify mobility aids required to assist with transfers and/or ambulation (gait belt, sit-to-stand, lift, walker, cane, etc )  - [place] fall precautions as indicated by assessment  - Record patient progress and toleration of activity level on Mobility SBAR; progress patient to next Phase/Stage  - Instruct patient to call for assistance with activity based on assessment  - Consider rehabilitation consult to assist with strengthening/weightbearing, etc   Outcome: Progressing     Problem: DISCHARGE PLANNING  Goal: Discharge to home or other facility with appropriate resources  Description: INTERVENTIONS:  - Identify barriers to discharge w/patient and caregiver  - Arrange for needed discharge resources and transportation as appropriate  - Identify discharge learning needs (meds, wound care, etc )  - Arrange for interpretive services to assist at discharge as needed  - Refer to Case Management Department for coordinating discharge planning if the patient needs post-hospital services based on physician/advanced practitioner order or complex needs related to functional status, cognitive ability, or social support system  Outcome: Progressing     Problem: Knowledge Deficit  Goal: Patient/family/caregiver demonstrates understanding of disease process, treatment plan, medications, and discharge instructions  Description: Complete learning assessment and assess knowledge base    Interventions:  - Provide teaching at level of understanding  - Provide teaching via preferred learning methods  Outcome: Progressing     Problem: CARDIOVASCULAR - ADULT  Goal: Maintains optimal cardiac output and hemodynamic stability  Description: INTERVENTIONS:  - Monitor I/O, vital signs and rhythm  - Monitor for S/S and trends of decreased cardiac output  - Administer and titrate ordered vasoactive medications to optimize hemodynamic stability  - Assess quality of pulses, skin color and temperature  - Assess for signs of decreased coronary artery perfusion  - Instruct patient to report change in severity of symptoms  Outcome: Progressing  Goal: Absence of cardiac dysrhythmias or at baseline rhythm  Description: INTERVENTIONS:  - Continuous cardiac monitoring, vital signs, obtain 12 lead EKG if ordered  - Administer antiarrhythmic and heart rate control medications as ordered  - Monitor electrolytes and administer replacement therapy as ordered  Outcome: Progressing

## 2021-02-12 VITALS
SYSTOLIC BLOOD PRESSURE: 135 MMHG | RESPIRATION RATE: 19 BRPM | TEMPERATURE: 97.9 F | HEART RATE: 66 BPM | BODY MASS INDEX: 30.67 KG/M2 | WEIGHT: 202.38 LBS | DIASTOLIC BLOOD PRESSURE: 65 MMHG | OXYGEN SATURATION: 95 % | HEIGHT: 68 IN

## 2021-02-12 DIAGNOSIS — Z23 ENCOUNTER FOR IMMUNIZATION: ICD-10-CM

## 2021-02-12 LAB
ALBUMIN SERPL BCP-MCNC: 2.6 G/DL (ref 3.5–5)
ALP SERPL-CCNC: 57 U/L (ref 46–116)
ALT SERPL W P-5'-P-CCNC: 41 U/L (ref 12–78)
ANION GAP SERPL CALCULATED.3IONS-SCNC: 10 MMOL/L (ref 4–13)
AST SERPL W P-5'-P-CCNC: 37 U/L (ref 5–45)
BASOPHILS # BLD AUTO: 0.01 THOUSANDS/ΜL (ref 0–0.1)
BASOPHILS NFR BLD AUTO: 0 % (ref 0–1)
BILIRUB SERPL-MCNC: 0.3 MG/DL (ref 0.2–1)
BUN SERPL-MCNC: 38 MG/DL (ref 5–25)
CALCIUM ALBUM COR SERPL-MCNC: 10 MG/DL (ref 8.3–10.1)
CALCIUM SERPL-MCNC: 8.9 MG/DL (ref 8.3–10.1)
CHLORIDE SERPL-SCNC: 109 MMOL/L (ref 100–108)
CO2 SERPL-SCNC: 23 MMOL/L (ref 21–32)
CREAT SERPL-MCNC: 1.44 MG/DL (ref 0.6–1.3)
CRP SERPL QL: 34.7 MG/L
D DIMER PPP FEU-MCNC: 0.72 UG/ML FEU
EOSINOPHIL # BLD AUTO: 0 THOUSAND/ΜL (ref 0–0.61)
EOSINOPHIL NFR BLD AUTO: 0 % (ref 0–6)
ERYTHROCYTE [DISTWIDTH] IN BLOOD BY AUTOMATED COUNT: 12.1 % (ref 11.6–15.1)
FERRITIN SERPL-MCNC: 560 NG/ML (ref 8–388)
GFR SERPL CREATININE-BSD FRML MDRD: 47 ML/MIN/1.73SQ M
GLUCOSE SERPL-MCNC: 166 MG/DL (ref 65–140)
GLUCOSE SERPL-MCNC: 167 MG/DL (ref 65–140)
GLUCOSE SERPL-MCNC: 172 MG/DL (ref 65–140)
GLUCOSE SERPL-MCNC: 277 MG/DL (ref 65–140)
HCT VFR BLD AUTO: 44.9 % (ref 36.5–49.3)
HGB BLD-MCNC: 14.3 G/DL (ref 12–17)
IMM GRANULOCYTES # BLD AUTO: 0.04 THOUSAND/UL (ref 0–0.2)
IMM GRANULOCYTES NFR BLD AUTO: 1 % (ref 0–2)
LYMPHOCYTES # BLD AUTO: 0.75 THOUSANDS/ΜL (ref 0.6–4.47)
LYMPHOCYTES NFR BLD AUTO: 9 % (ref 14–44)
MCH RBC QN AUTO: 30.2 PG (ref 26.8–34.3)
MCHC RBC AUTO-ENTMCNC: 31.8 G/DL (ref 31.4–37.4)
MCV RBC AUTO: 95 FL (ref 82–98)
MONOCYTES # BLD AUTO: 0.34 THOUSAND/ΜL (ref 0.17–1.22)
MONOCYTES NFR BLD AUTO: 4 % (ref 4–12)
NEUTROPHILS # BLD AUTO: 7.08 THOUSANDS/ΜL (ref 1.85–7.62)
NEUTS SEG NFR BLD AUTO: 86 % (ref 43–75)
NRBC BLD AUTO-RTO: 0 /100 WBCS
PLATELET # BLD AUTO: 317 THOUSANDS/UL (ref 149–390)
PMV BLD AUTO: 10.4 FL (ref 8.9–12.7)
POTASSIUM SERPL-SCNC: 4.8 MMOL/L (ref 3.5–5.3)
PROCALCITONIN SERPL-MCNC: 5.92 NG/ML
PROT SERPL-MCNC: 6.5 G/DL (ref 6.4–8.2)
RBC # BLD AUTO: 4.73 MILLION/UL (ref 3.88–5.62)
SODIUM SERPL-SCNC: 142 MMOL/L (ref 136–145)
WBC # BLD AUTO: 8.22 THOUSAND/UL (ref 4.31–10.16)

## 2021-02-12 PROCEDURE — 82728 ASSAY OF FERRITIN: CPT | Performed by: INTERNAL MEDICINE

## 2021-02-12 PROCEDURE — 80053 COMPREHEN METABOLIC PANEL: CPT | Performed by: INTERNAL MEDICINE

## 2021-02-12 PROCEDURE — 85025 COMPLETE CBC W/AUTO DIFF WBC: CPT | Performed by: INTERNAL MEDICINE

## 2021-02-12 PROCEDURE — 84145 PROCALCITONIN (PCT): CPT | Performed by: INTERNAL MEDICINE

## 2021-02-12 PROCEDURE — 86140 C-REACTIVE PROTEIN: CPT | Performed by: INTERNAL MEDICINE

## 2021-02-12 PROCEDURE — 85379 FIBRIN DEGRADATION QUANT: CPT | Performed by: INTERNAL MEDICINE

## 2021-02-12 PROCEDURE — 99239 HOSP IP/OBS DSCHRG MGMT >30: CPT | Performed by: INTERNAL MEDICINE

## 2021-02-12 PROCEDURE — 82948 REAGENT STRIP/BLOOD GLUCOSE: CPT

## 2021-02-12 RX ORDER — INSULIN LISPRO 100 [IU]/ML
5 INJECTION, SOLUTION INTRAVENOUS; SUBCUTANEOUS
Start: 2021-02-12 | End: 2021-03-15

## 2021-02-12 RX ORDER — CEFPODOXIME PROXETIL 200 MG/1
200 TABLET, FILM COATED ORAL 2 TIMES DAILY
Qty: 10 TABLET | Refills: 0 | Status: SHIPPED | OUTPATIENT
Start: 2021-02-12 | End: 2021-02-17

## 2021-02-12 RX ADMIN — LEVOTHYROXINE SODIUM 75 MCG: 75 TABLET ORAL at 05:43

## 2021-02-12 RX ADMIN — ASPIRIN 81 MG: 81 TABLET, COATED ORAL at 09:06

## 2021-02-12 RX ADMIN — INSULIN LISPRO 4 UNITS: 100 INJECTION, SOLUTION INTRAVENOUS; SUBCUTANEOUS at 12:52

## 2021-02-12 RX ADMIN — REMDESIVIR 100 MG: 100 INJECTION, POWDER, LYOPHILIZED, FOR SOLUTION INTRAVENOUS at 12:54

## 2021-02-12 RX ADMIN — METOPROLOL SUCCINATE 25 MG: 25 TABLET, EXTENDED RELEASE ORAL at 09:06

## 2021-02-12 RX ADMIN — ATORVASTATIN CALCIUM 40 MG: 40 TABLET, FILM COATED ORAL at 09:06

## 2021-02-12 RX ADMIN — ZINC SULFATE 220 MG (50 MG) CAPSULE 220 MG: CAPSULE at 09:04

## 2021-02-12 RX ADMIN — INSULIN LISPRO 1 UNITS: 100 INJECTION, SOLUTION INTRAVENOUS; SUBCUTANEOUS at 04:53

## 2021-02-12 RX ADMIN — HEPARIN SODIUM 5000 UNITS: 5000 INJECTION INTRAVENOUS; SUBCUTANEOUS at 05:43

## 2021-02-12 RX ADMIN — Medication 2000 UNITS: at 09:06

## 2021-02-12 RX ADMIN — OXYCODONE HYDROCHLORIDE AND ACETAMINOPHEN 1000 MG: 500 TABLET ORAL at 09:06

## 2021-02-12 RX ADMIN — INSULIN LISPRO 1 UNITS: 100 INJECTION, SOLUTION INTRAVENOUS; SUBCUTANEOUS at 09:07

## 2021-02-12 NOTE — CASE MANAGEMENT
LOS- 3 days    SW spoke with pt over phone to assess needs and discuss plans  Discussed goals of making sure pt's needs are met upon discharge and that Freedom of Choice is offered  Pt lives at home with his wife, daughter and grandchildren  Pt is independent with ADLs and mobility, driving  No DME at home  No HHC/STR history  No MH or D&A issues  Pt's PCP is Dr Amador Beckford, DO  Per pt he has prescription coverage and has no difficulty getting his medication as prescribed  Pt [facility] mail order pharmacy for maintenance medications [facility]  Pt does not have  POA/advanced directives  Offered information on and assistance with completing advanced directive  Pt declined at this time  Discussed discharge plans and needs with pt  Pt's plan is to return home with family  Discharge is pending for today  No discharge needs expressed or anticipated by pt at this time  IMM reviewed with patient  Patient agrees with discharge determination  Per pt his family will be transporting home home

## 2021-02-12 NOTE — ASSESSMENT & PLAN NOTE
Wt Readings from Last 3 Encounters:   02/09/21 91 8 kg (202 lb 6 1 oz)   01/28/21 91 2 kg (201 lb)   10/23/20 91 2 kg (201 lb)     Last echo from 2016 with EF of 60%, abnormal left ventricular relaxation  Patient denies any recent weight changes, LE edema, orthopnea  Remains euvolemic  Resume home medication on discharge

## 2021-02-12 NOTE — DISCHARGE SUMMARY
Discharge Summary - [place] [address] Internal Medicine    Patient Information: Jos Veloz 67 y o  male MRN: [id number]  Unit/Bed#: [address] [place] [address] Encounter: [id number]    Discharging Physician / Practitioner: Mady Mcintyre MD  PCP: Teresa Ramos DO  Admission Date: 2/9/2021  Discharge Date: 02/12/21    Reason for Admission: Fever - 75 years or older (Pt had MAB infusion at 1230 and grandson reports, chills, shaking, SOB and "unable to talk"  )      Discharge Diagnoses:     Principal Problem:    COVID-19 virus infection  Active Problems:    Type 2 diabetes mellitus with stage 3 chronic kidney disease, with long-term current use of insulin ([facility])    Essential hypertension    Chronic diastolic congestive heart failure ([facility])    Acute-on-chronic kidney injury ([facility] [address] )    Acquired hypothyroidism    Mixed hyperlipidemia  Resolved Problems:    * No resolved hospital problems  *        * COVID-19 virus infection  Assessment & Plan  Patient tested positive on 2/04  He has been feeling generally well at home up until afternoon on presentation, shortly after his MAB infusion  He had SpO2 88-91% on RA in the ED and was placed on 3L NC O2  Patient was treated as per Mild covid treatment pathway  Received remdesivir, Decadron as per COVID protocol  Patient also received IV antibiotics due to elevated procalcitonin for superimposed bacterial pneumonia  Clinically improving, remains on room air without any significant respiratory symptoms  · CRP downtrending, ferritin stable  · D-dimer remains low, patient denies any prior history of thromboembolism or malignancy  Anticoagulation or discharged is not indicated  · Procalcitonin is downtrending  · Blood cultures negative, urine Legionella pneumococcal antigen negative    Patient will be transition to oral cefpodoxime to complete 7 days  Patient will be discharged home, recommended monitoring of symptoms and outpatient follow-up  COVID precautions and monitoring was explained    Acute-on-chronic kidney injury [facility])  Assessment & Plan  Lab Results   Component Value Date    EGFR 47 02/12/2021    EGFR 40 02/11/2021    EGFR 34 02/10/2021    CREATININE 1 44 (H) 02/12/2021    CREATININE 1 63 (H) 02/11/2021    CREATININE 1 88 (H) 02/10/2021     Baseline 1 4-1 6  Cr 1 99 on admission  In the setting of poor oral intake and dehydration  Improved with gentle IV hydration to baseline  Resume home medication on discharge    Chronic diastolic congestive heart failure ([address] )  Assessment & Plan  Wt Readings from Last 3 Encounters:   02/09/21 91 8 kg (202 lb 6 1 oz)   01/28/21 91 2 kg (201 lb)   10/23/20 91 2 kg (201 lb)     Last echo from 2016 with EF of 60%, abnormal left ventricular relaxation  Patient denies any recent weight changes, LE edema, orthopnea  Remains euvolemic  Resume home medication on discharge      Essential hypertension  Assessment & Plan  Stable continue home meds    Type 2 diabetes mellitus with stage 3 chronic kidney disease, with long-term current use of insulin [facility])  Assessment & Plan  Lab Results   Component Value Date    HGBA1C 7 5 (H) 01/11/2021       Recent Labs     02/11/21  2133 02/12/21  0452 02/12/21  0728 02/12/21  1234   POCGLU 209* 172* 167* 277*       Blood Sugar Average: Last 72 hrs:  (P) 198 75     Continue home meds on discharge    Acquired hypothyroidism  Assessment & Plan  Continue levothyroxine 75 mcg    Mixed hyperlipidemia  Assessment & Plan  Continue atorvastatin       Consultations During Hospital Stay:  IP CONSULT TO CASE MANAGEMENT    Procedures Performed:     · None    Significant Findings:     · Refer to hospital course and above listed diagnosis related plan for details    Imaging while in hospital:    Xr Chest 1 View Portable    Result Date: 2/9/2021  Narrative: CHEST INDICATION:   chest pain  Patient has confirmed COVID-19  COMPARISON:  12/17/2019   EXAM PERFORMED/VIEWS:  XR CHEST PORTABLE FINDINGS: Cardiomediastinal silhouette appears unremarkable  New groundglass opacity in the left lower lung and perhaps mild opacity in the right lower lung  No pneumothorax or pleural effusion  Osseous structures appear within normal limits for patient age  Impression: Lower lung groundglass opacity likely related to known Covid 19 pneumonia  Workstation performed: [id number]       Incidental Findings:   None  Test Results Pending at Discharge (will require follow up):   · As per After Visit Summary     Outpatient Tests Requested:  · Follow-up BMP as outpatient with PCP    Complications:  Refer to hospital course and above listed diagnosis related plan, if any    Hospital Course: As per HPI  Du Saleem is a 67 y o  male patient with history of hypertension, dyslipidemia, diabetes mellitus type 2, CKD, hypothyroidism, CAD status post stent who originally presented to the hospital on 2/9/2021 due to fevers, chills, shortness of breath  He tested COVID positive on 02/04 prior to he had been doing well at home and only experienced mild fatigue, shortness of breath and cough     On the day of admission, he went for outpatient MAB infusion and shortly after he got home he started with a fever, chills, SOB, and states he was having trouble talking  He did complain of chest pain with cough  He denied any  LE edema, orthopnea, palpitations, abdominal pain  He denied headaches, lightheadedness, dizziness, numbness, tingling, gait abnormalities, weakness otherwise  In the ED he was noted to have an NEO and required 3L of NC O2  He did also have 1 episode of vomiting in the ED but denied any abdominal pain or nausea otherwise  Spoke with patient's daughter who reported that he has had a few moments of mild confusion in the past few days and would sometimes seem confused about what was going on and would need gentle reminders  Please see above list of diagnoses and related plan for additional information         Condition at Discharge: stable     Discharge Day Visit / Exam:     Subjective:    Noted to be ambulating independently in the room  Denies any chest pain shortness of breath or cough  Denies any fever or chills  Denies any  or GI complaints  Appetite is improving  Denies any dizziness or palpitations    Vitals: Blood Pressure: 135/65 (02/12/21 0900)  Pulse: 66 (02/12/21 0900)  Temperature: 97 9 °F (36 6 °C) (02/12/21 0900)  Temp Source: Oral (02/12/21 0900)  Respirations: 19 (02/12/21 0900)  Height: 5' 8" (172 7 cm) (02/09/21 2139)  Weight - Scale: 91 8 kg (202 lb 6 1 oz) (02/09/21 2139)  SpO2: 95 % (02/12/21 0900)  Exam:   Physical Exam  Constitutional:       General: He is not in acute distress  HENT:      Head: Normocephalic and atraumatic  Neck:      Musculoskeletal: Neck supple  Cardiovascular:      Rate and Rhythm: Normal rate  Pulmonary:      Effort: Pulmonary effort is normal  No respiratory distress  Breath sounds: No wheezing, rhonchi or rales  Chest:      Chest wall: No tenderness  Abdominal:      General: Bowel sounds are normal  There is no distension  Palpations: Abdomen is soft  Tenderness: There is no abdominal tenderness  There is no guarding or rebound  Musculoskeletal: Normal range of motion  Right lower leg: No edema  Left lower leg: No edema  Skin:     General: Skin is warm and dry  Findings: No rash  Neurological:      General: No focal deficit present  Mental Status: He is alert and oriented to person, place, and time  Mental status is at baseline  Cranial Nerves: No cranial nerve deficit  Psychiatric:         Mood and Affect: Mood normal          Discharge instructions/Information to patient and family:(Discharge Medications and Follow up):   See after visit summary for information provided to patient and family  Provisions for Follow-Up Care:  See after visit summary for information related to follow-up care and any pertinent home health orders        Disposition: Home    Planned Readmission:  No     Discharge Statement:  I spent 45 minutes discharging the patient  This time was spent on the day of discharge  I had direct contact with the patient on the day of discharge  Greater than 50% of the total time was spent examining patient, answering all patient questions, arranging and discussing plan of care with patient as well as directly providing post-discharge instructions  Additional time then spent on discharge activities  Discussed with patient's daughter over the phone  Discharge Medications:  See after visit summary for reconciled discharge medications provided to patient and family  ** Please Note: "This note has been constructed using a voice recognition system  Therefore there may be syntax, spelling, and/or grammatical errors   Please call if you have any questions  "**

## 2021-02-12 NOTE — ASSESSMENT & PLAN NOTE
Lab Results   Component Value Date    EGFR 47 02/12/2021    EGFR 40 02/11/2021    EGFR 34 02/10/2021    CREATININE 1 44 (H) 02/12/2021    CREATININE 1 63 (H) 02/11/2021    CREATININE 1 88 (H) 02/10/2021     Baseline 1 4-1 6  Cr 1 99 on admission  In the setting of poor oral intake and dehydration  Improved with gentle IV hydration to baseline  Resume home medication on discharge

## 2021-02-12 NOTE — ASSESSMENT & PLAN NOTE
Patient tested positive on 2/04  He has been feeling generally well at home up until afternoon on presentation, shortly after his MAB infusion  He had SpO2 88-91% on RA in the ED and was placed on 3L NC O2  Patient was treated as per Mild covid treatment pathway  Received remdesivir, Decadron as per COVID protocol  Patient also received IV antibiotics due to elevated procalcitonin for superimposed bacterial pneumonia  Clinically improving, remains on room air without any significant respiratory symptoms  · CRP downtrending, ferritin stable  · D-dimer remains low, patient denies any prior history of thromboembolism or malignancy  Anticoagulation or discharged is not indicated  · Procalcitonin is downtrending  · Blood cultures negative, urine Legionella pneumococcal antigen negative    Patient will be transition to oral cefpodoxime to complete 7 days  Patient will be discharged home, recommended monitoring of symptoms and outpatient follow-up  COVID precautions and monitoring was explained

## 2021-02-12 NOTE — DISCHARGE INSTRUCTIONS
[address]    Your healthcare provider and/or public health staff have evaluated you and have determined that you do not need to remain in the hospital at this time  At this time you can be isolated at home where you will be monitored by staff from your local or state health department  You should carefully follow the prevention and isolation steps below until a healthcare provider or local or state health department says that you can return to your normal activities  Stay home except to get medical care    People who are mildly ill with COVID-19 are able to isolate at home during their illness  You should restrict activities outside your home, except for getting medical care  Do not go to work, school, or public areas  Avoid using public transportation, ride-sharing, or taxis  Separate yourself from other people and animals in your home    People: As much as possible, you should stay in a specific room and away from other people in your home  Also, you should use a separate bathroom, if available  Animals: You should restrict contact with pets and other animals while you are sick with COVID-19, just like you would around other people  Although there have not been reports of pets or other animals becoming sick with COVID-19, it is still recommended that people sick with COVID-19 limit contact with animals until more information is known about the virus  When possible, have another member of your household care for your animals while you are sick  If you are sick with COVID-19, avoid contact with your pet, including petting, snuggling, being kissed or licked, and sharing food  If you must care for your pet or be around animals while you are sick, wash your hands before and after you interact with pets and wear a facemask  See COVID-19 and Animals for more information      Call ahead before visiting your doctor    If you have a medical appointment, call the healthcare provider and tell them that you have or may have COVID-19  This will help the healthcare providers office take steps to keep other people from getting infected or exposed  Wear a facemask    You should wear a facemask when you are around other people (e g , sharing a room or vehicle) or pets and before you enter a healthcare providers office  If you are not able to wear a facemask (for example, because it causes trouble breathing), then people who live with you should not stay in the same room with you, or they should wear a facemask if they enter your room  Cover your coughs and sneezes    Cover your mouth and nose with a tissue when you cough or sneeze  Throw used tissues in a lined trash can  Immediately wash your hands with soap and water for at least 20 seconds or, if soap and water are not available, clean your hands with an alcohol-based hand  that contains at least 60% alcohol  Clean your hands often    Wash your hands often with soap and water for at least 20 seconds, especially after blowing your nose, coughing, or sneezing; going to the bathroom; and before eating or preparing food  If soap and water are not readily available, use an alcohol-based hand  with at least 60% alcohol, covering all surfaces of your hands and rubbing them together until they feel dry  Soap and water are the best option if hands are visibly dirty  Avoid touching your eyes, nose, and mouth with unwashed hands  Avoid sharing personal household items    You should not share dishes, drinking glasses, cups, eating utensils, towels, or bedding with other people or pets in your home  After using these items, they should be washed thoroughly with soap and water  Clean all high-touch surfaces everyday    High touch surfaces include counters, tabletops, doorknobs, bathroom fixtures, toilets, phones, keyboards, tablets, and bedside tables  Also, clean any surfaces that may have blood, stool, or body fluids on them   Use a household cleaning spray or wipe, according to the label instructions  Labels contain instructions for safe and effective use of the cleaning product including precautions you should take when applying the product, such as wearing gloves and making sure you have good ventilation during use of the product  Monitor your symptoms    Seek prompt medical attention if your illness is worsening (e g , difficulty breathing)  Before seeking care, call your healthcare provider and tell them that you have, or are being evaluated for, COVID-19  Put on a facemask before you enter the facility  These steps will help the healthcare providers office to keep other people in the office or waiting room from getting infected or exposed  Ask your healthcare provider to call the local or [facility] health department  Persons who are placed under active monitoring or facilitated self-monitoring should follow instructions provided by their local health department or occupational health professionals, as appropriate  If you have a medical emergency and need to call 911, notify the dispatch personnel that you have, or are being evaluated for COVID-19  If possible, put on a facemask before emergency medical services arrive      Discontinuing home isolation    Patients with confirmed COVID-19 should remain under home isolation precautions until the following conditions are met:   - They have had no fever for at least 24 hours (that is one full day of no fever without the use medicine that reduces fevers)  AND  - other symptoms have improved (for example, when their cough or shortness of breath have improved)  AND  - If had mild or moderate illness, at least 10 days have passed since their symptoms first appeared or if severe illness (needed oxygen) or immunosuppressed, at least 20 days have passed since symptoms first appeared  Patients with confirmed COVID-19 should also notify close contacts (including their workplace) and ask that they self-quarantine  Currently, close contact is defined as being within 6 feet for 15 minutes or more from the period 24 hours starting 48 hours before symptom onset to the time at which the patient went into isolation  Close contacts of patients diagnosed with COVID-19 should be instructed by the patient to self-quarantine for 14 days from the last time of their last contact with the patient  Source: [employer]    Discharge Anticoagulation Plan:     While hospitalized, patient did not have confirmed or highly suspected VTE/PE, and D-dimer was < 2 5    Patient confirmed to have no other indication for therapeutic anticoagulation  Patient will need follow up with primary care provider (or specialist) within 72 hours of hospital discharge, and frequently thereafter to monitor for signs of worsening respiratory function, VTE and/or bleeding  Cefpodoxime Proxetil (By mouth)   Cefpodoxime Proxetil ([id number]-[id number] PROX-e-til)  Treats bacterial infections  This medicine is a cephalosporin antibiotic  Brand Name(s):   There may be other brand names for this medicine  When This Medicine Should Not Be Used: You should not use this medicine if you have ever had an allergic reaction to cephalosporin medicine such as Keflex®, Ceclor®, Velosef®, or Suprax®  How to Use This Medicine:   Tablet, Liquid  · Your doctor will tell you how much to take and how often  · Keep taking your medicine unless your doctor tells you to stop, even if you feel better  If you stop taking this medicine too soon, your infection may come back  · Take cefpodoxime with food  · Shake the oral liquid well just before using  Measure your medicine with a measuring spoon or medicine cup  If a dose is missed:   · Take your medicine as soon as you remember that you have missed your dose    · If it is almost time for your next dose, wait until then to take the medicine and skip the missed dose  · You should not use two doses at one time  How to Store and Dispose of This Medicine:   · Keep your tablets at room temperature in a closed container, away from heat, moisture, and direct light  Keep the oral liquid in the refrigerator  Do not freeze  Throw away any unused liquid that is more than 15days old  · Keep all medicine out of the reach of children  Drugs and Foods to Avoid:   Ask your doctor or pharmacist before using any other medicine, including over-the-counter medicines, vitamins, and herbal products  · Make sure your doctor knows if you are taking birth control pills or probenecid (Benemid®, ColBENEMID®) while taking cefpodoxime  Warnings While Using This Medicine:   · If you are pregnant or breastfeeding, talk to your doctor before taking this medicine  · Before taking this medicine, let your doctor know if you have ever had an allergic reaction to penicillin medicines, or if you have stomach or kidney problems  · If you have severe diarrhea while taking cefpodoxime, check with your doctor before taking medicine to stop the diarrhea  Possible Side Effects While Using This Medicine:   Call your doctor right away if you notice any of these side effects:  · Rash, hives, or blistering of the skin  · Swelling  · Wheezing or trouble breathing  · Severe diarrhea (watery or bloody)  · Severe stomach pain, vomiting  If you notice these less serious side effects, talk with your doctor:   · Mild diarrhea  · Nausea  · Sore mouth or tongue  · Vaginal itching or discharge  If you notice other side effects that you think are caused by this medicine, tell your doctor  Call your doctor for medical advice about side effects  You may report side effects to FDA at 8-071-FDA-7644  © Copyright [address] [place] [address] Information is for End User's use only and may not be sold, redistributed or otherwise used for commercial purposes    The above information is an  only  It is not intended as medical advice for individual conditions or treatments  Talk to your doctor, nurse or pharmacist before following any medical regimen to see if it is safe and effective for you

## 2021-02-12 NOTE — ASSESSMENT & PLAN NOTE
Lab Results   Component Value Date    HGBA1C 7 5 (H) 01/11/2021       Recent Labs     02/11/21  2133 02/12/21  0452 02/12/21  0728 02/12/21  1234   POCGLU 209* 172* 167* 277*       Blood Sugar Average: Last 72 hrs:  (P) 198 75     Continue home meds on discharge

## 2021-02-14 LAB
BACTERIA BLD CULT: NORMAL
BACTERIA BLD CULT: NORMAL

## 2021-02-15 ENCOUNTER — OFFICE VISIT (OUTPATIENT)
Dept: FAMILY MEDICINE CLINIC | Facility: CLINIC | Age: 76
End: 2021-02-15
Payer: MEDICARE

## 2021-02-15 ENCOUNTER — TRANSITIONAL CARE MANAGEMENT (OUTPATIENT)
Dept: FAMILY MEDICINE CLINIC | Facility: CLINIC | Age: 76
End: 2021-02-15

## 2021-02-15 VITALS
SYSTOLIC BLOOD PRESSURE: 116 MMHG | WEIGHT: 199 LBS | HEIGHT: 68 IN | RESPIRATION RATE: 22 BRPM | BODY MASS INDEX: 30.16 KG/M2 | DIASTOLIC BLOOD PRESSURE: 64 MMHG | HEART RATE: 88 BPM | TEMPERATURE: 98.4 F | OXYGEN SATURATION: 92 %

## 2021-02-15 DIAGNOSIS — N18.30 TYPE 2 DIABETES MELLITUS WITH STAGE 3 CHRONIC KIDNEY DISEASE, WITH LONG-TERM CURRENT USE OF INSULIN, UNSPECIFIED WHETHER STAGE 3A OR 3B CKD (HCC): ICD-10-CM

## 2021-02-15 DIAGNOSIS — Z79.4 TYPE 2 DIABETES MELLITUS WITH STAGE 3 CHRONIC KIDNEY DISEASE, WITH LONG-TERM CURRENT USE OF INSULIN, UNSPECIFIED WHETHER STAGE 3A OR 3B CKD (HCC): ICD-10-CM

## 2021-02-15 DIAGNOSIS — I10 ESSENTIAL HYPERTENSION: ICD-10-CM

## 2021-02-15 DIAGNOSIS — E11.22 TYPE 2 DIABETES MELLITUS WITH STAGE 3 CHRONIC KIDNEY DISEASE, WITH LONG-TERM CURRENT USE OF INSULIN, UNSPECIFIED WHETHER STAGE 3A OR 3B CKD (HCC): ICD-10-CM

## 2021-02-15 DIAGNOSIS — N18.31 CHRONIC KIDNEY DISEASE (CKD) STAGE G3A/A1, MODERATELY DECREASED GLOMERULAR FILTRATION RATE (GFR) BETWEEN 45-59 ML/MIN/1.73 SQUARE METER AND ALBUMINURIA CREATININE RATIO LESS THAN 30 MG/G (HCC): ICD-10-CM

## 2021-02-15 DIAGNOSIS — U07.1 COVID-19 VIRUS INFECTION: Primary | ICD-10-CM

## 2021-02-15 PROCEDURE — 99495 TRANSJ CARE MGMT MOD F2F 14D: CPT | Performed by: FAMILY MEDICINE

## 2021-02-15 RX ORDER — ALBUTEROL SULFATE 90 UG/1
2 AEROSOL, METERED RESPIRATORY (INHALATION) EVERY 6 HOURS PRN
Qty: 1 INHALER | Refills: 0 | Status: SHIPPED | OUTPATIENT
Start: 2021-02-15

## 2021-02-15 NOTE — PROGRESS NOTES
Assessment/Plan:    1  COVID-19 virus infection  -     Comprehensive metabolic panel; Future  -     CBC; Future  -     TSH, 3rd generation; Future  -     albuterol (ProAir HFA) 90 mcg/act inhaler; Inhale 2 puffs every 6 (six) hours as needed for wheezing    2  Type 2 diabetes mellitus with stage 3 chronic kidney disease, with long-term current use of insulin, unspecified whether stage 3a or 3b CKD ([facility] [address] )  -     Comprehensive metabolic panel; Future  -     CBC; Future  -     TSH, 3rd generation; Future    3  Essential hypertension  -     Comprehensive metabolic panel; Future  -     CBC; Future  -     TSH, 3rd generation; Future    4  Chronic kidney disease (CKD) stage G3a/A1, moderately decreased glomerular filtration rate (GFR) between 45-59 mL/min/1 73 square meter and albuminuria creatinine ratio less than 30 mg/g ([facility])  -     Comprehensive metabolic panel; Future  -     CBC; Future  -     TSH, 3rd generation; Future    Pt o2 sats are good, lungs are fairly clear  Pt is very fatigued and has been sleeping and was actually lying down when I got in the room  Pt is rarely sob when he is it is with exertion, which is to be expected  02 sat on ra in the office is certainly acceptable considering covid  Daughter will be watchinmg him closely - if he has any degradation opf symptoms I would want her to call the squad        There are no Patient Instructions on file for this visit  Return for Next scheduled follow up  Subjective:      Patient ID: Jessy Rouse is a 67 y o  male  Chief Complaint   Patient presents with    Transition of Care Management     [facility]       Pt is here for a TCM appt  Pt is being seen after admission to hospital for covid pneumonia  Pt was dx with covid - was a candidate for the Bamlavir and was infused  PT did not feel well after the infusion, as per pt and daughter - they were told they had a reaction to the [address]?     Pt was discharged on oxygen ad as per pt was advised may have a bacterial pnemonia as well    Daughter reports low 02 sat reading at home in the office we got 96% on Ra and 92 after walking to the exam room - RA      The following portions of the patient's history were reviewed and updated as appropriate: allergies, current medications, past family history, past medical history, past social history, past surgical history and problem list     Review of Systems   Constitutional: Positive for fatigue  Negative for activity change, appetite change, chills, diaphoresis, fever and unexpected weight change  HENT: Negative for congestion, dental problem, ear pain, mouth sores, sinus pressure, sinus pain, sore throat and trouble swallowing  Eyes: Negative for photophobia, discharge and itching  Respiratory: Positive for shortness of breath  Negative for apnea, cough, chest tightness and wheezing  Cardiovascular: Negative for chest pain, palpitations and leg swelling  Gastrointestinal: Negative for abdominal distention, abdominal pain, blood in stool, nausea and vomiting  Endocrine: Negative for cold intolerance, heat intolerance, polydipsia, polyphagia and polyuria  Genitourinary: Negative for difficulty urinating  Musculoskeletal: Positive for back pain (over where the pneumonia was seen in the lung)  Negative for arthralgias  Skin: Negative for color change and wound  Neurological: Negative for dizziness, syncope, speech difficulty and headaches  Hematological: Negative for adenopathy  Psychiatric/Behavioral: Negative for agitation and behavioral problems           Current Outpatient Medications   Medication Sig Dispense Refill    aspirin (ASPIR-81) 81 mg EC tablet Take 81 mg by mouth every morning        atorvastatin (LIPITOR) 40 mg tablet TAKE 1 TABLET BY MOUTH  DAILY 90 tablet 3    cefpodoxime (VANTIN) 200 mg tablet Take 1 tablet (200 mg total) by mouth 2 (two) times a day for 5 days 10 tablet 0    clotrimazole-betamethasone (LOTRISONE) 1-0 05 % cream Apply topically 2 (two) times a day 30 g 0    Coenzyme Q10 (COQ-10) 100 MG CAPS Take 200 mg by mouth daily at bedtime       furosemide (LASIX) 20 mg tablet Take 1 tablet (20 mg total) by mouth daily 90 tablet 0    glipiZIDE (GLUCOTROL) 5 mg tablet Take 1 tablet (5 mg total) by mouth daily with breakfast 90 tablet 1    insulin lispro (HumaLOG KwikPen) 100 units/mL injection pen Inject 5 Units under the skin 3 (three) times a day with meals Sliding scale      Levemir FlexTouch 100 units/mL injection pen INJECT SUBCUTANEOUSLY 39  UNITS DAILY AT BEDTIME 15 mL 8    levothyroxine 75 mcg tablet TAKE 1 TABLET BY MOUTH  DAILY 90 tablet 1    lisinopril (ZESTRIL) 2 5 mg tablet Take 1 tablet (2 5 mg total) by mouth daily 90 tablet 1    metoprolol succinate (TOPROL-XL) 25 mg 24 hr tablet TAKE 1 TABLET BY MOUTH  DAILY 90 tablet 2    multivitamin (THERAGRAN) TABS Take 1 tablet by mouth every morning      pantoprazole (PROTONIX) 40 mg tablet TAKE 1 TABLET BY MOUTH  DAILY 90 tablet 3    albuterol (ProAir HFA) 90 mcg/act inhaler Inhale 2 puffs every 6 (six) hours as needed for wheezing 1 Inhaler 0    Insulin Pen Needle (BD Pen Needle Sherie U/F) 32G X 4 MM MISC USE 4 TIMES DAILY AS  DIRECTED (Patient not taking: Reported on 2/9/2021) 360 each 1     No current facility-administered medications for this visit  Objective:    /64   Pulse 88   Temp 98 4 °F (36 9 °C)   Resp 22   Ht 5' 8" (1 727 m)   Wt 90 3 kg (199 lb)   SpO2 92% Comment: 97 while sitting in waiting room  BMI 30 26 kg/m²        Physical Exam  Vitals signs and nursing note reviewed  Constitutional:       General: He is not in acute distress  Appearance: He is well-developed  He is ill-appearing (nothing specific just does not look himself, rest of exam is suprisingly benign)  He is not toxic-appearing or diaphoretic  HENT:      Head: Normocephalic and atraumatic        Right Ear: External ear normal       Left Ear: External ear normal       Nose: Nose normal       Mouth/Throat:      Pharynx: No oropharyngeal exudate  Eyes:      General: No scleral icterus  Right eye: No discharge  Left eye: No discharge  Pupils: Pupils are equal, round, and reactive to light  Neck:      Thyroid: No thyromegaly  Cardiovascular:      Rate and Rhythm: Normal rate  Heart sounds: Normal heart sounds  No murmur  Pulmonary:      Effort: Pulmonary effort is normal  No respiratory distress  Breath sounds: Normal breath sounds  No wheezing  Abdominal:      General: Bowel sounds are normal  There is no distension  Palpations: Abdomen is soft  There is no mass  Tenderness: There is no abdominal tenderness  There is no guarding or rebound  Musculoskeletal: Normal range of motion  Skin:     General: Skin is warm and dry  Findings: No erythema or rash  Neurological:      Mental Status: He is alert        Coordination: Coordination normal       Deep Tendon Reflexes: Reflexes normal    Psychiatric:         Behavior: Behavior normal                 Ardell Roughen, DO

## 2021-02-23 ENCOUNTER — OFFICE VISIT (OUTPATIENT)
Dept: FAMILY MEDICINE CLINIC | Facility: CLINIC | Age: 76
End: 2021-02-23
Payer: MEDICARE

## 2021-02-23 VITALS
OXYGEN SATURATION: 92 % | TEMPERATURE: 97.2 F | DIASTOLIC BLOOD PRESSURE: 60 MMHG | SYSTOLIC BLOOD PRESSURE: 122 MMHG | HEART RATE: 95 BPM | WEIGHT: 187 LBS | HEIGHT: 68 IN | RESPIRATION RATE: 18 BRPM | BODY MASS INDEX: 28.34 KG/M2

## 2021-02-23 DIAGNOSIS — Z79.4 TYPE 2 DIABETES MELLITUS WITH STAGE 3 CHRONIC KIDNEY DISEASE, WITH LONG-TERM CURRENT USE OF INSULIN, UNSPECIFIED WHETHER STAGE 3A OR 3B CKD (HCC): ICD-10-CM

## 2021-02-23 DIAGNOSIS — E11.22 TYPE 2 DIABETES MELLITUS WITH STAGE 3 CHRONIC KIDNEY DISEASE, WITH LONG-TERM CURRENT USE OF INSULIN, UNSPECIFIED WHETHER STAGE 3A OR 3B CKD (HCC): ICD-10-CM

## 2021-02-23 DIAGNOSIS — N18.30 TYPE 2 DIABETES MELLITUS WITH STAGE 3 CHRONIC KIDNEY DISEASE, WITH LONG-TERM CURRENT USE OF INSULIN, UNSPECIFIED WHETHER STAGE 3A OR 3B CKD (HCC): ICD-10-CM

## 2021-02-23 DIAGNOSIS — I10 ESSENTIAL HYPERTENSION: ICD-10-CM

## 2021-02-23 DIAGNOSIS — U07.1 COVID-19 VIRUS INFECTION: Primary | ICD-10-CM

## 2021-02-23 DIAGNOSIS — E78.2 MIXED HYPERLIPIDEMIA: ICD-10-CM

## 2021-02-23 DIAGNOSIS — N18.31 CHRONIC KIDNEY DISEASE (CKD) STAGE G3A/A1, MODERATELY DECREASED GLOMERULAR FILTRATION RATE (GFR) BETWEEN 45-59 ML/MIN/1.73 SQUARE METER AND ALBUMINURIA CREATININE RATIO LESS THAN 30 MG/G (HCC): ICD-10-CM

## 2021-02-23 PROCEDURE — 99213 OFFICE O/P EST LOW 20 MIN: CPT | Performed by: FAMILY MEDICINE

## 2021-02-23 NOTE — PROGRESS NOTES
Assessment/Plan:    1  COVID-19 virus infection    2  Type 2 diabetes mellitus with stage 3 chronic kidney disease, with long-term current use of insulin, unspecified whether stage 3a or 3b CKD ([facility] [address] )    3  Essential hypertension    4  Chronic kidney disease (CKD) stage G3a/A1, moderately decreased glomerular filtration rate (GFR) between 45-59 mL/min/1 73 square meter and albuminuria creatinine ratio less than 30 mg/g ([facility])    5  Mixed hyperlipidemia    I think pt is doinmg very well  He is still not at full diet and his BS does seem to drop at night  - advised on protein rich meals for dinner  There are no Patient Instructions on file for this visit  No follow-ups on file  Subjective:      Patient ID: Reanna Scott is a 67 y o  male  Chief Complaint   Patient presents with    Follow-up     states f/u to reaction from antibody infusion ss,lpn       Pt is here to follow up covid  Pt states he has been getting constipated - he has been taking dulcolax  It seems to be working    Fatigue has improved greatly  Pt is not SOB at rest - but slightly with exertion  Sense of taste and smell seem to be ok  Sat 92% on RA  The following portions of the patient's history were reviewed and updated as appropriate: allergies, current medications, past family history, past medical history, past social history, past surgical history and problem list     Review of Systems   Constitutional: Negative for activity change, appetite change, chills, diaphoresis, fatigue, fever and unexpected weight change  HENT: Negative for congestion, dental problem, ear pain, mouth sores, sinus pressure, sinus pain, sore throat and trouble swallowing  Eyes: Negative for photophobia, discharge and itching  Respiratory: Negative for apnea, chest tightness and shortness of breath  Cardiovascular: Negative for chest pain, palpitations and leg swelling     Gastrointestinal: Negative for abdominal distention, abdominal pain, blood in stool, nausea and vomiting  Endocrine: Negative for cold intolerance, heat intolerance, polydipsia, polyphagia and polyuria  Genitourinary: Negative for difficulty urinating  Musculoskeletal: Negative for arthralgias  Skin: Negative for color change and wound  Neurological: Negative for dizziness, syncope, speech difficulty and headaches  Hematological: Negative for adenopathy  Psychiatric/Behavioral: Negative for agitation and behavioral problems           Current Outpatient Medications   Medication Sig Dispense Refill    albuterol (ProAir HFA) 90 mcg/act inhaler Inhale 2 puffs every 6 (six) hours as needed for wheezing 1 Inhaler 0    aspirin (ASPIR-81) 81 mg EC tablet Take 81 mg by mouth every morning        atorvastatin (LIPITOR) 40 mg tablet TAKE 1 TABLET BY MOUTH  DAILY 90 tablet 3    clotrimazole-betamethasone (LOTRISONE) 1-0 05 % cream Apply topically 2 (two) times a day 30 g 0    Coenzyme Q10 (COQ-10) 100 MG CAPS Take 200 mg by mouth daily at bedtime       furosemide (LASIX) 20 mg tablet Take 1 tablet (20 mg total) by mouth daily 90 tablet 0    glipiZIDE (GLUCOTROL) 5 mg tablet Take 1 tablet (5 mg total) by mouth daily with breakfast 90 tablet 1    insulin lispro (HumaLOG KwikPen) 100 units/mL injection pen Inject 5 Units under the skin 3 (three) times a day with meals Sliding scale      Insulin Pen Needle (BD Pen Needle Sherie U/F) 32G X 4 MM MISC USE 4 TIMES DAILY AS  DIRECTED 360 each 1    Levemir FlexTouch 100 units/mL injection pen INJECT SUBCUTANEOUSLY 39  UNITS DAILY AT BEDTIME 15 mL 8    levothyroxine 75 mcg tablet TAKE 1 TABLET BY MOUTH  DAILY 90 tablet 1    lisinopril (ZESTRIL) 2 5 mg tablet Take 1 tablet (2 5 mg total) by mouth daily 90 tablet 1    metoprolol succinate (TOPROL-XL) 25 mg 24 hr tablet TAKE 1 TABLET BY MOUTH  DAILY 90 tablet 2    multivitamin (THERAGRAN) TABS Take 1 tablet by mouth every morning      pantoprazole (PROTONIX) 40 mg tablet TAKE 1 TABLET BY MOUTH  DAILY 90 tablet 3     No current facility-administered medications for this visit  Objective:    /60   Pulse 95   Temp (!) 97 2 °F (36 2 °C)   Resp 18   Ht 5' 8" (1 727 m)   Wt 84 8 kg (187 lb)   SpO2 92%   BMI 28 43 kg/m²        Physical Exam  Vitals signs and nursing note reviewed  Constitutional:       General: He is not in acute distress  Appearance: He is well-developed  He is not diaphoretic  HENT:      Head: Normocephalic and atraumatic  Right Ear: External ear normal       Left Ear: External ear normal       Nose: Nose normal       Mouth/Throat:      Pharynx: No oropharyngeal exudate  Eyes:      General: No scleral icterus  Right eye: No discharge  Left eye: No discharge  Pupils: Pupils are equal, round, and reactive to light  Neck:      Thyroid: No thyromegaly  Cardiovascular:      Rate and Rhythm: Normal rate  Heart sounds: Normal heart sounds  No murmur  Pulmonary:      Effort: Pulmonary effort is normal  No respiratory distress  Breath sounds: Normal breath sounds  No wheezing  Abdominal:      General: Bowel sounds are normal  There is no distension  Palpations: Abdomen is soft  There is no mass  Tenderness: There is no abdominal tenderness  There is no guarding or rebound  Musculoskeletal: Normal range of motion  Skin:     General: Skin is warm and dry  Findings: No erythema or rash  Neurological:      Mental Status: He is alert        Coordination: Coordination normal       Deep Tendon Reflexes: Reflexes normal    Psychiatric:         Behavior: Behavior normal                 Jl Bearded, DO

## 2021-03-03 DIAGNOSIS — I10 ESSENTIAL HYPERTENSION: ICD-10-CM

## 2021-03-04 RX ORDER — METOPROLOL SUCCINATE 25 MG/1
TABLET, EXTENDED RELEASE ORAL
Qty: 90 TABLET | Refills: 3 | Status: SHIPPED | OUTPATIENT
Start: 2021-03-04 | End: 2022-01-17

## 2021-03-15 DIAGNOSIS — E11.22 TYPE 2 DIABETES MELLITUS WITH STAGE 3 CHRONIC KIDNEY DISEASE, WITH LONG-TERM CURRENT USE OF INSULIN, UNSPECIFIED WHETHER STAGE 3A OR 3B CKD (HCC): ICD-10-CM

## 2021-03-15 DIAGNOSIS — Z79.4 TYPE 2 DIABETES MELLITUS WITH STAGE 3 CHRONIC KIDNEY DISEASE, WITH LONG-TERM CURRENT USE OF INSULIN, UNSPECIFIED WHETHER STAGE 3A OR 3B CKD (HCC): ICD-10-CM

## 2021-03-15 DIAGNOSIS — N18.30 TYPE 2 DIABETES MELLITUS WITH STAGE 3 CHRONIC KIDNEY DISEASE, WITH LONG-TERM CURRENT USE OF INSULIN, UNSPECIFIED WHETHER STAGE 3A OR 3B CKD (HCC): ICD-10-CM

## 2021-03-15 RX ORDER — INSULIN LISPRO 100 [IU]/ML
INJECTION, SOLUTION INTRAVENOUS; SUBCUTANEOUS
Qty: 15 ML | Refills: 3 | Status: SHIPPED | OUTPATIENT
Start: 2021-03-15 | End: 2021-12-15 | Stop reason: SDUPTHER

## 2021-03-22 DIAGNOSIS — I10 ESSENTIAL HYPERTENSION: ICD-10-CM

## 2021-03-23 RX ORDER — FUROSEMIDE 20 MG/1
20 TABLET ORAL DAILY
Qty: 90 TABLET | Refills: 0 | Status: SHIPPED | OUTPATIENT
Start: 2021-03-23 | End: 2021-04-19 | Stop reason: SDUPTHER

## 2021-03-26 ENCOUNTER — OFFICE VISIT (OUTPATIENT)
Dept: CARDIOLOGY CLINIC | Facility: CLINIC | Age: 76
End: 2021-03-26
Payer: MEDICARE

## 2021-03-26 VITALS
BODY MASS INDEX: 28.19 KG/M2 | WEIGHT: 186 LBS | DIASTOLIC BLOOD PRESSURE: 60 MMHG | HEART RATE: 75 BPM | SYSTOLIC BLOOD PRESSURE: 130 MMHG | HEIGHT: 68 IN | TEMPERATURE: 98.7 F

## 2021-03-26 DIAGNOSIS — I25.10 CAD IN NATIVE ARTERY: ICD-10-CM

## 2021-03-26 DIAGNOSIS — N18.30 TYPE 2 DIABETES MELLITUS WITH STAGE 3 CHRONIC KIDNEY DISEASE, WITH LONG-TERM CURRENT USE OF INSULIN, UNSPECIFIED WHETHER STAGE 3A OR 3B CKD (HCC): ICD-10-CM

## 2021-03-26 DIAGNOSIS — Z79.4 TYPE 2 DIABETES MELLITUS WITH STAGE 3 CHRONIC KIDNEY DISEASE, WITH LONG-TERM CURRENT USE OF INSULIN, UNSPECIFIED WHETHER STAGE 3A OR 3B CKD (HCC): ICD-10-CM

## 2021-03-26 DIAGNOSIS — I50.32 CHRONIC DIASTOLIC (CONGESTIVE) HEART FAILURE (HCC): ICD-10-CM

## 2021-03-26 DIAGNOSIS — I25.10 CORONARY ARTERY DISEASE INVOLVING NATIVE CORONARY ARTERY OF NATIVE HEART WITHOUT ANGINA PECTORIS: Primary | ICD-10-CM

## 2021-03-26 DIAGNOSIS — E78.2 MIXED HYPERLIPIDEMIA: ICD-10-CM

## 2021-03-26 DIAGNOSIS — Z95.5 PRESENCE OF STENT IN CORONARY ARTERY: ICD-10-CM

## 2021-03-26 DIAGNOSIS — I10 ESSENTIAL HYPERTENSION: ICD-10-CM

## 2021-03-26 DIAGNOSIS — E11.22 TYPE 2 DIABETES MELLITUS WITH STAGE 3 CHRONIC KIDNEY DISEASE, WITH LONG-TERM CURRENT USE OF INSULIN, UNSPECIFIED WHETHER STAGE 3A OR 3B CKD (HCC): ICD-10-CM

## 2021-03-26 PROCEDURE — 99214 OFFICE O/P EST MOD 30 MIN: CPT | Performed by: INTERNAL MEDICINE

## 2021-03-26 PROCEDURE — 93000 ELECTROCARDIOGRAM COMPLETE: CPT | Performed by: INTERNAL MEDICINE

## 2021-03-26 NOTE — PROGRESS NOTES
Cardiology   Freddy Canales DO, Jack Eddy MD, Jackson Reyes MD, Kathryn Cadena MD, [facility] - [place]  -------------------------------------------------------------------  [facility] ORTHOPEDIC [facility]  [address], [address] [facility]  [place], [place], [address] [facility]  [phone]    Cardiology Follow Up  Richard Mckeon  1945  [id number]          Assessment/Plan:    1  Coronary artery disease involving native coronary artery of native heart without angina pectoris    2  Essential hypertension    3  Presence of stent in coronary artery    4  Mixed hyperlipidemia    5  CAD in native artery    6  Chronic diastolic (congestive) heart failure (HCC)    7  Type 2 diabetes mellitus with stage 3 chronic kidney disease, with long-term current use of insulin, unspecified whether stage 3a or 3b CKD ([facility] [address] )      - LE edema resolved with discontinuation of amlodipine and increasing furosemide to 20 mg daily  Last echocardiogram was in 2016 which showed a normal EF with diastolic dysfunction  - blood pressure is stable off amlodipine  - continue furosemide 20 mg daily  - Call if any change or if any dyspnea or LE edema  - Will obtain echocardiogram to assess LV function  Interval History:     Richard Mckeon is 67 y o  male here for followup of CAD and CHF  Since his last visit, he has been feeling well   he denies any palpitations, chest pain, shortness of breath, LE edema, orthopnea or PND  Diet is overall unchanged  He was previously having LE edema that resolved with discontinuing amlodipine and increasing furosemide dosage  He was admitted to [facility] with COVID infection last month but has recovered well  He lost weight as a result of infection  The patient has a history of CAD  He has a h/o PCI to the circumflex vessel in 1992   His last nuclear stress test was done in 5/2015 which showed inferior/inferolateral infarct c/w attenuation  EF 63%  He exercised for 6:38    He had a treadmill stress in 2018 which was normal   He exercised for 7 minutes without ECG changes         Past Medical History:   Diagnosis Date    Aortic narrowing     Last Assessed:  9/28/15    Arthritis     Hammond esophagus     Bilateral leg edema     Bulla, lung (HCC)     CHF (congestive heart failure) (HCC)     Chronic kidney disease     stage 3    Diabetes mellitus (HCC)     Enlarged prostate     Heel pain     right heel slipped in the garage-landed on the right heel    Herniated lumbar intervertebral disc     x 4 discs-fell off a roof    Hiatal hernia     High cholesterol     History of kidney problems     Hypertension     Old myocardial infarction     x2-pt was unaware of the MI-one stent    Thyroid disease     hypothyroid    Transient cerebral ischemia     Last Assessed:  8/27/15    Wears dentures     full upper, partial lower    Wears glasses      Social History     Socioeconomic History    Marital status: /Civil Union     Spouse name: Not on file    Number of children: Not on file    Years of education: Not on file    Highest education level: Not on file   Occupational History    Not on file   Social Needs    Financial resource strain: Not on file    Food insecurity     Worry: Not on file     Inability: Not on file   [employer] needs     Medical: Not on file     Non-medical: Not on file   Tobacco Use    Smoking status: Former Smoker     Packs/day: 4 00     Years: 20 00     Pack years: 80 00     Quit date:      Years since quittin 2    Smokeless tobacco: Never Used   Substance and Sexual Activity    Alcohol use: Not Currently    Drug use: No    Sexual activity: Not on file   Lifestyle    Physical activity     Days per week: Not on file     Minutes per session: Not on file    Stress: Not on file   Relationships    Social connections     Talks on phone: Not on file     Gets together: Not on file Attends Sabianism service: Not on file     Active member of club or organization: Not on file     Attends meetings of clubs or organizations: Not on file     Relationship status: Not on file    Intimate partner violence     Fear of current or ex partner: Not on file     Emotionally abused: Not on file     Physically abused: Not on file     Forced sexual activity: Not on file   Other Topics Concern    Not on file   Social History Narrative    Lack of exercise    Sleeps 6-7 hours a day      Family History   Problem Relation Age of Onset    Colon cancer Mother     Arthritis Mother     Diabetes Mother     Hypertension Mother     Cancer Mother         Breast    Hyperlipidemia Mother     Cancer Sister         ovarian    Other Brother         Cerebrovascular accident (CVA)    Diabetes Other         Sibling    Hypertension Other         Sibling    Hernia Father         umbilical    Hernia Son     Cancer Sister         liver    Kidney disease Sister     Kidney disease Brother     Mental illness Neg Hx      Past Surgical History:   Procedure Laterality Date    CATARACT EXTRACTION      COLONOSCOPY      Resolved:  2015    CORONARY ANGIOPLASTY WITH STENT PLACEMENT      Stent implanted early 1990's,     ESOPHAGOGASTRODUODENOSCOPY      KNEE SURGERY      right knee cartilage surgery-left meniscus repair    CO XCAPSL CTRC RMVL INSJ IO LENS PROSTH W/O ECP Right 1/24/2019    Procedure: EXTRACTION EXTRACAPSULAR CATARACT PHACO INTRAOCULAR LENS (IOL); Surgeon: Sue Coyne MD;  Location: [facility] MAIN OR;  Service: Ophthalmology    CO XCAPSL CTRC RMVL INSJ IO LENS PROSTH W/O ECP Left 2/21/2019    Procedure: EXTRACTION EXTRACAPSULAR CATARACT PHACO INTRAOCULAR LENS (IOL);   Surgeon: Sue Coyne MD;  Location: [facility] MAIN OR;  Service: Ophthalmology    TONSILLECTOMY      UMBILICAL HERNIA REPAIR      1980's,    UPPER GASTROINTESTINAL ENDOSCOPY      Last Assessed:  8/27/15       Current Outpatient Medications:    albuterol (ProAir HFA) 90 mcg/act inhaler, Inhale 2 puffs every 6 (six) hours as needed for wheezing, Disp: 1 Inhaler, Rfl: 0    aspirin (ASPIR-81) 81 mg EC tablet, Take 81 mg by mouth every morning  , Disp: , Rfl:     atorvastatin (LIPITOR) 40 mg tablet, TAKE 1 TABLET BY MOUTH  DAILY, Disp: 90 tablet, Rfl: 3    clotrimazole-betamethasone (LOTRISONE) 1-0 05 % cream, Apply topically 2 (two) times a day, Disp: 30 g, Rfl: 0    Coenzyme Q10 (COQ-10) 100 MG CAPS, Take 200 mg by mouth daily at bedtime , Disp: , Rfl:     furosemide (LASIX) 20 mg tablet, Take 1 tablet (20 mg total) by mouth daily, Disp: 90 tablet, Rfl: 0    glipiZIDE (GLUCOTROL) 5 mg tablet, Take 1 tablet (5 mg total) by mouth daily with breakfast, Disp: 90 tablet, Rfl: 1    HumaLOG KwikPen 100 units/mL injection pen, INJECT 5 UNITS  SUBCUTANEOUSLY 3 TIMES  DAILY WITH MEALS, Disp: 15 mL, Rfl: 3    Insulin Pen Needle (BD Pen Needle Sherie U/F) 32G X 4 MM MISC, USE 4 TIMES DAILY AS  DIRECTED, Disp: 360 each, Rfl: 1    Levemir FlexTouch 100 units/mL injection pen, INJECT SUBCUTANEOUSLY 39  UNITS DAILY AT BEDTIME, Disp: 15 mL, Rfl: 8    levothyroxine 75 mcg tablet, TAKE 1 TABLET BY MOUTH  DAILY, Disp: 90 tablet, Rfl: 1    lisinopril (ZESTRIL) 2 5 mg tablet, Take 1 tablet (2 5 mg total) by mouth daily, Disp: 90 tablet, Rfl: 1    metoprolol succinate (TOPROL-XL) 25 mg 24 hr tablet, TAKE 1 TABLET BY MOUTH  DAILY, Disp: 90 tablet, Rfl: 3    multivitamin (THERAGRAN) TABS, Take 1 tablet by mouth every morning, Disp: , Rfl:     pantoprazole (PROTONIX) 40 mg tablet, TAKE 1 TABLET BY MOUTH  DAILY, Disp: 90 tablet, Rfl: 3        Review of Systems:  Review of Systems   Constitutional: Negative for chills and fever  HENT: Negative for ear pain and sore throat  Eyes: Negative for pain and visual disturbance  Respiratory: Negative for cough and shortness of breath  Cardiovascular: Negative for chest pain and palpitations     Gastrointestinal: Negative for abdominal pain and vomiting  Genitourinary: Negative for dysuria and hematuria  Musculoskeletal: Positive for arthralgias  Negative for back pain  Skin: Negative for color change and rash  Neurological: Negative for seizures and syncope  All other systems reviewed and are negative  Physical Exam:  Vitals:  Vitals:    03/26/21 0934   BP: 130/60   BP Location: Right arm   Patient Position: Sitting   Cuff Size: Large   Pulse: 75   Temp: 98 7 °F (37 1 °C)   TempSrc: Temporal   Weight: 84 4 kg (186 lb)   Height: 5' 8" (1 727 m)     Physical Exam   Constitutional: He appears healthy  No distress  Eyes: Pupils are equal, round, and reactive to light  Conjunctivae are normal    Neck: Normal range of motion  Neck supple  No JVD present  Cardiovascular: Normal rate, regular rhythm and normal heart sounds  Exam reveals no gallop and no friction rub  No murmur heard  Pulmonary/Chest: Effort normal and breath sounds normal  He has no wheezes  He has no rales  Musculoskeletal:         General: No tenderness, deformity or edema  Neurological: He is alert and oriented to person, place, and time  Skin: Skin is warm and dry  Cardiographics:  EKG: Personally reviewed NSR with q waves inferiorly  Last known EF: 60% (2016)    This note was completed in part utilizing [employer] Fluency Direct Software  Grammatical errors, random word insertions, spelling mistakes, and incomplete sentences can be an occasional consequence of this system secondary to software limitations, ambient noise, and hardware issues  If you have any questions or concerns about the content, text, or information contained within the body of this dictation, please contact the provider for clarification  no

## 2021-03-29 DIAGNOSIS — I10 ESSENTIAL HYPERTENSION: ICD-10-CM

## 2021-03-29 RX ORDER — LISINOPRIL 2.5 MG/1
2.5 TABLET ORAL DAILY
Qty: 90 TABLET | Refills: 1 | Status: SHIPPED | OUTPATIENT
Start: 2021-03-29 | End: 2021-05-10 | Stop reason: SDUPTHER

## 2021-03-29 NOTE — TELEPHONE ENCOUNTER
Requested medication(s) are due for refill today: Yes  Patient has already received a courtesy refill: No  Other reason request has been forwarded to provider:fail

## 2021-03-30 ENCOUNTER — HOSPITAL ENCOUNTER (OUTPATIENT)
Dept: NON INVASIVE DIAGNOSTICS | Facility: HOSPITAL | Age: 76
Discharge: HOME | End: 2021-03-30
Attending: INTERNAL MEDICINE
Payer: MEDICARE

## 2021-03-30 DIAGNOSIS — I25.10 CORONARY ARTERY DISEASE INVOLVING NATIVE CORONARY ARTERY OF NATIVE HEART WITHOUT ANGINA PECTORIS: ICD-10-CM

## 2021-03-30 PROCEDURE — 93306 TTE W/DOPPLER COMPLETE: CPT

## 2021-03-30 PROCEDURE — 93306 TTE W/DOPPLER COMPLETE: CPT | Performed by: INTERNAL MEDICINE

## 2021-04-01 ENCOUNTER — TELEPHONE (OUTPATIENT)
Dept: CARDIOLOGY CLINIC | Facility: CLINIC | Age: 76
End: 2021-04-01

## 2021-04-01 NOTE — TELEPHONE ENCOUNTER
----- Message from Sánchez Lawrence DO sent at 3/31/2021  4:11 PM EDT -----  Can you please let the patient know echo was normal

## 2021-04-19 DIAGNOSIS — Z79.4 TYPE 2 DIABETES MELLITUS WITH STAGE 3 CHRONIC KIDNEY DISEASE, WITH LONG-TERM CURRENT USE OF INSULIN (HCC): ICD-10-CM

## 2021-04-19 DIAGNOSIS — E11.22 TYPE 2 DIABETES MELLITUS WITH STAGE 3 CHRONIC KIDNEY DISEASE, WITH LONG-TERM CURRENT USE OF INSULIN (HCC): ICD-10-CM

## 2021-04-19 DIAGNOSIS — N18.30 TYPE 2 DIABETES MELLITUS WITH STAGE 3 CHRONIC KIDNEY DISEASE, WITH LONG-TERM CURRENT USE OF INSULIN (HCC): ICD-10-CM

## 2021-04-19 DIAGNOSIS — I10 ESSENTIAL HYPERTENSION: ICD-10-CM

## 2021-04-19 RX ORDER — FUROSEMIDE 20 MG/1
20 TABLET ORAL DAILY
Qty: 90 TABLET | Refills: 0 | Status: SHIPPED | OUTPATIENT
Start: 2021-04-19 | End: 2021-09-08 | Stop reason: SDUPTHER

## 2021-04-19 RX ORDER — GLIPIZIDE 5 MG/1
5 TABLET ORAL
Qty: 90 TABLET | Refills: 1
Start: 2021-04-19 | End: 2021-04-21 | Stop reason: SDUPTHER

## 2021-04-21 ENCOUNTER — TELEPHONE (OUTPATIENT)
Dept: FAMILY MEDICINE CLINIC | Facility: CLINIC | Age: 76
End: 2021-04-21

## 2021-04-21 DIAGNOSIS — Z79.4 TYPE 2 DIABETES MELLITUS WITH STAGE 3 CHRONIC KIDNEY DISEASE, WITH LONG-TERM CURRENT USE OF INSULIN (HCC): ICD-10-CM

## 2021-04-21 DIAGNOSIS — N18.30 TYPE 2 DIABETES MELLITUS WITH STAGE 3 CHRONIC KIDNEY DISEASE, WITH LONG-TERM CURRENT USE OF INSULIN (HCC): ICD-10-CM

## 2021-04-21 DIAGNOSIS — E11.22 TYPE 2 DIABETES MELLITUS WITH STAGE 3 CHRONIC KIDNEY DISEASE, WITH LONG-TERM CURRENT USE OF INSULIN (HCC): ICD-10-CM

## 2021-04-21 RX ORDER — GLIPIZIDE 5 MG/1
5 TABLET ORAL
Qty: 90 TABLET | Refills: 1 | Status: SHIPPED | OUTPATIENT
Start: 2021-04-21 | End: 2021-05-04 | Stop reason: SDUPTHER

## 2021-04-21 NOTE — TELEPHONE ENCOUNTER
Pt medication, Glipizide, needs to go to [facility]  It looks like it may have printed by accident  Pls send   (Doesn't need the Lasix)

## 2021-04-22 ENCOUNTER — TELEPHONE (OUTPATIENT)
Dept: FAMILY MEDICINE CLINIC | Facility: CLINIC | Age: 76
End: 2021-04-22

## 2021-04-22 NOTE — TELEPHONE ENCOUNTER
[facility] EMERGENCY [facility]    Patient is concerned about his medication  He said he has been taking his glipizide 2 x per day for months  Please advise

## 2021-05-04 ENCOUNTER — OFFICE VISIT (OUTPATIENT)
Dept: FAMILY MEDICINE CLINIC | Facility: CLINIC | Age: 76
End: 2021-05-04
Payer: MEDICARE

## 2021-05-04 VITALS
WEIGHT: 189 LBS | RESPIRATION RATE: 16 BRPM | HEIGHT: 68 IN | TEMPERATURE: 97 F | SYSTOLIC BLOOD PRESSURE: 138 MMHG | DIASTOLIC BLOOD PRESSURE: 70 MMHG | BODY MASS INDEX: 28.64 KG/M2 | HEART RATE: 78 BPM

## 2021-05-04 DIAGNOSIS — I10 ESSENTIAL HYPERTENSION: ICD-10-CM

## 2021-05-04 DIAGNOSIS — I25.10 CORONARY ARTERY DISEASE INVOLVING NATIVE CORONARY ARTERY OF NATIVE HEART WITHOUT ANGINA PECTORIS: ICD-10-CM

## 2021-05-04 DIAGNOSIS — E11.22 TYPE 2 DIABETES MELLITUS WITH STAGE 3 CHRONIC KIDNEY DISEASE, WITH LONG-TERM CURRENT USE OF INSULIN, UNSPECIFIED WHETHER STAGE 3A OR 3B CKD (HCC): Primary | ICD-10-CM

## 2021-05-04 DIAGNOSIS — E11.22 TYPE 2 DIABETES MELLITUS WITH STAGE 3 CHRONIC KIDNEY DISEASE, WITH LONG-TERM CURRENT USE OF INSULIN (HCC): ICD-10-CM

## 2021-05-04 DIAGNOSIS — Z79.4 TYPE 2 DIABETES MELLITUS WITH STAGE 3 CHRONIC KIDNEY DISEASE, WITH LONG-TERM CURRENT USE OF INSULIN, UNSPECIFIED WHETHER STAGE 3A OR 3B CKD (HCC): Primary | ICD-10-CM

## 2021-05-04 DIAGNOSIS — Z79.4 TYPE 2 DIABETES MELLITUS WITH STAGE 3 CHRONIC KIDNEY DISEASE, WITH LONG-TERM CURRENT USE OF INSULIN (HCC): ICD-10-CM

## 2021-05-04 DIAGNOSIS — I50.32 CHRONIC DIASTOLIC CONGESTIVE HEART FAILURE (HCC): ICD-10-CM

## 2021-05-04 DIAGNOSIS — N18.30 TYPE 2 DIABETES MELLITUS WITH STAGE 3 CHRONIC KIDNEY DISEASE, WITH LONG-TERM CURRENT USE OF INSULIN (HCC): ICD-10-CM

## 2021-05-04 DIAGNOSIS — E03.9 ACQUIRED HYPOTHYROIDISM: ICD-10-CM

## 2021-05-04 DIAGNOSIS — E11.42 DIABETIC POLYNEUROPATHY ASSOCIATED WITH TYPE 2 DIABETES MELLITUS (HCC): ICD-10-CM

## 2021-05-04 DIAGNOSIS — N18.30 TYPE 2 DIABETES MELLITUS WITH STAGE 3 CHRONIC KIDNEY DISEASE, WITH LONG-TERM CURRENT USE OF INSULIN, UNSPECIFIED WHETHER STAGE 3A OR 3B CKD (HCC): Primary | ICD-10-CM

## 2021-05-04 PROCEDURE — 99214 OFFICE O/P EST MOD 30 MIN: CPT | Performed by: FAMILY MEDICINE

## 2021-05-04 RX ORDER — GLIPIZIDE 5 MG/1
5 TABLET ORAL
Qty: 180 TABLET | Refills: 1 | Status: SHIPPED | OUTPATIENT
Start: 2021-05-04 | End: 2021-06-16 | Stop reason: SDUPTHER

## 2021-05-04 NOTE — PROGRESS NOTES
Assessment/Plan:    1  Type 2 diabetes mellitus with stage 3 chronic kidney disease, with long-term current use of insulin, unspecified whether stage 3a or 3b CKD ([facility])  -     Hemoglobin A1C; Future; Expected date: 07/18/2021  -     Lipid Panel with Direct LDL reflex; Future; Expected date: 07/18/2021  -     Microalbumin / creatinine urine ratio; Future; Expected date: 07/18/2021    2  Diabetic polyneuropathy associated with type 2 diabetes mellitus ([place] [address] )  -     Lipid Panel with Direct LDL reflex; Future; Expected date: 07/18/2021    3  Acquired hypothyroidism  -     Lipid Panel with Direct LDL reflex; Future; Expected date: 07/18/2021  -     TSH, 3rd generation; Future; Expected date: 07/18/2021    4  Type 2 diabetes mellitus with stage 3 chronic kidney disease, with long-term current use of insulin ([facility])  -     glipiZIDE (GLUCOTROL) 5 mg tablet; Take 1 tablet (5 mg total) by mouth 2 (two) times a day before meals  -     Lipid Panel with Direct LDL reflex; Future; Expected date: 07/18/2021    5  Essential hypertension  -     Comprehensive metabolic panel; Future; Expected date: 07/18/2021  -     Lipid Panel with Direct LDL reflex; Future; Expected date: 07/18/2021    6  BMI 28 0-28 9,adult  -     Lipid Panel with Direct LDL reflex; Future; Expected date: 07/18/2021    7  Chronic diastolic congestive heart failure ([place] [address] )    8  Coronary artery disease involving native coronary artery of native heart without angina pectoris            Patient Instructions       Weight Management   AMBULATORY CARE:   Why it is important to manage your weight:  Being overweight increases your risk of health conditions such as heart disease, high blood pressure, type 2 diabetes, and certain types of cancer  It can also increase your risk for osteoarthritis, sleep apnea, and other respiratory problems  Aim for a slow, steady weight loss  Even a small amount of weight loss can lower your risk of health problems    How to lose weight safely: A safe and healthy way to lose weight is to eat fewer calories and get regular exercise  · You can lose up about 1 pound a week by decreasing the number of calories you eat by 500 calories each day  You can decrease calories by eating smaller portion sizes or by cutting out high-calorie foods  Read labels to find out how many calories are in the foods you eat  · You can also burn calories with exercise such as walking, swimming, or biking  You will be more likely to keep weight off if you make these changes part of your lifestyle  Exercise at least 30 minutes per day on most days of the week  You can also fit in more physical activity by taking the stairs instead of the elevator or parking farther away from stores  Ask your healthcare provider about the best exercise plan for you  Healthy meal plan for weight management:  A healthy meal plan includes a variety of foods, contains fewer calories, and helps you stay healthy  A healthy meal plan includes the following:     · Eat whole-grain foods more often  A healthy meal plan should contain fiber  Fiber is the part of grains, fruits, and vegetables that is not broken down by your body  Whole-grain foods are healthy and provide extra fiber in your diet  Some examples of whole-grain foods are whole-wheat breads and pastas, oatmeal, brown rice, and bulgur  · Eat a variety of vegetables every day  Include dark, leafy greens such as spinach, kale, arnold greens, and mustard greens  Eat yellow and orange vegetables such as carrots, sweet potatoes, and winter squash  · Eat a variety of fruits every day  Choose fresh or canned fruit (canned in its own juice or light syrup) instead of juice  Fruit juice has very little or no fiber  · Eat low-fat dairy foods  Drink fat-free (skim) milk or 1% milk  Eat fat-free yogurt and low-fat cottage cheese  Try low-fat cheeses such as mozzarella and other reduced-fat cheeses      · Choose meat and other protein foods that are low in fat  Choose beans or other legumes such as split peas or lentils  Choose fish, skinless poultry (chicken or turkey), or lean cuts of red meat (beef or pork)  Before you cook meat or poultry, cut off any visible fat  · Use less fat and oil  Try baking foods instead of frying them  Add less fat, such as margarine, sour cream, regular salad dressing and mayonnaise to foods  Eat fewer high-fat foods  Some examples of high-fat foods include french fries, doughnuts, ice cream, and cakes  · Eat fewer sweets  Limit foods and drinks that are high in sugar  This includes candy, cookies, regular soda, and sweetened drinks  Ways to decrease calories:   · Eat smaller portions  ? Use a small plate with smaller servings  ? Do not eat second helpings  ? When you eat at a restaurant, ask for a box and place half of your meal in the box before you eat  ? Share an entrée with someone else  · Replace high-calorie snacks with healthy, low-calorie snacks  ? Choose fresh fruit, vegetables, fat-free rice cakes, or air-popped popcorn instead of potato chips, nuts, or chocolate  ? Choose water or calorie-free drinks instead of soda or sweetened drinks  · Do not shop for groceries when you are hungry  You may be more likely to make unhealthy food choices  Take a grocery list of healthy foods and shop after you have eaten  · Eat regular meals  Do not skip meals  Skipping meals can lead to overeating later in the day  This can make it harder for you to lose weight  Eat a healthy snack in place of a meal if you do not have time to eat a regular meal  Talk with a dietitian to help you create a meal plan and schedule that is right for you  Other things to consider as you try to lose weight:   · Be aware of situations that may give you the urge to overeat, such as eating while watching television  Find ways to avoid these situations   For example, read a book, go for a walk, or do [facility]  · Meet with a weight loss support group or friends who are also trying to lose weight  This may help you stay motivated to continue working on your weight loss goals  © Copyright [address] Information is for End User's use only and may not be sold, redistributed or otherwise used for commercial purposes  All illustrations and images included in CareNotes® are the copyrighted property of [employer]  or [facility] Tiffany Crowley   The above information is an  only  It is not intended as medical advice for individual conditions or treatments  Talk to your doctor, nurse or pharmacist before following any medical regimen to see if it is safe and effective for you  Return in about 3 months (around 8/4/2021) for Recheck  Subjective:      Patient ID: Fawad John is a 67 y o  male  Chief Complaint   Patient presents with    Follow-up     [facility] lpn       Pt states he is here to discuss his glipizide  States the last time we filled it he states he cut it from two to one  Pt is concerned about his insulin prices  Pt did not get labs for todays visit  Pt is very frustarted ovber the glipizide being once a day when its supposed to be twice a day  Pt ios also very frustrated over the insulin prices      The following portions of the patient's history were reviewed and updated as appropriate: allergies, current medications, past family history, past medical history, past social history, past surgical history and problem list     Review of Systems   Constitutional: Negative for activity change, appetite change, chills, diaphoresis, fatigue, fever and unexpected weight change  HENT: Negative for congestion, dental problem, ear pain, mouth sores, sinus pressure, sinus pain, sore throat and trouble swallowing  Eyes: Negative for photophobia, discharge and itching  Respiratory: Negative for apnea, chest tightness and shortness of breath      Cardiovascular: Negative for chest pain, palpitations and leg swelling  Gastrointestinal: Negative for abdominal distention, abdominal pain, blood in stool, nausea and vomiting  Endocrine: Negative for cold intolerance, heat intolerance, polydipsia, polyphagia and polyuria  Genitourinary: Negative for difficulty urinating  Musculoskeletal: Negative for arthralgias  Skin: Negative for color change and wound  Neurological: Negative for dizziness, syncope, speech difficulty and headaches  Hematological: Negative for adenopathy  Psychiatric/Behavioral: Negative for agitation and behavioral problems           Current Outpatient Medications   Medication Sig Dispense Refill    albuterol (ProAir HFA) 90 mcg/act inhaler Inhale 2 puffs every 6 (six) hours as needed for wheezing 1 Inhaler 0    aspirin (ASPIR-81) 81 mg EC tablet Take 81 mg by mouth every morning        atorvastatin (LIPITOR) 40 mg tablet TAKE 1 TABLET BY MOUTH  DAILY 90 tablet 3    clotrimazole-betamethasone (LOTRISONE) 1-0 05 % cream Apply topically 2 (two) times a day 30 g 0    Coenzyme Q10 (COQ-10) 100 MG CAPS Take 200 mg by mouth daily at bedtime       furosemide (LASIX) 20 mg tablet Take 1 tablet (20 mg total) by mouth daily 90 tablet 0    glipiZIDE (GLUCOTROL) 5 mg tablet Take 1 tablet (5 mg total) by mouth 2 (two) times a day before meals 180 tablet 1    HumaLOG KwikPen 100 units/mL injection pen INJECT 5 UNITS  SUBCUTANEOUSLY 3 TIMES  DAILY WITH MEALS 15 mL 3    Insulin Pen Needle (BD Pen Needle Sherie U/F) 32G X 4 MM MISC USE 4 TIMES DAILY AS  DIRECTED 360 each 1    Levemir FlexTouch 100 units/mL injection pen INJECT SUBCUTANEOUSLY 39  UNITS DAILY AT BEDTIME 15 mL 8    levothyroxine 75 mcg tablet TAKE 1 TABLET BY MOUTH  DAILY 90 tablet 1    lisinopril (ZESTRIL) 2 5 mg tablet Take 1 tablet (2 5 mg total) by mouth daily 90 tablet 1    metoprolol succinate (TOPROL-XL) 25 mg 24 hr tablet TAKE 1 TABLET BY MOUTH  DAILY 90 tablet 3    multivitamin (THERAGRAN) TABS Take 1 tablet by mouth every morning      pantoprazole (PROTONIX) 40 mg tablet TAKE 1 TABLET BY MOUTH  DAILY 90 tablet 3     No current facility-administered medications for this visit  Objective:    /70   Pulse 78   Temp (!) 97 °F (36 1 °C)   Resp 16   Ht 5' 8" (1 727 m)   Wt 85 7 kg (189 lb)   BMI 28 74 kg/m²        Physical Exam  Vitals signs and nursing note reviewed  Constitutional:       General: He is not in acute distress  Appearance: He is well-developed  He is not diaphoretic  HENT:      Head: Normocephalic and atraumatic  Right Ear: External ear normal       Left Ear: External ear normal       Nose: Nose normal       Mouth/Throat:      Pharynx: No oropharyngeal exudate  Eyes:      General: No scleral icterus  Right eye: No discharge  Left eye: No discharge  Pupils: Pupils are equal, round, and reactive to light  Neck:      Thyroid: No thyromegaly  Cardiovascular:      Rate and Rhythm: Normal rate  Pulses: no weak pulses          Dorsalis pedis pulses are 2+ on the right side and 2+ on the left side  Posterior tibial pulses are 2+ on the right side and 2+ on the left side  Heart sounds: Normal heart sounds  No murmur  Pulmonary:      Effort: Pulmonary effort is normal  No respiratory distress  Breath sounds: Normal breath sounds  No wheezing  Abdominal:      General: Bowel sounds are normal  There is no distension  Palpations: Abdomen is soft  There is no mass  Tenderness: There is no abdominal tenderness  There is no guarding or rebound  Musculoskeletal: Normal range of motion  Feet:      Right foot:      Skin integrity: No ulcer, skin breakdown, erythema, warmth, callus or dry skin  Left foot:      Skin integrity: No ulcer, skin breakdown, erythema, warmth, callus or dry skin  Skin:     General: Skin is warm and dry  Findings: No erythema or rash     Neurological:      Mental Status: He is alert  Coordination: Coordination normal       Deep Tendon Reflexes: Reflexes normal    Psychiatric:         Behavior: Behavior normal                 Harrington Basset, DO  BMI Counseling: Body mass index is 28 74 kg/m²  The BMI is above normal  Nutrition recommendations include reducing portion sizes  Diabetic Foot Exam    Patient's shoes and socks removed  Right Foot/Ankle   Right Foot Inspection  Skin Exam: skin normal skin not intact, no dry skin, no warmth, no callus, no erythema, no maceration, no abnormal color, no pre-ulcer, no ulcer and no callus                          Toe Exam: ROM and strength within normal limits  Sensory   Vibration: intact  Proprioception: intact   Monofilament testing: intact  Vascular  Capillary refills: < 3 seconds  The right DP pulse is 2+  The right PT pulse is 2+  Left Foot/Ankle  Left Foot Inspection  Skin Exam: skin normalskin not intact, no dry skin, no warmth, no erythema, no maceration, normal color, no pre-ulcer, no ulcer and no callus                         Toe Exam: ROM and strength within normal limits                   Sensory   Vibration: intact  Proprioception: intact  Monofilament: intact  Vascular  Capillary refills: < 3 seconds  The left DP pulse is 2+  The left PT pulse is 2+  Assign Risk Category:  No deformity present; No loss of protective sensation;  No weak pulses       Risk: 0

## 2021-05-04 NOTE — PATIENT INSTRUCTIONS
Weight Management   AMBULATORY CARE:   Why it is important to manage your weight:  Being overweight increases your risk of health conditions such as heart disease, high blood pressure, type 2 diabetes, and certain types of cancer  It can also increase your risk for osteoarthritis, sleep apnea, and other respiratory problems  Aim for a slow, steady weight loss  Even a small amount of weight loss can lower your risk of health problems  How to lose weight safely:  A safe and healthy way to lose weight is to eat fewer calories and get regular exercise  · You can lose up about 1 pound a week by decreasing the number of calories you eat by 500 calories each day  You can decrease calories by eating smaller portion sizes or by cutting out high-calorie foods  Read labels to find out how many calories are in the foods you eat  · You can also burn calories with exercise such as walking, swimming, or biking  You will be more likely to keep weight off if you make these changes part of your lifestyle  Exercise at least 30 minutes per day on most days of the week  You can also fit in more physical activity by taking the stairs instead of the elevator or parking farther away from stores  Ask your healthcare provider about the best exercise plan for you  Healthy meal plan for weight management:  A healthy meal plan includes a variety of foods, contains fewer calories, and helps you stay healthy  A healthy meal plan includes the following:     · Eat whole-grain foods more often  A healthy meal plan should contain fiber  Fiber is the part of grains, fruits, and vegetables that is not broken down by your body  Whole-grain foods are healthy and provide extra fiber in your diet  Some examples of whole-grain foods are whole-wheat breads and pastas, oatmeal, brown rice, and bulgur  · Eat a variety of vegetables every day  Include dark, leafy greens such as spinach, kale, arnold greens, and mustard greens   Eat yellow and orange vegetables such as carrots, sweet potatoes, and winter squash  · Eat a variety of fruits every day  Choose fresh or canned fruit (canned in its own juice or light syrup) instead of juice  Fruit juice has very little or no fiber  · Eat low-fat dairy foods  Drink fat-free (skim) milk or 1% milk  Eat fat-free yogurt and low-fat cottage cheese  Try low-fat cheeses such as mozzarella and other reduced-fat cheeses  · Choose meat and other protein foods that are low in fat  Choose beans or other legumes such as split peas or lentils  Choose fish, skinless poultry (chicken or turkey), or lean cuts of red meat (beef or pork)  Before you cook meat or poultry, cut off any visible fat  · Use less fat and oil  Try baking foods instead of frying them  Add less fat, such as margarine, sour cream, regular salad dressing and mayonnaise to foods  Eat fewer high-fat foods  Some examples of high-fat foods include french fries, doughnuts, ice cream, and cakes  · Eat fewer sweets  Limit foods and drinks that are high in sugar  This includes candy, cookies, regular soda, and sweetened drinks  Ways to decrease calories:   · Eat smaller portions  ? Use a small plate with smaller servings  ? Do not eat second helpings  ? When you eat at a restaurant, ask for a box and place half of your meal in the box before you eat  ? Share an entrée with someone else  · Replace high-calorie snacks with healthy, low-calorie snacks  ? Choose fresh fruit, vegetables, fat-free rice cakes, or air-popped popcorn instead of potato chips, nuts, or chocolate  ? Choose water or calorie-free drinks instead of soda or sweetened drinks  · Do not shop for groceries when you are hungry  You may be more likely to make unhealthy food choices  Take a grocery list of healthy foods and shop after you have eaten  · Eat regular meals  Do not skip meals  Skipping meals can lead to overeating later in the day   This can make it harder for you to lose weight  Eat a healthy snack in place of a meal if you do not have time to eat a regular meal  Talk with a dietitian to help you create a meal plan and schedule that is right for you  Other things to consider as you try to lose weight:   · Be aware of situations that may give you the urge to overeat, such as eating while watching television  Find ways to avoid these situations  For example, read a book, go for a walk, or do crafts  · Meet with a weight loss support group or friends who are also trying to lose weight  This may help you stay motivated to continue working on your weight loss goals  © Copyright [address] Information is for End User's use only and may not be sold, redistributed or otherwise used for commercial purposes  All illustrations and images included in CareNotes® are the copyrighted property of [employer]  or [facility] Tiffany Crowley   The above information is an  only  It is not intended as medical advice for individual conditions or treatments  Talk to your doctor, nurse or pharmacist before following any medical regimen to see if it is safe and effective for you

## 2021-05-10 DIAGNOSIS — I10 ESSENTIAL HYPERTENSION: ICD-10-CM

## 2021-05-11 RX ORDER — LISINOPRIL 2.5 MG/1
2.5 TABLET ORAL DAILY
Qty: 90 TABLET | Refills: 1 | Status: SHIPPED | OUTPATIENT
Start: 2021-05-11 | End: 2021-09-07

## 2021-06-14 DIAGNOSIS — E11.22 TYPE 2 DIABETES MELLITUS WITH STAGE 3 CHRONIC KIDNEY DISEASE, WITH LONG-TERM CURRENT USE OF INSULIN (HCC): ICD-10-CM

## 2021-06-14 DIAGNOSIS — N18.30 TYPE 2 DIABETES MELLITUS WITH STAGE 3 CHRONIC KIDNEY DISEASE, WITH LONG-TERM CURRENT USE OF INSULIN (HCC): ICD-10-CM

## 2021-06-14 DIAGNOSIS — Z79.4 TYPE 2 DIABETES MELLITUS WITH STAGE 3 CHRONIC KIDNEY DISEASE, WITH LONG-TERM CURRENT USE OF INSULIN (HCC): ICD-10-CM

## 2021-06-14 RX ORDER — PEN NEEDLE, DIABETIC 32GX 5/32"
NEEDLE, DISPOSABLE MISCELLANEOUS
Qty: 360 EACH | Refills: 1 | Status: SHIPPED | OUTPATIENT
Start: 2021-06-14 | End: 2021-11-22

## 2021-06-16 DIAGNOSIS — E11.22 TYPE 2 DIABETES MELLITUS WITH STAGE 3 CHRONIC KIDNEY DISEASE, WITH LONG-TERM CURRENT USE OF INSULIN (HCC): ICD-10-CM

## 2021-06-16 DIAGNOSIS — N18.30 TYPE 2 DIABETES MELLITUS WITH STAGE 3 CHRONIC KIDNEY DISEASE, WITH LONG-TERM CURRENT USE OF INSULIN (HCC): ICD-10-CM

## 2021-06-16 DIAGNOSIS — Z79.4 TYPE 2 DIABETES MELLITUS WITH STAGE 3 CHRONIC KIDNEY DISEASE, WITH LONG-TERM CURRENT USE OF INSULIN (HCC): ICD-10-CM

## 2021-06-16 RX ORDER — GLIPIZIDE 5 MG/1
5 TABLET ORAL
Qty: 180 TABLET | Refills: 1 | Status: SHIPPED | OUTPATIENT
Start: 2021-06-16 | End: 2021-11-22

## 2021-07-20 LAB
ALBUMIN SERPL-MCNC: 4.3 G/DL (ref 3.7–4.7)
ALBUMIN/CREAT UR: 8 MG/G CREAT (ref 0–29)
ALBUMIN/GLOB SERPL: 2 {RATIO} (ref 1.2–2.2)
ALP SERPL-CCNC: 101 IU/L (ref 48–121)
ALT SERPL-CCNC: 20 IU/L (ref 0–44)
AST SERPL-CCNC: 16 IU/L (ref 0–40)
BILIRUB SERPL-MCNC: 0.3 MG/DL (ref 0–1.2)
BUN SERPL-MCNC: 28 MG/DL (ref 8–27)
BUN/CREAT SERPL: 19 (ref 10–24)
CALCIUM SERPL-MCNC: 9.7 MG/DL (ref 8.6–10.2)
CHLORIDE SERPL-SCNC: 107 MMOL/L (ref 96–106)
CHOLEST SERPL-MCNC: 147 MG/DL (ref 100–199)
CO2 SERPL-SCNC: 24 MMOL/L (ref 20–29)
CREAT SERPL-MCNC: 1.48 MG/DL (ref 0.76–1.27)
CREAT UR-MCNC: 78.6 MG/DL
GLOBULIN SER-MCNC: 2.1 G/DL (ref 1.5–4.5)
GLUCOSE SERPL-MCNC: 137 MG/DL (ref 65–99)
HBA1C MFR BLD: 7.9 % (ref 4.8–5.6)
HDLC SERPL-MCNC: 58 MG/DL
LDLC SERPL CALC-MCNC: 76 MG/DL (ref 0–99)
MICROALBUMIN UR-MCNC: 6.4 UG/ML
MICRODELETION SYND BLD/T FISH: NORMAL
MICRODELETION SYND BLD/T FISH: NORMAL
POTASSIUM SERPL-SCNC: 4.2 MMOL/L (ref 3.5–5.2)
PROT SERPL-MCNC: 6.4 G/DL (ref 6–8.5)
SL AMB EGFR AFRICAN AMERICAN: 52 ML/MIN/1.73
SL AMB EGFR NON AFRICAN AMERICAN: 45 ML/MIN/1.73
SODIUM SERPL-SCNC: 144 MMOL/L (ref 134–144)
TRIGL SERPL-MCNC: 63 MG/DL (ref 0–149)
TSH SERPL DL<=0.005 MIU/L-ACNC: 3.05 UIU/ML (ref 0.45–4.5)

## 2021-08-04 DIAGNOSIS — E03.1 CONGENITAL HYPOTHYROIDISM WITHOUT GOITER: ICD-10-CM

## 2021-08-05 RX ORDER — LEVOTHYROXINE SODIUM 0.07 MG/1
TABLET ORAL
Qty: 90 TABLET | Refills: 1 | Status: SHIPPED | OUTPATIENT
Start: 2021-08-05 | End: 2021-11-22

## 2021-09-05 DIAGNOSIS — K22.719 BARRETT'S ESOPHAGUS WITH DYSPLASIA: ICD-10-CM

## 2021-09-05 DIAGNOSIS — I10 ESSENTIAL HYPERTENSION: ICD-10-CM

## 2021-09-05 DIAGNOSIS — E78.2 MIXED HYPERLIPIDEMIA: ICD-10-CM

## 2021-09-07 RX ORDER — PANTOPRAZOLE SODIUM 40 MG/1
TABLET, DELAYED RELEASE ORAL
Qty: 90 TABLET | Refills: 3 | Status: SHIPPED | OUTPATIENT
Start: 2021-09-07 | End: 2022-06-20

## 2021-09-07 RX ORDER — ATORVASTATIN CALCIUM 40 MG/1
TABLET, FILM COATED ORAL
Qty: 90 TABLET | Refills: 3 | Status: SHIPPED | OUTPATIENT
Start: 2021-09-07 | End: 2022-06-20

## 2021-09-07 RX ORDER — LISINOPRIL 2.5 MG/1
TABLET ORAL
Qty: 90 TABLET | Refills: 3 | Status: SHIPPED | OUTPATIENT
Start: 2021-09-07

## 2021-09-08 DIAGNOSIS — E11.22 TYPE 2 DIABETES MELLITUS WITH STAGE 3 CHRONIC KIDNEY DISEASE, WITH LONG-TERM CURRENT USE OF INSULIN (HCC): ICD-10-CM

## 2021-09-08 DIAGNOSIS — I10 ESSENTIAL HYPERTENSION: ICD-10-CM

## 2021-09-08 DIAGNOSIS — Z79.4 TYPE 2 DIABETES MELLITUS WITH STAGE 3 CHRONIC KIDNEY DISEASE, WITH LONG-TERM CURRENT USE OF INSULIN (HCC): ICD-10-CM

## 2021-09-08 DIAGNOSIS — N18.30 TYPE 2 DIABETES MELLITUS WITH STAGE 3 CHRONIC KIDNEY DISEASE, WITH LONG-TERM CURRENT USE OF INSULIN (HCC): ICD-10-CM

## 2021-09-08 RX ORDER — FUROSEMIDE 20 MG/1
20 TABLET ORAL DAILY
Qty: 90 TABLET | Refills: 0 | Status: SHIPPED | OUTPATIENT
Start: 2021-09-08 | End: 2021-11-29 | Stop reason: SDUPTHER

## 2021-11-29 DIAGNOSIS — I10 ESSENTIAL HYPERTENSION: ICD-10-CM

## 2021-11-29 RX ORDER — FUROSEMIDE 20 MG/1
20 TABLET ORAL DAILY
Qty: 90 TABLET | Refills: 0 | Status: SHIPPED | OUTPATIENT
Start: 2021-11-29 | End: 2022-02-01

## 2021-12-09 DIAGNOSIS — N18.30 TYPE 2 DIABETES MELLITUS WITH STAGE 3 CHRONIC KIDNEY DISEASE, WITH LONG-TERM CURRENT USE OF INSULIN (HCC): ICD-10-CM

## 2021-12-09 DIAGNOSIS — Z79.4 TYPE 2 DIABETES MELLITUS WITH STAGE 3 CHRONIC KIDNEY DISEASE, WITH LONG-TERM CURRENT USE OF INSULIN (HCC): ICD-10-CM

## 2021-12-09 DIAGNOSIS — E11.22 TYPE 2 DIABETES MELLITUS WITH STAGE 3 CHRONIC KIDNEY DISEASE, WITH LONG-TERM CURRENT USE OF INSULIN (HCC): ICD-10-CM

## 2021-12-10 RX ORDER — INSULIN DETEMIR 100 [IU]/ML
INJECTION, SOLUTION SUBCUTANEOUS
Qty: 45 ML | Refills: 3 | Status: SHIPPED | OUTPATIENT
Start: 2021-12-10 | End: 2021-12-15 | Stop reason: SDUPTHER

## 2021-12-15 ENCOUNTER — OFFICE VISIT (OUTPATIENT)
Dept: FAMILY MEDICINE CLINIC | Facility: CLINIC | Age: 76
End: 2021-12-15
Payer: MEDICARE

## 2021-12-15 VITALS
RESPIRATION RATE: 16 BRPM | BODY MASS INDEX: 30.31 KG/M2 | OXYGEN SATURATION: 98 % | HEART RATE: 79 BPM | WEIGHT: 200 LBS | DIASTOLIC BLOOD PRESSURE: 60 MMHG | TEMPERATURE: 96.2 F | SYSTOLIC BLOOD PRESSURE: 142 MMHG | HEIGHT: 68 IN

## 2021-12-15 DIAGNOSIS — E11.22 TYPE 2 DIABETES MELLITUS WITH STAGE 3 CHRONIC KIDNEY DISEASE, WITH LONG-TERM CURRENT USE OF INSULIN (HCC): ICD-10-CM

## 2021-12-15 DIAGNOSIS — N18.30 TYPE 2 DIABETES MELLITUS WITH STAGE 3 CHRONIC KIDNEY DISEASE, WITH LONG-TERM CURRENT USE OF INSULIN, UNSPECIFIED WHETHER STAGE 3A OR 3B CKD (HCC): Primary | ICD-10-CM

## 2021-12-15 DIAGNOSIS — E11.22 TYPE 2 DIABETES MELLITUS WITH STAGE 3 CHRONIC KIDNEY DISEASE, WITH LONG-TERM CURRENT USE OF INSULIN, UNSPECIFIED WHETHER STAGE 3A OR 3B CKD (HCC): Primary | ICD-10-CM

## 2021-12-15 DIAGNOSIS — N18.30 TYPE 2 DIABETES MELLITUS WITH STAGE 3 CHRONIC KIDNEY DISEASE, WITH LONG-TERM CURRENT USE OF INSULIN (HCC): ICD-10-CM

## 2021-12-15 DIAGNOSIS — R41.81 AGE-RELATED COGNITIVE DECLINE: ICD-10-CM

## 2021-12-15 DIAGNOSIS — E11.42 DIABETIC POLYNEUROPATHY ASSOCIATED WITH TYPE 2 DIABETES MELLITUS (HCC): ICD-10-CM

## 2021-12-15 DIAGNOSIS — Z82.49 FHX: BRAIN ANEURYSM: ICD-10-CM

## 2021-12-15 DIAGNOSIS — R42 DIZZINESS: ICD-10-CM

## 2021-12-15 DIAGNOSIS — Z79.4 TYPE 2 DIABETES MELLITUS WITH STAGE 3 CHRONIC KIDNEY DISEASE, WITH LONG-TERM CURRENT USE OF INSULIN (HCC): ICD-10-CM

## 2021-12-15 DIAGNOSIS — R42 LIGHTHEADEDNESS: ICD-10-CM

## 2021-12-15 DIAGNOSIS — Z79.4 TYPE 2 DIABETES MELLITUS WITH STAGE 3 CHRONIC KIDNEY DISEASE, WITH LONG-TERM CURRENT USE OF INSULIN, UNSPECIFIED WHETHER STAGE 3A OR 3B CKD (HCC): Primary | ICD-10-CM

## 2021-12-15 PROCEDURE — 99214 OFFICE O/P EST MOD 30 MIN: CPT | Performed by: FAMILY MEDICINE

## 2021-12-15 RX ORDER — INSULIN LISPRO 100 [IU]/ML
2 INJECTION, SOLUTION INTRAVENOUS; SUBCUTANEOUS
Qty: 5.4 ML | Refills: 1 | Status: SHIPPED | OUTPATIENT
Start: 2021-12-15 | End: 2022-03-08

## 2021-12-15 RX ORDER — INSULIN DETEMIR 100 [IU]/ML
30 INJECTION, SOLUTION SUBCUTANEOUS EVERY MORNING
Qty: 27 ML | Refills: 1 | Status: SHIPPED | OUTPATIENT
Start: 2021-12-15 | End: 2021-12-23 | Stop reason: SDUPTHER

## 2021-12-15 RX ORDER — DONEPEZIL HYDROCHLORIDE 5 MG/1
5 TABLET, FILM COATED ORAL
Qty: 90 TABLET | Refills: 1 | Status: SHIPPED | OUTPATIENT
Start: 2021-12-15 | End: 2022-01-27 | Stop reason: SDUPTHER

## 2021-12-16 ENCOUNTER — TELEPHONE (OUTPATIENT)
Dept: FAMILY MEDICINE CLINIC | Facility: CLINIC | Age: 76
End: 2021-12-16

## 2021-12-23 ENCOUNTER — TELEPHONE (OUTPATIENT)
Dept: FAMILY MEDICINE CLINIC | Facility: CLINIC | Age: 76
End: 2021-12-23

## 2021-12-23 DIAGNOSIS — N18.30 TYPE 2 DIABETES MELLITUS WITH STAGE 3 CHRONIC KIDNEY DISEASE, WITH LONG-TERM CURRENT USE OF INSULIN, UNSPECIFIED WHETHER STAGE 3A OR 3B CKD (HCC): ICD-10-CM

## 2021-12-23 DIAGNOSIS — Z79.4 TYPE 2 DIABETES MELLITUS WITH STAGE 3 CHRONIC KIDNEY DISEASE, WITH LONG-TERM CURRENT USE OF INSULIN (HCC): ICD-10-CM

## 2021-12-23 DIAGNOSIS — N18.30 TYPE 2 DIABETES MELLITUS WITH STAGE 3 CHRONIC KIDNEY DISEASE, WITH LONG-TERM CURRENT USE OF INSULIN (HCC): ICD-10-CM

## 2021-12-23 DIAGNOSIS — Z79.4 TYPE 2 DIABETES MELLITUS WITH STAGE 3 CHRONIC KIDNEY DISEASE, WITH LONG-TERM CURRENT USE OF INSULIN, UNSPECIFIED WHETHER STAGE 3A OR 3B CKD (HCC): ICD-10-CM

## 2021-12-23 DIAGNOSIS — E03.9 ACQUIRED HYPOTHYROIDISM: Primary | ICD-10-CM

## 2021-12-23 DIAGNOSIS — E11.22 TYPE 2 DIABETES MELLITUS WITH STAGE 3 CHRONIC KIDNEY DISEASE, WITH LONG-TERM CURRENT USE OF INSULIN (HCC): ICD-10-CM

## 2021-12-23 DIAGNOSIS — E11.22 TYPE 2 DIABETES MELLITUS WITH STAGE 3 CHRONIC KIDNEY DISEASE, WITH LONG-TERM CURRENT USE OF INSULIN, UNSPECIFIED WHETHER STAGE 3A OR 3B CKD (HCC): ICD-10-CM

## 2021-12-23 RX ORDER — INSULIN DETEMIR 100 [IU]/ML
32 INJECTION, SOLUTION SUBCUTANEOUS EVERY MORNING
Qty: 28.8 ML | Refills: 1 | Status: SHIPPED | OUTPATIENT
Start: 2021-12-23 | End: 2022-03-08

## 2021-12-24 LAB
BUN SERPL-MCNC: 26 MG/DL (ref 8–27)
BUN/CREAT SERPL: 16 (ref 10–24)
CREAT SERPL-MCNC: 1.67 MG/DL (ref 0.76–1.27)
MICRODELETION SYND BLD/T FISH: NORMAL
SL AMB EGFR AFRICAN AMERICAN: 45 ML/MIN/1.73
SL AMB EGFR NON AFRICAN AMERICAN: 39 ML/MIN/1.73

## 2021-12-30 ENCOUNTER — HOSPITAL ENCOUNTER (OUTPATIENT)
Dept: RADIOLOGY | Facility: HOSPITAL | Age: 76
Discharge: HOME | End: 2021-12-30
Attending: FAMILY MEDICINE
Payer: MEDICARE

## 2021-12-30 DIAGNOSIS — E11.22 TYPE 2 DIABETES MELLITUS WITH STAGE 3 CHRONIC KIDNEY DISEASE, WITH LONG-TERM CURRENT USE OF INSULIN, UNSPECIFIED WHETHER STAGE 3A OR 3B CKD (HCC): ICD-10-CM

## 2021-12-30 DIAGNOSIS — Z82.49 FHX: BRAIN ANEURYSM: ICD-10-CM

## 2021-12-30 DIAGNOSIS — R41.81 AGE-RELATED COGNITIVE DECLINE: ICD-10-CM

## 2021-12-30 DIAGNOSIS — R42 DIZZINESS: ICD-10-CM

## 2021-12-30 DIAGNOSIS — Z79.4 TYPE 2 DIABETES MELLITUS WITH STAGE 3 CHRONIC KIDNEY DISEASE, WITH LONG-TERM CURRENT USE OF INSULIN, UNSPECIFIED WHETHER STAGE 3A OR 3B CKD (HCC): ICD-10-CM

## 2021-12-30 DIAGNOSIS — R42 LIGHTHEADEDNESS: ICD-10-CM

## 2021-12-30 DIAGNOSIS — N18.30 TYPE 2 DIABETES MELLITUS WITH STAGE 3 CHRONIC KIDNEY DISEASE, WITH LONG-TERM CURRENT USE OF INSULIN, UNSPECIFIED WHETHER STAGE 3A OR 3B CKD (HCC): ICD-10-CM

## 2021-12-30 PROCEDURE — 70496 CT ANGIOGRAPHY HEAD: CPT

## 2021-12-30 PROCEDURE — 70498 CT ANGIOGRAPHY NECK: CPT

## 2021-12-30 PROCEDURE — G1004 CDSM NDSC: HCPCS

## 2021-12-30 RX ADMIN — IOHEXOL 85 ML: 350 INJECTION, SOLUTION INTRAVENOUS at 13:33

## 2022-01-03 ENCOUNTER — OFFICE VISIT (OUTPATIENT)
Dept: FAMILY MEDICINE CLINIC | Facility: CLINIC | Age: 77
End: 2022-01-03
Payer: MEDICARE

## 2022-01-03 VITALS
HEIGHT: 68 IN | OXYGEN SATURATION: 99 % | DIASTOLIC BLOOD PRESSURE: 68 MMHG | TEMPERATURE: 96.8 F | SYSTOLIC BLOOD PRESSURE: 132 MMHG | RESPIRATION RATE: 20 BRPM | BODY MASS INDEX: 29.7 KG/M2 | HEART RATE: 71 BPM | WEIGHT: 196 LBS

## 2022-01-03 DIAGNOSIS — E11.22 TYPE 2 DIABETES MELLITUS WITH STAGE 3 CHRONIC KIDNEY DISEASE, WITH LONG-TERM CURRENT USE OF INSULIN, UNSPECIFIED WHETHER STAGE 3A OR 3B CKD (HCC): ICD-10-CM

## 2022-01-03 DIAGNOSIS — Z79.4 TYPE 2 DIABETES MELLITUS WITH STAGE 3 CHRONIC KIDNEY DISEASE, WITH LONG-TERM CURRENT USE OF INSULIN, UNSPECIFIED WHETHER STAGE 3A OR 3B CKD (HCC): ICD-10-CM

## 2022-01-03 DIAGNOSIS — N30.00 ACUTE CYSTITIS WITHOUT HEMATURIA: Primary | ICD-10-CM

## 2022-01-03 DIAGNOSIS — R91.8 ABNORMAL CT SCAN, LUNG: ICD-10-CM

## 2022-01-03 DIAGNOSIS — I50.32 CHRONIC DIASTOLIC CONGESTIVE HEART FAILURE (HCC): ICD-10-CM

## 2022-01-03 DIAGNOSIS — I70.209 PERIPHERAL ARTERIOSCLEROSIS (HCC): ICD-10-CM

## 2022-01-03 DIAGNOSIS — N18.30 TYPE 2 DIABETES MELLITUS WITH STAGE 3 CHRONIC KIDNEY DISEASE, WITH LONG-TERM CURRENT USE OF INSULIN, UNSPECIFIED WHETHER STAGE 3A OR 3B CKD (HCC): ICD-10-CM

## 2022-01-03 LAB
SL AMB  POCT GLUCOSE, UA: ABNORMAL
SL AMB LEUKOCYTE ESTERASE,UA: ABNORMAL
SL AMB POCT BILIRUBIN,UA: ABNORMAL
SL AMB POCT BLOOD,UA: ABNORMAL
SL AMB POCT CLARITY,UA: ABNORMAL
SL AMB POCT COLOR,UA: ABNORMAL
SL AMB POCT KETONES,UA: ABNORMAL
SL AMB POCT NITRITE,UA: ABNORMAL
SL AMB POCT PH,UA: 5.5
SL AMB POCT SPECIFIC GRAVITY,UA: 1.02
SL AMB POCT URINE PROTEIN: 30
SL AMB POCT UROBILINOGEN: 0.2

## 2022-01-03 PROCEDURE — 81003 URINALYSIS AUTO W/O SCOPE: CPT | Performed by: FAMILY MEDICINE

## 2022-01-03 PROCEDURE — 99214 OFFICE O/P EST MOD 30 MIN: CPT | Performed by: FAMILY MEDICINE

## 2022-01-03 RX ORDER — SULFAMETHOXAZOLE AND TRIMETHOPRIM 800; 160 MG/1; MG/1
1 TABLET ORAL EVERY 12 HOURS SCHEDULED
Qty: 14 TABLET | Refills: 0 | Status: SHIPPED | OUTPATIENT
Start: 2022-01-03 | End: 2022-01-10

## 2022-01-03 NOTE — PROGRESS NOTES
Assessment/Plan:    1  Acute cystitis without hematuria  -     POCT urine dip auto non-scope  -     Urine culture  -     sulfamethoxazole-trimethoprim (BACTRIM DS) 800-160 mg per tablet; Take 1 tablet by mouth every 12 (twelve) hours for 7 days    2  Chronic diastolic congestive heart failure ([facility] )    3  Type 2 diabetes mellitus with stage 3 chronic kidney disease, with long-term current use of insulin, unspecified whether stage 3a or 3b CKD ([facility] )    4  Peripheral arteriosclerosis ([facility] )    5  Abnormal CT scan, lung  -     CT chest wo contrast; Future; Expected date: 01/03/2022        Pt has diabetic logs - looks like he is experimenting with his insulin again - pt was again advised not to change the insulin or I will not be able to get consistant numbers to manage his sugars  There are no Patient Instructions on file for this visit  No follow-ups on file  Subjective:      Patient ID: Jesus Orellana is a 67 y o  male  Chief Complaint   Patient presents with    Urinary Tract Infection     [id number]       Pt is sched for a same day appt for a UTI  Pt has been having frequency  Started last Thursday  Dysuria  Was taking cranberry juice at home      The following portions of the patient's history were reviewed and updated as appropriate: allergies, current medications, past family history, past medical history, past social history, past surgical history and problem list     Review of Systems   Genitourinary: Positive for dysuria, frequency and urgency           Current Outpatient Medications   Medication Sig Dispense Refill    albuterol (ProAir HFA) 90 mcg/act inhaler Inhale 2 puffs every 6 (six) hours as needed for wheezing 1 Inhaler 0    aspirin (ASPIR-81) 81 mg EC tablet Take 81 mg by mouth every morning        atorvastatin (LIPITOR) 40 mg tablet TAKE 1 TABLET BY MOUTH  DAILY 90 tablet 3    BD Pen Needle Sherie U/F 32G X 4 MM MISC USE 4 TIMES DAILY AS  DIRECTED 360 each 0    Coenzyme Q10 (COQ-10) 100 MG CAPS Take 200 mg by mouth daily at bedtime       donepezil (ARICEPT) 5 mg tablet Take 1 tablet (5 mg total) by mouth daily at bedtime 90 tablet 1    furosemide (LASIX) 20 mg tablet Take 1 tablet (20 mg total) by mouth daily 90 tablet 0    glipiZIDE (GLUCOTROL) 5 mg tablet TAKE 1 TABLET BY MOUTH  TWICE DAILY BEFORE MEALS 180 tablet 0    insulin detemir (Levemir FlexTouch) 100 Units/mL injection pen Inject 32 Units under the skin every morning 28 8 mL 1    insulin lispro (HumaLOG KwikPen) 100 units/mL injection pen Inject 2 Units under the skin 3 (three) times a day with meals 5 4 mL 1    levothyroxine 75 mcg tablet TAKE 1 TABLET BY MOUTH  DAILY 90 tablet 0    lisinopril (ZESTRIL) 2 5 mg tablet TAKE 1 TABLET BY MOUTH  DAILY 90 tablet 3    metoprolol succinate (TOPROL-XL) 25 mg 24 hr tablet TAKE 1 TABLET BY MOUTH  DAILY 90 tablet 3    multivitamin (THERAGRAN) TABS Take 1 tablet by mouth every morning      pantoprazole (PROTONIX) 40 mg tablet TAKE 1 TABLET BY MOUTH  DAILY 90 tablet 3    clotrimazole-betamethasone (LOTRISONE) 1-0 05 % cream Apply topically 2 (two) times a day (Patient not taking: Reported on 12/15/2021 ) 30 g 0    sulfamethoxazole-trimethoprim (BACTRIM DS) 800-160 mg per tablet Take 1 tablet by mouth every 12 (twelve) hours for 7 days 14 tablet 0     No current facility-administered medications for this visit         Objective:    /68   Pulse 71   Temp (!) 96 8 °F (36 °C)   Resp 20   Ht 5' 8" (1 727 m)   Wt 88 9 kg (196 lb)   SpO2 99%   BMI 29 80 kg/m²        Physical Exam           Veto Huddleston DO

## 2022-01-05 ENCOUNTER — OFFICE VISIT (OUTPATIENT)
Dept: DIABETES SERVICES | Facility: CLINIC | Age: 77
End: 2022-01-05
Payer: MEDICARE

## 2022-01-05 VITALS — WEIGHT: 194.4 LBS | BODY MASS INDEX: 29.56 KG/M2

## 2022-01-05 DIAGNOSIS — E11.42 DIABETIC POLYNEUROPATHY ASSOCIATED WITH TYPE 2 DIABETES MELLITUS (HCC): ICD-10-CM

## 2022-01-05 DIAGNOSIS — Z79.4 TYPE 2 DIABETES MELLITUS WITH STAGE 3 CHRONIC KIDNEY DISEASE, WITH LONG-TERM CURRENT USE OF INSULIN, UNSPECIFIED WHETHER STAGE 3A OR 3B CKD (HCC): Primary | ICD-10-CM

## 2022-01-05 DIAGNOSIS — N18.30 TYPE 2 DIABETES MELLITUS WITH STAGE 3 CHRONIC KIDNEY DISEASE, WITH LONG-TERM CURRENT USE OF INSULIN, UNSPECIFIED WHETHER STAGE 3A OR 3B CKD (HCC): Primary | ICD-10-CM

## 2022-01-05 DIAGNOSIS — E11.22 TYPE 2 DIABETES MELLITUS WITH STAGE 3 CHRONIC KIDNEY DISEASE, WITH LONG-TERM CURRENT USE OF INSULIN, UNSPECIFIED WHETHER STAGE 3A OR 3B CKD (HCC): Primary | ICD-10-CM

## 2022-01-05 PROCEDURE — 97803 MED NUTRITION INDIV SUBSEQ: CPT

## 2022-01-05 NOTE — PATIENT INSTRUCTIONS
1  Consume 3 balanced meals/day 4-5 hours apart and 1 night snack  2  45 grams of carbohydrate per meal and 15 grams carbohydrate at snack  3  Increase aerobic exercise to 150 minutes a week over time

## 2022-01-05 NOTE — PROGRESS NOTES
Chief complaint E11 22 Type 2 DM with Stage 3 Chronic Kidney Disease with long term use of insulin, unspecified whether stage 3a or 3b  Assessment:    Visit Type: Initial visit    HPI Patricia Lockwood was seen today for his initial MNT visit  He has a history of chronic kidney disease and currently reports having a urinary infection and has started on medicine his PCP prescribed this past Monday, along with drinking 1/3 cup Cranberry Juice 2-3x/day  Problems identified in [employer] food recall include inconsistent carbohydrate intake, unbalanced meals, and exercise is limited to normal daily activities  Advised patient to keep carbohydrate intake to 45 grams per meal and 15 grams/snack, along with having balanced meals and snacks at proper times  Timing of meals should be 4-5 hours apart and night snack should be 2 hours after dinner  My Plate, portion booklet, food models and food labels were used to teach proper balanced meals, and basic carbohydrate counting  Patient is to keep daily food logs and bring them to his nutrition follow up appointment in 6 weeks  Patient instructed to phone RD if any questions arise  prior to his follow up appointment  Calorie needs 1800 calories/day    Weight change No    Medical Diagnosis/reason for visit E11 22    Barriers to Learning: no barriers    Exercise Only normal daily activities  Who shops: patient and spouse  Who cooks: patient and spouse  Typical food and beverage intake:  Breakfast 9-10 AM Bran muffin 1 -1 1/2 with 1 Tablespoon butter, Tea with splenda p m  food consumed Lunch 1-2 PM [place] on 2 sliceszx white bread with 3 slices thin ham or chicken with padilla (reduced fat) Diet p[employer]     Dinner 5-6:30pm - 1 1/2 - 2 cups cooked pasta, 2-3 meatballs, or roast beef with large potato and 3/4 cup green beans, diet pepsi, and 1-2 scoops ice cream    Nutrition Diagnosis:  Food and nutrition related knowledge deficit  related to Lack of prior exposure to accurate nutrition related information as evidenced by Conditions associated with diagnosis or treatment    Intervention: plate method, label reading, behavior modification strategies, carbohydrate counting and increased protein intake     Treatment Goals: Patient will consume 3 meals a day, Patient will count carbohydrates and Patient will exercise    Monitoring and evaluation:    Term code indicator  FH 1 3 2 Food Intake Criteria: Consume 3 balanced meals/day 4-5 hours apart and 1 night snack  Term code indicator  FH 1 6 3 Carbohydrate Intake Criteria: 45 grams of carbohydrate per meal and 15 grams carbohydrate at snack  Term code indicator  CH 2 2 Treatments/Therapy/Alternative Medicine Criteria: Increase exercise to 150 minutes a week overtime    Patients Response to Instruction:  Lopez Must  Expected Compliancegood

## 2022-01-06 ENCOUNTER — TELEPHONE (OUTPATIENT)
Dept: FAMILY MEDICINE CLINIC | Facility: CLINIC | Age: 77
End: 2022-01-06

## 2022-01-06 DIAGNOSIS — N30.00 ACUTE CYSTITIS WITHOUT HEMATURIA: Primary | ICD-10-CM

## 2022-01-06 LAB
BACTERIA UR CULT: ABNORMAL
Lab: ABNORMAL
SL AMB ANTIMICROBIAL SUSCEPTIBILITY: ABNORMAL

## 2022-01-06 RX ORDER — NITROFURANTOIN 25; 75 MG/1; MG/1
100 CAPSULE ORAL 2 TIMES DAILY
Qty: 14 CAPSULE | Refills: 0 | Status: SHIPPED | OUTPATIENT
Start: 2022-01-06 | End: 2022-01-13

## 2022-01-06 NOTE — TELEPHONE ENCOUNTER
Please call pt looks like his urine culture grew out enterococcus  He was given bactrim, does not seem like that is going to kill it    I will write him for macrobid BID for 7 days - he should start that

## 2022-01-07 NOTE — TELEPHONE ENCOUNTER
Patient aware  He will discontinue the current antibiotic and pick the other up tomorrow  NFA    Abner Finders

## 2022-01-13 ENCOUNTER — HOSPITAL ENCOUNTER (OUTPATIENT)
Dept: RADIOLOGY | Facility: HOSPITAL | Age: 77
Discharge: HOME | End: 2022-01-13
Attending: FAMILY MEDICINE
Payer: MEDICARE

## 2022-01-13 DIAGNOSIS — R91.8 ABNORMAL CT SCAN, LUNG: ICD-10-CM

## 2022-01-13 PROCEDURE — 71250 CT THORAX DX C-: CPT

## 2022-01-13 PROCEDURE — G1004 CDSM NDSC: HCPCS

## 2022-01-16 DIAGNOSIS — I10 ESSENTIAL HYPERTENSION: ICD-10-CM

## 2022-01-17 ENCOUNTER — TELEPHONE (OUTPATIENT)
Dept: FAMILY MEDICINE CLINIC | Facility: CLINIC | Age: 77
End: 2022-01-17

## 2022-01-17 DIAGNOSIS — R91.8 ABNORMAL CT SCAN OF LUNG: Primary | ICD-10-CM

## 2022-01-17 DIAGNOSIS — I50.32 CHRONIC DIASTOLIC CONGESTIVE HEART FAILURE (HCC): ICD-10-CM

## 2022-01-17 DIAGNOSIS — N18.30 TYPE 2 DIABETES MELLITUS WITH STAGE 3 CHRONIC KIDNEY DISEASE, WITH LONG-TERM CURRENT USE OF INSULIN, UNSPECIFIED WHETHER STAGE 3A OR 3B CKD (HCC): ICD-10-CM

## 2022-01-17 DIAGNOSIS — Z82.49 FHX: BRAIN ANEURYSM: ICD-10-CM

## 2022-01-17 DIAGNOSIS — Z79.4 TYPE 2 DIABETES MELLITUS WITH STAGE 3 CHRONIC KIDNEY DISEASE, WITH LONG-TERM CURRENT USE OF INSULIN, UNSPECIFIED WHETHER STAGE 3A OR 3B CKD (HCC): ICD-10-CM

## 2022-01-17 DIAGNOSIS — E11.22 TYPE 2 DIABETES MELLITUS WITH STAGE 3 CHRONIC KIDNEY DISEASE, WITH LONG-TERM CURRENT USE OF INSULIN, UNSPECIFIED WHETHER STAGE 3A OR 3B CKD (HCC): ICD-10-CM

## 2022-01-17 RX ORDER — METOPROLOL SUCCINATE 25 MG/1
TABLET, EXTENDED RELEASE ORAL
Qty: 90 TABLET | Refills: 3 | Status: SHIPPED | OUTPATIENT
Start: 2022-01-17

## 2022-01-17 NOTE — TELEPHONE ENCOUNTER
Called p[t to discuss the ct scan of the chest   Pt has what looks like atelectisis or scarring , mass cannot be excluded  Radiology suggestes repeating ct scan in three months to prove this    I left message for pt to call back if he is ok phuc that I will send script over for the ct repeat in 3 months

## 2022-01-18 NOTE — TELEPHONE ENCOUNTER
Spoke w patient and made aware  Yes, he will go back in 3 mo for CT repeat  Script can be sent    Thank you  Winter Magaña MA

## 2022-01-23 DIAGNOSIS — N18.30 TYPE 2 DIABETES MELLITUS WITH STAGE 3 CHRONIC KIDNEY DISEASE, WITH LONG-TERM CURRENT USE OF INSULIN (HCC): ICD-10-CM

## 2022-01-23 DIAGNOSIS — E11.22 TYPE 2 DIABETES MELLITUS WITH STAGE 3 CHRONIC KIDNEY DISEASE, WITH LONG-TERM CURRENT USE OF INSULIN (HCC): ICD-10-CM

## 2022-01-23 DIAGNOSIS — Z79.4 TYPE 2 DIABETES MELLITUS WITH STAGE 3 CHRONIC KIDNEY DISEASE, WITH LONG-TERM CURRENT USE OF INSULIN (HCC): ICD-10-CM

## 2022-01-24 RX ORDER — GLIPIZIDE 5 MG/1
TABLET ORAL
Qty: 180 TABLET | Refills: 3 | Status: SHIPPED | OUTPATIENT
Start: 2022-01-24 | End: 2022-03-08

## 2022-01-27 ENCOUNTER — OFFICE VISIT (OUTPATIENT)
Dept: FAMILY MEDICINE CLINIC | Facility: CLINIC | Age: 77
End: 2022-01-27
Payer: MEDICARE

## 2022-01-27 VITALS
RESPIRATION RATE: 16 BRPM | HEART RATE: 65 BPM | HEIGHT: 68 IN | BODY MASS INDEX: 29.86 KG/M2 | TEMPERATURE: 97.3 F | DIASTOLIC BLOOD PRESSURE: 68 MMHG | OXYGEN SATURATION: 98 % | WEIGHT: 197 LBS | SYSTOLIC BLOOD PRESSURE: 126 MMHG

## 2022-01-27 DIAGNOSIS — I25.10 CORONARY ARTERY DISEASE INVOLVING NATIVE CORONARY ARTERY OF NATIVE HEART WITHOUT ANGINA PECTORIS: ICD-10-CM

## 2022-01-27 DIAGNOSIS — N18.30 TYPE 2 DIABETES MELLITUS WITH STAGE 3 CHRONIC KIDNEY DISEASE, WITH LONG-TERM CURRENT USE OF INSULIN, UNSPECIFIED WHETHER STAGE 3A OR 3B CKD (HCC): ICD-10-CM

## 2022-01-27 DIAGNOSIS — Z12.11 SCREEN FOR COLON CANCER: ICD-10-CM

## 2022-01-27 DIAGNOSIS — E03.9 ACQUIRED HYPOTHYROIDISM: ICD-10-CM

## 2022-01-27 DIAGNOSIS — Z11.59 NEED FOR HEPATITIS C SCREENING TEST: ICD-10-CM

## 2022-01-27 DIAGNOSIS — I50.32 CHRONIC DIASTOLIC CONGESTIVE HEART FAILURE (HCC): ICD-10-CM

## 2022-01-27 DIAGNOSIS — R35.0 BENIGN PROSTATIC HYPERPLASIA WITH URINARY FREQUENCY: ICD-10-CM

## 2022-01-27 DIAGNOSIS — I10 ESSENTIAL HYPERTENSION: ICD-10-CM

## 2022-01-27 DIAGNOSIS — E11.22 TYPE 2 DIABETES MELLITUS WITH STAGE 3 CHRONIC KIDNEY DISEASE, WITH LONG-TERM CURRENT USE OF INSULIN, UNSPECIFIED WHETHER STAGE 3A OR 3B CKD (HCC): ICD-10-CM

## 2022-01-27 DIAGNOSIS — E11.42 DIABETIC POLYNEUROPATHY ASSOCIATED WITH TYPE 2 DIABETES MELLITUS (HCC): ICD-10-CM

## 2022-01-27 DIAGNOSIS — N40.1 BENIGN PROSTATIC HYPERPLASIA WITH URINARY FREQUENCY: ICD-10-CM

## 2022-01-27 DIAGNOSIS — Z00.00 MEDICARE ANNUAL WELLNESS VISIT, SUBSEQUENT: Primary | ICD-10-CM

## 2022-01-27 DIAGNOSIS — N18.31 CHRONIC KIDNEY DISEASE (CKD) STAGE G3A/A1, MODERATELY DECREASED GLOMERULAR FILTRATION RATE (GFR) BETWEEN 45-59 ML/MIN/1.73 SQUARE METER AND ALBUMINURIA CREATININE RATIO LESS THAN 30 MG/G (HCC): ICD-10-CM

## 2022-01-27 DIAGNOSIS — E78.2 MIXED HYPERLIPIDEMIA: ICD-10-CM

## 2022-01-27 DIAGNOSIS — Z12.5 SCREENING FOR PROSTATE CANCER: ICD-10-CM

## 2022-01-27 DIAGNOSIS — R41.81 AGE-RELATED COGNITIVE DECLINE: ICD-10-CM

## 2022-01-27 DIAGNOSIS — Z79.4 TYPE 2 DIABETES MELLITUS WITH STAGE 3 CHRONIC KIDNEY DISEASE, WITH LONG-TERM CURRENT USE OF INSULIN, UNSPECIFIED WHETHER STAGE 3A OR 3B CKD (HCC): ICD-10-CM

## 2022-01-27 DIAGNOSIS — R31.9 URINARY TRACT INFECTION WITH HEMATURIA, SITE UNSPECIFIED: ICD-10-CM

## 2022-01-27 DIAGNOSIS — N39.0 URINARY TRACT INFECTION WITH HEMATURIA, SITE UNSPECIFIED: ICD-10-CM

## 2022-01-27 PROBLEM — N17.9 ACUTE-ON-CHRONIC KIDNEY INJURY (HCC): Status: RESOLVED | Noted: 2021-02-09 | Resolved: 2022-01-27

## 2022-01-27 PROBLEM — N18.9 ACUTE-ON-CHRONIC KIDNEY INJURY (HCC): Status: RESOLVED | Noted: 2021-02-09 | Resolved: 2022-01-27

## 2022-01-27 LAB
SL AMB  POCT GLUCOSE, UA: NEGATIVE
SL AMB LEUKOCYTE ESTERASE,UA: NORMAL
SL AMB POCT BILIRUBIN,UA: NEGATIVE
SL AMB POCT BLOOD,UA: NORMAL
SL AMB POCT CLARITY,UA: NORMAL
SL AMB POCT COLOR,UA: YELLOW
SL AMB POCT KETONES,UA: NEGATIVE
SL AMB POCT NITRITE,UA: NEGATIVE
SL AMB POCT PH,UA: 5.5
SL AMB POCT SPECIFIC GRAVITY,UA: 1.02
SL AMB POCT URINE PROTEIN: NEGATIVE
SL AMB POCT UROBILINOGEN: 0.2

## 2022-01-27 PROCEDURE — G0439 PPPS, SUBSEQ VISIT: HCPCS | Performed by: FAMILY MEDICINE

## 2022-01-27 PROCEDURE — 81003 URINALYSIS AUTO W/O SCOPE: CPT | Performed by: FAMILY MEDICINE

## 2022-01-27 PROCEDURE — 1123F ACP DISCUSS/DSCN MKR DOCD: CPT | Performed by: FAMILY MEDICINE

## 2022-01-27 RX ORDER — CIPROFLOXACIN 500 MG/1
500 TABLET, FILM COATED ORAL EVERY 12 HOURS SCHEDULED
Qty: 14 TABLET | Refills: 0 | Status: SHIPPED | OUTPATIENT
Start: 2022-01-27 | End: 2022-02-03

## 2022-01-27 RX ORDER — TAMSULOSIN HYDROCHLORIDE 0.4 MG/1
0.4 CAPSULE ORAL
Qty: 30 CAPSULE | Refills: 5 | Status: SHIPPED | OUTPATIENT
Start: 2022-01-27 | End: 2022-03-09 | Stop reason: SDUPTHER

## 2022-01-27 RX ORDER — DONEPEZIL HYDROCHLORIDE 10 MG/1
10 TABLET, FILM COATED ORAL
Qty: 90 TABLET | Refills: 1 | Status: SHIPPED | OUTPATIENT
Start: 2022-01-27 | End: 2022-07-26

## 2022-01-27 NOTE — PROGRESS NOTES
Assessment and Plan:     Problem List Items Addressed This Visit        Endocrine    Type 2 diabetes mellitus with stage 3 chronic kidney disease, with long-term current use of insulin ([facility] )    Relevant Orders    Hemoglobin A1C    Microalbumin / creatinine urine ratio    Acquired hypothyroidism    Relevant Orders    TSH, 3rd generation with Free T4 reflex    Diabetic polyneuropathy associated with type 2 diabetes mellitus ([facility] )       Cardiovascular and Mediastinum    Coronary artery disease involving native coronary artery of native heart without angina pectoris    Essential hypertension    Relevant Orders    Comprehensive metabolic panel    Chronic diastolic congestive heart failure (HCC)       Genitourinary    Chronic kidney disease (CKD) stage G3a/A1, moderately decreased glomerular filtration rate (GFR) between 45-59 mL/min/1 73 square meter and albuminuria creatinine ratio less than 30 mg/g ([facility] )    Relevant Orders    Ambulatory Referral to Urology       Other    Mixed hyperlipidemia    Relevant Orders    Lipid Panel with Direct LDL reflex    Benign prostatic hyperplasia with urinary frequency    Relevant Medications    tamsulosin (FLOMAX) 0 4 mg    Other Relevant Orders    Ambulatory Referral to Urology      Other Visit Diagnoses     Medicare annual wellness visit, subsequent    -  Primary    Urinary tract infection with hematuria, site unspecified        Relevant Medications    ciprofloxacin (CIPRO) 500 mg tablet    Other Relevant Orders    POCT urine dip auto non-scope (Completed)    Urine culture    Ambulatory Referral to Urology    Age-related cognitive decline        Relevant Medications    donepezil (ARICEPT) 10 mg tablet    Screen for colon cancer        Relevant Orders    Ambulatory Referral to Gastroenterology    Screening for prostate cancer        Relevant Orders    PSA, Total Screen    Need for hepatitis C screening test        Relevant Orders    Hepatitis C antibody            Pt's UTI/ kideny isses may be related to the prostate - - p[t sent to urology may benefit form eval and procedure      Preventive health issues were discussed with patient, and age appropriate screening tests were ordered as noted in patient's After Visit Summary  Personalized health advice and appropriate referrals for health education or preventive services given if needed, as noted in patient's After Visit Summary       History of Present Illness:     Patient presents for Medicare Annual Wellness visit  Pt feels he thinks he may have a UTI would like that checked    Patient Care Team:  Lakia Adler DO as PCP - General (Family Medicine)  Francesca Harper DPM as Consulting Physician (Podiatry)  Katharine Tomlinson MD as Consulting Physician (Ophthalmology)  Tato Liriano MD as Consulting Physician (Urology)     Problem List:     Patient Active Problem List   Diagnosis    Hammond's esophagus with dysplasia    Chronic kidney disease (CKD) stage G3a/A1, moderately decreased glomerular filtration rate (GFR) between 45-59 mL/min/1 73 square meter and albuminuria creatinine ratio less than 30 mg/g (HCC)    Colon, diverticulosis    Chronic constipation    Coronary artery disease involving native coronary artery of native heart without angina pectoris    Type 2 diabetes mellitus with stage 3 chronic kidney disease, with long-term current use of insulin ([address] )    Disc degeneration, lumbar    Hiatal hernia    History of myocardial infarction    Essential hypertension    Acquired hypothyroidism    Lumbar radiculopathy    Mixed hyperlipidemia    Chronic diastolic congestive heart failure ([address] )    Age-related incipient cataract of both eyes    Primary osteoarthritis of left foot    Plantar fasciitis    Peripheral arteriosclerosis ([address] )    Diabetic polyneuropathy associated with type 2 diabetes mellitus ([address] )    Benign prostatic hyperplasia with urinary frequency    Presence of stent in coronary artery    COVID-19 virus infection      Past Medical and Surgical History:     Past Medical History:   Diagnosis Date    Aortic narrowing     Last Assessed:  9/28/15    Arthritis     Hammond esophagus     Bilateral leg edema     Bulla, lung (HCC)     CHF (congestive heart failure) (HCC)     Chronic kidney disease     stage 3    Diabetes mellitus (HCC)     Enlarged prostate     Heel pain     right heel slipped in the garage-landed on the right heel    Herniated lumbar intervertebral disc     x 4 discs-fell off a roof    Hiatal hernia     High cholesterol     History of kidney problems     Hypertension     Old myocardial infarction     x2-pt was unaware of the MI-one stent    Thyroid disease     hypothyroid    Transient cerebral ischemia     Last Assessed:  8/27/15    Wears dentures     full upper, partial lower    Wears glasses      Past Surgical History:   Procedure Laterality Date    CATARACT EXTRACTION      COLONOSCOPY      Resolved:  2015    CORONARY ANGIOPLASTY WITH STENT PLACEMENT      Stent implanted early 1990's,     ESOPHAGOGASTRODUODENOSCOPY      KNEE SURGERY      right knee cartilage surgery-left meniscus repair    WV XCAPSL CTRC RMVL INSJ IO LENS PROSTH W/O ECP Right 1/24/2019    Procedure: EXTRACTION EXTRACAPSULAR CATARACT PHACO INTRAOCULAR LENS (IOL); Surgeon: Lemuel Moncada MD;  Location: [facility] OR;  Service: Ophthalmology    [place] XCAPSL CTRC RMVL INSJ IO LENS PROSTH W/O ECP Left 2/21/2019    Procedure: EXTRACTION EXTRACAPSULAR CATARACT PHACO INTRAOCULAR LENS (IOL);   Surgeon: Lemuel Moncada MD;  Location: [facility] OR;  Service: Ophthalmology    TONSILLECTOMY      UMBILICAL HERNIA REPAIR      1980's,    UPPER GASTROINTESTINAL ENDOSCOPY      Last Assessed:  8/27/15      Family History:     Family History   Problem Relation Age of Onset    Colon cancer Mother     Arthritis Mother     Diabetes Mother     Hypertension Mother     Cancer Mother         Breast    Hyperlipidemia Mother    Jr Abt Cancer Sister         ovarian    Other Brother         Cerebrovascular accident (CVA)    Diabetes Other         Sibling    Hypertension Other         Sibling    Hernia Father         umbilical   [place] Hernia Son     Cancer Sister         liver    Kidney disease Sister     Kidney disease Brother     Mental illness Neg Hx       Social History:     Social History     Socioeconomic History    Marital status: /Civil Union     Spouse name: None    Number of children: None    Years of education: None    Highest education level: None   Occupational History    None   Tobacco Use    Smoking status: Former Smoker     Packs/day: 4 00     Years: 20 00     Pack years: 80 00     Quit date:      Years since quittin 1    Smokeless tobacco: Never Used   Vaping Use    Vaping Use: Never used   Substance and Sexual Activity    Alcohol use: Not Currently    Drug use: No    Sexual activity: None   Other Topics Concern    None   Social History Narrative    Lack of exercise    Sleeps 6-7 hours a day     Social Determinants of Health     Financial Resource Strain: Not on file   Food Insecurity: Not on file   Transportation Needs: Not on file   Physical Activity: Not on file   Stress: Not on file   Social Connections: Not on file   Intimate Partner Violence: Not on file   Housing Stability: Not on file      Medications and Allergies:     Current Outpatient Medications   Medication Sig Dispense Refill    albuterol (ProAir HFA) 90 mcg/act inhaler Inhale 2 puffs every 6 (six) hours as needed for wheezing 1 Inhaler 0    aspirin (ASPIR-81) 81 mg EC tablet Take 81 mg by mouth every morning        atorvastatin (LIPITOR) 40 mg tablet TAKE 1 TABLET BY MOUTH  DAILY 90 tablet 3    BD Pen Needle Sherie U/F 32G X 4 MM MISC USE 4 TIMES DAILY AS  DIRECTED 360 each 0    Coenzyme Q10 (COQ-10) 100 MG CAPS Take 200 mg by mouth daily at bedtime       furosemide (LASIX) 20 mg tablet Take 1 tablet (20 mg total) by mouth daily 90 tablet 0    glipiZIDE (GLUCOTROL) 5 mg tablet TAKE 1 TABLET BY MOUTH  TWICE DAILY BEFORE MEALS 180 tablet 3    insulin detemir (Levemir FlexTouch) 100 Units/mL injection pen Inject 32 Units under the skin every morning 28 8 mL 1    insulin lispro (HumaLOG KwikPen) 100 units/mL injection pen Inject 2 Units under the skin 3 (three) times a day with meals 5 4 mL 1    levothyroxine 75 mcg tablet TAKE 1 TABLET BY MOUTH  DAILY 90 tablet 0    lisinopril (ZESTRIL) 2 5 mg tablet TAKE 1 TABLET BY MOUTH  DAILY 90 tablet 3    metoprolol succinate (TOPROL-XL) 25 mg 24 hr tablet TAKE 1 TABLET BY MOUTH  DAILY 90 tablet 3    multivitamin (THERAGRAN) TABS Take 1 tablet by mouth every morning      pantoprazole (PROTONIX) 40 mg tablet TAKE 1 TABLET BY MOUTH  DAILY 90 tablet 3    ciprofloxacin (CIPRO) 500 mg tablet Take 1 tablet (500 mg total) by mouth every 12 (twelve) hours for 7 days 14 tablet 0    donepezil (ARICEPT) 10 mg tablet Take 1 tablet (10 mg total) by mouth daily at bedtime 90 tablet 1    tamsulosin (FLOMAX) 0 4 mg Take 1 capsule (0 4 mg total) by mouth daily with dinner 30 capsule 5     No current facility-administered medications for this visit  No Known Allergies   Immunizations:     Immunization History   Administered Date(s) Administered    INFLUENZA 01/31/2019    Pneumococcal Conjugate 13-Valent 12/28/2015    Pneumococcal Polysaccharide PPV23 04/23/2019      Health Maintenance:         Topic Date Due    Hepatitis C Screening  Never done    Colorectal Cancer Screening  10/09/2021         Topic Date Due    COVID-19 Vaccine (1) Never done    Influenza Vaccine (1) 09/01/2021      Medicare Health Risk Assessment:     /68   Pulse 65   Temp (!) 97 3 °F (36 3 °C)   Resp 16   Ht 5' 8" (1 727 m)   Wt 89 4 kg (197 lb)   SpO2 98%   BMI 29 95 kg/m²      Abdirizak Richards is here for his Subsequent Wellness visit   Last Medicare Wellness visit information reviewed, patient interviewed, no change since last AWV      Health Risk Assessment:   Patient rates overall health as very good  Patient feels that their physical health rating is same  Patient is satisfied with their life  Eyesight was rated as same  Hearing was rated as same  Patient feels that their emotional and mental health rating is same  Patients states they are never, rarely angry  Patient states they are sometimes unusually tired/fatigued  Pain experienced in the last 7 days has been some  Patient's pain rating has been 8/10  Patient states that he has experienced no weight loss or gain in last 6 months  Depression Screening:   PHQ-2 Score: 0      Fall Risk Screening: In the past year, patient has experienced: history of falling in past year    Number of falls: 1  Injured during fall?: Yes    Feels unsteady when standing or walking?: No    Worried about falling?: No      Home Safety:  Patient does not have trouble with stairs inside or outside of their home  Patient has working smoke alarms and has working carbon monoxide detector  Home safety hazards include: none  Nutrition:   Current diet is Regular and Diabetic  Medications:   Patient is currently taking over-the-counter supplements  OTC medications include: see medication list  Patient is able to manage medications  Activities of Daily Living (ADLs)/Instrumental Activities of Daily Living (IADLs):   Walk and transfer into and out of bed and chair?: Yes  Dress and groom yourself?: Yes    Bathe or shower yourself?: Yes    Feed yourself?  Yes  Do your laundry/housekeeping?: Yes  Manage your money, pay your bills and track your expenses?: Yes  Make your own meals?: Yes    Do your own shopping?: Yes    Previous Hospitalizations:   Any hospitalizations or ED visits within the last 12 months?: No      Advance Care Planning:   Living will: No      Cognitive Screening:   Provider or family/friend/caregiver concerned regarding cognition?: Yes    Cognition Comments: Pt w acute cognitive decline - takes aricept - pt states he thinks it helps    PREVENTIVE SCREENINGS      Cardiovascular Screening:    General: Screening Not Indicated, History Lipid Disorder and Risks and Benefits Discussed    Due for: Lipid Panel      Diabetes Screening:     General: Screening Not Indicated, History Diabetes and Risks and Benefits Discussed    Due for: Blood Glucose      Colorectal Cancer Screening:     General: Risks and Benefits Discussed and Screening Current    Due for: Colonoscopy - High Risk      Prostate Cancer Screening:    General: Screening Not Indicated and Risks and Benefits Discussed    Due for: PSA      Osteoporosis Screening:    General: Risks and Benefits Discussed and Patient Declines      Abdominal Aortic Aneurysm (AAA) Screening:    Risk factors include: tobacco use        General: Screening Not Indicated and Risks and Benefits Discussed      Lung Cancer Screening:     General: Screening Not Indicated    Screening, Brief Intervention, and Referral to Treatment (SBIRT)    Screening  Typical number of drinks in a day: 0  Typical number of drinks in a week: 0  Interpretation: Low risk drinking behavior      AUDIT-C Screenin) How often did you have a drink containing alcohol in the past year? never  2) How many drinks did you have on a typical day when you were drinking in the past year? 0  3) How often did you have 6 or more drinks on one occasion in the past year? never    AUDIT-C Score: 0  Interpretation: Score 0-3 (male): Negative screen for alcohol misuse    Single Item Drug Screening:  How often have you used an illegal drug (including marijuana) or a prescription medication for non-medical reasons in the past year? never    Single Item Drug Screen Score: 0  Interpretation: Negative screen for possible drug use disorder    PE  CARLYLE 2+ prostate  Redell Luis, DO

## 2022-01-27 NOTE — PATIENT INSTRUCTIONS
Medicare Preventive Visit Patient Instructions  Thank you for completing your Welcome to Medicare Visit or Medicare Annual Wellness Visit today  Your next wellness visit will be due in one year (1/28/2023)  The screening/preventive services that you may require over the next 5-10 years are detailed below  Some tests may not apply to you based off risk factors and/or age  Screening tests ordered at today's visit but not completed yet may show as past due  Also, please note that scanned in results may not display below  Preventive Screenings:  Service Recommendations Previous Testing/Comments   Colorectal Cancer Screening  · Colonoscopy    · Fecal Occult Blood Test (FOBT)/Fecal Immunochemical Test (FIT)  · Fecal DNA/Cologuard Test  · Flexible Sigmoidoscopy Age: 54-65 years old   Colonoscopy: every 10 years (May be performed more frequently if at higher risk)  OR  FOBT/FIT: every 1 year  OR  Cologuard: every 3 years  OR  Sigmoidoscopy: every 5 years  Screening may be recommended earlier than age 48 if at higher risk for colorectal cancer  Also, an individualized decision between you and your healthcare provider will decide whether screening between the ages of 74-80 would be appropriate   Colonoscopy: 10/09/2019  FOBT/FIT: Not on file  Cologuard: Not on file  Sigmoidoscopy: Not on file          Prostate Cancer Screening Individualized decision between patient and health care provider in men between ages of 53-78   Medicare will cover every 12 months beginning on the day after your 50th birthday PSA: 3 8 ng/mL     Screening Not Indicated     Hepatitis C Screening Once for adults born between 1945 and 1965  More frequently in patients at high risk for Hepatitis C Hep C Antibody: Not on file        Diabetes Screening 1-2 times per year if you're at risk for diabetes or have pre-diabetes Fasting glucose: No results in last 5 years   A1C: 7 9 %    Screening Not Indicated  History Diabetes   Cholesterol Screening Once every 5 years if you don't have a lipid disorder  May order more often based on risk factors  Lipid panel: 07/19/2021    Screening Not Indicated  History Lipid Disorder      Other Preventive Screenings Covered by Medicare:  1  Abdominal Aortic Aneurysm (AAA) Screening: covered once if your at risk  You're considered to be at risk if you have a family history of AAA or a male between the age of 73-68 who smoking at least 100 cigarettes in your lifetime  2  Lung Cancer Screening: covers low dose CT scan once per year if you meet all of the following conditions: (1) Age 50-69; (2) No signs or symptoms of lung cancer; (3) Current smoker or have quit smoking within the last 15 years; (4) You have a tobacco smoking history of at least 30 pack years (packs per day x number of years you smoked); (5) You get a written order from a healthcare provider  3  Glaucoma Screening: covered annually if you're considered high risk: (1) You have diabetes OR (2) Family history of glaucoma OR (3)  aged 48 and older OR (3)  American aged 72 and older  3  Osteoporosis Screening: covered every 2 years if you meet one of the following conditions: (1) Have a vertebral abnormality; (2) On glucocorticoid therapy for more than 3 months; (3) Have primary hyperparathyroidism; (4) On osteoporosis medications and need to assess response to drug therapy  5  HIV Screening: covered annually if you're between the age of 12-76  Also covered annually if you are younger than 13 and older than 72 with risk factors for HIV infection  For pregnant patients, it is covered up to 3 times per pregnancy      Immunizations:  Immunization Recommendations   Influenza Vaccine Annual influenza vaccination during flu season is recommended for all persons aged >= 6 months who do not have contraindications   Pneumococcal Vaccine (Prevnar and Pneumovax)  * Prevnar = PCV13  * Pneumovax = PPSV23 Adults 25-60 years old: 1-3 doses may be recommended based on certain risk factors  Adults 72 years old: Prevnar (PCV13) vaccine recommended followed by Pneumovax (PPSV23) vaccine  If already received PPSV23 since turning 65, then PCV13 recommended at least one year after PPSV23 dose  Hepatitis B Vaccine 3 dose series if at intermediate or high risk (ex: diabetes, end stage renal disease, liver disease)   Tetanus (Td) Vaccine - COST NOT COVERED BY MEDICARE PART B Following completion of primary series, a booster dose should be given every 10 years to maintain immunity against tetanus  Td may also be given as tetanus wound prophylaxis  Tdap Vaccine - COST NOT COVERED BY MEDICARE PART B Recommended at least once for all adults  For pregnant patients, recommended with each pregnancy  Shingles Vaccine (Shingrix) - COST NOT COVERED BY MEDICARE PART B  2 shot series recommended in those aged 48 and above     Health Maintenance Due:      Topic Date Due    Hepatitis C Screening  Never done    Colorectal Cancer Screening  10/09/2021     Immunizations Due:      Topic Date Due    COVID-19 Vaccine (1) Never done    Influenza Vaccine (1) 09/01/2021     Advance Directives   What are advance directives? Advance directives are legal documents that state your wishes and plans for medical care  These plans are made ahead of time in case you lose your ability to make decisions for yourself  Advance directives can apply to any medical decision, such as the treatments you want, and if you want to donate organs  What are the types of advance directives? There are many types of advance directives, and each state has rules about how to use them  You may choose a combination of any of the following:  · Living will: This is a written record of the treatment you want  You can also choose which treatments you do not want, which to limit, and which to stop at a certain time  This includes surgery, medicine, IV fluid, and tube feedings     · Durable power of  for healthcare [facility]): This is a written record that states who you want to make healthcare choices for you when you are unable to make them for yourself  This person, called a proxy, is usually a family member or a friend  You may choose more than 1 proxy  · Do not resuscitate (DNR) order:  A DNR order is used in case your heart stops beating or you stop breathing  It is a request not to have certain forms of treatment, such as CPR  A DNR order may be included in other types of advance directives  · Medical directive: This covers the care that you want if you are in a coma, near death, or unable to make decisions for yourself  You can list the treatments you want for each condition  Treatment may include pain medicine, surgery, blood transfusions, dialysis, IV or tube feedings, and a ventilator (breathing machine)  · Values history: This document has questions about your views, beliefs, and how you feel and think about life  This information can help others choose the care that you would choose  Why are advance directives important? An advance directive helps you control your care  Although spoken wishes may be used, it is better to have your wishes written down  Spoken wishes can be misunderstood, or not followed  Treatments may be given even if you do not want them  An advance directive may make it easier for your family to make difficult choices about your care  Fall Prevention    Fall prevention  includes ways to make your home and other areas safer  It also includes ways you can move more carefully to prevent a fall  Health conditions that cause changes in your blood pressure, vision, or muscle strength and coordination may increase your risk for falls  Medicines may also increase your risk for falls if they make you dizzy, weak, or sleepy  Fall prevention tips:   · Stand or sit up slowly  · Use assistive devices as directed  · Wear shoes that fit well and have soles that   · Wear a personal alarm  · Stay active  · Manage your medical conditions  Home Safety Tips:  · Add items to prevent falls in the bathroom  · Keep paths clear  · Install bright lights in your home  · Keep items you use often on shelves within reach  · Paint or place reflective tape on the edges of your stairs  Weight Management   Why it is important to manage your weight:  Being overweight increases your risk of health conditions such as heart disease, high blood pressure, type 2 diabetes, and certain types of cancer  It can also increase your risk for osteoarthritis, sleep apnea, and other respiratory problems  Aim for a slow, steady weight loss  Even a small amount of weight loss can lower your risk of health problems  How to lose weight safely:  A safe and healthy way to lose weight is to eat fewer calories and get regular exercise  You can lose up about 1 pound a week by decreasing the number of calories you eat by 500 calories each day  Healthy meal plan for weight management:  A healthy meal plan includes a variety of foods, contains fewer calories, and helps you stay healthy  A healthy meal plan includes the following:  · Eat whole-grain foods more often  A healthy meal plan should contain fiber  Fiber is the part of grains, fruits, and vegetables that is not broken down by your body  Whole-grain foods are healthy and provide extra fiber in your diet  Some examples of whole-grain foods are whole-wheat breads and pastas, oatmeal, brown rice, and bulgur  · Eat a variety of vegetables every day  Include dark, leafy greens such as spinach, kale, arnold greens, and mustard greens  Eat yellow and orange vegetables such as carrots, sweet potatoes, and winter squash  · Eat a variety of fruits every day  Choose fresh or canned fruit (canned in its own juice or light syrup) instead of juice  Fruit juice has very little or no fiber  · Eat low-fat dairy foods  Drink fat-free (skim) milk or 1% milk   Eat fat-free yogurt and low-fat cottage cheese  Try low-fat cheeses such as mozzarella and other reduced-fat cheeses  · Choose meat and other protein foods that are low in fat  Choose beans or other legumes such as split peas or lentils  Choose fish, skinless poultry (chicken or turkey), or lean cuts of red meat (beef or pork)  Before you cook meat or poultry, cut off any visible fat  · Use less fat and oil  Try baking foods instead of frying them  Add less fat, such as margarine, sour cream, regular salad dressing and mayonnaise to foods  Eat fewer high-fat foods  Some examples of high-fat foods include french fries, doughnuts, ice cream, and cakes  · Eat fewer sweets  Limit foods and drinks that are high in sugar  This includes candy, cookies, regular soda, and sweetened drinks  Exercise:  Exercise at least 30 minutes per day on most days of the week  Some examples of exercise include walking, biking, dancing, and swimming  You can also fit in more physical activity by taking the stairs instead of the elevator or parking farther away from stores  Ask your healthcare provider about the best exercise plan for you  © Copyright [employer] 2018 Information is for End User's use only and may not be sold, redistributed or otherwise used for commercial purposes   All illustrations and images included in CareNotes® are the copyrighted property of A D A M , Inc  or [address] [place] [address] [employer]

## 2022-01-30 DIAGNOSIS — I10 ESSENTIAL HYPERTENSION: ICD-10-CM

## 2022-01-31 ENCOUNTER — OFFICE VISIT (OUTPATIENT)
Dept: CARDIOLOGY CLINIC | Facility: CLINIC | Age: 77
End: 2022-01-31
Payer: MEDICARE

## 2022-01-31 VITALS
SYSTOLIC BLOOD PRESSURE: 120 MMHG | DIASTOLIC BLOOD PRESSURE: 82 MMHG | HEART RATE: 86 BPM | WEIGHT: 192 LBS | HEIGHT: 68 IN | TEMPERATURE: 97.3 F | OXYGEN SATURATION: 97 % | BODY MASS INDEX: 29.1 KG/M2

## 2022-01-31 DIAGNOSIS — I50.32 CHRONIC DIASTOLIC CONGESTIVE HEART FAILURE (HCC): ICD-10-CM

## 2022-01-31 DIAGNOSIS — E78.2 MIXED HYPERLIPIDEMIA: ICD-10-CM

## 2022-01-31 DIAGNOSIS — I10 ESSENTIAL HYPERTENSION: ICD-10-CM

## 2022-01-31 DIAGNOSIS — I25.10 CORONARY ARTERY DISEASE INVOLVING NATIVE CORONARY ARTERY OF NATIVE HEART WITHOUT ANGINA PECTORIS: Primary | ICD-10-CM

## 2022-01-31 LAB
BACTERIA UR CULT: ABNORMAL
Lab: ABNORMAL
SL AMB ANTIMICROBIAL SUSCEPTIBILITY: ABNORMAL

## 2022-01-31 PROCEDURE — 93000 ELECTROCARDIOGRAM COMPLETE: CPT | Performed by: INTERNAL MEDICINE

## 2022-01-31 PROCEDURE — 99214 OFFICE O/P EST MOD 30 MIN: CPT | Performed by: INTERNAL MEDICINE

## 2022-01-31 NOTE — PROGRESS NOTES
Cardiology   Barbara Poster, DO, Kelly Garza MD, Ne Aguilar MD, Herb Ny MD, [facility]  -------------------------------------------------------------------  [facility]  [email or web address], [address] [facility] [place] [address], [facility] [facility] [address]  [phone]    Cardiology Follow Up  Tung Jerez  1945  [id number]          Assessment/Plan:    1  Coronary artery disease involving native coronary artery of native heart without angina pectoris    2  Essential hypertension    3  Chronic diastolic congestive heart failure ([address] )    4  Mixed hyperlipidemia      - LE edema resolved with discontinuation of amlodipine and increasing furosemide to 20 mg daily  Last echocardiogram showed a normal EF with diastolic dysfunction  - blood pressure is stable off amlodipine  - continue furosemide 20 mg daily  - Call if any change or if any dyspnea or LE edema    - Discussed diet and weight loss  Interval History:     Tung Jerez is 67 y o  male here for followup of CAD and CHF  Since his last visit, he has been feeling well   he denies any palpitations, chest pain, shortness of breath, LE edema, orthopnea or PND  He was previously having LE edema that resolved with discontinuing amlodipine and increasing furosemide dosage  The patient has a history of CAD  He has a h/o PCI to the circumflex vessel in 1992  His last nuclear stress test was done in 5/2015 which showed inferior/inferolateral infarct c/w attenuation  EF 63%  He exercised for 6:38    He had a treadmill stress in July 2018 which was normal   He exercised for 7 minutes without ECG changes     Echocardiogram in March 2021 showed ejection fraction of 55-60% with mild valve disease    The following portions of the patient's history were reviewed and updated as appropriate: allergies, current medications, past family history, past medical history, past social history, past surgical history and problem list       Current Outpatient Medications:     aspirin (ASPIR-81) 81 mg EC tablet, Take 81 mg by mouth every morning  , Disp: , Rfl:     atorvastatin (LIPITOR) 40 mg tablet, TAKE 1 TABLET BY MOUTH  DAILY, Disp: 90 tablet, Rfl: 3    BD Pen Needle Sherie U/F 32G X 4 MM MISC, USE 4 TIMES DAILY AS  DIRECTED, Disp: 360 each, Rfl: 0    ciprofloxacin (CIPRO) 500 mg tablet, Take 1 tablet (500 mg total) by mouth every 12 (twelve) hours for 7 days, Disp: 14 tablet, Rfl: 0    Coenzyme Q10 (COQ-10) 100 MG CAPS, Take 200 mg by mouth daily at bedtime , Disp: , Rfl:     donepezil (ARICEPT) 10 mg tablet, Take 1 tablet (10 mg total) by mouth daily at bedtime, Disp: 90 tablet, Rfl: 1    furosemide (LASIX) 20 mg tablet, Take 1 tablet (20 mg total) by mouth daily, Disp: 90 tablet, Rfl: 0    glipiZIDE (GLUCOTROL) 5 mg tablet, TAKE 1 TABLET BY MOUTH  TWICE DAILY BEFORE MEALS, Disp: 180 tablet, Rfl: 3    insulin detemir (Levemir FlexTouch) 100 Units/mL injection pen, Inject 32 Units under the skin every morning, Disp: 28 8 mL, Rfl: 1    insulin lispro (HumaLOG KwikPen) 100 units/mL injection pen, Inject 2 Units under the skin 3 (three) times a day with meals, Disp: 5 4 mL, Rfl: 1    levothyroxine 75 mcg tablet, TAKE 1 TABLET BY MOUTH  DAILY, Disp: 90 tablet, Rfl: 0    lisinopril (ZESTRIL) 2 5 mg tablet, TAKE 1 TABLET BY MOUTH  DAILY, Disp: 90 tablet, Rfl: 3    metoprolol succinate (TOPROL-XL) 25 mg 24 hr tablet, TAKE 1 TABLET BY MOUTH  DAILY, Disp: 90 tablet, Rfl: 3    multivitamin (THERAGRAN) TABS, Take 1 tablet by mouth every morning, Disp: , Rfl:     pantoprazole (PROTONIX) 40 mg tablet, TAKE 1 TABLET BY MOUTH  DAILY, Disp: 90 tablet, Rfl: 3    tamsulosin (FLOMAX) 0 4 mg, Take 1 capsule (0 4 mg total) by mouth daily with dinner, Disp: 30 capsule, Rfl: 5    albuterol (ProAir HFA) 90 mcg/act inhaler, Inhale 2 puffs every 6 (six) hours as needed for wheezing (Patient not taking: Reported on 1/31/2022 ), Disp: 1 Inhaler, Rfl: 0        Review of Systems:  Review of Systems   Respiratory: Negative for shortness of breath  Cardiovascular: Negative for chest pain, palpitations and leg swelling  Musculoskeletal: Positive for arthralgias  All other systems reviewed and are negative  Physical Exam:  Vitals:  Vitals:    01/31/22 1035   BP: 120/82   BP Location: Right arm   Patient Position: Sitting   Cuff Size: Large   Pulse: 86   Temp: (!) 97 3 °F (36 3 °C)   TempSrc: Temporal   SpO2: 97%   Weight: 87 1 kg (192 lb)   Height: 5' 8" (1 727 m)     Physical Exam   Constitutional: He appears healthy  No distress  Eyes: Pupils are equal, round, and reactive to light  Conjunctivae are normal    Neck: No JVD present  Cardiovascular: Normal rate, regular rhythm and normal heart sounds  Exam reveals no gallop and no friction rub  No murmur heard  Pulmonary/Chest: Effort normal and breath sounds normal  He has no wheezes  He has no rales  Musculoskeletal:         General: No tenderness, deformity or edema  Cervical back: Normal range of motion and neck supple  Neurological: He is alert and oriented to person, place, and time  Skin: Skin is warm and dry  Cardiographics:  EKG: Personally reviewed NSR with q waves inferiorly  Last known EF: 55%    This note was completed in part utilizing M-Modal Fluency Direct Software  Grammatical errors, random word insertions, spelling mistakes, and incomplete sentences can be an occasional consequence of this system secondary to software limitations, ambient noise, and hardware issues  If you have any questions or concerns about the content, text, or information contained within the body of this dictation, please contact the provider for clarification

## 2022-02-01 RX ORDER — FUROSEMIDE 20 MG/1
TABLET ORAL
Qty: 90 TABLET | Refills: 3 | Status: SHIPPED | OUTPATIENT
Start: 2022-02-01 | End: 2022-03-03 | Stop reason: SDUPTHER

## 2022-02-02 ENCOUNTER — TELEPHONE (OUTPATIENT)
Dept: FAMILY MEDICINE CLINIC | Facility: CLINIC | Age: 77
End: 2022-02-02

## 2022-02-02 NOTE — TELEPHONE ENCOUNTER
Attempted to call pt, no machine, no answer  Please leave open to give him message from Dr Della Mckeon

## 2022-02-02 NOTE — TELEPHONE ENCOUNTER
Its very possible the flomax is dropping his pressure some at night  Please have him stop that medication -flomax(tamusolin)  And we will see if that helps  Pt was given a referral at our last appt to see urology so we will see if their is a different solution to the prostate     Thank You  DR LIEBERMAN

## 2022-02-02 NOTE — TELEPHONE ENCOUNTER
Spoke to patients wife  She says the patient got very weak this morning and fell  She says he fell in the bathroom and says he did not hit his head  He is alert  Denies any pain or SOB  She says they need help with his weakness  Patients wife wanted some clarity on the 3 meds recently prescribed  Sig was read to her and clarification was given  I spoke with Dr Shanae Billings about this patient, she is okay with the patient not coming in at this time until Dr LIEBERMAN can advise  Patients wife would like to speak with Dr Min Minor about possible treatment for weakness moving forward  PT? Neuro?    Gabino Pemberton MA

## 2022-02-02 NOTE — TELEPHONE ENCOUNTER
TRIAGE     Patient was found on the floor this morning  Patient did eventually get up and his wife said hes ok now  But she wants him to see Dr Zara Angela

## 2022-02-03 NOTE — TELEPHONE ENCOUNTER
Patient advised to stop taking Flomax(tamsulosin) and to contact Dr Lizz Dejesus (urologist) to f/u and see if there is anything else to be done about prostate  Advised to call back if he has any additional bouts of weakness or falling   No further action needed at this time  Natalya Tsang

## 2022-02-11 ENCOUNTER — TELEPHONE (OUTPATIENT)
Dept: GASTROENTEROLOGY | Facility: CLINIC | Age: 77
End: 2022-02-11

## 2022-02-11 NOTE — TELEPHONE ENCOUNTER
Returned patient call, He is not due until 10/2022 for FLIP  Dr Caitie Bass does have a ref in  I asked him to return our call to let us know if he is having any issues we can schedule an OV

## 2022-03-03 DIAGNOSIS — I10 ESSENTIAL HYPERTENSION: ICD-10-CM

## 2022-03-03 RX ORDER — FUROSEMIDE 20 MG/1
20 TABLET ORAL DAILY
Qty: 90 TABLET | Refills: 1 | Status: SHIPPED | OUTPATIENT
Start: 2022-03-03 | End: 2022-03-21 | Stop reason: SDUPTHER

## 2022-03-08 ENCOUNTER — OFFICE VISIT (OUTPATIENT)
Dept: ENDOCRINOLOGY | Facility: CLINIC | Age: 77
End: 2022-03-08
Payer: MEDICARE

## 2022-03-08 VITALS
BODY MASS INDEX: 29.1 KG/M2 | DIASTOLIC BLOOD PRESSURE: 68 MMHG | TEMPERATURE: 97 F | WEIGHT: 192 LBS | HEIGHT: 68 IN | HEART RATE: 80 BPM | SYSTOLIC BLOOD PRESSURE: 140 MMHG

## 2022-03-08 DIAGNOSIS — E11.22 TYPE 2 DIABETES MELLITUS WITH STAGE 3B CHRONIC KIDNEY DISEASE, WITH LONG-TERM CURRENT USE OF INSULIN (HCC): Primary | ICD-10-CM

## 2022-03-08 DIAGNOSIS — I25.10 CORONARY ARTERY DISEASE INVOLVING NATIVE CORONARY ARTERY OF NATIVE HEART WITHOUT ANGINA PECTORIS: ICD-10-CM

## 2022-03-08 DIAGNOSIS — N18.32 TYPE 2 DIABETES MELLITUS WITH STAGE 3B CHRONIC KIDNEY DISEASE, WITH LONG-TERM CURRENT USE OF INSULIN (HCC): Primary | ICD-10-CM

## 2022-03-08 DIAGNOSIS — I70.209 PERIPHERAL ARTERIOSCLEROSIS (HCC): ICD-10-CM

## 2022-03-08 DIAGNOSIS — Z79.4 TYPE 2 DIABETES MELLITUS WITH STAGE 3B CHRONIC KIDNEY DISEASE, WITH LONG-TERM CURRENT USE OF INSULIN (HCC): Primary | ICD-10-CM

## 2022-03-08 DIAGNOSIS — I10 ESSENTIAL HYPERTENSION: ICD-10-CM

## 2022-03-08 DIAGNOSIS — E78.2 MIXED HYPERLIPIDEMIA: ICD-10-CM

## 2022-03-08 DIAGNOSIS — E11.22 TYPE 2 DIABETES MELLITUS WITH STAGE 3 CHRONIC KIDNEY DISEASE, WITH LONG-TERM CURRENT USE OF INSULIN (HCC): ICD-10-CM

## 2022-03-08 DIAGNOSIS — Z79.4 TYPE 2 DIABETES MELLITUS WITH STAGE 3 CHRONIC KIDNEY DISEASE, WITH LONG-TERM CURRENT USE OF INSULIN, UNSPECIFIED WHETHER STAGE 3A OR 3B CKD (HCC): ICD-10-CM

## 2022-03-08 DIAGNOSIS — N18.30 TYPE 2 DIABETES MELLITUS WITH STAGE 3 CHRONIC KIDNEY DISEASE, WITH LONG-TERM CURRENT USE OF INSULIN, UNSPECIFIED WHETHER STAGE 3A OR 3B CKD (HCC): ICD-10-CM

## 2022-03-08 DIAGNOSIS — E03.9 ACQUIRED HYPOTHYROIDISM: ICD-10-CM

## 2022-03-08 DIAGNOSIS — I50.32 CHRONIC DIASTOLIC CONGESTIVE HEART FAILURE (HCC): ICD-10-CM

## 2022-03-08 DIAGNOSIS — E11.22 TYPE 2 DIABETES MELLITUS WITH STAGE 3 CHRONIC KIDNEY DISEASE, WITH LONG-TERM CURRENT USE OF INSULIN, UNSPECIFIED WHETHER STAGE 3A OR 3B CKD (HCC): ICD-10-CM

## 2022-03-08 DIAGNOSIS — Z79.4 TYPE 2 DIABETES MELLITUS WITH STAGE 3 CHRONIC KIDNEY DISEASE, WITH LONG-TERM CURRENT USE OF INSULIN (HCC): ICD-10-CM

## 2022-03-08 DIAGNOSIS — N18.30 TYPE 2 DIABETES MELLITUS WITH STAGE 3 CHRONIC KIDNEY DISEASE, WITH LONG-TERM CURRENT USE OF INSULIN (HCC): ICD-10-CM

## 2022-03-08 PROCEDURE — 99205 OFFICE O/P NEW HI 60 MIN: CPT | Performed by: INTERNAL MEDICINE

## 2022-03-08 RX ORDER — SEMAGLUTIDE 1.34 MG/ML
0.25 INJECTION, SOLUTION SUBCUTANEOUS WEEKLY
Qty: 3 ML | Refills: 3 | Status: SHIPPED | OUTPATIENT
Start: 2022-03-08

## 2022-03-08 RX ORDER — INSULIN DETEMIR 100 [IU]/ML
20 INJECTION, SOLUTION SUBCUTANEOUS
Qty: 18 ML | Refills: 3
Start: 2022-03-08 | End: 2022-05-13 | Stop reason: SDUPTHER

## 2022-03-08 RX ORDER — INSULIN LISPRO 100 [IU]/ML
3 INJECTION, SOLUTION INTRAVENOUS; SUBCUTANEOUS
Qty: 8.1 ML | Refills: 1
Start: 2022-03-08 | End: 2022-03-22 | Stop reason: SDUPTHER

## 2022-03-08 RX ORDER — SEMAGLUTIDE 1.34 MG/ML
INJECTION, SOLUTION SUBCUTANEOUS
Qty: 1.5 ML | Refills: 0 | Status: SHIPPED | COMMUNITY
Start: 2022-03-08

## 2022-03-08 NOTE — PATIENT INSTRUCTIONS
Discontinue glipizide   Decrease Levemir to 20 units subcutaneously at night   Decrease Humalog to 3-4 units with meals 3 times a day   Start Ozempic 0 25 mg subcutaneously weekly   Please check blood sugars before meals and at bedtime, keep a log, send for review in 3-4 weeks   Please have labs done as ordered   Keep well hydrated

## 2022-03-09 ENCOUNTER — TELEPHONE (OUTPATIENT)
Dept: UROLOGY | Facility: MEDICAL CENTER | Age: 77
End: 2022-03-09

## 2022-03-09 ENCOUNTER — OFFICE VISIT (OUTPATIENT)
Dept: UROLOGY | Facility: CLINIC | Age: 77
End: 2022-03-09
Payer: MEDICARE

## 2022-03-09 ENCOUNTER — TELEPHONE (OUTPATIENT)
Dept: OTHER | Facility: OTHER | Age: 77
End: 2022-03-09

## 2022-03-09 VITALS
HEIGHT: 68 IN | BODY MASS INDEX: 29.04 KG/M2 | DIASTOLIC BLOOD PRESSURE: 78 MMHG | HEART RATE: 82 BPM | OXYGEN SATURATION: 97 % | WEIGHT: 191.6 LBS | SYSTOLIC BLOOD PRESSURE: 126 MMHG

## 2022-03-09 DIAGNOSIS — N18.31 CHRONIC KIDNEY DISEASE (CKD) STAGE G3A/A1, MODERATELY DECREASED GLOMERULAR FILTRATION RATE (GFR) BETWEEN 45-59 ML/MIN/1.73 SQUARE METER AND ALBUMINURIA CREATININE RATIO LESS THAN 30 MG/G (HCC): ICD-10-CM

## 2022-03-09 DIAGNOSIS — N39.0 URINARY TRACT INFECTION WITH HEMATURIA, SITE UNSPECIFIED: ICD-10-CM

## 2022-03-09 DIAGNOSIS — N40.1 BENIGN PROSTATIC HYPERPLASIA WITH URINARY FREQUENCY: Primary | ICD-10-CM

## 2022-03-09 DIAGNOSIS — R35.0 BENIGN PROSTATIC HYPERPLASIA WITH URINARY FREQUENCY: Primary | ICD-10-CM

## 2022-03-09 DIAGNOSIS — Z12.5 SCREENING FOR PROSTATE CANCER: ICD-10-CM

## 2022-03-09 DIAGNOSIS — R31.9 URINARY TRACT INFECTION WITH HEMATURIA, SITE UNSPECIFIED: ICD-10-CM

## 2022-03-09 LAB
BACTERIA UR QL AUTO: ABNORMAL /HPF
BILIRUB UR QL STRIP: NEGATIVE
CLARITY UR: CLEAR
COLOR UR: ABNORMAL
GLUCOSE UR STRIP-MCNC: ABNORMAL MG/DL
HGB UR QL STRIP.AUTO: NEGATIVE
HYALINE CASTS #/AREA URNS LPF: ABNORMAL /LPF
KETONES UR STRIP-MCNC: NEGATIVE MG/DL
LEUKOCYTE ESTERASE UR QL STRIP: NEGATIVE
MUCOUS THREADS UR QL AUTO: ABNORMAL
NITRITE UR QL STRIP: NEGATIVE
NON-SQ EPI CELLS URNS QL MICRO: ABNORMAL /HPF
PH UR STRIP.AUTO: 6 [PH]
POST-VOID RESIDUAL VOLUME, ML POC: 217 ML
PROT UR STRIP-MCNC: NEGATIVE MG/DL
RBC #/AREA URNS AUTO: ABNORMAL /HPF
SL AMB  POCT GLUCOSE, UA: 500
SL AMB LEUKOCYTE ESTERASE,UA: NORMAL
SL AMB POCT BILIRUBIN,UA: NORMAL
SL AMB POCT BLOOD,UA: NORMAL
SL AMB POCT CLARITY,UA: CLEAR
SL AMB POCT COLOR,UA: YELLOW
SL AMB POCT KETONES,UA: NORMAL
SL AMB POCT NITRITE,UA: NORMAL
SL AMB POCT PH,UA: 6
SL AMB POCT SPECIFIC GRAVITY,UA: 1015
SL AMB POCT URINE PROTEIN: NORMAL
SL AMB POCT UROBILINOGEN: 0.2
SP GR UR STRIP.AUTO: 1.01 (ref 1–1.03)
UROBILINOGEN UR STRIP-ACNC: <2 MG/DL
WBC #/AREA URNS AUTO: ABNORMAL /HPF

## 2022-03-09 PROCEDURE — 99203 OFFICE O/P NEW LOW 30 MIN: CPT | Performed by: PHYSICIAN ASSISTANT

## 2022-03-09 PROCEDURE — 81001 URINALYSIS AUTO W/SCOPE: CPT | Performed by: PHYSICIAN ASSISTANT

## 2022-03-09 PROCEDURE — 81002 URINALYSIS NONAUTO W/O SCOPE: CPT | Performed by: PHYSICIAN ASSISTANT

## 2022-03-09 PROCEDURE — 51798 US URINE CAPACITY MEASURE: CPT | Performed by: PHYSICIAN ASSISTANT

## 2022-03-09 RX ORDER — TAMSULOSIN HYDROCHLORIDE 0.4 MG/1
0.4 CAPSULE ORAL
Qty: 30 CAPSULE | Refills: 5 | Status: SHIPPED | OUTPATIENT
Start: 2022-03-09 | End: 2022-03-09

## 2022-03-09 NOTE — TELEPHONE ENCOUNTER
Received fax from [facility] that patient needed to have prior authorization for the doxazosin script sent in   Called and informed Kwabena Maxwell with authorizations and she will work on it for patient

## 2022-03-09 NOTE — TELEPHONE ENCOUNTER
I received a call from Tulio Mills at the [facility] location  They received drug prior authorization request for Doxazosin 8mg  Patient is no longer taking Tamsulosin 0 4mg due to adverse drug reaction but he could not specify his exact symptoms  I called the pharmacy to investigate further  Original drug ordered by the provider was BRANDED Cardura XL 8mg instead of GENERIC Doxazosin 8mg  Script for Doxazosin 8mg 1 PO QAM #30 with 3 refills was called into [id number]; verbal order given to EdJOSE Ph   Copayment due is $9   Patient aware of situation and resolution  No further action required

## 2022-03-09 NOTE — PROGRESS NOTES
1  Benign prostatic hyperplasia with urinary frequency  POCT urine dip    POCT Measure PVR    Ambulatory Referral to Urology   2  Urinary tract infection with hematuria, site unspecified  POCT urine dip    POCT Measure PVR    Ambulatory Referral to Urology   3  Chronic kidney disease (CKD) stage G3a/A1, moderately decreased glomerular filtration rate (GFR) between 45-59 mL/min/1 73 square meter and albuminuria creatinine ratio less than 30 mg/g [facility])  Ambulatory Referral to Urology       Assessment and plan:     1  BPH with incomplete emptying  2  CKD  - start tamsulosin  - proper hydration and avoidance of constipation  - will obtain renal US    2  Prostate cancer screening  - normal CARLYLE  - update PSA      Sam Scherer PA-C      Chief Complaint     BPH, UTI, CKD    History of Present Illness     Nirav Santana is a 67 y o  male presenting today as a new patient for BPH  Patient was followed by his primary care provider and had an episode of urinary infection  A known enlarged prostate from previous imaging  Noted to have chronic kidney disease  Was prescribed Flomax however patient reportedly has not started this medication  He reports feeling a weaker stream and sometimes incomplete bladder emptying sensation  Denies any current dysuria, gross hematuria, suprapubic pressure, or flank pain  He is overall comfortable with his urination  Had a urinary infection 1/27/22, culture revealing e faecalis low colony growth  Last PSA 3 8 (2021)    CT abdomen/pelvis (2020) showing pronounced prostatomegaly with invagination into the base of the bladder and mild circumferential bladder wall thickening  Medical comorbidities include HTN, lumbar radiculopathy, CKD, constipation, barrets esophagus, hiatal hernia, diabetes (hgba1c 7 9)      Urine dip leukocyte and nitrite negative, trace blood, positive glucose     PVR 217mL  AUA SYMPTOM SCORE      Most Recent Value   AUA SYMPTOM SCORE    How often have you had a sensation of not emptying your bladder completely after you finished urinating? 3   How often have you had to urinate again less than two hours after you finished urinating? 2   How often have you found you stopped and started again several times when you urinate? 2   How often have you found it difficult to postpone urination? 1   How often have you had a weak urinary stream? 0   How often have you had to push or strain to begin urination? 0   How many times did you most typically get up to urinate from the time you went to bed at night until the time you got up in the morning? 2   Quality of Life: If you were to spend the rest of your life with your urinary condition just the way it is now, how would you feel about that? 2   AUA SYMPTOM SCORE 10            Laboratory     Lab Results   Component Value Date    CREATININE 1 67 (H) 12/23/2021       Lab Results   Component Value Date    PSA 3 8 01/11/2021    PSA 3 4 07/12/2019    PSA 3 2 06/30/2016       Review of Systems     Review of Systems   Constitutional: Negative for activity change, appetite change, chills, diaphoresis, fatigue, fever and unexpected weight change  Respiratory: Negative for chest tightness and shortness of breath  Cardiovascular: Negative for chest pain, palpitations and leg swelling  Gastrointestinal: Negative for abdominal distention, abdominal pain, constipation, diarrhea, nausea and vomiting  Genitourinary: Negative for decreased urine volume, difficulty urinating, dysuria, enuresis, flank pain, frequency, genital sores, hematuria and urgency  Musculoskeletal: Negative for back pain, gait problem and myalgias  Skin: Negative for color change, pallor, rash and wound  Psychiatric/Behavioral: Negative for behavioral problems  The patient is not nervous/anxious  Allergies     No Known Allergies    Physical Exam     Physical Exam  Constitutional:       General: He is not in acute distress  Appearance: Normal appearance  He is normal weight  He is not ill-appearing, toxic-appearing or diaphoretic  HENT:      Head: Normocephalic and atraumatic  Eyes:      General:         Right eye: No discharge  Left eye: No discharge  Conjunctiva/sclera: Conjunctivae normal    Pulmonary:      Effort: Pulmonary effort is normal  No respiratory distress  Genitourinary:     Comments: Good rectal tone  Prostate 60g, smooth, symmetric, no palpable nodules  Musculoskeletal:         General: No swelling or tenderness  Normal range of motion  Skin:     General: Skin is warm and dry  Coloration: Skin is not jaundiced or pale  Neurological:      General: No focal deficit present  Mental Status: He is alert and oriented to person, place, and time  Psychiatric:         Mood and Affect: Mood normal          Behavior: Behavior normal          Thought Content: Thought content normal            Vital Signs     There were no vitals filed for this visit        Current Medications       Current Outpatient Medications:     albuterol (ProAir HFA) 90 mcg/act inhaler, Inhale 2 puffs every 6 (six) hours as needed for wheezing (Patient not taking: Reported on 1/31/2022 ), Disp: 1 Inhaler, Rfl: 0    aspirin (ASPIR-81) 81 mg EC tablet, Take 81 mg by mouth every morning  , Disp: , Rfl:     atorvastatin (LIPITOR) 40 mg tablet, TAKE 1 TABLET BY MOUTH  DAILY, Disp: 90 tablet, Rfl: 3    BD Pen Needle Sherie U/F 32G X 4 MM MISC, USE 4 TIMES DAILY AS  DIRECTED, Disp: 360 each, Rfl: 0    Coenzyme Q10 (COQ-10) 100 MG CAPS, Take 200 mg by mouth daily at bedtime , Disp: , Rfl:     donepezil (ARICEPT) 10 mg tablet, Take 1 tablet (10 mg total) by mouth daily at bedtime (Patient not taking: Reported on 3/8/2022 ), Disp: 90 tablet, Rfl: 1    furosemide (LASIX) 20 mg tablet, Take 1 tablet (20 mg total) by mouth daily, Disp: 90 tablet, Rfl: 1    insulin detemir (Levemir FlexTouch) 100 Units/mL injection pen, Inject 20 Units under the skin daily at bedtime, Disp: 18 mL, Rfl: 3    insulin lispro (HumaLOG KwikPen) 100 units/mL injection pen, Inject 3 Units under the skin 3 (three) times a day with meals, Disp: 8 1 mL, Rfl: 1    levothyroxine 75 mcg tablet, TAKE 1 TABLET BY MOUTH  DAILY, Disp: 90 tablet, Rfl: 0    lisinopril (ZESTRIL) 2 5 mg tablet, TAKE 1 TABLET BY MOUTH  DAILY, Disp: 90 tablet, Rfl: 3    metoprolol succinate (TOPROL-XL) 25 mg 24 hr tablet, TAKE 1 TABLET BY MOUTH  DAILY, Disp: 90 tablet, Rfl: 3    multivitamin (THERAGRAN) TABS, Take 1 tablet by mouth every morning, Disp: , Rfl:     pantoprazole (PROTONIX) 40 mg tablet, TAKE 1 TABLET BY MOUTH  DAILY, Disp: 90 tablet, Rfl: 3    Semaglutide,0 25 or 0 5MG/DOS, (Ozempic, 0 25 or 0 5 MG/DOSE,) 2 MG/1 5ML SOPN, Inject 0 25 mg under the skin once a week, Disp: 3 mL, Rfl: 3    Semaglutide,0 25 or 0 5MG/DOS, (Ozempic, 0 25 or 0 5 MG/DOSE,) 2 MG/1 5ML SOPN, Inject   25 mg under the skin once a week, Disp: 1 5 mL, Rfl: 0    tamsulosin (FLOMAX) 0 4 mg, Take 1 capsule (0 4 mg total) by mouth daily with dinner (Patient not taking: Reported on 3/8/2022 ), Disp: 30 capsule, Rfl: 5      Active Problems     Patient Active Problem List   Diagnosis    Hammond's esophagus with dysplasia    Chronic kidney disease (CKD) stage G3a/A1, moderately decreased glomerular filtration rate (GFR) between 45-59 mL/min/1 73 square meter and albuminuria creatinine ratio less than 30 mg/g (HCC)    Colon, diverticulosis    Chronic constipation    Coronary artery disease involving native coronary artery of native heart without angina pectoris    Type 2 diabetes mellitus with stage 3 chronic kidney disease, with long-term current use of insulin ([facility])    Disc degeneration, lumbar    Hiatal hernia    History of myocardial infarction    Essential hypertension    Acquired hypothyroidism    Lumbar radiculopathy    Mixed hyperlipidemia    Chronic diastolic congestive heart failure ([facility] [address] )    Age-related incipient cataract of both eyes    Primary osteoarthritis of left foot    Plantar fasciitis    Peripheral arteriosclerosis ([address] )    Diabetic polyneuropathy associated with type 2 diabetes mellitus (HCC)    Benign prostatic hyperplasia with urinary frequency    Presence of stent in coronary artery    COVID-19 virus infection         Past Medical History     Past Medical History:   Diagnosis Date    Aortic narrowing     Last Assessed:  9/28/15    Arthritis     Hammond esophagus     Bilateral leg edema     Bulla, lung (HCC)     CHF (congestive heart failure) (HCC)     Chronic kidney disease     stage 3    Diabetes mellitus (HCC)     Enlarged prostate     Heel pain     right heel slipped in the garage-landed on the right heel    Herniated lumbar intervertebral disc     x 4 discs-fell off a roof    Hiatal hernia     High cholesterol     History of kidney problems     Hypertension     Old myocardial infarction     x2-pt was unaware of the MI-one stent    Thyroid disease     hypothyroid    Transient cerebral ischemia     Last Assessed:  8/27/15    Wears dentures     full upper, partial lower    Wears glasses          Surgical History     Past Surgical History:   Procedure Laterality Date    CATARACT EXTRACTION      COLONOSCOPY      Resolved:  2015    CORONARY ANGIOPLASTY WITH STENT PLACEMENT      Stent implanted early 1990's,     ESOPHAGOGASTRODUODENOSCOPY      KNEE SURGERY      right knee cartilage surgery-left meniscus repair    KY XCAPSL CTRC RMVL INSJ IO LENS PROSTH W/O ECP Right 1/24/2019    Procedure: EXTRACTION EXTRACAPSULAR CATARACT PHACO INTRAOCULAR LENS (IOL); Surgeon: Janey Artis MD;  Location: [facility] OR;  Service: Ophthalmology    [place] XCAPSL CTRC RMVL INSJ IO LENS PROSTH W/O ECP Left 2/21/2019    Procedure: EXTRACTION EXTRACAPSULAR CATARACT PHACO INTRAOCULAR LENS (IOL);   Surgeon: Janey Artis MD;  Location: [facility] OR;  Service: Ophthalmology   [facility] [place]ONSILLECTOMY      UMBILICAL HERNIA REPAIR      18's,    UPPER GASTROINTESTINAL ENDOSCOPY      Last Assessed:  8/27/15         Family History     Family History   Problem Relation Age of Onset    Colon cancer Mother     Arthritis Mother     Diabetes Mother     Hypertension Mother     Cancer Mother         Breast    Hyperlipidemia Mother    Sl[place] Lama Cancer Sister         ovarian    Other Brother         Cerebrovascular accident (CVA)    Diabetes Other         Sibling    Hypertension Other         Sibling    Hernia Father         umbilical   Slime Lama Hernia Son     Cancer Sister         liver    Kidney disease Sister     Kidney disease Brother     Mental illness Neg Hx          Social History     Social History       Radiology

## 2022-03-09 NOTE — TELEPHONE ENCOUNTER
Pt called in stating he stopped taking  tamsulosin (FLOMAX) 0 4 mg [[id number]] because it doesn't work for him and this is what was sent to the pharmacy for him today  He is requesting a call back from the office

## 2022-03-21 DIAGNOSIS — N18.30 TYPE 2 DIABETES MELLITUS WITH STAGE 3 CHRONIC KIDNEY DISEASE, WITH LONG-TERM CURRENT USE OF INSULIN, UNSPECIFIED WHETHER STAGE 3A OR 3B CKD (HCC): ICD-10-CM

## 2022-03-21 DIAGNOSIS — Z79.4 TYPE 2 DIABETES MELLITUS WITH STAGE 3 CHRONIC KIDNEY DISEASE, WITH LONG-TERM CURRENT USE OF INSULIN, UNSPECIFIED WHETHER STAGE 3A OR 3B CKD (HCC): ICD-10-CM

## 2022-03-21 DIAGNOSIS — I10 ESSENTIAL HYPERTENSION: ICD-10-CM

## 2022-03-21 DIAGNOSIS — E11.22 TYPE 2 DIABETES MELLITUS WITH STAGE 3 CHRONIC KIDNEY DISEASE, WITH LONG-TERM CURRENT USE OF INSULIN, UNSPECIFIED WHETHER STAGE 3A OR 3B CKD (HCC): ICD-10-CM

## 2022-03-21 RX ORDER — FUROSEMIDE 20 MG/1
20 TABLET ORAL DAILY
Qty: 90 TABLET | Refills: 1 | Status: SHIPPED | OUTPATIENT
Start: 2022-03-21

## 2022-03-21 NOTE — TELEPHONE ENCOUNTER
Pt called and advised that BGs have been running high sense the Humalog dose was decreased to 3 units 3 times a day with meals  Jess report placed on Dr Josefina chance for review

## 2022-03-21 NOTE — TELEPHONE ENCOUNTER
Reviewed  Continuous glucose monitor worn only 43% of the time  Has patient started Ozempic? Yes, when did he started  Agree that patient has been having some postprandial highs but also had a low to 59 mg/dL yesterday morning-can patient identify why this happened - did he eat less or was he more active?   Recommend increasing Humalog to 5 units with meals for now

## 2022-03-22 RX ORDER — INSULIN LISPRO 100 [IU]/ML
INJECTION, SOLUTION INTRAVENOUS; SUBCUTANEOUS
Qty: 8.1 ML | Refills: 1
Start: 2022-03-22 | End: 2022-05-13 | Stop reason: SDUPTHER

## 2022-03-22 NOTE — TELEPHONE ENCOUNTER
Spoke with patient and reviewed Galen Mcguire and changes to medication  He understood  He does not know why he had a low of 59  He started Sunday 2 weeks ago

## 2022-03-29 ENCOUNTER — TELEPHONE (OUTPATIENT)
Dept: NEUROLOGY | Facility: CLINIC | Age: 77
End: 2022-03-29

## 2022-03-29 NOTE — TELEPHONE ENCOUNTER
Spoke with patient to confirm his 3/31/2022 @ 1 pm appointment with Dr Rosanne Veliz and provided new office address for appointment of [facility] [facility],[address], [place]

## 2022-03-30 DIAGNOSIS — E11.22 TYPE 2 DIABETES MELLITUS WITH STAGE 3 CHRONIC KIDNEY DISEASE, WITH LONG-TERM CURRENT USE OF INSULIN (HCC): ICD-10-CM

## 2022-03-30 DIAGNOSIS — N18.30 TYPE 2 DIABETES MELLITUS WITH STAGE 3 CHRONIC KIDNEY DISEASE, WITH LONG-TERM CURRENT USE OF INSULIN (HCC): ICD-10-CM

## 2022-03-30 DIAGNOSIS — Z79.4 TYPE 2 DIABETES MELLITUS WITH STAGE 3 CHRONIC KIDNEY DISEASE, WITH LONG-TERM CURRENT USE OF INSULIN (HCC): ICD-10-CM

## 2022-03-30 RX ORDER — PEN NEEDLE, DIABETIC 32GX 5/32"
NEEDLE, DISPOSABLE MISCELLANEOUS
Qty: 360 EACH | Refills: 0 | Status: SHIPPED | OUTPATIENT
Start: 2022-03-30 | End: 2022-04-01 | Stop reason: SDUPTHER

## 2022-03-31 ENCOUNTER — OFFICE VISIT (OUTPATIENT)
Dept: NEUROLOGY | Facility: CLINIC | Age: 77
End: 2022-03-31
Payer: MEDICARE

## 2022-03-31 VITALS
TEMPERATURE: 98.4 F | WEIGHT: 188 LBS | BODY MASS INDEX: 26.92 KG/M2 | DIASTOLIC BLOOD PRESSURE: 68 MMHG | SYSTOLIC BLOOD PRESSURE: 114 MMHG | HEIGHT: 70 IN | HEART RATE: 90 BPM

## 2022-03-31 DIAGNOSIS — R42 DIZZINESS: ICD-10-CM

## 2022-03-31 DIAGNOSIS — R42 DIZZY SPELLS: Primary | ICD-10-CM

## 2022-03-31 PROCEDURE — 99204 OFFICE O/P NEW MOD 45 MIN: CPT | Performed by: PSYCHIATRY & NEUROLOGY

## 2022-03-31 NOTE — PROGRESS NOTES
Patient ID: Sonia Diaz is a 67 y o  male  Assessment/Plan:    Dizzy spells  68 y/o m w h/o DM, HTN, CAD, 2 previous MI, lumbar radiculopathy, CKD, HLD presents as a new patient for dizzy spells  To review, onset 4-5 years ago, sporadic, about every 3 months, describes as lightheadedness, episodes last for 5-15 min, can linger around for 2-3 days then resolves on its own  Reviewed- CTA head and neck- no major atherosclerotic changes in vertebral basilar circulation  He had echo done- moderate left atrial enlargement and mild right atrial enlargement  Impression- unclear etiology, differentials include autonomic dysfunction related to diabetes mellitus, patient is instructed to check blood pressure and glucose level during those spells  Other differentials include arrhythmia- instructed to follow-up with Cardiology  Sometimes peripheral neuropathy can also cause these symptoms  Low suspicion for vertebrobasilar insufficiency given recent CTA did not show any major a through sclerosis in posterior circulation in he denies any motion/vertiginous like symptoms  Plan-  · Refer to cardiology to rule out arrhythmia during those episodes  · Follow-up with us as needed  · Defer imaging at this time as patient's exam is within normal limits  No recent changes in overall medical health             Diagnoses and all orders for this visit:    Dizzy spells    Dizziness  -     Ambulatory referral to Neurology  -     Ambulatory Referral to Cardiology; Future           Subjective:    68 y/o m w h/o DM ([id number]) HTN, CAD, 2 previous MI, lumbar radiculopathy, CKD, HLD presents as a new patient for dizzy spells  Patient reports he started having Dizzy spells- about 4-5 years ago feels like he is lightheaded as if about to fall, it can happen when he is sitting, about every 3 months, last for 5-10 min, lingers for 2-3 days then disappear, no change with position  Drinking water doesn't help  They went to hospital twice, didn't find anything  No changes in meds  He takes Aspirin 81  ADLs- not driving d/t dizzy spells  Otherwise independent in all basic and instrumental ADLs  Denies smoking, quit about 50 years ago, not drinking anymore  Denies palpitations, denies any other symptoms like visual changes, any recent falls, changes in dizziness with change in neck movement  Denies any other focal neurological symptoms at this time      The following portions of the patient's history were reviewed and updated as appropriate: allergies, current medications, past family history, past medical history, past social history, past surgical history and problem list          Objective:    Blood pressure 114/68, pulse 90, temperature 98 4 °F (36 9 °C), height 5' 10" (1 778 m), weight 85 3 kg (188 lb)  Physical Exam  Vitals reviewed  Constitutional:       Appearance: Normal appearance  HENT:      Head: Normocephalic  Mouth/Throat:      Mouth: Mucous membranes are moist       Pharynx: Oropharynx is clear  Eyes:      Extraocular Movements: Extraocular movements intact  Pupils: Pupils are equal, round, and reactive to light  Cardiovascular:      Rate and Rhythm: Normal rate  Pulses: Normal pulses  Pulmonary:      Effort: Pulmonary effort is normal    Abdominal:      Palpations: Abdomen is soft  Musculoskeletal:         General: Normal range of motion  Cervical back: Normal range of motion  Skin:     General: Skin is warm  Capillary Refill: Capillary refill takes less than 2 seconds  Neurological:      Mental Status: He is alert  Psychiatric:         Mood and Affect: Mood normal            Neurological Examination:     Mental Status: The patient was awake, alert, attentive, oriented to person, place, and time  No dysarthria or aphasia noted  Cranial Nerves:   I: smell Not tested   II: visual fields Full to confrontation   Pupils equal, round, reactive to light with normal accomodation  Fundus: benign fundus  III,IV,VI: extraocular muscles EOMI, no nystagmus   V: masseter and pterygoid strength full  Sensation in the V1 through V3 distributions intact to pinprick and light touch bilaterally  VII: Face is symmetric with no weakness noted  VIII: Audition intact to finger rub bilaterally  IX/X: Uvula midline  Soft palate elevation symmetric  XI: Trapezius and SCM strength 5/5 B/L  XII: Tongue midline with no atrophy or fasciculations with appropriate movement  Motor Examination:   No pronator drift  Bulk: Normal  No atrophy Tone: Normal  Fasciculations: None  Deltoid Biceps Triceps WE   WF   FF IO     Right        5         5          5         5      5      5   5        Left           5        5          5          5      5     5   5                       IP        Quad   Ham     TA       Gastroc   Right      5            5          5         5                5  Left         5            5         5         5                5       Reflexes:                   Biceps Brachioradialis Triceps Patella Achilles Plantars   Right          2+            1+                  1+        1+       1+         Down   Left            2+             1+                 1+         1+       1+         Down     Clonus: None    Coordination: Patient able to perform normal finger-to-nose and heel to shin appropriately  Normal rapid alternating movements  Sensory: Normal sensation to light touch  Decreased vibratory sensation b/l feet  Gait:normal stance and posture, normal stride length and arm swing, normal turn around  Romberg negative  ROS:    Review of Systems   Constitutional: Negative  Negative for appetite change and fever  HENT: Negative  Negative for hearing loss, tinnitus, trouble swallowing and voice change  Eyes: Negative  Negative for photophobia and pain  Respiratory: Negative  Negative for shortness of breath      Cardiovascular: Negative  Negative for palpitations  Gastrointestinal: Negative  Negative for nausea and vomiting  Endocrine: Negative  Negative for cold intolerance  Genitourinary: Negative  Negative for dysuria, frequency and urgency  Musculoskeletal: Negative  Negative for myalgias and neck pain  Skin: Negative  Negative for rash  Neurological: Positive for dizziness, weakness and light-headedness  Negative for tremors, seizures, syncope, facial asymmetry, speech difficulty, numbness and headaches  Hematological: Negative  Does not bruise/bleed easily  Psychiatric/Behavioral: Negative  Negative for confusion, hallucinations and sleep disturbance  All other systems reviewed and are negative

## 2022-03-31 NOTE — ASSESSMENT & PLAN NOTE
68 y/o m w h/o DM, HTN, CAD, 2 previous MI, lumbar radiculopathy, CKD, HLD presents as a new patient for dizzy spells  To review, onset 4-5 years ago, sporadic, about every 3 months, describes as lightheadedness, episodes last for 5-15 min, can linger around for 2-3 days then resolves on its own  Reviewed- CTA head and neck- no major atherosclerotic changes in vertebral basilar circulation  He had echo done- moderate left atrial enlargement and mild right atrial enlargement  Impression- unclear etiology, differentials include autonomic dysfunction related to diabetes mellitus, patient is instructed to check blood pressure and glucose level during those spells  Other differentials include arrhythmia- instructed to follow-up with Cardiology  Sometimes peripheral neuropathy can also cause these symptoms  Low suspicion for vertebrobasilar insufficiency given recent CTA did not show any major a through sclerosis in posterior circulation in he denies any motion/vertiginous like symptoms  Plan-  · Refer to cardiology to rule out arrhythmia during those episodes  · Follow-up with us as needed  · Defer imaging at this time as patient's exam is within normal limits    No recent changes in overall medical health

## 2022-04-01 DIAGNOSIS — Z79.4 TYPE 2 DIABETES MELLITUS WITH STAGE 3 CHRONIC KIDNEY DISEASE, WITH LONG-TERM CURRENT USE OF INSULIN (HCC): ICD-10-CM

## 2022-04-01 DIAGNOSIS — E11.22 TYPE 2 DIABETES MELLITUS WITH STAGE 3 CHRONIC KIDNEY DISEASE, WITH LONG-TERM CURRENT USE OF INSULIN (HCC): ICD-10-CM

## 2022-04-01 DIAGNOSIS — N18.30 TYPE 2 DIABETES MELLITUS WITH STAGE 3 CHRONIC KIDNEY DISEASE, WITH LONG-TERM CURRENT USE OF INSULIN (HCC): ICD-10-CM

## 2022-04-01 RX ORDER — PEN NEEDLE, DIABETIC 32GX 5/32"
NEEDLE, DISPOSABLE MISCELLANEOUS
Qty: 90 EACH | Refills: 0 | Status: SHIPPED | OUTPATIENT
Start: 2022-04-01 | End: 2022-07-01 | Stop reason: SDUPTHER

## 2022-04-04 ENCOUNTER — TELEPHONE (OUTPATIENT)
Dept: GASTROENTEROLOGY | Facility: CLINIC | Age: 77
End: 2022-04-04

## 2022-04-04 NOTE — TELEPHONE ENCOUNTER
Returned patient's call, Advise him of appointment details and LMOM asking him to call back if he still has a question

## 2022-04-05 ENCOUNTER — TELEPHONE (OUTPATIENT)
Dept: GASTROENTEROLOGY | Facility: CLINIC | Age: 77
End: 2022-04-05

## 2022-04-05 ENCOUNTER — OFFICE VISIT (OUTPATIENT)
Dept: GASTROENTEROLOGY | Facility: CLINIC | Age: 77
End: 2022-04-05
Payer: MEDICARE

## 2022-04-05 VITALS
HEIGHT: 70 IN | BODY MASS INDEX: 27.2 KG/M2 | HEART RATE: 73 BPM | WEIGHT: 190 LBS | DIASTOLIC BLOOD PRESSURE: 80 MMHG | SYSTOLIC BLOOD PRESSURE: 138 MMHG

## 2022-04-05 DIAGNOSIS — R14.2 ERUCTATION: ICD-10-CM

## 2022-04-05 DIAGNOSIS — K59.09 CHRONIC CONSTIPATION: ICD-10-CM

## 2022-04-05 DIAGNOSIS — Z12.11 SCREEN FOR COLON CANCER: ICD-10-CM

## 2022-04-05 DIAGNOSIS — Z80.0 FAMILY HISTORY OF COLON CANCER IN MOTHER: ICD-10-CM

## 2022-04-05 DIAGNOSIS — K57.30 COLON, DIVERTICULOSIS: ICD-10-CM

## 2022-04-05 DIAGNOSIS — Z86.010 HX OF ADENOMATOUS COLONIC POLYPS: ICD-10-CM

## 2022-04-05 DIAGNOSIS — K22.719 BARRETT'S ESOPHAGUS WITH DYSPLASIA: Primary | ICD-10-CM

## 2022-04-05 DIAGNOSIS — R19.4 CHANGE IN BOWEL HABITS: ICD-10-CM

## 2022-04-05 PROBLEM — Z86.0101 HX OF ADENOMATOUS COLONIC POLYPS: Status: ACTIVE | Noted: 2022-04-05

## 2022-04-05 PROCEDURE — 99214 OFFICE O/P EST MOD 30 MIN: CPT | Performed by: INTERNAL MEDICINE

## 2022-04-05 NOTE — PROGRESS NOTES
[facility] - Outpatient Follow-up Note  Bobo Luna 67 y o  male MRN: [id number]  Encounter: [id number]          ASSESSMENT AND PLAN:      1  Screen for colon cancer  Due later this year however has recent change in bowel habits constipation patient predominant  Will schedule colonoscopy sooner than later  He has a history of multiple adenomatous polyps some tubulovillous adenomas  - Ambulatory Referral to Gastroenterology    2  Hammond's esophagus with dysplasia  Due for Barretts screening this year  - EGD; Future    3  Colon, diverticulosis  Asymptomatic  - Colonoscopy; Future    4  Chronic constipation  Major concern discussed vegetables brans and oats without sugar  Discussed yogurt  He will initiate MiraLax  - Colonoscopy; Future    5  Hx of adenomatous colonic polyps  As above  - Colonoscopy; Future    6  Family history of colon cancer in mother  In her 76s  - Colonoscopy; Future    7  Change in bowel habits  As above  - Colonoscopy; Future    8  Eructation  Frequent eructation some increasing pyrosis  He will otherwise follow-up in 4 months  - Colonoscopy; Future    ______________________________________________________________________    SUBJECTIVE:  Very pleasant gentleman with history of Barretts esophagus repeat reflux diverticulosis constipation multiple adenomatous colon polyps and hyperplastic polyps  Some intestinal metaplasia of the upper gastrointestinal tract  And family history colon cancer mother in her 76s  Last EGD colonoscopy was 10/2019 no intestinal metaplasia was seen at that time additionally in no adenomatous polyps  No melena bright red blood per rectum  REVIEW OF SYSTEMS IS OTHERWISE NEGATIVE        Historical Information   Past Medical History:   Diagnosis Date    Aortic narrowing     Last Assessed:  9/28/15    Arthritis     Hammond esophagus     Benign prostatic hyperplasia     Bilateral leg edema     Bulla[facility] lung ([facility] [address] )     CHF (congestive heart failure) (HCC)     Chronic kidney disease     stage 3    Diabetes mellitus (HCC)     Enlarged prostate     Heel pain     right heel slipped in the garage-landed on the right heel    Herniated lumbar intervertebral disc     x 4 discs-fell off a roof    Hiatal hernia     High cholesterol     History of kidney problems     Hypertension     Old myocardial infarction     x2-pt was unaware of the MI-one stent    Thyroid disease     hypothyroid    Transient cerebral ischemia     Last Assessed:  8/27/15    Wears dentures     full upper, partial lower    Wears glasses      Past Surgical History:   Procedure Laterality Date    CATARACT EXTRACTION      COLONOSCOPY      Resolved:      CORONARY ANGIOPLASTY WITH STENT PLACEMENT      Stent implanted early ,     ESOPHAGOGASTRODUODENOSCOPY      KNEE SURGERY      right knee cartilage surgery-left meniscus repair    NE XCAPSL CTRC RMVL INSJ IO LENS PROSTH W/O ECP Right 2019    Procedure: EXTRACTION EXTRACAPSULAR CATARACT PHACO INTRAOCULAR LENS (IOL); Surgeon: Darya Marie MD;  Location: [facility] MAIN OR;  Service: Ophthalmology    NE XCAPSL CTRC RMVL INSJ IO LENS PROSTH W/O ECP Left 2019    Procedure: EXTRACTION EXTRACAPSULAR CATARACT PHACO INTRAOCULAR LENS (IOL);   Surgeon: Darya Marie MD;  Location: [facility] MAIN OR;  Service: Ophthalmology    TONSILLECTOMY      UMBILICAL HERNIA REPAIR      ,    UPPER GASTROINTESTINAL ENDOSCOPY      Last Assessed:  8/27/15     Social History   Social History     Substance and Sexual Activity   Alcohol Use Not Currently     Social History     Substance and Sexual Activity   Drug Use No     Social History     Tobacco Use   Smoking Status Former Smoker    Packs/day: 4 00    Years: 20 00    Pack years: 80 00    Quit date: 12    Years since quittin 2   Smokeless Tobacco Never Used     Family History   Problem Relation Age of Onset    Colon cancer Mother     Arthritis Mother     Diabetes Mother     Hypertension Mother     Cancer Mother         Breast    Hyperlipidemia Mother     Cancer Sister         ovarian    Other Brother         Cerebrovascular accident (CVA)    Diabetes Other         Sibling    Hypertension Other         Sibling    Hernia Father         umbilical   Osker Ok Hernia Son     Cancer Sister         liver    Kidney disease Sister     Kidney disease Brother     Mental illness Neg Hx        Meds/Allergies       Current Outpatient Medications:     aspirin (ASPIR-81) 81 mg EC tablet    atorvastatin (LIPITOR) 40 mg tablet    Coenzyme Q10 (COQ-10) 100 MG CAPS    doxazosin (CARDURA XL) 8 MG 24 hr tablet    furosemide (LASIX) 20 mg tablet    insulin detemir (Levemir FlexTouch) 100 Units/mL injection pen    insulin lispro (HumaLOG KwikPen) 100 units/mL injection pen    Insulin Pen Needle (BD Pen Needle Sherie U/F) 32G X 4 MM MISC    levothyroxine 75 mcg tablet    lisinopril (ZESTRIL) 2 5 mg tablet    metoprolol succinate (TOPROL-XL) 25 mg 24 hr tablet    multivitamin (THERAGRAN) TABS    pantoprazole (PROTONIX) 40 mg tablet    Semaglutide,0 25 or 0 5MG/DOS, (Ozempic, 0 25 or 0 5 MG/DOSE,) 2 MG/1 5ML SOPN    albuterol (ProAir HFA) 90 mcg/act inhaler    donepezil (ARICEPT) 10 mg tablet    Semaglutide,0 25 or 0 5MG/DOS, (Ozempic, 0 25 or 0 5 MG/DOSE,) 2 MG/1 5ML SOPN    No Known Allergies        Objective     Blood pressure 138/80, pulse 73, height 5' 10" (1 778 m), weight 86 2 kg (190 lb)  Body mass index is 27 26 kg/m²  PHYSICAL EXAM:      General Appearance:   Alert, cooperative, no distress   HEENT:   Normocephalic, atraumatic, anicteric      Neck:  Supple, symmetrical, trachea midline   Lungs:   Clear to auscultation bilaterally; no rales, rhonchi or wheezing; respirations unlabored    Heart[de-identified]   Regular rate and rhythm; no murmur, rub, or gallop     Abdomen:   Soft, non-tender, non-distended; normal bowel sounds; no masses, no organomegaly  Genitalia:   Deferred    Rectal:   Deferred    Extremities:  No cyanosis, clubbing or edema    Pulses:  2+ and symmetric    Skin:  No jaundice, rashes, or lesions    Lymph nodes:  No palpable cervical lymphadenopathy        Lab Results:   No visits with results within 1 Day(s) from this visit  Latest known visit with results is:   Office Visit on 03/09/2022   Component Date Value    LEUKOCYTE ESTERASE,UA 03/09/2022 -     NITRITE,UA 03/09/2022 -     SL AMB POCT UROBILINOGEN 03/09/2022 0 2     POCT URINE PROTEIN 03/09/2022 -      PH,UA 03/09/2022 6 0     BLOOD,UA 03/09/2022 hemo trace     SPECIFIC GRAVITY,UA 03/09/2022 1,015     KETONES,UA 03/09/2022 -     BILIRUBIN,UA 03/09/2022 -     GLUCOSE, UA 03/09/2022 500      COLOR,UA 03/09/2022 yellow     CLARITY,UA 03/09/2022 clear     POST-VOID RESIDUAL VOLUM* 03/09/2022 217     Color, UA 03/09/2022 Light Yellow     Clarity, UA 03/09/2022 Clear     Specific Gravity, UA 03/09/2022 1 014     pH, UA 03/09/2022 6 0     Leukocytes, UA 03/09/2022 Negative     Nitrite, UA 03/09/2022 Negative     Protein, UA 03/09/2022 Negative     Glucose, UA 03/09/2022 500 (1/2%)*    Ketones, UA 03/09/2022 Negative     Urobilinogen, UA 03/09/2022 <2 0     Bilirubin, UA 03/09/2022 Negative     Blood, UA 03/09/2022 Negative     RBC, UA 03/09/2022 1-2     WBC, UA 03/09/2022 None Seen     Epithelial Cells 03/09/2022 Occasional     Bacteria, UA 03/09/2022 None Seen     MUCUS THREADS 03/09/2022 Occasional*    Hyaline Casts, UA 03/09/2022 0-3*         Radiology Results:   No results found

## 2022-04-05 NOTE — TELEPHONE ENCOUNTER
Procedure Clearance faxed to Dr Shaheed Garcia at [phone]  Pt is to have a Flip w/ Dr Christine Espinosa at [facility] on 6/10/22  Awaiting response   Pt # [phone]

## 2022-04-10 DIAGNOSIS — E03.1 CONGENITAL HYPOTHYROIDISM WITHOUT GOITER: ICD-10-CM

## 2022-04-11 RX ORDER — LEVOTHYROXINE SODIUM 0.07 MG/1
TABLET ORAL
Qty: 90 TABLET | Refills: 3 | Status: SHIPPED | OUTPATIENT
Start: 2022-04-11

## 2022-04-13 ENCOUNTER — TELEPHONE (OUTPATIENT)
Dept: ENDOCRINOLOGY | Facility: CLINIC | Age: 77
End: 2022-04-13

## 2022-04-13 LAB
ALBUMIN SERPL-MCNC: 4.3 G/DL (ref 3.7–4.7)
ALBUMIN SERPL-MCNC: 4.3 G/DL (ref 3.7–4.7)
ALBUMIN/CREAT UR: 17 MG/G CREAT (ref 0–29)
ALBUMIN/GLOB SERPL: 2.4 {RATIO} (ref 1.2–2.2)
ALBUMIN/GLOB SERPL: 2.4 {RATIO} (ref 1.2–2.2)
ALP SERPL-CCNC: 77 IU/L (ref 44–121)
ALP SERPL-CCNC: 77 IU/L (ref 44–121)
ALT SERPL-CCNC: 12 IU/L (ref 0–44)
ALT SERPL-CCNC: 13 IU/L (ref 0–44)
AST SERPL-CCNC: 14 IU/L (ref 0–40)
AST SERPL-CCNC: 15 IU/L (ref 0–40)
BILIRUB SERPL-MCNC: 0.5 MG/DL (ref 0–1.2)
BILIRUB SERPL-MCNC: 0.5 MG/DL (ref 0–1.2)
BUN SERPL-MCNC: 21 MG/DL (ref 8–27)
BUN SERPL-MCNC: 21 MG/DL (ref 8–27)
BUN/CREAT SERPL: 14 (ref 10–24)
BUN/CREAT SERPL: 14 (ref 10–24)
CALCIUM SERPL-MCNC: 9.6 MG/DL (ref 8.6–10.2)
CALCIUM SERPL-MCNC: 9.7 MG/DL (ref 8.6–10.2)
CHLORIDE SERPL-SCNC: 105 MMOL/L (ref 96–106)
CHLORIDE SERPL-SCNC: 105 MMOL/L (ref 96–106)
CHOLEST SERPL-MCNC: 132 MG/DL (ref 100–199)
CHOLEST SERPL-MCNC: 136 MG/DL (ref 100–199)
CO2 SERPL-SCNC: 20 MMOL/L (ref 20–29)
CO2 SERPL-SCNC: 20 MMOL/L (ref 20–29)
CREAT SERPL-MCNC: 1.48 MG/DL (ref 0.76–1.27)
CREAT SERPL-MCNC: 1.51 MG/DL (ref 0.76–1.27)
CREAT UR-MCNC: 131.5 MG/DL
EGFR: 47 ML/MIN/1.73
EGFR: 48 ML/MIN/1.73
EST. AVERAGE GLUCOSE BLD GHB EST-MCNC: 183 MG/DL
GLOBULIN SER-MCNC: 1.8 G/DL (ref 1.5–4.5)
GLOBULIN SER-MCNC: 1.8 G/DL (ref 1.5–4.5)
GLUCOSE SERPL-MCNC: 194 MG/DL (ref 65–99)
GLUCOSE SERPL-MCNC: 200 MG/DL (ref 65–99)
HBA1C MFR BLD: 7.8 % (ref 4.8–5.6)
HBA1C MFR BLD: 8 % (ref 4.8–5.6)
HCV AB S/CO SERPL IA: <0.1 S/CO RATIO (ref 0–0.9)
HDLC SERPL-MCNC: 52 MG/DL
HDLC SERPL-MCNC: 52 MG/DL
LDLC SERPL CALC-MCNC: 66 MG/DL (ref 0–99)
LDLC SERPL CALC-MCNC: 69 MG/DL (ref 0–99)
MICROALBUMIN UR-MCNC: 22.5 UG/ML
MICRODELETION SYND BLD/T FISH: NORMAL
MICRODELETION SYND BLD/T FISH: NORMAL
POTASSIUM SERPL-SCNC: 4.4 MMOL/L (ref 3.5–5.2)
POTASSIUM SERPL-SCNC: 4.5 MMOL/L (ref 3.5–5.2)
PROT SERPL-MCNC: 6.1 G/DL (ref 6–8.5)
PROT SERPL-MCNC: 6.1 G/DL (ref 6–8.5)
PSA SERPL-MCNC: 3.9 NG/ML (ref 0–4)
SL AMB VLDL CHOLESTEROL CALC: 15 MG/DL (ref 5–40)
SODIUM SERPL-SCNC: 141 MMOL/L (ref 134–144)
SODIUM SERPL-SCNC: 143 MMOL/L (ref 134–144)
T4 FREE SERPL-MCNC: 1.39 NG/DL (ref 0.82–1.77)
TRIGL SERPL-MCNC: 70 MG/DL (ref 0–149)
TRIGL SERPL-MCNC: 73 MG/DL (ref 0–149)
TSH SERPL DL<=0.005 MIU/L-ACNC: 1.68 UIU/ML (ref 0.45–4.5)
TSH SERPL DL<=0.005 MIU/L-ACNC: 1.7 UIU/ML (ref 0.45–4.5)

## 2022-04-14 ENCOUNTER — TELEPHONE (OUTPATIENT)
Dept: ENDOCRINOLOGY | Facility: CLINIC | Age: 77
End: 2022-04-14

## 2022-04-19 ENCOUNTER — HOSPITAL ENCOUNTER (OUTPATIENT)
Dept: RADIOLOGY | Facility: HOSPITAL | Age: 77
Discharge: HOME | End: 2022-04-19
Attending: FAMILY MEDICINE
Payer: MEDICARE

## 2022-04-19 DIAGNOSIS — N18.30 TYPE 2 DIABETES MELLITUS WITH STAGE 3 CHRONIC KIDNEY DISEASE, WITH LONG-TERM CURRENT USE OF INSULIN, UNSPECIFIED WHETHER STAGE 3A OR 3B CKD (HCC): ICD-10-CM

## 2022-04-19 DIAGNOSIS — R91.8 ABNORMAL CT SCAN OF LUNG: ICD-10-CM

## 2022-04-19 DIAGNOSIS — Z82.49 FHX: BRAIN ANEURYSM: ICD-10-CM

## 2022-04-19 DIAGNOSIS — Z79.4 TYPE 2 DIABETES MELLITUS WITH STAGE 3 CHRONIC KIDNEY DISEASE, WITH LONG-TERM CURRENT USE OF INSULIN, UNSPECIFIED WHETHER STAGE 3A OR 3B CKD (HCC): ICD-10-CM

## 2022-04-19 DIAGNOSIS — E11.22 TYPE 2 DIABETES MELLITUS WITH STAGE 3 CHRONIC KIDNEY DISEASE, WITH LONG-TERM CURRENT USE OF INSULIN, UNSPECIFIED WHETHER STAGE 3A OR 3B CKD (HCC): ICD-10-CM

## 2022-04-19 DIAGNOSIS — I50.32 CHRONIC DIASTOLIC CONGESTIVE HEART FAILURE (HCC): ICD-10-CM

## 2022-04-19 PROCEDURE — 71250 CT THORAX DX C-: CPT

## 2022-04-19 PROCEDURE — G1004 CDSM NDSC: HCPCS

## 2022-04-22 ENCOUNTER — HOSPITAL ENCOUNTER (OUTPATIENT)
Dept: RADIOLOGY | Facility: HOSPITAL | Age: 77
Discharge: HOME | End: 2022-04-22
Payer: MEDICARE

## 2022-04-22 DIAGNOSIS — R35.0 BENIGN PROSTATIC HYPERPLASIA WITH URINARY FREQUENCY: ICD-10-CM

## 2022-04-22 DIAGNOSIS — N40.1 BENIGN PROSTATIC HYPERPLASIA WITH URINARY FREQUENCY: ICD-10-CM

## 2022-04-22 PROCEDURE — 76770 US EXAM ABDO BACK WALL COMP: CPT

## 2022-04-28 ENCOUNTER — TELEPHONE (OUTPATIENT)
Dept: UROLOGY | Facility: CLINIC | Age: 77
End: 2022-04-28

## 2022-04-28 NOTE — TELEPHONE ENCOUNTER
Called and spoke with patient at this time  Advised of provider notes below  Patient states he is unaware of the cysto/TRUS procedure, as he does not recall this being discussed at last visit  I did explain this procedure to him in detail and he was amenable to scheduling  He also states he had to stop to doxazosin due to dizziness and making him feel strange  Advised I would let providers know  Reviewed minimal scheduling spots for procedures, we will call him tomorrow with appt  He verbalized understanding  Patient scheduled for next available on 6/7/22 at 9am with Dr Montana Rizzo  Please confirm

## 2022-04-28 NOTE — TELEPHONE ENCOUNTER
----- Message from Jaren Saez PA-C sent at 4/28/2022  7:33 AM EDT -----  Please let the patient know that he has a large urinary residual despite the addition of tamsulosin at her last visit  Significant prostatomegaly noted  I would recommend proceeding with a cystoscopy and transrectal ultrasound

## 2022-05-03 ENCOUNTER — OFFICE VISIT (OUTPATIENT)
Dept: FAMILY MEDICINE CLINIC | Facility: CLINIC | Age: 77
End: 2022-05-03
Payer: MEDICARE

## 2022-05-03 VITALS
OXYGEN SATURATION: 97 % | SYSTOLIC BLOOD PRESSURE: 130 MMHG | BODY MASS INDEX: 26.05 KG/M2 | DIASTOLIC BLOOD PRESSURE: 70 MMHG | TEMPERATURE: 97.5 F | HEIGHT: 70 IN | WEIGHT: 182 LBS | RESPIRATION RATE: 16 BRPM | HEART RATE: 90 BPM

## 2022-05-03 DIAGNOSIS — N40.1 BENIGN PROSTATIC HYPERPLASIA WITH URINARY FREQUENCY: ICD-10-CM

## 2022-05-03 DIAGNOSIS — E78.2 MIXED HYPERLIPIDEMIA: ICD-10-CM

## 2022-05-03 DIAGNOSIS — I10 ESSENTIAL HYPERTENSION: ICD-10-CM

## 2022-05-03 DIAGNOSIS — I25.10 CORONARY ARTERY DISEASE INVOLVING NATIVE CORONARY ARTERY OF NATIVE HEART WITHOUT ANGINA PECTORIS: ICD-10-CM

## 2022-05-03 DIAGNOSIS — R35.0 BENIGN PROSTATIC HYPERPLASIA WITH URINARY FREQUENCY: ICD-10-CM

## 2022-05-03 DIAGNOSIS — Z79.4 TYPE 2 DIABETES MELLITUS WITH STAGE 3 CHRONIC KIDNEY DISEASE, WITH LONG-TERM CURRENT USE OF INSULIN, UNSPECIFIED WHETHER STAGE 3A OR 3B CKD (HCC): Primary | ICD-10-CM

## 2022-05-03 DIAGNOSIS — N18.31 CHRONIC KIDNEY DISEASE (CKD) STAGE G3A/A1, MODERATELY DECREASED GLOMERULAR FILTRATION RATE (GFR) BETWEEN 45-59 ML/MIN/1.73 SQUARE METER AND ALBUMINURIA CREATININE RATIO LESS THAN 30 MG/G (HCC): ICD-10-CM

## 2022-05-03 DIAGNOSIS — E11.42 DIABETIC POLYNEUROPATHY ASSOCIATED WITH TYPE 2 DIABETES MELLITUS (HCC): ICD-10-CM

## 2022-05-03 DIAGNOSIS — I50.32 CHRONIC DIASTOLIC CONGESTIVE HEART FAILURE (HCC): ICD-10-CM

## 2022-05-03 DIAGNOSIS — N18.30 TYPE 2 DIABETES MELLITUS WITH STAGE 3 CHRONIC KIDNEY DISEASE, WITH LONG-TERM CURRENT USE OF INSULIN, UNSPECIFIED WHETHER STAGE 3A OR 3B CKD (HCC): Primary | ICD-10-CM

## 2022-05-03 DIAGNOSIS — E03.9 ACQUIRED HYPOTHYROIDISM: ICD-10-CM

## 2022-05-03 DIAGNOSIS — E11.22 TYPE 2 DIABETES MELLITUS WITH STAGE 3 CHRONIC KIDNEY DISEASE, WITH LONG-TERM CURRENT USE OF INSULIN, UNSPECIFIED WHETHER STAGE 3A OR 3B CKD (HCC): Primary | ICD-10-CM

## 2022-05-03 DIAGNOSIS — F41.9 ANXIETY: ICD-10-CM

## 2022-05-03 PROBLEM — U07.1 COVID-19 VIRUS INFECTION: Status: RESOLVED | Noted: 2021-02-08 | Resolved: 2022-05-03

## 2022-05-03 PROCEDURE — 99214 OFFICE O/P EST MOD 30 MIN: CPT | Performed by: FAMILY MEDICINE

## 2022-05-03 RX ORDER — ESCITALOPRAM OXALATE 10 MG/1
10 TABLET ORAL DAILY
Qty: 90 TABLET | Refills: 1 | Status: SHIPPED | OUTPATIENT
Start: 2022-05-03

## 2022-05-03 NOTE — ASSESSMENT & PLAN NOTE
Pt was placed on cardura by urology - pt states he stopped the med due to side affects - pt has a "procedure scheduled"

## 2022-05-03 NOTE — PATIENT INSTRUCTIONS
Weight Management   AMBULATORY CARE:   Why it is important to manage your weight:  Being overweight increases your risk of health conditions such as heart disease, high blood pressure, type 2 diabetes, and certain types of cancer  It can also increase your risk for osteoarthritis, sleep apnea, and other respiratory problems  Aim for a slow, steady weight loss  Even a small amount of weight loss can lower your risk of health problems  Risks of being overweight:  Extra weight can cause many health problems, including the following:  · Diabetes (high blood sugar level)    · High blood pressure or high cholesterol    · Heart disease    · Stroke    · Gallbladder or liver disease    · Cancer of the colon, breast, prostate, liver, or kidney    · Sleep apnea    · Arthritis or gout    Screening  is done to check for health conditions before you have signs or symptoms  If you are 28to 79years old, your blood sugar level may be checked every 3 years for signs of prediabetes or diabetes  Your healthcare provider will check your blood pressure at each visit  High blood pressure can lead to a stroke or other problems  Your provider may check for signs of heart disease, cancer, or other health problems  How to lose weight safely:  A safe and healthy way to lose weight is to eat fewer calories and get regular exercise  · You can lose up about 1 pound a week by decreasing the number of calories you eat by 500 calories each day  You can decrease calories by eating smaller portion sizes or by cutting out high-calorie foods  Read labels to find out how many calories are in the foods you eat  · You can also burn calories with exercise such as walking, swimming, or biking  You will be more likely to keep weight off if you make these changes part of your lifestyle  Exercise at least 30 minutes per day on most days of the week   You can also fit in more physical activity by taking the stairs instead of the elevator or parking farther away from stores  Ask your healthcare provider about the best exercise plan for you  Healthy meal plan for weight management:  A healthy meal plan includes a variety of foods, contains fewer calories, and helps you stay healthy  A healthy meal plan includes the following:     · Eat whole-grain foods more often  A healthy meal plan should contain fiber  Fiber is the part of grains, fruits, and vegetables that is not broken down by your body  Whole-grain foods are healthy and provide extra fiber in your diet  Some examples of whole-grain foods are whole-wheat breads and pastas, oatmeal, brown rice, and bulgur  · Eat a variety of vegetables every day  Include dark, leafy greens such as spinach, kale, arnold greens, and mustard greens  Eat yellow and orange vegetables such as carrots, sweet potatoes, and winter squash  · Eat a variety of fruits every day  Choose fresh or canned fruit (canned in its own juice or light syrup) instead of juice  Fruit juice has very little or no fiber  · Eat low-fat dairy foods  Drink fat-free (skim) milk or 1% milk  Eat fat-free yogurt and low-fat cottage cheese  Try low-fat cheeses such as mozzarella and other reduced-fat cheeses  · Choose meat and other protein foods that are low in fat  Choose beans or other legumes such as split peas or lentils  Choose fish, skinless poultry (chicken or turkey), or lean cuts of red meat (beef or pork)  Before you cook meat or poultry, cut off any visible fat  · Use less fat and oil  Try baking foods instead of frying them  Add less fat, such as margarine, sour cream, regular salad dressing and mayonnaise to foods  Eat fewer high-fat foods  Some examples of high-fat foods include french fries, doughnuts, ice cream, and cakes  · Eat fewer sweets  Limit foods and drinks that are high in sugar  This includes candy, cookies, regular soda, and sweetened drinks  Ways to decrease calories:   · Eat smaller portions  ? Use a small plate with smaller servings  ? Do not eat second helpings  ? When you eat at a restaurant, ask for a box and place half of your meal in the box before you eat  ? Share an entrée with someone else  · Replace high-calorie snacks with healthy, low-calorie snacks  ? Choose fresh fruit, vegetables, fat-free rice cakes, or air-popped popcorn instead of potato chips, nuts, or chocolate  ? Choose water or calorie-free drinks instead of soda or sweetened drinks  · Do not shop for groceries when you are hungry  You may be more likely to make unhealthy food choices  Take a grocery list of healthy foods and shop after you have eaten  · Eat regular meals  Do not skip meals  Skipping meals can lead to overeating later in the day  This can make it harder for you to lose weight  Eat a healthy snack in place of a meal if you do not have time to eat a regular meal  Talk with a dietitian to help you create a meal plan and schedule that is right for you  Other things to consider as you try to lose weight:   · Be aware of situations that may give you the urge to overeat, such as eating while watching television  Find ways to avoid these situations  For example, read a book, go for a walk, or do crafts  · Meet with a weight loss support group or friends who are also trying to lose weight  This may help you stay motivated to continue working on your weight loss goals  © Copyright [employer] 2022 Information is for End User's use only and may not be sold, redistributed or otherwise used for commercial purposes  All illustrations and images included in CareNotes® are the copyrighted property of A [employer] M [employer] Inc  or [facility] Tiffany Crowley   The above information is an  only  It is not intended as medical advice for individual conditions or treatments  Talk to your doctor, nurse or pharmacist before following any medical regimen to see if it is safe and effective for you

## 2022-05-03 NOTE — PROGRESS NOTES
Assessment/Plan:    1  Type 2 diabetes mellitus with stage 3 chronic kidney disease, with long-term current use of insulin, unspecified whether stage 3a or 3b CKD ([place] [id number] )  -     Comprehensive metabolic panel; Future; Expected date: 10/15/2022  -     CBC and Platelet; Future; Expected date: 10/30/2022    2  Diabetic polyneuropathy associated with type 2 diabetes mellitus ([place] [id number] )    3  Acquired hypothyroidism  -     TSH, 3rd generation; Future; Expected date: 10/15/2022    4  Essential hypertension    5  Coronary artery disease involving native coronary artery of native heart without angina pectoris    6  Chronic diastolic congestive heart failure ([place] [id number] )    7  Chronic kidney disease (CKD) stage G3a/A1, moderately decreased glomerular filtration rate (GFR) between 45-59 mL/min/1 73 square meter and albuminuria creatinine ratio less than 30 mg/g (HCC)    8  Benign prostatic hyperplasia with urinary frequency  Assessment & Plan:  Pt was placed on cardura by urology - pt states he stopped the med due to side affects - pt has a "procedure scheduled"      9  Mixed hyperlipidemia  -     Lipid Panel with Direct LDL reflex; Future; Expected date: 10/15/2022    10  Anxiety  -     escitalopram (Lexapro) 10 mg tablet; Take 1 tablet (10 mg total) by mouth daily  -     CBC and Platelet; Future; Expected date: 10/30/2022    11  BMI 26 0-26 9,adult      BMI Counseling: Body mass index is 26 11 kg/m²  The BMI is above normal  Nutrition recommendations include decreasing portion sizes  Exercise recommendations include moderate physical activity 150 minutes/week  No pharmacotherapy was ordered  Rationale for BMI follow-up plan is due to patient being overweight or obese  Depression Screening and Follow-up Plan: Patient was screened for depression during today's encounter  They screened negative with a PHQ-2 score of 0           Patient Instructions     Weight Management   AMBULATORY CARE:   Why it is important to manage your weight: Being overweight increases your risk of health conditions such as heart disease, high blood pressure, type 2 diabetes, and certain types of cancer  It can also increase your risk for osteoarthritis, sleep apnea, and other respiratory problems  Aim for a slow, steady weight loss  Even a small amount of weight loss can lower your risk of health problems  Risks of being overweight:  Extra weight can cause many health problems, including the following:  Diabetes (high blood sugar level)    High blood pressure or high cholesterol    Heart disease    Stroke    Gallbladder or liver disease    Cancer of the colon, breast, prostate, liver, or kidney    Sleep apnea    Arthritis or gout    Screening  is done to check for health conditions before you have signs or symptoms  If you are 28to 79years old, your blood sugar level may be checked every 3 years for signs of prediabetes or diabetes  Your healthcare provider will check your blood pressure at each visit  High blood pressure can lead to a stroke or other problems  Your provider may check for signs of heart disease, cancer, or other health problems  How to lose weight safely:  A safe and healthy way to lose weight is to eat fewer calories and get regular exercise  You can lose up about 1 pound a week by decreasing the number of calories you eat by 500 calories each day  You can decrease calories by eating smaller portion sizes or by cutting out high-calorie foods  Read labels to find out how many calories are in the foods you eat  You can also burn calories with exercise such as walking, swimming, or biking  You will be more likely to keep weight off if you make these changes part of your lifestyle  Exercise at least 30 minutes per day on most days of the week  You can also fit in more physical activity by taking the stairs instead of the elevator or parking farther away from stores  Ask your healthcare provider about the best exercise plan for you  Healthy meal plan for weight management:  A healthy meal plan includes a variety of foods, contains fewer calories, and helps you stay healthy  A healthy meal plan includes the following:     Eat whole-grain foods more often  A healthy meal plan should contain fiber  Fiber is the part of grains, fruits, and vegetables that is not broken down by your body  Whole-grain foods are healthy and provide extra fiber in your diet  Some examples of whole-grain foods are whole-wheat breads and pastas, oatmeal, brown rice, and bulgur  Eat a variety of vegetables every day  Include dark, leafy greens such as spinach, kale, arnold greens, and mustard greens  Eat yellow and orange vegetables such as carrots, sweet potatoes, and winter squash  Eat a variety of fruits every day  Choose fresh or canned fruit (canned in its own juice or light syrup) instead of juice  Fruit juice has very little or no fiber  Eat low-fat dairy foods  Drink fat-free (skim) milk or 1% milk  Eat fat-free yogurt and low-fat cottage cheese  Try low-fat cheeses such as mozzarella and other reduced-fat cheeses  Choose meat and other protein foods that are low in fat  Choose beans or other legumes such as split peas or lentils  Choose fish, skinless poultry (chicken or turkey), or lean cuts of red meat (beef or pork)  Before you cook meat or poultry, cut off any visible fat  Use less fat and oil  Try baking foods instead of frying them  Add less fat, such as margarine, sour cream, regular salad dressing and mayonnaise to foods  Eat fewer high-fat foods  Some examples of high-fat foods include french fries, doughnuts, ice cream, and cakes  Eat fewer sweets  Limit foods and drinks that are high in sugar  This includes candy, cookies, regular soda, and sweetened drinks  Ways to decrease calories:   Eat smaller portions  Use a small plate with smaller servings  Do not eat second helpings      When you eat at a restaurant, ask for a box and place half of your meal in the box before you eat  Share an entrée with someone else  Replace high-calorie snacks with healthy, low-calorie snacks  Choose fresh fruit, vegetables, fat-free rice cakes, or air-popped popcorn instead of potato chips, nuts, or chocolate  Choose water or calorie-free drinks instead of soda or sweetened drinks  Do not shop for groceries when you are hungry  You may be more likely to make unhealthy food choices  Take a grocery list of healthy foods and shop after you have eaten  Eat regular meals  Do not skip meals  Skipping meals can lead to overeating later in the day  This can make it harder for you to lose weight  Eat a healthy snack in place of a meal if you do not have time to eat a regular meal  Talk with a dietitian to help you create a meal plan and schedule that is right for you  Other things to consider as you try to lose weight:   Be aware of situations that may give you the urge to overeat, such as eating while watching television  Find ways to avoid these situations  For example, read a book, go for a walk, or do crafts  Meet with a weight loss support group or friends who are also trying to lose weight  This may help you stay motivated to continue working on your weight loss goals  © Copyright [employer] 2022 Information is for End User's use only and may not be sold, redistributed or otherwise used for commercial purposes  All illustrations and images included in CareNotes® are the copyrighted property of [employer]  or [address] [place] [address]  The above information is an  only  It is not intended as medical advice for individual conditions or treatments  Talk to your doctor, nurse or pharmacist before following any medical regimen to see if it is safe and effective for you  Return in about 4 weeks (around 5/31/2022) for Recheck 1 month and 6 months      Subjective:      Patient ID: Alirio cabello a 67 y o  male  Chief Complaint   Patient presents with    Follow-up     3 month [id number]       Pt is sched for a three month follow up  Pt had his labs  Pt states he finds he has been getting anxiety attacks during the day - started after I saw him the last time and he is getting tests and procedure  States the anxiety attacks bring his sugars up? Pt states the last time I saw him we suggested endo - pt states he started seeing Dr Kiran Moreno - they are maganing his meds now      The following portions of the patient's history were reviewed and updated as appropriate: allergies, current medications, past family history, past medical history, past social history, past surgical history and problem list     Review of Systems   Constitutional: Negative for activity change, appetite change, chills, diaphoresis, fatigue, fever and unexpected weight change  HENT: Negative for congestion, dental problem, ear pain, mouth sores, sinus pressure, sinus pain, sore throat and trouble swallowing  Eyes: Negative for photophobia, discharge and itching  Respiratory: Negative for apnea, chest tightness and shortness of breath  Cardiovascular: Negative for chest pain, palpitations and leg swelling  Gastrointestinal: Negative for abdominal distention, abdominal pain, blood in stool, nausea and vomiting  Endocrine: Negative for cold intolerance, heat intolerance, polydipsia, polyphagia and polyuria  Genitourinary: Negative for difficulty urinating  Lower urinary tract symptom,s   Musculoskeletal: Negative for arthralgias  Skin: Negative for color change and wound  Neurological: Negative for dizziness, syncope, speech difficulty and headaches  Hematological: Negative for adenopathy  Psychiatric/Behavioral: Negative for agitation and behavioral problems  The patient is nervous/anxious            Current Outpatient Medications   Medication Sig Dispense Refill    albuterol (ProAir HFA) 90 mcg/act inhaler Inhale 2 puffs every 6 (six) hours as needed for wheezing 1 Inhaler 0    aspirin (ASPIR-81) 81 mg EC tablet Take 81 mg by mouth every morning        atorvastatin (LIPITOR) 40 mg tablet TAKE 1 TABLET BY MOUTH  DAILY 90 tablet 3    Coenzyme Q10 (COQ-10) 100 MG CAPS Take 200 mg by mouth daily at bedtime       doxazosin (CARDURA XL) 8 MG 24 hr tablet Take 1 tablet (8 mg total) by mouth daily with breakfast 30 tablet 3    furosemide (LASIX) 20 mg tablet Take 1 tablet (20 mg total) by mouth daily 90 tablet 1    insulin detemir (Levemir FlexTouch) 100 Units/mL injection pen Inject 20 Units under the skin daily at bedtime 18 mL 3    insulin lispro (HumaLOG KwikPen) 100 units/mL injection pen Inject 5 units under the skin 3 times a day with meals 8 1 mL 1    Insulin Pen Needle (BD Pen Needle Sherie U/F) 32G X 4 MM MISC USE 4 TIMES DAILY AS  DIRECTED 90 each 0    levothyroxine 75 mcg tablet TAKE 1 TABLET BY MOUTH  DAILY 90 tablet 3    lisinopril (ZESTRIL) 2 5 mg tablet TAKE 1 TABLET BY MOUTH  DAILY 90 tablet 3    metoprolol succinate (TOPROL-XL) 25 mg 24 hr tablet TAKE 1 TABLET BY MOUTH  DAILY 90 tablet 3    multivitamin (THERAGRAN) TABS Take 1 tablet by mouth every morning      pantoprazole (PROTONIX) 40 mg tablet TAKE 1 TABLET BY MOUTH  DAILY 90 tablet 3    Semaglutide,0 25 or 0 5MG/DOS, (Ozempic, 0 25 or 0 5 MG/DOSE,) 2 MG/1 5ML SOPN Inject 0 25 mg under the skin once a week 3 mL 3    donepezil (ARICEPT) 10 mg tablet Take 1 tablet (10 mg total) by mouth daily at bedtime (Patient not taking: Reported on 3/31/2022 ) 90 tablet 1    escitalopram (Lexapro) 10 mg tablet Take 1 tablet (10 mg total) by mouth daily 90 tablet 1    Semaglutide,0 25 or 0 5MG/DOS, (Ozempic, 0 25 or 0 5 MG/DOSE,) 2 MG/1 5ML SOPN Inject   25 mg under the skin once a week (Patient not taking: Reported on 4/5/2022 ) 1 5 mL 0     No current facility-administered medications for this visit         Objective:    /70   Pulse 90   Temp 97 5 °F (36 4 °C)   Resp 16   Ht 5' 10" (1 778 m)   Wt 82 6 kg (182 lb)   SpO2 97%   BMI 26 11 kg/m²        Physical Exam  Vitals and nursing note reviewed  Constitutional:       General: He is not in acute distress  Appearance: He is well-developed  He is not diaphoretic  HENT:      Head: Normocephalic and atraumatic  Right Ear: External ear normal       Left Ear: External ear normal       Nose: Nose normal       Mouth/Throat:      Pharynx: No oropharyngeal exudate  Eyes:      General: No scleral icterus  Right eye: No discharge  Left eye: No discharge  Pupils: Pupils are equal, round, and reactive to light  Neck:      Thyroid: No thyromegaly  Cardiovascular:      Rate and Rhythm: Normal rate  Pulses: no weak pulses          Dorsalis pedis pulses are 2+ on the right side and 2+ on the left side  Posterior tibial pulses are 2+ on the right side and 2+ on the left side  Heart sounds: Normal heart sounds  No murmur heard  Pulmonary:      Effort: Pulmonary effort is normal  No respiratory distress  Breath sounds: Normal breath sounds  No wheezing  Abdominal:      General: Bowel sounds are normal  There is no distension  Palpations: Abdomen is soft  There is no mass  Tenderness: There is no abdominal tenderness  There is no guarding or rebound  Musculoskeletal:         General: Normal range of motion  Feet:      Right foot:      Skin integrity: No ulcer, skin breakdown, erythema, warmth, callus or dry skin  Left foot:      Skin integrity: No ulcer, skin breakdown, erythema, warmth, callus or dry skin  Skin:     General: Skin is warm and dry  Findings: No erythema or rash  Neurological:      Mental Status: He is alert        Coordination: Coordination normal       Deep Tendon Reflexes: Reflexes normal    Psychiatric:         Behavior: Behavior normal               Recent Results (from the past 672 hour(s))   Comprehensive metabolic panel    Collection Time: 04/12/22  9:05 AM   Result Value Ref Range    Glucose, Random 200 (H) 65 - 99 mg/dL    BUN 21 8 - 27 mg/dL    Creatinine 1 51 (H) 0 76 - 1 27 mg/dL    eGFR 47 (L) >59 mL/min/1 73    SL AMB BUN/CREATININE RATIO 14 10 - 24    Sodium 143 134 - 144 mmol/L    Potassium 4 5 3 5 - 5 2 mmol/L    Chloride 105 96 - 106 mmol/L    CO2 20 20 - 29 mmol/L    CALCIUM 9 6 8 6 - 10 2 mg/dL    Protein, Total 6 1 6 0 - 8 5 g/dL    Albumin 4 3 3 7 - 4 7 g/dL    Globulin, Total 1 8 1 5 - 4 5 g/dL    Albumin/Globulin Ratio 2 4 (H) 1 2 - 2 2    TOTAL BILIRUBIN 0 5 0 0 - 1 2 mg/dL    Alk Phos Isoenzymes 77 44 - 121 IU/L    AST 14 0 - 40 IU/L    ALT 13 0 - 44 IU/L   Lipid panel    Collection Time: 04/12/22  9:05 AM   Result Value Ref Range    Cholesterol, Total 136 100 - 199 mg/dL    Triglycerides 73 0 - 149 mg/dL    HDL 52 >39 mg/dL    VLDL Cholesterol Calculated 15 5 - 40 mg/dL    LDL Calculated 69 0 - 99 mg/dL   Hemoglobin A1C    Collection Time: 04/12/22  9:05 AM   Result Value Ref Range    Hemoglobin A1C 8 0 (H) 4 8 - 5 6 %    Estimated Average Glucose 183 mg/dL   T4, free    Collection Time: 04/12/22  9:05 AM   Result Value Ref Range    Free t4 1 39 0 82 - 1 77 ng/dL   TSH, 3rd generation    Collection Time: 04/12/22  9:05 AM   Result Value Ref Range    TSH 1 700 0 450 - 4 500 uIU/mL   Comprehensive metabolic panel    Collection Time: 04/12/22  9:06 AM   Result Value Ref Range    Glucose, Random 194 (H) 65 - 99 mg/dL    BUN 21 8 - 27 mg/dL    Creatinine 1 48 (H) 0 76 - 1 27 mg/dL    eGFR 48 (L) >59 mL/min/1 73    SL AMB BUN/CREATININE RATIO 14 10 - 24    Sodium 141 134 - 144 mmol/L    Potassium 4 4 3 5 - 5 2 mmol/L    Chloride 105 96 - 106 mmol/L    CO2 20 20 - 29 mmol/L    CALCIUM 9 7 8 6 - 10 2 mg/dL    Protein, Total 6 1 6 0 - 8 5 g/dL    Albumin 4 3 3 7 - 4 7 g/dL    Globulin, Total 1 8 1 5 - 4 5 g/dL    Albumin/Globulin Ratio 2 4 (H) 1 2 - 2 2    TOTAL BILIRUBIN 0 5 0 0 - 1 2 mg/dL    Alk Phos Isoenzymes 77 44 - 121 IU/L    AST 15 0 - 40 IU/L    ALT 12 0 - 44 IU/L   Lipid panel    Collection Time: 04/12/22  9:06 AM   Result Value Ref Range    Cholesterol, Total 132 100 - 199 mg/dL    Triglycerides 70 0 - 149 mg/dL    HDL 52 >39 mg/dL    LDL Calculated 66 0 - 99 mg/dL   Microalbumin / creatinine urine ratio    Collection Time: 04/12/22  9:06 AM   Result Value Ref Range    Creatinine, Urine 131 5 Not Estab  mg/dL    Microalbum  ,U,Random 22 5 Not Estab  ug/mL    Microalb/Creat Ratio 17 0 - 29 mg/g creat   Cardiovascular Report    Collection Time: 04/12/22  9:06 AM   Result Value Ref Range    Interpretation Note    [facility] Kidney Stone Panel    Collection Time: 04/12/22  9:06 AM   Result Value Ref Range    Interpretation Note    Hemoglobin A1c (w/out EAG)    Collection Time: 04/12/22  9:06 AM   Result Value Ref Range    Hemoglobin A1C 7 8 (H) 4 8 - 5 6 %   PSA Total, Diagnostic    Collection Time: 04/12/22  9:06 AM   Result Value Ref Range    Prostate Specific Antigen Total 3 9 0 0 - 4 0 ng/mL   Hepatitis C antibody    Collection Time: 04/12/22  9:06 AM   Result Value Ref Range    HEP C AB <0 1 0 0 - 0 9 s/co ratio   TSH, 3rd generation with Free T4 reflex    Collection Time: 04/12/22  9:06 AM   Result Value Ref Range    TSH 1 680 0 450 - 4 500 uIU/mL     Diabetic Foot Exam    Patient's shoes and socks removed  Right Foot/Ankle   Right Foot Inspection  Skin Exam: skin normal  Skin not intact, no dry skin, no warmth, no callus, no erythema, no maceration, no abnormal color, no pre-ulcer, no ulcer and no callus  Toe Exam: ROM and strength within normal limits  Sensory   Vibration: intact  Proprioception: intact  Monofilament testing: intact    Vascular  Capillary refills: < 3 seconds  The right DP pulse is 2+  The right PT pulse is 2+       Left Foot/Ankle  Left Foot Inspection  Skin Exam: skin normal  Skin not intact, no dry skin, no warmth, no erythema, no maceration, normal color, no pre-ulcer, no ulcer and no callus  Toe Exam: ROM and strength within normal limits  Sensory   Vibration: intact  Proprioception: intact  Monofilament testing: intact    Vascular  Capillary refills: < 3 seconds  The left DP pulse is 2+  The left PT pulse is 2+  Assign Risk Category  No deformity present  No loss of protective sensation  No weak pulses  Risk: 0      Hay Quiroz DO  BMI Counseling: Body mass index is 26 11 kg/m²  The BMI is above normal  Exercise recommendations include moderate aerobic physical activity for 150 minutes/week

## 2022-05-13 DIAGNOSIS — E11.22 TYPE 2 DIABETES MELLITUS WITH STAGE 3 CHRONIC KIDNEY DISEASE, WITH LONG-TERM CURRENT USE OF INSULIN (HCC): ICD-10-CM

## 2022-05-13 DIAGNOSIS — Z79.4 TYPE 2 DIABETES MELLITUS WITH STAGE 3 CHRONIC KIDNEY DISEASE, WITH LONG-TERM CURRENT USE OF INSULIN (HCC): ICD-10-CM

## 2022-05-13 DIAGNOSIS — E11.22 TYPE 2 DIABETES MELLITUS WITH STAGE 3 CHRONIC KIDNEY DISEASE, WITH LONG-TERM CURRENT USE OF INSULIN, UNSPECIFIED WHETHER STAGE 3A OR 3B CKD (HCC): ICD-10-CM

## 2022-05-13 DIAGNOSIS — N18.30 TYPE 2 DIABETES MELLITUS WITH STAGE 3 CHRONIC KIDNEY DISEASE, WITH LONG-TERM CURRENT USE OF INSULIN, UNSPECIFIED WHETHER STAGE 3A OR 3B CKD (HCC): ICD-10-CM

## 2022-05-13 DIAGNOSIS — Z79.4 TYPE 2 DIABETES MELLITUS WITH STAGE 3 CHRONIC KIDNEY DISEASE, WITH LONG-TERM CURRENT USE OF INSULIN, UNSPECIFIED WHETHER STAGE 3A OR 3B CKD (HCC): ICD-10-CM

## 2022-05-13 DIAGNOSIS — N18.30 TYPE 2 DIABETES MELLITUS WITH STAGE 3 CHRONIC KIDNEY DISEASE, WITH LONG-TERM CURRENT USE OF INSULIN (HCC): ICD-10-CM

## 2022-05-13 NOTE — TELEPHONE ENCOUNTER
Spoke with the patient and he confirmed he takes 5 units of Humalog before each meal (3 times a day) and he changed Levemir to 15 units about one month ago

## 2022-05-13 NOTE — TELEPHONE ENCOUNTER
Patient called concerned that his blood sugar is dropping very low in the middle of the night  He has decreased Levemir to 15 units at bedtime and wonders if he should take it in the morning instead        He will bring in [employer] for download today

## 2022-05-13 NOTE — TELEPHONE ENCOUNTER
Reviewed  Noted to have low/tight blood sugars overnight twice over the last 2 weeks  Has highs post breakfast   Please ask patient to confirm what doses of short-acting insulin he is using, is he skipping it with any meal/taking it after rather than before? Also, when did he decrease the dose of Levemir to 15?

## 2022-05-13 NOTE — TELEPHONE ENCOUNTER
Recommend decreasing Levemir to 12 units at bedtime   Increase Humalog to 7 units with breakfast, continue 5 units with lunch, dinner

## 2022-05-16 RX ORDER — INSULIN DETEMIR 100 [IU]/ML
12 INJECTION, SOLUTION SUBCUTANEOUS
Qty: 18 ML | Refills: 3
Start: 2022-05-16 | End: 2022-11-12

## 2022-05-16 RX ORDER — INSULIN LISPRO 100 [IU]/ML
INJECTION, SOLUTION INTRAVENOUS; SUBCUTANEOUS
Qty: 8.1 ML | Refills: 1
Start: 2022-05-16 | End: 2022-06-28 | Stop reason: SDUPTHER

## 2022-05-19 ENCOUNTER — TELEPHONE (OUTPATIENT)
Dept: CARDIOLOGY CLINIC | Facility: CLINIC | Age: 77
End: 2022-05-19

## 2022-05-19 NOTE — LETTER
Cardiology Pre Operative Clearance      PRE OPERATIVE CARDIAC RISK ASSESSMENT    05/19/22    Viann Moni  1945  [id number]    Date of Surgery: 06/10/2022     Type of Surgery: EGD and Colonoscopy    Surgeon: Dr Amelia Funez    No Cardiac Contraindication for Planned Surgical Procedures    Anticoagulation: not applicable    Physician Comment: May interrupt Aspirin if needed for 5 days    Electronically Signed: Shiva Samson DO

## 2022-05-25 ENCOUNTER — RA CDI HCC (OUTPATIENT)
Dept: OTHER | Facility: HOSPITAL | Age: 77
End: 2022-05-25

## 2022-05-25 NOTE — PROGRESS NOTES
[facility] 75  coding opportunities          Chart Reviewed number of suggestions sent to Provider: 1   I13 [id number]    Patients Insurance     Medicare Insurance: [employer]

## 2022-05-31 NOTE — TELEPHONE ENCOUNTER
Patient has questions about his upcoming TRUS procedure  Patient has a colonoscopy on 6/10 and wants to know if the TRUS procedure will affect it  If so, patient would like to push his TRUS procedure until after his colonoscopy      Patient requesting a call back at [phone]

## 2022-05-31 NOTE — TELEPHONE ENCOUNTER
Called and confirmed with patient that it will be of for him to have both the trus done and then the colonoscopy 3 days later   No further questions

## 2022-06-02 ENCOUNTER — OFFICE VISIT (OUTPATIENT)
Dept: FAMILY MEDICINE CLINIC | Facility: CLINIC | Age: 77
End: 2022-06-02
Payer: MEDICARE

## 2022-06-02 VITALS
DIASTOLIC BLOOD PRESSURE: 60 MMHG | OXYGEN SATURATION: 97 % | WEIGHT: 179 LBS | SYSTOLIC BLOOD PRESSURE: 110 MMHG | RESPIRATION RATE: 16 BRPM | TEMPERATURE: 97.4 F | HEART RATE: 74 BPM | HEIGHT: 70 IN | BODY MASS INDEX: 25.62 KG/M2

## 2022-06-02 DIAGNOSIS — N18.31 CHRONIC KIDNEY DISEASE (CKD) STAGE G3A/A1, MODERATELY DECREASED GLOMERULAR FILTRATION RATE (GFR) BETWEEN 45-59 ML/MIN/1.73 SQUARE METER AND ALBUMINURIA CREATININE RATIO LESS THAN 30 MG/G (HCC): ICD-10-CM

## 2022-06-02 DIAGNOSIS — N18.30 TYPE 2 DIABETES MELLITUS WITH STAGE 3 CHRONIC KIDNEY DISEASE, WITH LONG-TERM CURRENT USE OF INSULIN, UNSPECIFIED WHETHER STAGE 3A OR 3B CKD (HCC): ICD-10-CM

## 2022-06-02 DIAGNOSIS — F41.9 ANXIETY: Primary | ICD-10-CM

## 2022-06-02 DIAGNOSIS — E11.22 TYPE 2 DIABETES MELLITUS WITH STAGE 3 CHRONIC KIDNEY DISEASE, WITH LONG-TERM CURRENT USE OF INSULIN, UNSPECIFIED WHETHER STAGE 3A OR 3B CKD (HCC): ICD-10-CM

## 2022-06-02 DIAGNOSIS — Z79.4 TYPE 2 DIABETES MELLITUS WITH STAGE 3 CHRONIC KIDNEY DISEASE, WITH LONG-TERM CURRENT USE OF INSULIN, UNSPECIFIED WHETHER STAGE 3A OR 3B CKD (HCC): ICD-10-CM

## 2022-06-02 DIAGNOSIS — E78.2 MIXED HYPERLIPIDEMIA: ICD-10-CM

## 2022-06-02 PROCEDURE — 99213 OFFICE O/P EST LOW 20 MIN: CPT | Performed by: FAMILY MEDICINE

## 2022-06-02 NOTE — PROGRESS NOTES
Assessment/Plan:    1  Anxiety  -     Comprehensive metabolic panel; Future; Expected date: 11/29/2022  -     CBC and Platelet; Future; Expected date: 11/29/2022    2  Chronic kidney disease (CKD) stage G3a/A1, moderately decreased glomerular filtration rate (GFR) between 45-59 mL/min/1 73 square meter and albuminuria creatinine ratio less than 30 mg/g (HCC)  -     Comprehensive metabolic panel; Future; Expected date: 11/29/2022  -     CBC and Platelet; Future; Expected date: 11/29/2022    3  Type 2 diabetes mellitus with stage 3 chronic kidney disease, with long-term current use of insulin, unspecified whether stage 3a or 3b CKD ([facility] [address] )  -     Comprehensive metabolic panel; Future; Expected date: 11/29/2022  -     CBC and Platelet; Future; Expected date: 11/29/2022    4  Mixed hyperlipidemia  -     Comprehensive metabolic panel; Future; Expected date: 11/29/2022  -     CBC and Platelet; Future; Expected date: 11/29/2022  -     Lipid Panel with Direct LDL reflex; Future; Expected date: 11/29/2022          There are no Patient Instructions on file for this visit  No follow-ups on file  Subjective:      Patient ID: Josse Becerra is a 67 y o  male  Chief Complaint   Patient presents with    Follow-up     On meds,   Khang Marsh MA        Pt is sched for a follow up for anxiety meds  Pt states he has issues to discuss    Pt states next Friday he is going for a colonoscopy - on Tuesday he is also having an us of kidney and bladder    Pt is on ten of lexapro - no longer shaking, he is not having issues with anxiety in his head  Sleep is going ok  No nausea no vomiting          The following portions of the patient's history were reviewed and updated as appropriate: allergies, current medications, past family history, past medical history, past social history, past surgical history and problem list     Review of Systems   Constitutional: Negative for activity change, appetite change, chills, diaphoresis, fatigue, fever and unexpected weight change  HENT: Negative for congestion, dental problem, ear pain, mouth sores, sinus pressure, sinus pain, sore throat and trouble swallowing  Eyes: Negative for photophobia, discharge and itching  Respiratory: Negative for apnea, chest tightness and shortness of breath  Cardiovascular: Negative for chest pain, palpitations and leg swelling  Gastrointestinal: Negative for abdominal distention, abdominal pain, blood in stool, nausea and vomiting  Endocrine: Negative for cold intolerance, heat intolerance, polydipsia, polyphagia and polyuria  Genitourinary: Negative for difficulty urinating  Musculoskeletal: Negative for arthralgias  Skin: Negative for color change and wound  Neurological: Negative for dizziness, syncope, speech difficulty and headaches  Hematological: Negative for adenopathy  Psychiatric/Behavioral: Negative for agitation and behavioral problems  The patient is nervous/anxious            Current Outpatient Medications   Medication Sig Dispense Refill    albuterol (ProAir HFA) 90 mcg/act inhaler Inhale 2 puffs every 6 (six) hours as needed for wheezing 1 Inhaler 0    aspirin (ECOTRIN LOW STRENGTH) 81 mg EC tablet Take 81 mg by mouth every morning        atorvastatin (LIPITOR) 40 mg tablet TAKE 1 TABLET BY MOUTH  DAILY 90 tablet 3    Coenzyme Q10 (COQ-10) 100 MG CAPS Take 200 mg by mouth daily at bedtime       doxazosin (CARDURA XL) 8 MG 24 hr tablet Take 1 tablet (8 mg total) by mouth daily with breakfast 30 tablet 3    escitalopram (Lexapro) 10 mg tablet Take 1 tablet (10 mg total) by mouth daily 90 tablet 1    furosemide (LASIX) 20 mg tablet Take 1 tablet (20 mg total) by mouth daily 90 tablet 1    insulin detemir (Levemir FlexTouch) 100 Units/mL injection pen Inject 12 Units under the skin daily at bedtime 18 mL 3    insulin lispro (HumaLOG KwikPen) 100 units/mL injection pen Inject 7 units under the skin before breakfast and 5 units before lunch and dinner 8 1 mL 1    Insulin Pen Needle (BD Pen Needle Sherie U/F) 32G X 4 MM MISC USE 4 TIMES DAILY AS  DIRECTED 90 each 0    levothyroxine 75 mcg tablet TAKE 1 TABLET BY MOUTH  DAILY 90 tablet 3    lisinopril (ZESTRIL) 2 5 mg tablet TAKE 1 TABLET BY MOUTH  DAILY 90 tablet 3    metoprolol succinate (TOPROL-XL) 25 mg 24 hr tablet TAKE 1 TABLET BY MOUTH  DAILY 90 tablet 3    multivitamin (THERAGRAN) TABS Take 1 tablet by mouth every morning      pantoprazole (PROTONIX) 40 mg tablet TAKE 1 TABLET BY MOUTH  DAILY 90 tablet 3    Semaglutide,0 25 or 0 5MG/DOS, (Ozempic, 0 25 or 0 5 MG/DOSE,) 2 MG/1 5ML SOPN Inject 0 25 mg under the skin once a week 3 mL 3    donepezil (ARICEPT) 10 mg tablet Take 1 tablet (10 mg total) by mouth daily at bedtime (Patient not taking: No sig reported) 90 tablet 1    Semaglutide,0 25 or 0 5MG/DOS, (Ozempic, 0 25 or 0 5 MG/DOSE,) 2 MG/1 5ML SOPN Inject   25 mg under the skin once a week (Patient not taking: No sig reported) 1 5 mL 0     No current facility-administered medications for this visit  Objective:    /60   Pulse 74   Temp (!) 97 4 °F (36 3 °C)   Resp 16   Ht 5' 10" (1 778 m)   Wt 81 2 kg (179 lb)   SpO2 97%   BMI 25 68 kg/m²        Physical Exam  Vitals and nursing note reviewed  Constitutional:       General: He is not in acute distress  Appearance: He is well-developed  He is not diaphoretic  HENT:      Head: Normocephalic and atraumatic  Right Ear: External ear normal       Left Ear: External ear normal       Nose: Nose normal       Mouth/Throat:      Pharynx: No oropharyngeal exudate  Eyes:      General: No scleral icterus  Right eye: No discharge  Left eye: No discharge  Pupils: Pupils are equal, round, and reactive to light  Neck:      Thyroid: No thyromegaly  Cardiovascular:      Rate and Rhythm: Normal rate  Heart sounds: Normal heart sounds  No murmur heard    Pulmonary:      Effort: Pulmonary effort is normal  No respiratory distress  Breath sounds: Normal breath sounds  No wheezing  Abdominal:      General: Bowel sounds are normal  There is no distension  Palpations: Abdomen is soft  There is no mass  Tenderness: There is no abdominal tenderness  There is no guarding or rebound  Musculoskeletal:         General: Normal range of motion  Skin:     General: Skin is warm and dry  Findings: No erythema or rash  Neurological:      Mental Status: He is alert        Coordination: Coordination normal       Deep Tendon Reflexes: Reflexes normal    Psychiatric:         Behavior: Behavior normal                 Tess Petty DO

## 2022-06-03 ENCOUNTER — TELEPHONE (OUTPATIENT)
Dept: GASTROENTEROLOGY | Facility: CLINIC | Age: 77
End: 2022-06-03

## 2022-06-03 NOTE — TELEPHONE ENCOUNTER
Spoke to confirming pt's colonoscopy scheduled on 6/10/22 with Dr Argentina Arana at [facility]  I informed pt that [facility] would be calling 1-2 days prior with arrival time  I informed of clear liquid diet the day before and to do the bowel cleansing preparation  I informed pt that he will need a  the day of the procedure due to being under sedation  Pt did not have any questions

## 2022-06-07 ENCOUNTER — TELEPHONE (OUTPATIENT)
Dept: UROLOGY | Facility: CLINIC | Age: 77
End: 2022-06-07

## 2022-06-07 NOTE — TELEPHONE ENCOUNTER
PT WAS SUPPOSE TO HAVE A PROCEDURE IN THE OFFICE BY  [facility] TODAY BUT NEEDED TO BE CANCELLED DUE TO  [facility]  PLEASE ADVISE ON URGENCY

## 2022-06-08 DIAGNOSIS — N18.31 TYPE 2 DIABETES MELLITUS WITH STAGE 3A CHRONIC KIDNEY DISEASE, WITH LONG-TERM CURRENT USE OF INSULIN (HCC): Primary | ICD-10-CM

## 2022-06-08 DIAGNOSIS — E11.22 TYPE 2 DIABETES MELLITUS WITH STAGE 3A CHRONIC KIDNEY DISEASE, WITH LONG-TERM CURRENT USE OF INSULIN (HCC): Primary | ICD-10-CM

## 2022-06-08 DIAGNOSIS — Z79.4 TYPE 2 DIABETES MELLITUS WITH STAGE 3A CHRONIC KIDNEY DISEASE, WITH LONG-TERM CURRENT USE OF INSULIN (HCC): Primary | ICD-10-CM

## 2022-06-08 RX ORDER — FLASH GLUCOSE SENSOR
KIT MISCELLANEOUS
Qty: 6 EACH | Refills: 3 | Status: SHIPPED | OUTPATIENT
Start: 2022-06-08

## 2022-06-09 ENCOUNTER — NURSE TRIAGE (OUTPATIENT)
Dept: OTHER | Facility: OTHER | Age: 77
End: 2022-06-09

## 2022-06-09 NOTE — TELEPHONE ENCOUNTER
Regarding: Medication question  ----- Message from Josselin Weiss sent at 6/9/2022 10:41 AM EDT -----  "I am not sure what medications I can take prior to my colonoscopy scheduled for tomorrow "

## 2022-06-09 NOTE — TELEPHONE ENCOUNTER
Patient is diabetic and has questions about prep prior to colonoscopy on 6/10/22  Patient takes Humalog insulin with meals; does finger sticks with meals and has a question about his long acting insulin (Levemir) at bedtime tonight  Please follow up with patient for guidance today  Patient has sugar free Gatorade  Reason for Disposition   [1] Caller has URGENT question or concern AND [2] triager unable to answer question    Answer Assessment - Initial Assessment Questions  1  DATE/TIME: "When did you have your colonoscopy?"       6/10/22    2  MAIN CONCERN: "What is your main concern right now?" "What questions do you have?"      What medications can I take in the morning prior to colonoscopy?     Protocols used: COLONOSCOPY SYMPTOMS AND QUESTIONS-ADULT-

## 2022-06-10 ENCOUNTER — ANESTHESIA (OUTPATIENT)
Dept: GASTROENTEROLOGY | Facility: AMBULATORY SURGERY CENTER | Age: 77
End: 2022-06-10

## 2022-06-10 ENCOUNTER — HOSPITAL ENCOUNTER (OUTPATIENT)
Dept: GASTROENTEROLOGY | Facility: AMBULATORY SURGERY CENTER | Age: 77
Discharge: HOME | End: 2022-06-10
Payer: MEDICARE

## 2022-06-10 ENCOUNTER — ANESTHESIA EVENT (OUTPATIENT)
Dept: GASTROENTEROLOGY | Facility: AMBULATORY SURGERY CENTER | Age: 77
End: 2022-06-10

## 2022-06-10 VITALS
TEMPERATURE: 96.9 F | DIASTOLIC BLOOD PRESSURE: 69 MMHG | OXYGEN SATURATION: 100 % | WEIGHT: 180 LBS | HEIGHT: 70 IN | BODY MASS INDEX: 25.77 KG/M2 | SYSTOLIC BLOOD PRESSURE: 123 MMHG | HEART RATE: 63 BPM | RESPIRATION RATE: 18 BRPM

## 2022-06-10 DIAGNOSIS — Z86.010 HX OF ADENOMATOUS COLONIC POLYPS: ICD-10-CM

## 2022-06-10 DIAGNOSIS — R19.4 CHANGE IN BOWEL HABITS: ICD-10-CM

## 2022-06-10 DIAGNOSIS — R14.2 ERUCTATION: ICD-10-CM

## 2022-06-10 DIAGNOSIS — K22.719 BARRETT'S ESOPHAGUS WITH DYSPLASIA: ICD-10-CM

## 2022-06-10 DIAGNOSIS — K59.09 CHRONIC CONSTIPATION: ICD-10-CM

## 2022-06-10 DIAGNOSIS — Z80.0 FAMILY HISTORY OF COLON CANCER IN MOTHER: ICD-10-CM

## 2022-06-10 DIAGNOSIS — K57.30 COLON, DIVERTICULOSIS: ICD-10-CM

## 2022-06-10 PROCEDURE — 45385 COLONOSCOPY W/LESION REMOVAL: CPT | Performed by: INTERNAL MEDICINE

## 2022-06-10 PROCEDURE — 43239 EGD BIOPSY SINGLE/MULTIPLE: CPT | Performed by: INTERNAL MEDICINE

## 2022-06-10 PROCEDURE — 88305 TISSUE EXAM BY PATHOLOGIST: CPT | Performed by: PATHOLOGY

## 2022-06-10 RX ORDER — LIDOCAINE HYDROCHLORIDE 20 MG/ML
INJECTION, SOLUTION EPIDURAL; INFILTRATION; INTRACAUDAL; PERINEURAL AS NEEDED
Status: DISCONTINUED | OUTPATIENT
Start: 2022-06-10 | End: 2022-06-10

## 2022-06-10 RX ORDER — SODIUM CHLORIDE, SODIUM LACTATE, POTASSIUM CHLORIDE, CALCIUM CHLORIDE 600; 310; 30; 20 MG/100ML; MG/100ML; MG/100ML; MG/100ML
20 INJECTION, SOLUTION INTRAVENOUS CONTINUOUS
Status: DISCONTINUED | OUTPATIENT
Start: 2022-06-10 | End: 2022-06-14 | Stop reason: HOSPADM

## 2022-06-10 RX ORDER — SODIUM CHLORIDE, SODIUM LACTATE, POTASSIUM CHLORIDE, CALCIUM CHLORIDE 600; 310; 30; 20 MG/100ML; MG/100ML; MG/100ML; MG/100ML
INJECTION, SOLUTION INTRAVENOUS CONTINUOUS PRN
Status: DISCONTINUED | OUTPATIENT
Start: 2022-06-10 | End: 2022-06-10

## 2022-06-10 RX ORDER — PROPOFOL 10 MG/ML
INJECTION, EMULSION INTRAVENOUS AS NEEDED
Status: DISCONTINUED | OUTPATIENT
Start: 2022-06-10 | End: 2022-06-10

## 2022-06-10 RX ADMIN — PROPOFOL 150 MG: 10 INJECTION, EMULSION INTRAVENOUS at 08:15

## 2022-06-10 RX ADMIN — PROPOFOL 50 MG: 10 INJECTION, EMULSION INTRAVENOUS at 08:37

## 2022-06-10 RX ADMIN — PROPOFOL 50 MG: 10 INJECTION, EMULSION INTRAVENOUS at 08:31

## 2022-06-10 RX ADMIN — LIDOCAINE HYDROCHLORIDE 100 MG: 20 INJECTION, SOLUTION EPIDURAL; INFILTRATION; INTRACAUDAL; PERINEURAL at 08:15

## 2022-06-10 RX ADMIN — SODIUM CHLORIDE, SODIUM LACTATE, POTASSIUM CHLORIDE, CALCIUM CHLORIDE: 600; 310; 30; 20 INJECTION, SOLUTION INTRAVENOUS at 08:09

## 2022-06-10 RX ADMIN — PROPOFOL 50 MG: 10 INJECTION, EMULSION INTRAVENOUS at 08:25

## 2022-06-10 NOTE — ANESTHESIA POSTPROCEDURE EVALUATION
Post-Op Assessment Note    CV Status:  Stable  Pain Score: 0    Pain management: adequate     Mental Status:  Arousable and sleepy   Hydration Status:  Stable   PONV Controlled:  Controlled   Airway Patency:  Patent      Post Op Vitals Reviewed: Yes      Staff: CRNA         No complications documented      BP   110/55   Temp 97 6   Pulse 76   Resp 14   SpO2 99

## 2022-06-10 NOTE — H&P
History and Physical -  [facility]  Du Saleem 67 y o  male MRN: [id number]                  HPI: Du Saleem is a 67y o  year old male who presents for EGD and colonoscopy for change in bowel habits Barretts esophagus, family history colon cancer mother, personal history of adenomatous colon polyps,      REVIEW OF SYSTEMS: Per the HPI, and otherwise unremarkable  Historical Information   Past Medical History:   Diagnosis Date    Aortic narrowing     Last Assessed:  9/28/15    Arthritis     Hammond esophagus     Benign prostatic hyperplasia     Bilateral leg edema     Bulla, lung (HCC)     CHF (congestive heart failure) (HCC)     Chronic kidney disease     stage 3    Colon polyp     COVID-19 virus infection 02/08/2021    Diabetes mellitus ([facility] [address] )     Enlarged prostate     Heel pain     right heel slipped in the garage-landed on the right heel    Herniated lumbar intervertebral disc     x 4 discs-fell off a roof    Hiatal hernia     High cholesterol     History of kidney problems     Hypertension     Myocardial infarction ([facility] [address] )     Old myocardial infarction     x2-pt was unaware of the MI-one stent    Thyroid disease     hypothyroid    Transient cerebral ischemia     Last Assessed:  8/27/15    Wears dentures     full upper, partial lower    Wears glasses      Past Surgical History:   Procedure Laterality Date    CATARACT EXTRACTION      COLONOSCOPY      Resolved:  2015    CORONARY ANGIOPLASTY WITH STENT PLACEMENT      Stent implanted early 1990's,     ESOPHAGOGASTRODUODENOSCOPY      KNEE SURGERY      right knee cartilage surgery-left meniscus repair    GA XCAPSL CTRC RMVL INSJ IO LENS PROSTH W/O ECP Right 1/24/2019    Procedure: EXTRACTION EXTRACAPSULAR CATARACT PHACO INTRAOCULAR LENS (IOL);   Surgeon: Jeanne Bolton MD;  Location: [facility] MAIN OR;  Service: Ophthalmology    GA XCAPSL CTRC RMVL INSJ IO LENS PROSTH W/O ECP Left 2/21/2019    Procedure: EXTRACTION EXTRACAPSULAR CATARACT PHACO INTRAOCULAR LENS (IOL);   Surgeon: Hiram Peabody, MD;  Location: [facility] MAIN OR;  Service: Ophthalmology    TONSILLECTOMY      UMBILICAL HERNIA REPAIR      ,    UPPER GASTROINTESTINAL ENDOSCOPY      Last Assessed:  8/27/15     Social History   Social History     Substance and Sexual Activity   Alcohol Use Not Currently     Social History     Substance and Sexual Activity   Drug Use No     Social History     Tobacco Use   Smoking Status Former Smoker    Packs/day: 4 00    Years: 20 00    Pack years: 80 00    Quit date: 12    Years since quittin 4   Smokeless Tobacco Never Used     Family History   Problem Relation Age of Onset   Beau Codington Colon cancer Mother     Arthritis Mother     Diabetes Mother     Hypertension Mother     Cancer Mother         Breast    Hyperlipidemia Mother     Cancer Sister         ovarian    Other Brother         Cerebrovascular accident (CVA)    Diabetes Other         Sibling    Hypertension Other         Sibling    Hernia Father         umbilical   Beau Codington Hernia Son     Cancer Sister         liver    Kidney disease Sister     Kidney disease Brother     Mental illness Neg Hx        Meds/Allergies       Current Outpatient Medications:     aspirin (ECOTRIN LOW STRENGTH) 81 mg EC tablet    atorvastatin (LIPITOR) 40 mg tablet    doxazosin (CARDURA XL) 8 MG 24 hr tablet    escitalopram (Lexapro) 10 mg tablet    furosemide (LASIX) 20 mg tablet    insulin detemir (Levemir FlexTouch) 100 Units/mL injection pen    insulin lispro (HumaLOG KwikPen) 100 units/mL injection pen    levothyroxine 75 mcg tablet    lisinopril (ZESTRIL) 2 5 mg tablet    metoprolol succinate (TOPROL-XL) 25 mg 24 hr tablet    multivitamin (THERAGRAN) TABS    pantoprazole (PROTONIX) 40 mg tablet    albuterol (ProAir HFA) 90 mcg/act inhaler    Coenzyme Q10 (COQ-10) 100 MG CAPS    Continuous Blood Gluc Sensor (FreeStyle Jess 2 Sensor) MISC    donepezil (ARICEPT) 10 mg tablet    Insulin Pen Needle (BD Pen Needle Sherie U/F) 32G X 4 MM MISC    Semaglutide,0 25 or 0 5MG/DOS, (Ozempic, 0 25 or 0 5 MG/DOSE,) 2 MG/1 5ML SOPN    Semaglutide,0 25 or 0 5MG/DOS, (Ozempic, 0 25 or 0 5 MG/DOSE,) 2 MG/1 5ML SOPN  No current facility-administered medications for this encounter  Facility-Administered Medications Ordered in Other Encounters:     lactated ringers infusion, , Intravenous, Continuous PRN, New Bag at 06/10/22 0809    No Known Allergies    Objective     /70   Pulse 69   Temp (!) 96 9 °F (36 1 °C) (Temporal)   Resp 20   Ht 5' 10" (1 778 m)   Wt 81 6 kg (180 lb)   SpO2 100%   BMI 25 83 kg/m²       PHYSICAL EXAM    Gen: NAD  Head: NCAT  CV: RRR  CHEST: Clear  ABD: soft, NT/ND  EXT: no edema      ASSESSMENT/PLAN:  This is a 67y o  year old male here for colonoscopy, and he is stable and optimized for his procedure

## 2022-06-10 NOTE — ANESTHESIA PREPROCEDURE EVALUATION
Procedure:  EGD  COLONOSCOPY    Relevant Problems   CARDIO   (+) Chronic diastolic congestive heart failure (HCC)   (+) Coronary artery disease involving native coronary artery of native heart without angina pectoris   (+) Essential hypertension   (+) Mixed hyperlipidemia      ENDO   (+) Acquired hypothyroidism   (+) Type 2 diabetes mellitus with stage 3 chronic kidney disease, with long-term current use of insulin (HCC)      GI/HEPATIC   (+) Hiatal hernia      /RENAL   (+) Chronic kidney disease (CKD) stage G3a/A1, moderately decreased glomerular filtration rate (GFR) between 45-59 mL/min/1 73 square meter and albuminuria creatinine ratio less than 30 mg/g (HCC)      MUSCULOSKELETAL   (+) Disc degeneration, lumbar   (+) Primary osteoarthritis of left foot      NEURO/PSYCH   (+) Anxiety   (+) Diabetic polyneuropathy associated with type 2 diabetes mellitus (HCC)   (+) History of myocardial infarction   (+) Hx of adenomatous colonic polyps      Echo 3/30/21: LVEF 55-60%, no regional wall motion abnormalities, mild LVH, grade 1 DD  No major valve disease  Physical Exam    Airway    Mallampati score: II  TM Distance: >3 FB  Neck ROM: full     Dental   Comment: Patient has 2 native lower teeth, otherwise has full upper denture, and partial for lowers  , upper dentures and lower dentures,     Cardiovascular      Pulmonary      Other Findings        Anesthesia Plan  ASA Score- 3     Anesthesia Type- IV sedation with anesthesia with ASA Monitors  Additional Monitors:   Airway Plan:           Plan Factors-Exercise tolerance (METS): >4 METS  Chart reviewed  Existing labs reviewed  Patient summary reviewed  Patient is not a current smoker  Obstructive sleep apnea risk education given perioperatively  Induction- intravenous  Postoperative Plan-     Informed Consent- Anesthetic plan and risks discussed with patient  I personally reviewed this patient with the CRNA   Discussed and agreed on the Anesthesia Plan with the CRNA  Jamie Lino

## 2022-06-18 DIAGNOSIS — K22.719 BARRETT'S ESOPHAGUS WITH DYSPLASIA: ICD-10-CM

## 2022-06-18 DIAGNOSIS — E78.2 MIXED HYPERLIPIDEMIA: ICD-10-CM

## 2022-06-20 RX ORDER — PANTOPRAZOLE SODIUM 40 MG/1
TABLET, DELAYED RELEASE ORAL
Qty: 90 TABLET | Refills: 3 | Status: SHIPPED | OUTPATIENT
Start: 2022-06-20

## 2022-06-20 RX ORDER — ATORVASTATIN CALCIUM 40 MG/1
TABLET, FILM COATED ORAL
Qty: 90 TABLET | Refills: 3 | Status: SHIPPED | OUTPATIENT
Start: 2022-06-20

## 2022-06-28 DIAGNOSIS — E11.22 TYPE 2 DIABETES MELLITUS WITH STAGE 3 CHRONIC KIDNEY DISEASE, WITH LONG-TERM CURRENT USE OF INSULIN, UNSPECIFIED WHETHER STAGE 3A OR 3B CKD (HCC): ICD-10-CM

## 2022-06-28 DIAGNOSIS — Z79.4 TYPE 2 DIABETES MELLITUS WITH STAGE 3 CHRONIC KIDNEY DISEASE, WITH LONG-TERM CURRENT USE OF INSULIN, UNSPECIFIED WHETHER STAGE 3A OR 3B CKD (HCC): ICD-10-CM

## 2022-06-28 DIAGNOSIS — N18.30 TYPE 2 DIABETES MELLITUS WITH STAGE 3 CHRONIC KIDNEY DISEASE, WITH LONG-TERM CURRENT USE OF INSULIN, UNSPECIFIED WHETHER STAGE 3A OR 3B CKD (HCC): ICD-10-CM

## 2022-06-28 RX ORDER — INSULIN LISPRO 100 [IU]/ML
INJECTION, SOLUTION INTRAVENOUS; SUBCUTANEOUS
Qty: 8.1 ML | Refills: 1 | Status: SHIPPED | OUTPATIENT
Start: 2022-06-28

## 2022-06-30 NOTE — RESULT ENCOUNTER NOTE
Patient was informed via my chart biopsies were benign  Repeat EGD in 3 years repeat colonoscopy in 5 years due to history of intestinal metaplasia lower esophagus and history of polyps as well as family history of colon cancer

## 2022-07-01 DIAGNOSIS — E11.22 TYPE 2 DIABETES MELLITUS WITH STAGE 3 CHRONIC KIDNEY DISEASE, WITH LONG-TERM CURRENT USE OF INSULIN (HCC): ICD-10-CM

## 2022-07-01 DIAGNOSIS — Z79.4 TYPE 2 DIABETES MELLITUS WITH STAGE 3 CHRONIC KIDNEY DISEASE, WITH LONG-TERM CURRENT USE OF INSULIN (HCC): ICD-10-CM

## 2022-07-01 DIAGNOSIS — N18.30 TYPE 2 DIABETES MELLITUS WITH STAGE 3 CHRONIC KIDNEY DISEASE, WITH LONG-TERM CURRENT USE OF INSULIN (HCC): ICD-10-CM

## 2022-07-03 PROBLEM — N40.1 BENIGN LOCALIZED PROSTATIC HYPERPLASIA WITH LOWER URINARY TRACT SYMPTOMS (LUTS): Status: ACTIVE | Noted: 2022-07-03

## 2022-07-03 PROBLEM — Z71.2 PERSON CONSULTING FOR EXPLANATION OF EXAMINATION OR TEST FINDING: Status: ACTIVE | Noted: 2022-07-03

## 2022-07-03 NOTE — PROGRESS NOTES
Problem List Items Addressed This Visit        Genitourinary    Benign localized prostatic hyperplasia with lower urinary tract symptoms (LUTS) - Primary    Relevant Medications    finasteride (PROSCAR) 5 mg tablet    Other Relevant Orders    Uroflow    POCT Measure PVR (Completed)       Other    Person consulting for explanation of examination or test finding      Other Visit Diagnoses     Other retention of urine         Relevant Medications    finasteride (PROSCAR) 5 mg tablet    Other Relevant Orders    Uroflow            AUA symptom score please  Uroflow/pvr please  Cysto/trus    Book for outlet surgery if he would like      Assessment and plan:     Please see problem oriented charting for the assessment plan of today's urological complaints-     Jennifer Toussaint MD      Chief Complaint     Chief Complaint   Patient presents with    Benign Prostatic Hypertrophy         History of Present Illness     Edith Rocha is a 65 y o  man with urinary retention, incomplete emptying, maximum urinary flow is 4 2 milliliters/second, residual volume is 189 milliliters, these may be sprays findings given low voided volume, however the voiding curve is saw tooth in nature  Status post endoscopic and cystoscopic workup today  Please see separate procedure note  In brief, his workup shows a 133 gram gland with a large median lobe, no stones, no malignant findings  He would like to avoid surgery, I counseled him on finasteride, he is interested in this and this has been sent to his pharmacy  He will see us in 6 months for a PVR and AUA symptom score, if he notices benefit on this medication I recommend he continue taking it, if absolutely no change in his symptoms he can stop taking it  Should a surgical approach be necessary I would recommend either a holmium laser enucleation of prostate or a simple prostatectomy      The following portions of the patient's history were reviewed and updated as appropriate: allergies, current medications, past family history, past medical history, past social history, past surgical history and problem list     Detailed Urologic History     - please refer to HPI    Review of Systems     Review of Systems   Constitutional: Negative  HENT: Negative  Eyes: Negative  Respiratory: Negative  Cardiovascular: Negative  Gastrointestinal: Negative  Endocrine: Negative  Genitourinary: Negative  Nocturia 2-3 times per night, has good days and bad days, some slow stream and forcing to urinate, not motivated for surgery at the current time after participating in the shared decision-making model   Musculoskeletal: Negative  Skin: Negative  Allergic/Immunologic: Negative  Neurological: Negative  Hematological: Negative  Psychiatric/Behavioral: Negative  Allergies     No Known Allergies    Physical Exam     Physical Exam  Vitals reviewed  Constitutional:       General: He is not in acute distress  Appearance: Normal appearance  He is not ill-appearing, toxic-appearing or diaphoretic  HENT:      Head: Normocephalic and atraumatic  Eyes:      General: No scleral icterus  Right eye: No discharge  Left eye: No discharge  Cardiovascular:      Pulses: Normal pulses  Pulmonary:      Effort: Pulmonary effort is normal    Abdominal:      General: There is no distension  Palpations: There is no mass  Genitourinary:     Comments: Normal chrissie stage, uncircumcised, foreskin reduced over the head of the penis at the end of cystoscopy today  Musculoskeletal:         General: No swelling  Skin:     General: Skin is warm  Neurological:      General: No focal deficit present  Mental Status: He is alert and oriented to person, place, and time  Cranial Nerves: No cranial nerve deficit     Psychiatric:         Mood and Affect: Mood normal          Behavior: Behavior normal          Thought Content: Thought content normal          Judgment: Judgment normal              Vital Signs  Vitals:    07/05/22 0920   BP: 120/70   BP Location: Left arm   Patient Position: Sitting   Cuff Size: Adult   Weight: 79 4 kg (175 lb)   Height: 5' 10" (1 778 m)         Current Medications       Current Outpatient Medications:     albuterol (ProAir HFA) 90 mcg/act inhaler, Inhale 2 puffs every 6 (six) hours as needed for wheezing, Disp: 1 Inhaler, Rfl: 0    aspirin (ECOTRIN LOW STRENGTH) 81 mg EC tablet, Take 81 mg by mouth every morning  , Disp: , Rfl:     atorvastatin (LIPITOR) 40 mg tablet, TAKE 1 TABLET BY MOUTH  DAILY, Disp: 90 tablet, Rfl: 3    Coenzyme Q10 (COQ-10) 100 MG CAPS, Take 200 mg by mouth daily at bedtime , Disp: , Rfl:     Continuous Blood Gluc Sensor (FreeStyle Jess 2 Sensor) MISC, Change sensor every 14 days, Disp: 6 each, Rfl: 3    doxazosin (CARDURA XL) 8 MG 24 hr tablet, Take 1 tablet (8 mg total) by mouth daily with breakfast, Disp: 30 tablet, Rfl: 3    escitalopram (Lexapro) 10 mg tablet, Take 1 tablet (10 mg total) by mouth daily, Disp: 90 tablet, Rfl: 1    finasteride (PROSCAR) 5 mg tablet, Take 1 tablet (5 mg total) by mouth daily, Disp: 90 tablet, Rfl: 3    furosemide (LASIX) 20 mg tablet, Take 1 tablet (20 mg total) by mouth daily, Disp: 90 tablet, Rfl: 1    insulin detemir (Levemir FlexTouch) 100 Units/mL injection pen, Inject 12 Units under the skin daily at bedtime, Disp: 18 mL, Rfl: 3    insulin lispro (HumaLOG KwikPen) 100 units/mL injection pen, Inject 7 units under the skin before breakfast and 5 units before lunch and dinner, Disp: 8 1 mL, Rfl: 1    Insulin Pen Needle (BD Pen Needle Sherie U/F) 32G X 4 MM MISC, USE 4 TIMES DAILY AS  DIRECTED, Disp: 90 each, Rfl: 3    levothyroxine 75 mcg tablet, TAKE 1 TABLET BY MOUTH  DAILY, Disp: 90 tablet, Rfl: 3    lisinopril (ZESTRIL) 2 5 mg tablet, TAKE 1 TABLET BY MOUTH  DAILY, Disp: 90 tablet, Rfl: 3    metoprolol succinate (TOPROL-XL) 25 mg 24 hr tablet, TAKE 1 TABLET BY MOUTH  DAILY, Disp: 90 tablet, Rfl: 3    multivitamin (THERAGRAN) TABS, Take 1 tablet by mouth every morning, Disp: , Rfl:     pantoprazole (PROTONIX) 40 mg tablet, TAKE 1 TABLET BY MOUTH  DAILY, Disp: 90 tablet, Rfl: 3    Semaglutide,0 25 or 0 5MG/DOS, (Ozempic, 0 25 or 0 5 MG/DOSE,) 2 MG/1 5ML SOPN, Inject 0 25 mg under the skin once a week, Disp: 3 mL, Rfl: 3    donepezil (ARICEPT) 10 mg tablet, Take 1 tablet (10 mg total) by mouth daily at bedtime (Patient not taking: No sig reported), Disp: 90 tablet, Rfl: 1    Semaglutide,0 25 or 0 5MG/DOS, (Ozempic, 0 25 or 0 5 MG/DOSE,) 2 MG/1 5ML SOPN, Inject   25 mg under the skin once a week (Patient not taking: No sig reported), Disp: 1 5 mL, Rfl: 0      Active Problems     Patient Active Problem List   Diagnosis    Hammond's esophagus with dysplasia    Chronic kidney disease (CKD) stage G3a/A1, moderately decreased glomerular filtration rate (GFR) between 45-59 mL/min/1 73 square meter and albuminuria creatinine ratio less than 30 mg/g (HCC)    Colon, diverticulosis    Chronic constipation    Coronary artery disease involving native coronary artery of native heart without angina pectoris    Type 2 diabetes mellitus with stage 3 chronic kidney disease, with long-term current use of insulin (HCC)    Disc degeneration, lumbar    Hiatal hernia    History of myocardial infarction    Essential hypertension    Acquired hypothyroidism    Lumbar radiculopathy    Mixed hyperlipidemia    Chronic diastolic congestive heart failure (HCC)    Age-related incipient cataract of both eyes    Primary osteoarthritis of left foot    Plantar fasciitis    Peripheral arteriosclerosis ([address] )    Diabetic polyneuropathy associated with type 2 diabetes mellitus (HCC)    Presence of stent in coronary artery    Dizzy spells    Hx of adenomatous colonic polyps    Family history of colon cancer in mother    Anxiety    Benign localized prostatic hyperplasia with lower urinary tract symptoms (LUTS)    Person consulting for explanation of examination or test finding         Past Medical History     Past Medical History:   Diagnosis Date    Aortic narrowing     Last Assessed:  9/28/15    Arthritis     Hammond esophagus     Bilateral leg edema     [place], lung (HCC)     CHF (congestive heart failure) (HCC)     Chronic kidney disease     stage 3    Colon polyp     COVID-19 virus infection 02/08/2021    Diabetes mellitus ([facility] )     Enlarged prostate     Heel pain     right heel slipped in the garage-landed on the right heel    Herniated lumbar intervertebral disc     x 4 discs-fell off a roof    Hiatal hernia     High cholesterol     History of kidney problems     Hypertension     Myocardial infarction ([facility] )     Old myocardial infarction     x2-pt was unaware of the MI-one stent    Thyroid disease     hypothyroid    Transient cerebral ischemia     Last Assessed:  8/27/15    Wears dentures     full upper, partial lower    Wears glasses          Surgical History     Past Surgical History:   Procedure Laterality Date    CATARACT EXTRACTION      COLONOSCOPY      Resolved:  2015    CORONARY ANGIOPLASTY WITH STENT PLACEMENT      Stent implanted early 1990's,     ESOPHAGOGASTRODUODENOSCOPY      KNEE SURGERY      right knee cartilage surgery-left meniscus repair    ME XCAPSL CTRC RMVL INSJ IO LENS PROSTH W/O ECP Right 1/24/2019    Procedure: EXTRACTION EXTRACAPSULAR CATARACT PHACO INTRAOCULAR LENS (IOL); Surgeon: Tony Yoo MD;  Location: [facility] MAIN OR;  Service: Ophthalmology    ME XCAPSL CTRC RMVL INSJ IO LENS PROSTH W/O ECP Left 2/21/2019    Procedure: EXTRACTION EXTRACAPSULAR CATARACT PHACO INTRAOCULAR LENS (IOL);   Surgeon: Tony Yoo MD;  Location: [facility] MAIN OR;  Service: Ophthalmology    TONSILLECTOMY      UMBILICAL HERNIA REPAIR      1980's,    UPPER GASTROINTESTINAL ENDOSCOPY      Last Assessed:  8/27/15 Family History     Family History   Problem Relation Age of Onset    Colon cancer Mother     Arthritis Mother     Diabetes Mother     Hypertension Mother     Cancer Mother         Breast    Hyperlipidemia Mother    Earna Lakesha Cancer Sister         ovarian    Other Brother         Cerebrovascular accident (CVA)    Diabetes Other         Sibling    Hypertension Other         Sibling    Hernia Father         umbilical   Earna Lakesha Hernia Son     Cancer Sister         liver    Kidney disease Sister     Kidney disease Brother     Mental illness Neg Hx          Social History     Social History     Social History     Tobacco Use   Smoking Status Former Smoker    Packs/day: 4 00    Years: 20 00    Pack years: 80 00    Quit date: 12    Years since quittin 5   Smokeless Tobacco Never Used         Pertinent Lab Values     Lab Results   Component Value Date    CREATININE 1 48 (H) 2022       Lab Results   Component Value Date    PSA 3 9 2022    PSA 3 8 2021    PSA 3 4 2019             Pertinent Imaging      realtime review of TRUS

## 2022-07-05 ENCOUNTER — PROCEDURE VISIT (OUTPATIENT)
Dept: UROLOGY | Facility: CLINIC | Age: 77
End: 2022-07-05
Payer: MEDICARE

## 2022-07-05 VITALS
HEIGHT: 70 IN | SYSTOLIC BLOOD PRESSURE: 120 MMHG | BODY MASS INDEX: 25.05 KG/M2 | DIASTOLIC BLOOD PRESSURE: 70 MMHG | WEIGHT: 175 LBS

## 2022-07-05 DIAGNOSIS — R33.8 OTHER RETENTION OF URINE: ICD-10-CM

## 2022-07-05 DIAGNOSIS — N40.1 BENIGN LOCALIZED PROSTATIC HYPERPLASIA WITH LOWER URINARY TRACT SYMPTOMS (LUTS): Primary | ICD-10-CM

## 2022-07-05 DIAGNOSIS — Z71.2 PERSON CONSULTING FOR EXPLANATION OF EXAMINATION OR TEST FINDING: ICD-10-CM

## 2022-07-05 LAB — POST-VOID RESIDUAL VOLUME, ML POC: 189 ML

## 2022-07-05 PROCEDURE — 51741 ELECTRO-UROFLOWMETRY FIRST: CPT | Performed by: UROLOGY

## 2022-07-05 PROCEDURE — 52000 CYSTOURETHROSCOPY: CPT | Performed by: UROLOGY

## 2022-07-05 PROCEDURE — 99214 OFFICE O/P EST MOD 30 MIN: CPT | Performed by: UROLOGY

## 2022-07-05 PROCEDURE — 51798 US URINE CAPACITY MEASURE: CPT | Performed by: UROLOGY

## 2022-07-05 PROCEDURE — 76872 US TRANSRECTAL: CPT | Performed by: UROLOGY

## 2022-07-05 RX ORDER — FINASTERIDE 5 MG/1
5 TABLET, FILM COATED ORAL DAILY
Qty: 90 TABLET | Refills: 3 | Status: SHIPPED | OUTPATIENT
Start: 2022-07-05 | End: 2022-10-03

## 2022-07-05 RX ORDER — PEN NEEDLE, DIABETIC 32GX 5/32"
NEEDLE, DISPOSABLE MISCELLANEOUS
Qty: 90 EACH | Refills: 3 | Status: SHIPPED | OUTPATIENT
Start: 2022-07-05 | End: 2022-09-07 | Stop reason: SDUPTHER

## 2022-07-05 NOTE — PROGRESS NOTES
Office Transrectal Ultrasound    Indication    BPH/LUTS    Informed consent   The risks, benefits and alternatives to TRUS d/w patient      Transrectal ultrasonography  The patient was placed in the left lateral decubitus position  After an attentive digital rectal examination, a 7 5 mHz sidefire ultrasound probe was gently inserted into the rectum and biplanar imaging of the prostate was done with the findings noted below  Images were taken of any abnormal findings and also to document prostate size  Bladder  The bladder base appeared normal     Prostate  Digital rectal exam findings:  - enlarged gland    Ultrasound size measurements:  -Volume:  133 95 cm3    Ultrasound findings:  -Cysts: None  -Masses: None  -Median lobe: present                  Complications  There were no procedural complications  Disposition  The patient was dismissed to home     Post-procedure instructions: Today he underwent an uncomplicated transrectal ultrasound      Showing a 133 gram gland, no malignant findings are noted, a large median lobe is noted  Biopsy prostate     Date/Time 7/5/2022 10:04 AM     Performed by  Randell Mccloud MD     Authorized by Randell Mccloud MD      [place] Protocol   Consent: Verbal consent obtained  Written consent obtained  Risks and benefits: risks, benefits and alternatives were discussed  Consent given by: patient  Patient understanding: patient states understanding of the procedure being performed  Patient consent: the patient's understanding of the procedure matches consent given  Procedure consent: procedure consent matches procedure scheduled  Relevant documents: relevant documents present and verified  Test results: test results available and properly labeled  Site marked: the operative site was not marked  Radiology Images displayed and confirmed   If images not available, report reviewed: imaging studies available  Required items: required blood products, implants, devices, and special equipment available  Patient identity confirmed: verbally with patient and provided demographic data        Local anesthesia used: no     Anesthesia   Local anesthesia used: no     Sedation   Patient sedated: no        Specimen: no    Culture: no   Procedure Details   Procedure Notes: Office Transrectal Ultrasound    Indication    BPH/LUTS    Informed consent   The risks, benefits and alternatives to TRUS d/w patient      Transrectal ultrasonography  The patient was placed in the left lateral decubitus position  After an attentive digital rectal examination, a 7 5 mHz sidefire ultrasound probe was gently inserted into the rectum and biplanar imaging of the prostate was done with the findings noted below  Images were taken of any abnormal findings and also to document prostate size  Bladder  The bladder base appeared normal     Prostate  Digital rectal exam findings:  - enlarged gland    Ultrasound size measurements:  -Volume:  133 95 cm3    Ultrasound findings:  -Cysts: None  -Masses: None  -Median lobe: present                  Complications  There were no procedural complications  Disposition  The patient was dismissed to home     Post-procedure instructions: Today he underwent an uncomplicated transrectal ultrasound      Showing a 133 gram gland, no malignant findings are noted, a large median lobe is noted    Patient Transportation: confirmed  Patient tolerance: patient tolerated the procedure well with no immediate complications

## 2022-07-05 NOTE — PROGRESS NOTES
Office Cystoscopy Procedure Note    Indication:    BPH/LUTS    Informed consent   The risks, benefits, complications, treatment options, and expected outcomes were discussed with the patient  The patient concurred with the proposed plan and provided informed consent  Anesthesia  Lidocaine jelly 2%    Antibiotic prophylaxis   None    Procedure  The patient was placed in the supineposition, was prepped and draped in the usual manner using sterile technique, and 2% lidocaine jelly instilled into the urethra  A 17 F flexible cystoscope was then inserted into the urethra and the urethra and bladder carefully examined  The following findings were noted:    Findings:  Urethra:  normal  Prostate:  Trilobar hypertrophy, high-grade obstruction of the bladder outlet  Bladder:  No malignant-appearing lesions, no stones  Ureteral orifices:  Orthotopic  Other findings:  None, retroflexed view confirms    Specimens: None                 Complications:    None; patient tolerated the procedure well           Disposition: To home            Condition: Stable    Plan: Trilobar hypertrophy, high grade obstruction, no malignant lesions       Cystoscopy     Date/Time 7/5/2022 10:02 AM     Performed by  Arnaud Buenrostro MD     Authorized by Arnaud Buenrostro MD      Universal Protocol:  Consent: Verbal consent obtained  Written consent obtained  Risks and benefits: risks, benefits and alternatives were discussed  Consent given by: patient  Patient understanding: patient states understanding of the procedure being performed  Patient consent: the patient's understanding of the procedure matches consent given  Procedure consent: procedure consent matches procedure scheduled  Relevant documents: relevant documents present and verified  Test results: test results available and properly labeled  Site marked: the operative site was not marked  Radiology Images displayed and confirmed   If images not available, report reviewed: imaging studies available  Required items: required blood products, implants, devices, and special equipment available  Patient identity confirmed: verbally with patient and provided demographic data        Procedure Details:  Procedure type: cystoscopy    Patient tolerance: Patient tolerated the procedure well with no immediate complications    Additional Procedure Details: Office Cystoscopy Procedure Note    Indication:    BPH/LUTS    Informed consent   The risks, benefits, complications, treatment options, and expected outcomes were discussed with the patient  The patient concurred with the proposed plan and provided informed consent  Anesthesia  Lidocaine jelly 2%    Antibiotic prophylaxis   None    Procedure  The patient was placed in the supineposition, was prepped and draped in the usual manner using sterile technique, and 2% lidocaine jelly instilled into the urethra  A 17 F flexible cystoscope was then inserted into the urethra and the urethra and bladder carefully examined    The following findings were noted:    Findings:  Urethra:  normal  Prostate:  Trilobar hypertrophy, high-grade obstruction of the bladder outlet  Bladder:  No malignant-appearing lesions, no stones  Ureteral orifices:  Orthotopic  Other findings:  None, retroflexed view confirms    Specimens: None                 Complications:    None; patient tolerated the procedure well           Disposition: To home            Condition: Stable    Plan: Trilobar hypertrophy, high grade obstruction, no malignant lesions

## 2022-08-21 DIAGNOSIS — E11.22 TYPE 2 DIABETES MELLITUS WITH STAGE 3 CHRONIC KIDNEY DISEASE, WITH LONG-TERM CURRENT USE OF INSULIN, UNSPECIFIED WHETHER STAGE 3A OR 3B CKD (HCC): ICD-10-CM

## 2022-08-21 DIAGNOSIS — Z79.4 TYPE 2 DIABETES MELLITUS WITH STAGE 3 CHRONIC KIDNEY DISEASE, WITH LONG-TERM CURRENT USE OF INSULIN, UNSPECIFIED WHETHER STAGE 3A OR 3B CKD (HCC): ICD-10-CM

## 2022-08-21 DIAGNOSIS — N18.30 TYPE 2 DIABETES MELLITUS WITH STAGE 3 CHRONIC KIDNEY DISEASE, WITH LONG-TERM CURRENT USE OF INSULIN, UNSPECIFIED WHETHER STAGE 3A OR 3B CKD (HCC): ICD-10-CM

## 2022-08-22 RX ORDER — INSULIN LISPRO 100 [IU]/ML
INJECTION, SOLUTION INTRAVENOUS; SUBCUTANEOUS
Qty: 15 ML | Refills: 3 | Status: SHIPPED | OUTPATIENT
Start: 2022-08-22

## 2022-08-24 ENCOUNTER — OFFICE VISIT (OUTPATIENT)
Dept: CARDIOLOGY CLINIC | Facility: CLINIC | Age: 77
End: 2022-08-24
Payer: MEDICARE

## 2022-08-24 VITALS
HEART RATE: 63 BPM | SYSTOLIC BLOOD PRESSURE: 115 MMHG | WEIGHT: 172.1 LBS | BODY MASS INDEX: 24.69 KG/M2 | DIASTOLIC BLOOD PRESSURE: 50 MMHG

## 2022-08-24 DIAGNOSIS — E78.2 MIXED HYPERLIPIDEMIA: ICD-10-CM

## 2022-08-24 DIAGNOSIS — I25.10 CORONARY ARTERY DISEASE INVOLVING NATIVE CORONARY ARTERY OF NATIVE HEART WITHOUT ANGINA PECTORIS: Primary | ICD-10-CM

## 2022-08-24 DIAGNOSIS — I50.32 CHRONIC DIASTOLIC CONGESTIVE HEART FAILURE (HCC): ICD-10-CM

## 2022-08-24 DIAGNOSIS — I10 ESSENTIAL HYPERTENSION: ICD-10-CM

## 2022-08-24 PROCEDURE — 93000 ELECTROCARDIOGRAM COMPLETE: CPT | Performed by: INTERNAL MEDICINE

## 2022-08-24 PROCEDURE — 99214 OFFICE O/P EST MOD 30 MIN: CPT | Performed by: INTERNAL MEDICINE

## 2022-08-24 NOTE — PROGRESS NOTES
Cardiology   JerilynUtica Poster, DO, Kelly Garza MD, Ne Aguilar MD, Herb Ny MD, [facility] [place]  -------------------------------------------------------------------  [facility]  [address], [address] [facility], [address] [place] [address], [facility] [address]  [phone]    Cardiology Follow Up  Tung Jerez  1945  [id number]          Assessment/Plan:    1  Coronary artery disease involving native coronary artery of native heart without angina pectoris    2  Essential hypertension    3  Mixed hyperlipidemia    4  Chronic diastolic congestive heart failure (HCC)      - LE edema resolved with discontinuation of amlodipine and increasing furosemide to 20 mg daily  Last echocardiogram showed a normal EF with diastolic dysfunction  - patient is predominantly sedentary and has multiple risk factors for CAD including previous PCI over 5 years ago  Will schedule for nuclear stress test for further evaluation   - blood pressure is stable off amlodipine  - continue furosemide 20 mg daily  - Call if any change or if any dyspnea or LE edema    - Discussed diet and weight loss  Interval History:     Tung Jerez is 65 y o  male here for followup of CAD and CHF  Since his last visit, he has been feeling well   he denies any palpitations, chest pain, shortness of breath, LE edema, orthopnea or PND  He is mostly sedentary but does not have any symptoms with his usual exertion level  He was previously having LE edema that resolved with discontinuing amlodipine and increasing furosemide dosage  He had blood work done in April which showed LDL of 66, triglycerides of 70  Creatinine was 1 5  A1c was elevated at 7 8%  He follows with endocrinology as well  He has lost weight with addition of Ozempic  The patient has a history of CAD  He has a h/o PCI to the circumflex vessel in 1992   His last nuclear stress test was done in 5/2015 which showed inferior/inferolateral infarct c/w attenuation  EF 63%  He exercised for 6:38    He had a treadmill stress in July 2018 which was normal   He exercised for 7 minutes without ECG changes     Echocardiogram in March 2021 showed ejection fraction of 55-60% with mild valve disease    The following portions of the patient's history were reviewed and updated as appropriate: allergies, current medications, past family history, past medical history, past social history, past surgical history and problem list       Current Outpatient Medications:     albuterol (ProAir HFA) 90 mcg/act inhaler, Inhale 2 puffs every 6 (six) hours as needed for wheezing, Disp: 1 Inhaler, Rfl: 0    aspirin (ECOTRIN LOW STRENGTH) 81 mg EC tablet, Take 81 mg by mouth every morning  , Disp: , Rfl:     atorvastatin (LIPITOR) 40 mg tablet, TAKE 1 TABLET BY MOUTH  DAILY, Disp: 90 tablet, Rfl: 3    Coenzyme Q10 (COQ-10) 100 MG CAPS, Take 200 mg by mouth daily at bedtime , Disp: , Rfl:     Continuous Blood Gluc Sensor (FreeStyle Jess 2 Sensor) MISC, Change sensor every 14 days, Disp: 6 each, Rfl: 3    doxazosin (CARDURA XL) 8 MG 24 hr tablet, Take 1 tablet (8 mg total) by mouth daily with breakfast, Disp: 30 tablet, Rfl: 3    escitalopram (Lexapro) 10 mg tablet, Take 1 tablet (10 mg total) by mouth daily, Disp: 90 tablet, Rfl: 1    finasteride (PROSCAR) 5 mg tablet, Take 1 tablet (5 mg total) by mouth daily, Disp: 90 tablet, Rfl: 3    furosemide (LASIX) 20 mg tablet, Take 1 tablet (20 mg total) by mouth daily, Disp: 90 tablet, Rfl: 1    insulin detemir (Levemir FlexTouch) 100 Units/mL injection pen, Inject 12 Units under the skin daily at bedtime, Disp: 18 mL, Rfl: 3    insulin lispro (HumaLOG KwikPen) 100 units/mL injection pen, INJECT 7 UNITS  SUBCUTANEOUSLY BEFORE  BREAKFAST AND 5 UNITS  BEFORE LUNCH AND DINNER, Disp: 15 mL, Rfl: 3    Insulin Pen Needle (BD Pen Needle Sherie U/F) 32G X 4 MM MISC, USE 4 TIMES DAILY AS  DIRECTED, Disp: 90 each, Rfl: 3    levothyroxine 75 mcg tablet, TAKE 1 TABLET BY MOUTH  DAILY, Disp: 90 tablet, Rfl: 3    lisinopril (ZESTRIL) 2 5 mg tablet, TAKE 1 TABLET BY MOUTH  DAILY, Disp: 90 tablet, Rfl: 3    metoprolol succinate (TOPROL-XL) 25 mg 24 hr tablet, TAKE 1 TABLET BY MOUTH  DAILY, Disp: 90 tablet, Rfl: 3    multivitamin (THERAGRAN) TABS, Take 1 tablet by mouth every morning, Disp: , Rfl:     pantoprazole (PROTONIX) 40 mg tablet, TAKE 1 TABLET BY MOUTH  DAILY, Disp: 90 tablet, Rfl: 3    Semaglutide,0 25 or 0 5MG/DOS, (Ozempic, 0 25 or 0 5 MG/DOSE,) 2 MG/1 5ML SOPN, Inject 0 25 mg under the skin once a week, Disp: 3 mL, Rfl: 3    donepezil (ARICEPT) 10 mg tablet, Take 1 tablet (10 mg total) by mouth daily at bedtime (Patient not taking: No sig reported), Disp: 90 tablet, Rfl: 1    Semaglutide,0 25 or 0 5MG/DOS, (Ozempic, 0 25 or 0 5 MG/DOSE,) 2 MG/1 5ML SOPN, Inject   25 mg under the skin once a week (Patient not taking: No sig reported), Disp: 1 5 mL, Rfl: 0        Review of Systems:  Review of Systems   Respiratory: Negative for shortness of breath  Cardiovascular: Negative for chest pain, palpitations and leg swelling  Musculoskeletal: Positive for arthralgias  All other systems reviewed and are negative  Physical Exam:  Vitals:  Vitals:    08/24/22 1256   BP: 115/50   BP Location: Left arm   Patient Position: Sitting   Cuff Size: Standard   Pulse: 63   Weight: 78 1 kg (172 lb 1 6 oz)     Physical Exam   Constitutional: He appears healthy  No distress  Eyes: Pupils are equal, round, and reactive to light  Conjunctivae are normal    Neck: No JVD present  Cardiovascular: Normal rate, regular rhythm and normal heart sounds  Exam reveals no gallop and no friction rub  No murmur heard  Pulmonary/Chest: Effort normal and breath sounds normal  He has no wheezes  He has no rales  Musculoskeletal:         General: No tenderness, deformity or edema        Cervical back: Normal range of motion and neck supple  Neurological: He is alert and oriented to person, place, and time  Skin: Skin is warm and dry  Cardiographics:  EKG: Personally reviewed NSR with q waves inferiorly  Last known EF: 55%    This note was completed in part utilizing M-Modal Fluency Direct Software  Grammatical errors, random word insertions, spelling mistakes, and incomplete sentences can be an occasional consequence of this system secondary to software limitations, ambient noise, and hardware issues  If you have any questions or concerns about the content, text, or information contained within the body of this dictation, please contact the provider for clarification

## 2022-08-25 ENCOUNTER — HOSPITAL ENCOUNTER (OUTPATIENT)
Dept: NON INVASIVE DIAGNOSTICS | Facility: HOSPITAL | Age: 77
Discharge: HOME | End: 2022-08-25
Attending: INTERNAL MEDICINE
Payer: MEDICARE

## 2022-08-25 ENCOUNTER — HOSPITAL ENCOUNTER (OUTPATIENT)
Dept: RADIOLOGY | Facility: HOSPITAL | Age: 77
Discharge: HOME | End: 2022-08-25
Attending: INTERNAL MEDICINE
Payer: MEDICARE

## 2022-08-25 DIAGNOSIS — I50.32 CHRONIC DIASTOLIC CONGESTIVE HEART FAILURE (HCC): ICD-10-CM

## 2022-08-25 DIAGNOSIS — I10 ESSENTIAL HYPERTENSION: ICD-10-CM

## 2022-08-25 DIAGNOSIS — I25.10 CORONARY ARTERY DISEASE INVOLVING NATIVE CORONARY ARTERY OF NATIVE HEART WITHOUT ANGINA PECTORIS: ICD-10-CM

## 2022-08-25 LAB
CHEST PAIN STATEMENT: NORMAL
MAX DIASTOLIC BP: 70 MMHG
MAX HEART RATE: 122 BPM
MAX HR PERCENT: 85 %
MAX HR: 122 BPM
MAX PREDICTED HEART RATE: 142 BPM
MAX. SYSTOLIC BP: 164 MMHG
PROTOCOL NAME: NORMAL
RATE PRESSURE PRODUCT: NORMAL
REASON FOR TERMINATION: NORMAL
SL CV REST NUCLEAR ISOTOPE DOSE: 10.27 MCI
SL CV STRESS NUCLEAR ISOTOPE DOSE: 32 MCI
SL CV STRESS RECOVERY BP: NORMAL MMHG
SL CV STRESS RECOVERY HR: 77 BPM
SL CV STRESS RECOVERY O2 SAT: 99 %
SL CV STRESS STAGE REACHED: 2
STRESS ANGINA INDEX: 0
STRESS BASELINE BP: NORMAL MMHG
STRESS BASELINE HR: 68 BPM
STRESS DUKE TREADMILL SCORE: 6
STRESS O2 SAT REST: 98 %
STRESS PEAK HR: 122 BPM
STRESS POST ESTIMATED WORKLOAD: 7 METS
STRESS POST EXERCISE DUR MIN: 6 MIN
STRESS POST O2 SAT PEAK: 99 %
STRESS POST PEAK BP: 164 MMHG
STRESS ST DEPRESSION: 0 MM
TARGET HR FORMULA: NORMAL
TEST INDICATION: NORMAL
TIME IN EXERCISE PHASE: NORMAL

## 2022-08-25 PROCEDURE — 93017 CV STRESS TEST TRACING ONLY: CPT

## 2022-08-25 PROCEDURE — 93018 CV STRESS TEST I&R ONLY: CPT | Performed by: INTERNAL MEDICINE

## 2022-08-25 PROCEDURE — G1004 CDSM NDSC: HCPCS

## 2022-08-25 PROCEDURE — 93016 CV STRESS TEST SUPVJ ONLY: CPT | Performed by: INTERNAL MEDICINE

## 2022-08-25 PROCEDURE — A9502 TC99M TETROFOSMIN: HCPCS

## 2022-08-25 PROCEDURE — 78452 HT MUSCLE IMAGE SPECT MULT: CPT | Performed by: INTERNAL MEDICINE

## 2022-08-25 PROCEDURE — 78452 HT MUSCLE IMAGE SPECT MULT: CPT

## 2022-08-29 ENCOUNTER — TELEPHONE (OUTPATIENT)
Dept: CARDIOLOGY CLINIC | Facility: CLINIC | Age: 77
End: 2022-08-29

## 2022-08-29 NOTE — TELEPHONE ENCOUNTER
----- Message from Lisa Henriquez DO sent at 8/29/2022  9:57 AM EDT -----  Can you please let the patient know his stress test was normal

## 2022-09-03 DIAGNOSIS — I10 ESSENTIAL HYPERTENSION: ICD-10-CM

## 2022-09-07 ENCOUNTER — OFFICE VISIT (OUTPATIENT)
Dept: ENDOCRINOLOGY | Facility: CLINIC | Age: 77
End: 2022-09-07
Payer: MEDICARE

## 2022-09-07 VITALS
DIASTOLIC BLOOD PRESSURE: 60 MMHG | WEIGHT: 172 LBS | HEART RATE: 73 BPM | SYSTOLIC BLOOD PRESSURE: 104 MMHG | BODY MASS INDEX: 24.62 KG/M2 | HEIGHT: 70 IN

## 2022-09-07 DIAGNOSIS — Z79.4 TYPE 2 DIABETES MELLITUS WITH STAGE 3 CHRONIC KIDNEY DISEASE, WITH LONG-TERM CURRENT USE OF INSULIN (HCC): ICD-10-CM

## 2022-09-07 DIAGNOSIS — N18.30 TYPE 2 DIABETES MELLITUS WITH STAGE 3 CHRONIC KIDNEY DISEASE, WITH LONG-TERM CURRENT USE OF INSULIN (HCC): ICD-10-CM

## 2022-09-07 DIAGNOSIS — I25.10 CORONARY ARTERY DISEASE INVOLVING NATIVE CORONARY ARTERY OF NATIVE HEART WITHOUT ANGINA PECTORIS: ICD-10-CM

## 2022-09-07 DIAGNOSIS — E11.22 TYPE 2 DIABETES MELLITUS WITH STAGE 3 CHRONIC KIDNEY DISEASE, WITH LONG-TERM CURRENT USE OF INSULIN (HCC): ICD-10-CM

## 2022-09-07 DIAGNOSIS — E78.2 MIXED HYPERLIPIDEMIA: ICD-10-CM

## 2022-09-07 DIAGNOSIS — I50.32 CHRONIC DIASTOLIC CONGESTIVE HEART FAILURE (HCC): ICD-10-CM

## 2022-09-07 DIAGNOSIS — E03.9 ACQUIRED HYPOTHYROIDISM: Primary | ICD-10-CM

## 2022-09-07 DIAGNOSIS — I10 ESSENTIAL HYPERTENSION: ICD-10-CM

## 2022-09-07 PROCEDURE — 99214 OFFICE O/P EST MOD 30 MIN: CPT | Performed by: INTERNAL MEDICINE

## 2022-09-07 PROCEDURE — 95251 CONT GLUC MNTR ANALYSIS I&R: CPT | Performed by: INTERNAL MEDICINE

## 2022-09-07 RX ORDER — LISINOPRIL 2.5 MG/1
TABLET ORAL
Qty: 90 TABLET | Refills: 3 | Status: SHIPPED | OUTPATIENT
Start: 2022-09-07

## 2022-09-07 RX ORDER — PEN NEEDLE, DIABETIC 32GX 5/32"
NEEDLE, DISPOSABLE MISCELLANEOUS
Qty: 100 EACH | Refills: 3 | Status: SHIPPED | OUTPATIENT
Start: 2022-09-07 | End: 2022-10-14 | Stop reason: SDUPTHER

## 2022-09-07 RX ORDER — INSULIN DETEMIR 100 [IU]/ML
8 INJECTION, SOLUTION SUBCUTANEOUS
Qty: 15 ML | Refills: 3 | Status: SHIPPED | OUTPATIENT
Start: 2022-09-07 | End: 2023-03-06

## 2022-09-07 NOTE — PATIENT INSTRUCTIONS
New current regimen   Please try to avoid high carbohydrates for breakfast, include some protein into your diet  Recommend walking/other form of exercise/ physical activity   Please follow-up with your eye doctor   Labs now and prior to follow-up  If you notice any persistent high or low blood sugars, please let us know

## 2022-09-07 NOTE — PROGRESS NOTES
Delmy Sepulveda 65 y o  male MRN: [id number]    Encounter: [id number]      Assessment/Plan     1  Type 2 diabetes mellitus long-term insulin with hyperglycemia   2  CKD   No recent labs, Last A1c 7 8%   Fairly controlled for age, comorbidities based on review of continuous glucose monitor report  Hyperglycemia most prominent post breakfast which is a high carb meal  At this time patient prefers not increasing dose of Ozempic    Recommend the following at this time  Continue current regimen   Avoid high carbohydrates at breakfast, try to include some protein   Consistent carb diet, regular exercise  Follow-up with ophthalmologist  Labs now, prior to follow    3  Hyperlipidemia  - continue statin therapy    4  Hypertension  5  CAD, CHF  Blood pressure at goal  - continue current medications, follow-up with cardiology     6  Hypothyroidism - continue levothyroxine, check labs    CC: Diabetes    History of Present Illness     HPI:  Delmy Sepulveda is a 65 y o  male presents for a follow-up visit regarding diabetes management  Also has  hypertension, hyperlipidemia, CAD, CHF, CKD, hypothyroidism, Hammond's esophagus    Last seen in 03/2022  Last Eye exam:  Due     Current regimen:   levemir 8-9 units subcutaneously at bedtime, most days 8    Humalog 7, 5-6, 5-6, units with meals  ozempic 0 25 mg weekly     Levothyroxine 75 mcg orally daily    Lost 20 lbs since the last visit   No appetite suppression   otherwise feels well and has no complaints   No walking/ exercise     has muffins for breakfast most dayst , no protein     Statin:  Lipitor  ACE-I/ARB:   Lisinopril     Home glucose monitoring: CGM interpretation   Jess   Time percentage CGM worn 98 %   Time in range 68  (goal >65-70%)  High  25%  Very high 7%  Low 0%  Very low 0%  SD 26 4 (goal <50)     Average glucose 174 mg/dL  GMI 7 5 %     All other systems were reviewed and are negative      Review of Systems      Historical Information   Past Medical History:   Diagnosis Date    Aortic narrowing     Last Assessed:  9/28/15    Arthritis     Hammond esophagus     Bilateral leg edema     Bulla, lung (HCC)     CHF (congestive heart failure) (HCC)     Chronic kidney disease     stage 3    Colon polyp     COVID-19 virus infection 02/08/2021    Diabetes mellitus ([facility] )     Enlarged prostate     Heel pain     right heel slipped in the garage-landed on the right heel    Herniated lumbar intervertebral disc     x 4 discs-fell off a roof    Hiatal hernia     High cholesterol     History of kidney problems     Hypertension     Myocardial infarction ([facility] )     Old myocardial infarction     x2-pt was unaware of the MI-one stent    Thyroid disease     hypothyroid    Transient cerebral ischemia     Last Assessed:  8/27/15    Wears dentures     full upper, partial lower    Wears glasses      Past Surgical History:   Procedure Laterality Date    CATARACT EXTRACTION      COLONOSCOPY      Resolved:  2015    CORONARY ANGIOPLASTY WITH STENT PLACEMENT      Stent implanted early 1990's,     ESOPHAGOGASTRODUODENOSCOPY      KNEE SURGERY      right knee cartilage surgery-left meniscus repair    WY XCAPSL CTRC RMVL INSJ IO LENS PROSTH W/O ECP Right 1/24/2019    Procedure: EXTRACTION EXTRACAPSULAR CATARACT PHACO INTRAOCULAR LENS (IOL); Surgeon: Alejandro Garibay MD;  Location: [facility] OR;  Service: Ophthalmology    WY XCAPSL CTRC RMVL INSJ IO LENS PROSTH W/O ECP Left 2/21/2019    Procedure: EXTRACTION EXTRACAPSULAR CATARACT PHACO INTRAOCULAR LENS (IOL);   Surgeon: Alejandro Garibay MD;  Location: [facility] OR;  Service: Ophthalmology    TONSILLECTOMY      UMBILICAL HERNIA REPAIR      1980's,    UPPER GASTROINTESTINAL ENDOSCOPY      Last Assessed:  8/27/15     Social History   Social History     Substance and Sexual Activity   Alcohol Use Not Currently     Social History     Substance and Sexual Activity   Drug Use No     Social History     Tobacco Use   Smoking Status Former Smoker    Packs/day: 4 00    Years: 20 00    Pack years: 80 00    Quit date:     Years since quittin 7   Smokeless Tobacco Never Used     Family History:   Family History   Problem Relation Age of Onset    Colon cancer Mother     Arthritis Mother     Diabetes Mother     Hypertension Mother     Cancer Mother         Breast    Hyperlipidemia Mother     Cancer Sister         ovarian    Other Brother         Cerebrovascular accident (CVA)    Diabetes Other         Sibling    Hypertension Other         Sibling    Hernia Father         umbilical   Yvonne Mare Hernia Son     Cancer Sister         liver    Kidney disease Sister     Kidney disease Brother     Mental illness Neg Hx        Meds/Allergies   Current Outpatient Medications   Medication Sig Dispense Refill    aspirin (ECOTRIN LOW STRENGTH) 81 mg EC tablet Take 81 mg by mouth every morning        atorvastatin (LIPITOR) 40 mg tablet TAKE 1 TABLET BY MOUTH  DAILY 90 tablet 3    Coenzyme Q10 (COQ-10) 100 MG CAPS Take 200 mg by mouth daily at bedtime       Continuous Blood Gluc Sensor (FreeStyle Jess 2 Sensor) MISC Change sensor every 14 days 6 each 3    doxazosin (CARDURA XL) 8 MG 24 hr tablet Take 1 tablet (8 mg total) by mouth daily with breakfast 30 tablet 3    escitalopram (Lexapro) 10 mg tablet Take 1 tablet (10 mg total) by mouth daily 90 tablet 1    finasteride (PROSCAR) 5 mg tablet Take 1 tablet (5 mg total) by mouth daily 90 tablet 3    furosemide (LASIX) 20 mg tablet Take 1 tablet (20 mg total) by mouth daily 90 tablet 1    insulin detemir (Levemir FlexTouch) 100 Units/mL injection pen Inject 12 Units under the skin daily at bedtime (Patient taking differently: Inject 8-9 Units under the skin daily at bedtime) 18 mL 3    insulin lispro (HumaLOG KwikPen) 100 units/mL injection pen INJECT 7 UNITS  SUBCUTANEOUSLY BEFORE  BREAKFAST AND 5 UNITS  BEFORE LUNCH AND DINNER 15 mL 3    Insulin Pen Needle (BD Pen Needle Sherie U/F) 32G X 4 MM MISC USE 4 TIMES DAILY AS  DIRECTED 90 each 3    levothyroxine 75 mcg tablet TAKE 1 TABLET BY MOUTH  DAILY 90 tablet 3    lisinopril (ZESTRIL) 2 5 mg tablet TAKE 1 TABLET BY MOUTH  DAILY 90 tablet 3    metoprolol succinate (TOPROL-XL) 25 mg 24 hr tablet TAKE 1 TABLET BY MOUTH  DAILY 90 tablet 3    multivitamin (THERAGRAN) TABS Take 1 tablet by mouth every morning      albuterol (ProAir HFA) 90 mcg/act inhaler Inhale 2 puffs every 6 (six) hours as needed for wheezing (Patient not taking: Reported on 9/7/2022) 1 Inhaler 0    donepezil (ARICEPT) 10 mg tablet Take 1 tablet (10 mg total) by mouth daily at bedtime (Patient not taking: No sig reported) 90 tablet 1    pantoprazole (PROTONIX) 40 mg tablet TAKE 1 TABLET BY MOUTH  DAILY (Patient not taking: Reported on 9/7/2022) 90 tablet 3    Semaglutide,0 25 or 0 5MG/DOS, (Ozempic, 0 25 or 0 5 MG/DOSE,) 2 MG/1 5ML SOPN Inject 0 25 mg under the skin once a week (Patient not taking: Reported on 9/7/2022) 3 mL 3     No current facility-administered medications for this visit  No Known Allergies    Objective   Vitals: Blood pressure 104/60, pulse 73, height 5' 10" (1 778 m), weight 78 kg (172 lb)  Physical Exam  Constitutional:       General: He is not in acute distress  Appearance: He is well-developed  He is not diaphoretic  HENT:      Head: Normocephalic and atraumatic  Eyes:      Conjunctiva/sclera: Conjunctivae normal       Pupils: Pupils are equal, round, and reactive to light  Cardiovascular:      Rate and Rhythm: Normal rate and regular rhythm  Heart sounds: Normal heart sounds  No murmur heard  Pulmonary:      Effort: Pulmonary effort is normal  No respiratory distress  Breath sounds: Normal breath sounds  No wheezing  Abdominal:      General: There is no distension  Palpations: Abdomen is soft  Tenderness: There is no abdominal tenderness  There is no guarding     Musculoskeletal:      Cervical back: Normal range of motion and neck supple  Skin:     General: Skin is warm and dry  Findings: No erythema or rash  Neurological:      Mental Status: He is alert and oriented to person, place, and time  Psychiatric:         Behavior: Behavior normal          Thought Content: Thought content normal          The history was obtained from the review of the chart, patient  Lab Results:   Lab Results   Component Value Date/Time    Hemoglobin A1C 7 8 (H) 04/12/2022 09:06 AM    Hemoglobin A1C 8 0 (H) 04/12/2022 09:05 AM    BUN 21 04/12/2022 09:06 AM    BUN 21 04/12/2022 09:05 AM    BUN 26 12/23/2021 12:52 PM    Potassium 4 4 04/12/2022 09:06 AM    Potassium 4 5 04/12/2022 09:05 AM    Chloride 105 04/12/2022 09:06 AM    Chloride 105 04/12/2022 09:05 AM    CO2 20 04/12/2022 09:06 AM    CO2 20 04/12/2022 09:05 AM    Creatinine 1 48 (H) 04/12/2022 09:06 AM    Creatinine 1 51 (H) 04/12/2022 09:05 AM    Creatinine 1 67 (H) 12/23/2021 12:52 PM    AST 15 04/12/2022 09:06 AM    AST 14 04/12/2022 09:05 AM    ALT 12 04/12/2022 09:06 AM    ALT 13 04/12/2022 09:05 AM    Albumin 4 3 04/12/2022 09:06 AM    Albumin 4 3 04/12/2022 09:05 AM    Globulin, Total 1 8 04/12/2022 09:06 AM    Globulin, Total 1 8 04/12/2022 09:05 AM    HDL 52 04/12/2022 09:06 AM    HDL 52 04/12/2022 09:05 AM    Triglycerides 70 04/12/2022 09:06 AM    Triglycerides 73 04/12/2022 09:05 AM         Imaging Studies: I have personally reviewed pertinent reports  Portions of the record may have been created with voice recognition software  Occasional wrong word or "sound a like" substitutions may have occurred due to the inherent limitations of voice recognition software  Read the chart carefully and recognize, using context, where substitutions have occurred

## 2022-09-10 DIAGNOSIS — I10 ESSENTIAL HYPERTENSION: ICD-10-CM

## 2022-09-12 RX ORDER — FUROSEMIDE 20 MG/1
TABLET ORAL
Qty: 90 TABLET | Refills: 1 | Status: SHIPPED | OUTPATIENT
Start: 2022-09-12

## 2022-09-15 LAB
ALBUMIN SERPL-MCNC: 4.1 G/DL (ref 3.7–4.7)
ALBUMIN/GLOB SERPL: 2.4 {RATIO} (ref 1.2–2.2)
ALP SERPL-CCNC: 84 IU/L (ref 44–121)
ALT SERPL-CCNC: 12 IU/L (ref 0–44)
AST SERPL-CCNC: 18 IU/L (ref 0–40)
BILIRUB SERPL-MCNC: 0.4 MG/DL (ref 0–1.2)
BUN SERPL-MCNC: 26 MG/DL (ref 8–27)
BUN/CREAT SERPL: 17 (ref 10–24)
CALCIUM SERPL-MCNC: 9.3 MG/DL (ref 8.6–10.2)
CHLORIDE SERPL-SCNC: 105 MMOL/L (ref 96–106)
CHOLEST SERPL-MCNC: 145 MG/DL (ref 100–199)
CO2 SERPL-SCNC: 23 MMOL/L (ref 20–29)
CREAT SERPL-MCNC: 1.49 MG/DL (ref 0.76–1.27)
EGFR: 48 ML/MIN/1.73
EST. AVERAGE GLUCOSE BLD GHB EST-MCNC: 183 MG/DL
GLOBULIN SER-MCNC: 1.7 G/DL (ref 1.5–4.5)
GLUCOSE SERPL-MCNC: 160 MG/DL (ref 65–99)
HBA1C MFR BLD: 8 % (ref 4.8–5.6)
HDLC SERPL-MCNC: 57 MG/DL
LDLC SERPL CALC-MCNC: 72 MG/DL (ref 0–99)
POTASSIUM SERPL-SCNC: 4.2 MMOL/L (ref 3.5–5.2)
PROT SERPL-MCNC: 5.8 G/DL (ref 6–8.5)
SL AMB VLDL CHOLESTEROL CALC: 16 MG/DL (ref 5–40)
SODIUM SERPL-SCNC: 143 MMOL/L (ref 134–144)
T4 FREE SERPL-MCNC: 1.2 NG/DL (ref 0.82–1.77)
TRIGL SERPL-MCNC: 86 MG/DL (ref 0–149)
TSH SERPL DL<=0.005 MIU/L-ACNC: 1.37 UIU/ML (ref 0.45–4.5)

## 2022-09-19 DIAGNOSIS — Z79.4 TYPE 2 DIABETES MELLITUS WITH STAGE 3 CHRONIC KIDNEY DISEASE, WITH LONG-TERM CURRENT USE OF INSULIN, UNSPECIFIED WHETHER STAGE 3A OR 3B CKD (HCC): Primary | ICD-10-CM

## 2022-09-19 DIAGNOSIS — N18.30 TYPE 2 DIABETES MELLITUS WITH STAGE 3 CHRONIC KIDNEY DISEASE, WITH LONG-TERM CURRENT USE OF INSULIN, UNSPECIFIED WHETHER STAGE 3A OR 3B CKD (HCC): Primary | ICD-10-CM

## 2022-09-19 DIAGNOSIS — E11.22 TYPE 2 DIABETES MELLITUS WITH STAGE 3 CHRONIC KIDNEY DISEASE, WITH LONG-TERM CURRENT USE OF INSULIN, UNSPECIFIED WHETHER STAGE 3A OR 3B CKD (HCC): Primary | ICD-10-CM

## 2022-10-14 DIAGNOSIS — F41.9 ANXIETY: ICD-10-CM

## 2022-10-14 DIAGNOSIS — E11.22 TYPE 2 DIABETES MELLITUS WITH STAGE 3 CHRONIC KIDNEY DISEASE, WITH LONG-TERM CURRENT USE OF INSULIN (HCC): ICD-10-CM

## 2022-10-14 DIAGNOSIS — Z79.4 TYPE 2 DIABETES MELLITUS WITH STAGE 3 CHRONIC KIDNEY DISEASE, WITH LONG-TERM CURRENT USE OF INSULIN (HCC): ICD-10-CM

## 2022-10-14 DIAGNOSIS — N18.30 TYPE 2 DIABETES MELLITUS WITH STAGE 3 CHRONIC KIDNEY DISEASE, WITH LONG-TERM CURRENT USE OF INSULIN (HCC): ICD-10-CM

## 2022-10-14 RX ORDER — PEN NEEDLE, DIABETIC 32GX 5/32"
NEEDLE, DISPOSABLE MISCELLANEOUS
Qty: 100 EACH | Refills: 3 | Status: SHIPPED | OUTPATIENT
Start: 2022-10-14 | End: 2022-10-19 | Stop reason: SDUPTHER

## 2022-10-15 RX ORDER — ESCITALOPRAM OXALATE 10 MG/1
TABLET ORAL
Qty: 90 TABLET | Refills: 1 | Status: SHIPPED | OUTPATIENT
Start: 2022-10-15

## 2022-10-19 DIAGNOSIS — Z79.4 TYPE 2 DIABETES MELLITUS WITH STAGE 3 CHRONIC KIDNEY DISEASE, WITH LONG-TERM CURRENT USE OF INSULIN (HCC): ICD-10-CM

## 2022-10-19 DIAGNOSIS — N18.30 TYPE 2 DIABETES MELLITUS WITH STAGE 3 CHRONIC KIDNEY DISEASE, WITH LONG-TERM CURRENT USE OF INSULIN (HCC): ICD-10-CM

## 2022-10-19 DIAGNOSIS — E11.22 TYPE 2 DIABETES MELLITUS WITH STAGE 3 CHRONIC KIDNEY DISEASE, WITH LONG-TERM CURRENT USE OF INSULIN (HCC): ICD-10-CM

## 2022-10-19 RX ORDER — PEN NEEDLE, DIABETIC 32GX 5/32"
NEEDLE, DISPOSABLE MISCELLANEOUS
Qty: 100 EACH | Refills: 3 | Status: SHIPPED | OUTPATIENT
Start: 2022-10-19

## 2022-11-28 NOTE — TELEPHONE ENCOUNTER
Patient asking if there are any recommendations for a Podiatrist   Please advise  Kaylee Watt MA Patient

## 2022-12-10 DIAGNOSIS — I10 ESSENTIAL HYPERTENSION: ICD-10-CM

## 2022-12-12 RX ORDER — METOPROLOL SUCCINATE 25 MG/1
TABLET, EXTENDED RELEASE ORAL
Qty: 90 TABLET | Refills: 3 | Status: SHIPPED | OUTPATIENT
Start: 2022-12-12

## 2023-02-07 DIAGNOSIS — Z79.4 TYPE 2 DIABETES MELLITUS WITH STAGE 3 CHRONIC KIDNEY DISEASE, WITH LONG-TERM CURRENT USE OF INSULIN (HCC): ICD-10-CM

## 2023-02-07 DIAGNOSIS — E11.22 TYPE 2 DIABETES MELLITUS WITH STAGE 3 CHRONIC KIDNEY DISEASE, WITH LONG-TERM CURRENT USE OF INSULIN (HCC): ICD-10-CM

## 2023-02-07 DIAGNOSIS — N18.30 TYPE 2 DIABETES MELLITUS WITH STAGE 3 CHRONIC KIDNEY DISEASE, WITH LONG-TERM CURRENT USE OF INSULIN (HCC): ICD-10-CM

## 2023-02-07 RX ORDER — PEN NEEDLE, DIABETIC 32GX 5/32"
NEEDLE, DISPOSABLE MISCELLANEOUS
Qty: 40 EACH | Refills: 3 | Status: SHIPPED | OUTPATIENT
Start: 2023-02-07 | End: 2023-02-13 | Stop reason: SDUPTHER

## 2023-02-09 ENCOUNTER — OFFICE VISIT (OUTPATIENT)
Dept: CARDIOLOGY CLINIC | Facility: CLINIC | Age: 78
End: 2023-02-09

## 2023-02-09 VITALS
DIASTOLIC BLOOD PRESSURE: 58 MMHG | HEIGHT: 70 IN | WEIGHT: 173 LBS | SYSTOLIC BLOOD PRESSURE: 124 MMHG | BODY MASS INDEX: 24.77 KG/M2 | OXYGEN SATURATION: 99 % | HEART RATE: 65 BPM

## 2023-02-09 DIAGNOSIS — E11.22 TYPE 2 DIABETES MELLITUS WITH STAGE 3 CHRONIC KIDNEY DISEASE, WITH LONG-TERM CURRENT USE OF INSULIN, UNSPECIFIED WHETHER STAGE 3A OR 3B CKD (HCC): ICD-10-CM

## 2023-02-09 DIAGNOSIS — I25.10 CORONARY ARTERY DISEASE INVOLVING NATIVE CORONARY ARTERY OF NATIVE HEART WITHOUT ANGINA PECTORIS: Primary | ICD-10-CM

## 2023-02-09 DIAGNOSIS — Z79.4 TYPE 2 DIABETES MELLITUS WITH STAGE 3 CHRONIC KIDNEY DISEASE, WITH LONG-TERM CURRENT USE OF INSULIN, UNSPECIFIED WHETHER STAGE 3A OR 3B CKD (HCC): ICD-10-CM

## 2023-02-09 DIAGNOSIS — I50.32 CHRONIC DIASTOLIC CONGESTIVE HEART FAILURE (HCC): ICD-10-CM

## 2023-02-09 DIAGNOSIS — N18.30 TYPE 2 DIABETES MELLITUS WITH STAGE 3 CHRONIC KIDNEY DISEASE, WITH LONG-TERM CURRENT USE OF INSULIN, UNSPECIFIED WHETHER STAGE 3A OR 3B CKD (HCC): ICD-10-CM

## 2023-02-09 DIAGNOSIS — I70.209 PERIPHERAL ARTERIOSCLEROSIS (HCC): ICD-10-CM

## 2023-02-09 NOTE — PROGRESS NOTES
Cardiology   Lilliana Douglas DO, Guillermo Cordero MD, Chandler Russo MD, Jean Marie John MD, [facility] [place]  -------------------------------------------------------------------  [facility]  [address], [address], [address]  [place], [place], [address] [facility] [address] [facility]  [phone]    Cardiology Follow Up  Whitney Cameron  1945  [id number]          Assessment/Plan:    1  Coronary artery disease involving native coronary artery of native heart without angina pectoris    2  Chronic diastolic congestive heart failure ([facility] [address] )    3  Type 2 diabetes mellitus with stage 3 chronic kidney disease, with long-term current use of insulin, unspecified whether stage 3a or 3b CKD ([facility] [address] )    4  [facility]ios[facility]      - LE edema resolved with discontinuation of amlodipine and increasing furosemide to 20 mg daily  Last echocardiogram showed a normal EF with diastolic dysfunction  - patient is predominantly sedentary and has multiple risk factors for CAD including previous PCI over 5 years ago  Most recent stress test did not show any ischemia or infarction  - blood pressure is stable off amlodipine  - continue furosemide 20 mg daily  - Call if any change or if any dyspnea or LE edema    - Discussed diet and weight loss  Interval History:     Whitney Cameron is 65 y o  male here for followup of CAD and CHF  Since his last visit, he has been feeling well   he denies any palpitations, chest pain, shortness of breath, LE edema, orthopnea or PND  Diet is overall unchanged  There has not been a significant change in weight  He was previously having LE edema that resolved with discontinuing amlodipine and increasing furosemide dosage  His most recent blood work was in September 2022 which showed a creatinine of 1 5 with LDL of 72, triglycerides of 86 and A1c of 8%  Patient follows with endocrinology as well       Past cardiac history:  He has a history of CAD  He has a h/o PCI to the circumflex vessel in 1992  His last nuclear stress test was done in 5/2015 which showed inferior/inferolateral infarct c/w attenuation  EF 63%  He exercised for 6:38    He had a treadmill stress in July 2018 which was normal   He exercised for 7 minutes without ECG changes     Echocardiogram in March 2021 showed ejection fraction of 55-60% with mild valve disease    The following portions of the patient's history were reviewed and updated as appropriate: allergies, current medications, past family history, past medical history, past social history, past surgical history and problem list       Current Outpatient Medications:   •  aspirin (ECOTRIN LOW STRENGTH) 81 mg EC tablet, Take 81 mg by mouth every morning  , Disp: , Rfl:   •  atorvastatin (LIPITOR) 40 mg tablet, TAKE 1 TABLET BY MOUTH  DAILY, Disp: 90 tablet, Rfl: 3  •  BD Pen Needle Sherie U/F 32G X 4 MM MISC, USE 4 TIMES DAILY AS DIRECTED, Disp: 40 each, Rfl: 3  •  Coenzyme Q10 (COQ-10) 100 MG CAPS, Take 200 mg by mouth daily at bedtime , Disp: , Rfl:   •  Continuous Blood Gluc Sensor (FreeStyle Jess 2 Sensor) MISC, Change sensor every 14 days, Disp: 6 each, Rfl: 3  •  donepezil (ARICEPT) 10 mg tablet, Take 1 tablet (10 mg total) by mouth daily at bedtime, Disp: 90 tablet, Rfl: 1  •  doxazosin (CARDURA XL) 8 MG 24 hr tablet, Take 1 tablet (8 mg total) by mouth daily with breakfast, Disp: 30 tablet, Rfl: 3  •  escitalopram (LEXAPRO) 10 mg tablet, TAKE ONE TABLET BY MOUTH EVERY DAY, Disp: 90 tablet, Rfl: 1  •  furosemide (LASIX) 20 mg tablet, TAKE ONE TABLET BY MOUTH EVERY DAY, Disp: 90 tablet, Rfl: 1  •  insulin detemir (Levemir FlexTouch) 100 Units/mL injection pen, Inject 8 Units under the skin daily at bedtime, Disp: 15 mL, Rfl: 3  •  insulin lispro (HumaLOG KwikPen) 100 units/mL injection pen, INJECT 7 UNITS  SUBCUTANEOUSLY BEFORE  BREAKFAST AND 5 UNITS  BEFORE LUNCH AND DINNER, Disp: 15 mL, Rfl: 3  •  levothyroxine 75 mcg tablet, TAKE 1 TABLET BY MOUTH  DAILY, Disp: 90 tablet, Rfl: 3  •  lisinopril (ZESTRIL) 2 5 mg tablet, TAKE 1 TABLET BY MOUTH  DAILY, Disp: 90 tablet, Rfl: 3  •  metoprolol succinate (TOPROL-XL) 25 mg 24 hr tablet, TAKE 1 TABLET BY MOUTH  DAILY, Disp: 90 tablet, Rfl: 3  •  multivitamin (THERAGRAN) TABS, Take 1 tablet by mouth every morning, Disp: , Rfl:   •  Semaglutide,0 25 or 0 5MG/DOS, (Ozempic, 0 25 or 0 5 MG/DOSE,) 2 MG/1 5ML SOPN, Inject 0 25 mg under the skin once a week, Disp: 3 mL, Rfl: 3  •  albuterol (ProAir HFA) 90 mcg/act inhaler, Inhale 2 puffs every 6 (six) hours as needed for wheezing (Patient not taking: Reported on 9/7/2022), Disp: 1 Inhaler, Rfl: 0  •  finasteride (PROSCAR) 5 mg tablet, Take 1 tablet (5 mg total) by mouth daily, Disp: 90 tablet, Rfl: 3  •  pantoprazole (PROTONIX) 40 mg tablet, TAKE 1 TABLET BY MOUTH  DAILY (Patient not taking: Reported on 9/7/2022), Disp: 90 tablet, Rfl: 3        Review of Systems:  Review of Systems   Respiratory: Negative for shortness of breath  Cardiovascular: Negative for chest pain, palpitations and leg swelling  Musculoskeletal: Positive for arthralgias  All other systems reviewed and are negative  Physical Exam:  Vitals:  Vitals:    02/09/23 1034   BP: 124/58   BP Location: Left arm   Patient Position: Sitting   Cuff Size: Standard   Pulse: 65   SpO2: 99%   Weight: 78 5 kg (173 lb)   Height: 5' 10" (1 778 m)     Physical Exam   Constitutional: He appears healthy  No distress  Eyes: Pupils are equal, round, and reactive to light  Conjunctivae are normal    Neck: No JVD present  Cardiovascular: Normal rate, regular rhythm and normal heart sounds  Exam reveals no gallop and no friction rub  No murmur heard  Pulmonary/Chest: Effort normal and breath sounds normal  He has no wheezes  He has no rales  Musculoskeletal:         General: No tenderness, deformity or edema  Cervical back: Normal range of motion and neck supple  Neurological: He is alert and oriented to person, place, and time  Skin: Skin is warm and dry  Cardiographics:  EKG: Personally reviewed NSR with q wave lead 3 and poor R wave progression  Last known EF: 55%    This note was completed in part utilizing M-Modal Fluency Direct Software  Grammatical errors, random word insertions, spelling mistakes, and incomplete sentences can be an occasional consequence of this system secondary to software limitations, ambient noise, and hardware issues  If you have any questions or concerns about the content, text, or information contained within the body of this dictation, please contact the provider for clarification

## 2023-02-13 DIAGNOSIS — Z79.4 TYPE 2 DIABETES MELLITUS WITH STAGE 3 CHRONIC KIDNEY DISEASE, WITH LONG-TERM CURRENT USE OF INSULIN (HCC): ICD-10-CM

## 2023-02-13 DIAGNOSIS — N18.30 TYPE 2 DIABETES MELLITUS WITH STAGE 3 CHRONIC KIDNEY DISEASE, WITH LONG-TERM CURRENT USE OF INSULIN (HCC): ICD-10-CM

## 2023-02-13 DIAGNOSIS — E11.22 TYPE 2 DIABETES MELLITUS WITH STAGE 3 CHRONIC KIDNEY DISEASE, WITH LONG-TERM CURRENT USE OF INSULIN (HCC): ICD-10-CM

## 2023-02-13 RX ORDER — PEN NEEDLE, DIABETIC 32GX 5/32"
NEEDLE, DISPOSABLE MISCELLANEOUS
Qty: 100 EACH | Refills: 3 | Status: SHIPPED | OUTPATIENT
Start: 2023-02-13

## 2023-02-27 ENCOUNTER — OFFICE VISIT (OUTPATIENT)
Dept: UROLOGY | Facility: CLINIC | Age: 78
End: 2023-02-27

## 2023-02-27 VITALS
HEIGHT: 70 IN | OXYGEN SATURATION: 98 % | HEART RATE: 66 BPM | SYSTOLIC BLOOD PRESSURE: 140 MMHG | DIASTOLIC BLOOD PRESSURE: 64 MMHG | RESPIRATION RATE: 18 BRPM | BODY MASS INDEX: 24.77 KG/M2 | WEIGHT: 173 LBS

## 2023-02-27 DIAGNOSIS — N40.1 BENIGN LOCALIZED PROSTATIC HYPERPLASIA WITH LOWER URINARY TRACT SYMPTOMS (LUTS): Primary | ICD-10-CM

## 2023-02-27 LAB — POST-VOID RESIDUAL VOLUME, ML POC: 233 ML

## 2023-02-27 NOTE — PROGRESS NOTES
UROLOGY PROGRESS NOTE   Patient Identifiers: Wendi Shaw (MRN [id number])  Date of Service: 2/27/2023    Subjective:   76-year-old man history of urinary retention  He has incomplete bladder emptying with a high residual volume  Last A1c around 8 0  PVR around 230  He feels comfortable on finasteride and doxazosin 8 mg      Reason for visit: Urinary retention/BPH follow-up    Objective:     VITALS:    Vitals:    02/27/23 1404   BP: 140/64   Pulse: 66   Resp: 18   SpO2: 98%           LABS:  Lab Results   Component Value Date    HGB 14 3 02/12/2021    HCT 44 9 02/12/2021    WBC 8 22 02/12/2021     02/12/2021   ]    Lab Results   Component Value Date     12/12/2017    K 4 2 09/14/2022     09/14/2022    CO2 23 09/14/2022    BUN 26 09/14/2022    CREATININE 1 49 (H) 09/14/2022    CALCIUM 8 9 02/12/2021    GLUCOSE 132 (H) 12/12/2017   ]        INPATIENT MEDS:    Current Outpatient Medications:   •  aspirin (ECOTRIN LOW STRENGTH) 81 mg EC tablet, Take 81 mg by mouth every morning  , Disp: , Rfl:   •  atorvastatin (LIPITOR) 40 mg tablet, TAKE 1 TABLET BY MOUTH  DAILY, Disp: 90 tablet, Rfl: 3  •  Coenzyme Q10 (COQ-10) 100 MG CAPS, Take 200 mg by mouth daily at bedtime , Disp: , Rfl:   •  Continuous Blood Gluc Sensor (FreeStyle Jess 2 Sensor) MISC, Change sensor every 14 days, Disp: 6 each, Rfl: 3  •  doxazosin (CARDURA XL) 8 MG 24 hr tablet, Take 1 tablet (8 mg total) by mouth daily with breakfast, Disp: 30 tablet, Rfl: 3  •  escitalopram (LEXAPRO) 10 mg tablet, TAKE ONE TABLET BY MOUTH EVERY DAY, Disp: 90 tablet, Rfl: 1  •  furosemide (LASIX) 20 mg tablet, TAKE ONE TABLET BY MOUTH EVERY DAY, Disp: 90 tablet, Rfl: 1  •  insulin detemir (Levemir FlexTouch) 100 Units/mL injection pen, Inject 8 Units under the skin daily at bedtime, Disp: 15 mL, Rfl: 3  •  insulin lispro (HumaLOG KwikPen) 100 units/mL injection pen, INJECT 7 UNITS  SUBCUTANEOUSLY BEFORE  BREAKFAST AND 5 UNITS  BEFORE LUNCH AND DINNER, Disp: 15 mL, Rfl: 3  •  Insulin Pen Needle (BD Pen Needle Sherie U/F) 32G X 4 MM MISC, Use to inject insulin daily, Disp: 100 each, Rfl: 3  •  levothyroxine 75 mcg tablet, TAKE 1 TABLET BY MOUTH  DAILY, Disp: 90 tablet, Rfl: 3  •  lisinopril (ZESTRIL) 2 5 mg tablet, TAKE 1 TABLET BY MOUTH  DAILY, Disp: 90 tablet, Rfl: 3  •  metoprolol succinate (TOPROL-XL) 25 mg 24 hr tablet, TAKE 1 TABLET BY MOUTH  DAILY, Disp: 90 tablet, Rfl: 3  •  multivitamin (THERAGRAN) TABS, Take 1 tablet by mouth every morning, Disp: , Rfl:   •  pantoprazole (PROTONIX) 40 mg tablet, TAKE 1 TABLET BY MOUTH  DAILY, Disp: 90 tablet, Rfl: 3  •  Semaglutide,0 25 or 0 5MG/DOS, (Ozempic, 0 25 or 0 5 MG/DOSE,) 2 MG/1 5ML SOPN, Inject 0 25 mg under the skin once a week, Disp: 3 mL, Rfl: 3  •  albuterol (ProAir HFA) 90 mcg/act inhaler, Inhale 2 puffs every 6 (six) hours as needed for wheezing (Patient not taking: Reported on 9/7/2022), Disp: 1 Inhaler, Rfl: 0  •  donepezil (ARICEPT) 10 mg tablet, Take 1 tablet (10 mg total) by mouth daily at bedtime, Disp: 90 tablet, Rfl: 1  •  finasteride (PROSCAR) 5 mg tablet, Take 1 tablet (5 mg total) by mouth daily, Disp: 90 tablet, Rfl: 3      Physical Exam:   /64 (BP Location: Right arm, Patient Position: Sitting, Cuff Size: Standard)   Pulse 66   Resp 18   Ht 5' 10" (1 778 m)   Wt 78 5 kg (173 lb)   SpO2 98%   BMI 24 82 kg/m²   GEN: no acute distress    RESP: breathing comfortably with no accessory muscle use    ABD: soft, non-tender, non-distended   INCISION:    EXT: no significant peripheral edema     RADIOLOGY:   None    Assessment:   #1    BPH with obstruction    Plan:   -Continue same medications  -Follow-up in 6 months with PSA prior to visit  -  -

## 2023-03-02 DIAGNOSIS — I10 ESSENTIAL HYPERTENSION: ICD-10-CM

## 2023-03-02 RX ORDER — FUROSEMIDE 20 MG/1
20 TABLET ORAL DAILY
Qty: 90 TABLET | Refills: 1 | Status: SHIPPED | OUTPATIENT
Start: 2023-03-02

## 2023-03-02 NOTE — TELEPHONE ENCOUNTER
Looks like it was mentioned that pt was due for an AWV but it looks like pt was given an appt for a 15 min med check    Please resched pt for a 30 min AWV

## 2023-03-03 RX ORDER — FUROSEMIDE 20 MG/1
TABLET ORAL
Qty: 90 TABLET | Refills: 1 | Status: SHIPPED | OUTPATIENT
Start: 2023-03-03

## 2023-03-22 ENCOUNTER — OFFICE VISIT (OUTPATIENT)
Dept: FAMILY MEDICINE CLINIC | Facility: CLINIC | Age: 78
End: 2023-03-22

## 2023-03-22 VITALS
WEIGHT: 174 LBS | TEMPERATURE: 98 F | RESPIRATION RATE: 18 BRPM | SYSTOLIC BLOOD PRESSURE: 120 MMHG | DIASTOLIC BLOOD PRESSURE: 60 MMHG | HEIGHT: 68 IN | BODY MASS INDEX: 26.37 KG/M2 | HEART RATE: 60 BPM

## 2023-03-22 DIAGNOSIS — Z79.4 TYPE 2 DIABETES MELLITUS WITH STAGE 3A CHRONIC KIDNEY DISEASE, WITH LONG-TERM CURRENT USE OF INSULIN (HCC): ICD-10-CM

## 2023-03-22 DIAGNOSIS — E11.22 TYPE 2 DIABETES MELLITUS WITH STAGE 3 CHRONIC KIDNEY DISEASE, WITH LONG-TERM CURRENT USE OF INSULIN, UNSPECIFIED WHETHER STAGE 3A OR 3B CKD (HCC): ICD-10-CM

## 2023-03-22 DIAGNOSIS — Z79.4 TYPE 2 DIABETES MELLITUS WITH STAGE 3 CHRONIC KIDNEY DISEASE, WITH LONG-TERM CURRENT USE OF INSULIN, UNSPECIFIED WHETHER STAGE 3A OR 3B CKD (HCC): ICD-10-CM

## 2023-03-22 DIAGNOSIS — N40.1 BENIGN LOCALIZED PROSTATIC HYPERPLASIA WITH LOWER URINARY TRACT SYMPTOMS (LUTS): ICD-10-CM

## 2023-03-22 DIAGNOSIS — I10 ESSENTIAL HYPERTENSION: ICD-10-CM

## 2023-03-22 DIAGNOSIS — N18.31 TYPE 2 DIABETES MELLITUS WITH STAGE 3A CHRONIC KIDNEY DISEASE, WITH LONG-TERM CURRENT USE OF INSULIN (HCC): ICD-10-CM

## 2023-03-22 DIAGNOSIS — I25.10 CORONARY ARTERY DISEASE INVOLVING NATIVE CORONARY ARTERY OF NATIVE HEART WITHOUT ANGINA PECTORIS: ICD-10-CM

## 2023-03-22 DIAGNOSIS — Z13.820 SCREENING FOR OSTEOPOROSIS: ICD-10-CM

## 2023-03-22 DIAGNOSIS — E03.9 ACQUIRED HYPOTHYROIDISM: ICD-10-CM

## 2023-03-22 DIAGNOSIS — E11.22 TYPE 2 DIABETES MELLITUS WITH STAGE 3A CHRONIC KIDNEY DISEASE, WITH LONG-TERM CURRENT USE OF INSULIN (HCC): ICD-10-CM

## 2023-03-22 DIAGNOSIS — N18.30 TYPE 2 DIABETES MELLITUS WITH STAGE 3 CHRONIC KIDNEY DISEASE, WITH LONG-TERM CURRENT USE OF INSULIN, UNSPECIFIED WHETHER STAGE 3A OR 3B CKD (HCC): ICD-10-CM

## 2023-03-22 DIAGNOSIS — N18.31 CHRONIC KIDNEY DISEASE (CKD) STAGE G3A/A1, MODERATELY DECREASED GLOMERULAR FILTRATION RATE (GFR) BETWEEN 45-59 ML/MIN/1.73 SQUARE METER AND ALBUMINURIA CREATININE RATIO LESS THAN 30 MG/G (HCC): ICD-10-CM

## 2023-03-22 DIAGNOSIS — Z00.00 MEDICARE ANNUAL WELLNESS VISIT, SUBSEQUENT: Primary | ICD-10-CM

## 2023-03-22 DIAGNOSIS — E11.42 DIABETIC POLYNEUROPATHY ASSOCIATED WITH TYPE 2 DIABETES MELLITUS (HCC): ICD-10-CM

## 2023-03-22 LAB
LEFT EYE DIABETIC RETINOPATHY: ABNORMAL
LEFT EYE IMAGE QUALITY: ABNORMAL
LEFT EYE MACULAR EDEMA: ABNORMAL
LEFT EYE OTHER RETINOPATHY: ABNORMAL
RIGHT EYE DIABETIC RETINOPATHY: ABNORMAL
RIGHT EYE IMAGE QUALITY: ABNORMAL
RIGHT EYE MACULAR EDEMA: ABNORMAL
RIGHT EYE OTHER RETINOPATHY: ABNORMAL
SEVERITY (EYE EXAM): ABNORMAL

## 2023-03-22 NOTE — PATIENT INSTRUCTIONS
Medicare Preventive Visit Patient Instructions  Thank you for completing your Welcome to Medicare Visit or Medicare Annual Wellness Visit today  Your next wellness visit will be due in one year (3/22/2024)  The screening/preventive services that you may require over the next 5-10 years are detailed below  Some tests may not apply to you based off risk factors and/or age  Screening tests ordered at today's visit but not completed yet may show as past due  Also, please note that scanned in results may not display below  Preventive Screenings:  Service Recommendations Previous Testing/Comments   Colorectal Cancer Screening  · Colonoscopy    · Fecal Occult Blood Test (FOBT)/Fecal Immunochemical Test (FIT)  · Fecal DNA/Cologuard Test  · Flexible Sigmoidoscopy Age: 39-70 years old   Colonoscopy: every 10 years (May be performed more frequently if at higher risk)  OR  FOBT/FIT: every 1 year  OR  Cologuard: every 3 years  OR  Sigmoidoscopy: every 5 years  Screening may be recommended earlier than age 39 if at higher risk for colorectal cancer  Also, an individualized decision between you and your healthcare provider will decide whether screening between the ages of 74-80 would be appropriate   Colonoscopy: 06/10/2022  FOBT/FIT: Not on file  Cologuard: Not on file  Sigmoidoscopy: Not on file    Screening Current     Prostate Cancer Screening Individualized decision between patient and health care provider in men between ages of 53-78   Medicare will cover every 12 months beginning on the day after your 50th birthday PSA: 3 9 ng/mL     Screening Not Indicated     Hepatitis C Screening Once for adults born between 1945 and 1965  More frequently in patients at high risk for Hepatitis C Hep C Antibody: 04/12/2022    Screening Current   Diabetes Screening 1-2 times per year if you're at risk for diabetes or have pre-diabetes Fasting glucose: No results in last 5 years (No results in last 5 years)  A1C: 8 0 % (9/14/2022)  Screening Not Indicated  History Diabetes   Cholesterol Screening Once every 5 years if you don't have a lipid disorder  May order more often based on risk factors  Lipid panel: 09/14/2022  Screening Not Indicated  History Lipid Disorder      Other Preventive Screenings Covered by Medicare:  1  Abdominal Aortic Aneurysm (AAA) Screening: covered once if your at risk  You're considered to be at risk if you have a family history of AAA or a male between the age of 73-68 who smoking at least 100 cigarettes in your lifetime  2  Lung Cancer Screening: covers low dose CT scan once per year if you meet all of the following conditions: (1) Age 50-69; (2) No signs or symptoms of lung cancer; (3) Current smoker or have quit smoking within the last 15 years; (4) You have a tobacco smoking history of at least 20 pack years (packs per day x number of years you smoked); (5) You get a written order from a healthcare provider  3  Glaucoma Screening: covered annually if you're considered high risk: (1) You have diabetes OR (2) Family history of glaucoma OR (3)  aged 48 and older OR (3)  American aged 72 and older  3  Osteoporosis Screening: covered every 2 years if you meet one of the following conditions: (1) Have a vertebral abnormality; (2) On glucocorticoid therapy for more than 3 months; (3) Have primary hyperparathyroidism; (4) On osteoporosis medications and need to assess response to drug therapy  5  HIV Screening: covered annually if you're between the age of 12-76  Also covered annually if you are younger than 13 and older than 72 with risk factors for HIV infection  For pregnant patients, it is covered up to 3 times per pregnancy      Immunizations:  Immunization Recommendations   Influenza Vaccine Annual influenza vaccination during flu season is recommended for all persons aged >= 6 months who do not have contraindications   Pneumococcal Vaccine   * Pneumococcal conjugate vaccine = PCV13 (Prevnar 13), PCV15 (Vaxneuvance), PCV20 (Prevnar 20)  * Pneumococcal polysaccharide vaccine = PPSV23 (Pneumovax) Adults 2364 years old: 1-3 doses may be recommended based on certain risk factors  Adults 72 years old: 1-2 doses may be recommended based off what pneumonia vaccine you previously received   Hepatitis B Vaccine 3 dose series if at intermediate or high risk (ex: diabetes, end stage renal disease, liver disease)   Tetanus (Td) Vaccine - COST NOT COVERED BY MEDICARE PART B Following completion of primary series, a booster dose should be given every 10 years to maintain immunity against tetanus  Td may also be given as tetanus wound prophylaxis  Tdap Vaccine - COST NOT COVERED BY MEDICARE PART B Recommended at least once for all adults  For pregnant patients, recommended with each pregnancy  Shingles Vaccine (Shingrix) - COST NOT COVERED BY MEDICARE PART B  2 shot series recommended in those aged 48 and above     Health Maintenance Due:      Topic Date Due   • Colorectal Cancer Screening  06/09/2027   • Hepatitis C Screening  Completed     Immunizations Due:      Topic Date Due   • COVID-19 Vaccine (1) Never done   • Influenza Vaccine (1) 09/01/2022     Advance Directives   What are advance directives? Advance directives are legal documents that state your wishes and plans for medical care  These plans are made ahead of time in case you lose your ability to make decisions for yourself  Advance directives can apply to any medical decision, such as the treatments you want, and if you want to donate organs  What are the types of advance directives? There are many types of advance directives, and each state has rules about how to use them  You may choose a combination of any of the following:  · Living will: This is a written record of the treatment you want  You can also choose which treatments you do not want, which to limit, and which to stop at a certain time   This includes surgery, medicine, IV fluid, and tube feedings  · Durable power of  for healthcare [facility]): This is a written record that states who you want to make healthcare choices for you when you are unable to make them for yourself  This person, called a proxy, is usually a family member or a friend  You may choose more than 1 proxy  · Do not resuscitate (DNR) order:  A DNR order is used in case your heart stops beating or you stop breathing  It is a request not to have certain forms of treatment, such as CPR  A DNR order may be included in other types of advance directives  · Medical directive: This covers the care that you want if you are in a coma, near death, or unable to make decisions for yourself  You can list the treatments you want for each condition  Treatment may include pain medicine, surgery, blood transfusions, dialysis, IV or tube feedings, and a ventilator (breathing machine)  · Values history: This document has questions about your views, beliefs, and how you feel and think about life  This information can help others choose the care that you would choose  Why are advance directives important? An advance directive helps you control your care  Although spoken wishes may be used, it is better to have your wishes written down  Spoken wishes can be misunderstood, or not followed  Treatments may be given even if you do not want them  An advance directive may make it easier for your family to make difficult choices about your care  Weight Management   Why it is important to manage your weight:  Being overweight increases your risk of health conditions such as heart disease, high blood pressure, type 2 diabetes, and certain types of cancer  It can also increase your risk for osteoarthritis, sleep apnea, and other respiratory problems  Aim for a slow, steady weight loss  Even a small amount of weight loss can lower your risk of health problems    How to lose weight safely:  A safe and healthy way to lose weight is to eat fewer calories and get regular exercise  You can lose up about 1 pound a week by decreasing the number of calories you eat by 500 calories each day  Healthy meal plan for weight management:  A healthy meal plan includes a variety of foods, contains fewer calories, and helps you stay healthy  A healthy meal plan includes the following:  · Eat whole-grain foods more often  A healthy meal plan should contain fiber  Fiber is the part of grains, fruits, and vegetables that is not broken down by your body  Whole-grain foods are healthy and provide extra fiber in your diet  Some examples of whole-grain foods are whole-wheat breads and pastas, oatmeal, brown rice, and bulgur  · Eat a variety of vegetables every day  Include dark, leafy greens such as spinach, kale, arnold greens, and mustard greens  Eat yellow and orange vegetables such as carrots, sweet potatoes, and winter squash  · Eat a variety of fruits every day  Choose fresh or canned fruit (canned in its own juice or light syrup) instead of juice  Fruit juice has very little or no fiber  · Eat low-fat dairy foods  Drink fat-free (skim) milk or 1% milk  Eat fat-free yogurt and low-fat cottage cheese  Try low-fat cheeses such as mozzarella and other reduced-fat cheeses  · Choose meat and other protein foods that are low in fat  Choose beans or other legumes such as split peas or lentils  Choose fish, skinless poultry (chicken or turkey), or lean cuts of red meat (beef or pork)  Before you cook meat or poultry, cut off any visible fat  · Use less fat and oil  Try baking foods instead of frying them  Add less fat, such as margarine, sour cream, regular salad dressing and mayonnaise to foods  Eat fewer high-fat foods  Some examples of high-fat foods include french fries, doughnuts, ice cream, and cakes  · Eat fewer sweets  Limit foods and drinks that are high in sugar   This includes candy, cookies, regular soda, and sweetened drinks  Exercise:  Exercise at least 30 minutes per day on most days of the week  Some examples of exercise include walking, biking, dancing, and swimming  You can also fit in more physical activity by taking the stairs instead of the elevator or parking farther away from stores  Ask your healthcare provider about the best exercise plan for you  © Copyright [employer] 2018 Information is for End User's use only and may not be sold, redistributed or otherwise used for commercial purposes  All illustrations and images included in CareNotes® are the copyrighted property of [employer]  or [facility] Tiffany Crowley   Weight Management   AMBULATORY CARE:   Why it is important to manage your weight:  Being overweight increases your risk of health conditions such as heart disease, high blood pressure, type 2 diabetes, and certain types of cancer  It can also increase your risk for osteoarthritis, sleep apnea, and other respiratory problems  Aim for a slow, steady weight loss  Even a small amount of weight loss can lower your risk of health problems  Risks of being overweight:  Extra weight can cause many health problems, including the following:  • Diabetes (high blood sugar level)    • High blood pressure or high cholesterol    • Heart disease    • Stroke    • Gallbladder or liver disease    • Cancer of the colon, breast, prostate, liver, or kidney    • Sleep apnea    • Arthritis or gout    Screening  is done to check for health conditions before you have signs or symptoms  If you are 28to 79years old, your blood sugar level may be checked every 3 years for signs of prediabetes or diabetes  Your healthcare provider will check your blood pressure at each visit  High blood pressure can lead to a stroke or other problems  Your provider may check for signs of heart disease, cancer, or other health problems    How to lose weight safely:  A safe and healthy way to lose weight is to eat fewer calories and get regular exercise  • You can lose up about 1 pound a week by decreasing the number of calories you eat by 500 calories each day  You can decrease calories by eating smaller portion sizes or by cutting out high-calorie foods  Read labels to find out how many calories are in the foods you eat  • You can also burn calories with exercise such as walking, swimming, or biking  You will be more likely to keep weight off if you make these changes part of your lifestyle  Exercise at least 30 minutes per day on most days of the week  You can also fit in more physical activity by taking the stairs instead of the elevator or parking farther away from stores  Ask your healthcare provider about the best exercise plan for you  Healthy meal plan for weight management:  A healthy meal plan includes a variety of foods, contains fewer calories, and helps you stay healthy  A healthy meal plan includes the following:     • Eat whole-grain foods more often  A healthy meal plan should contain fiber  Fiber is the part of grains, fruits, and vegetables that is not broken down by your body  Whole-grain foods are healthy and provide extra fiber in your diet  Some examples of whole-grain foods are whole-wheat breads and pastas, oatmeal, brown rice, and bulgur  • Eat a variety of vegetables every day  Include dark, leafy greens such as spinach, kale, arnold greens, and mustard greens  Eat yellow and orange vegetables such as carrots, sweet potatoes, and winter squash  • Eat a variety of fruits every day  Choose fresh or canned fruit (canned in its own juice or light syrup) instead of juice  Fruit juice has very little or no fiber  • Eat low-fat dairy foods  Drink fat-free (skim) milk or 1% milk  Eat fat-free yogurt and low-fat cottage cheese  Try low-fat cheeses such as mozzarella and other reduced-fat cheeses  • Choose meat and other protein foods that are low in fat    Choose beans or other legumes such as split peas or lentils  Choose fish, skinless poultry (chicken or turkey), or lean cuts of red meat (beef or pork)  Before you cook meat or poultry, cut off any visible fat  • Use less fat and oil  Try baking foods instead of frying them  Add less fat, such as margarine, sour cream, regular salad dressing and mayonnaise to foods  Eat fewer high-fat foods  Some examples of high-fat foods include french fries, doughnuts, ice cream, and cakes  • Eat fewer sweets  Limit foods and drinks that are high in sugar  This includes candy, cookies, regular soda, and sweetened drinks  Ways to decrease calories:   • Eat smaller portions  ? Use a small plate with smaller servings  ? Do not eat second helpings  ? When you eat at a restaurant, ask for a box and place half of your meal in the box before you eat  ? Share an entrée with someone else  • Replace high-calorie snacks with healthy, low-calorie snacks  ? Choose fresh fruit, vegetables, fat-free rice cakes, or air-popped popcorn instead of potato chips, nuts, or chocolate  ? Choose water or calorie-free drinks instead of soda or sweetened drinks  • Do not shop for groceries when you are hungry  You may be more likely to make unhealthy food choices  Take a grocery list of healthy foods and shop after you have eaten  • Eat regular meals  Do not skip meals  Skipping meals can lead to overeating later in the day  This can make it harder for you to lose weight  Eat a healthy snack in place of a meal if you do not have time to eat a regular meal  Talk with a dietitian to help you create a meal plan and schedule that is right for you  Other things to consider as you try to lose weight:   • Be aware of situations that may give you the urge to overeat, such as eating while watching television  Find ways to avoid these situations  For example, read a book, go for a walk, or do crafts      • Meet with a weight loss support group or friends who are also trying to lose weight  This may help you stay motivated to continue working on your weight loss goals  © Copyright [facility] 2022 Information is for End User's use only and may not be sold, redistributed or otherwise used for commercial purposes  The above information is an  only  It is not intended as medical advice for individual conditions or treatments  Talk to your doctor, nurse or pharmacist before following any medical regimen to see if it is safe and effective for you

## 2023-03-22 NOTE — PROGRESS NOTES
Assessment and Plan:     Problem List Items Addressed This Visit        Endocrine    Type 2 diabetes mellitus with stage 3a chronic kidney disease, with long-term current use of insulin ([facility] )    Acquired hypothyroidism    Diabetic polyneuropathy associated with type 2 diabetes mellitus ([facility] )       Cardiovascular and Mediastinum    Coronary artery disease involving native coronary artery of native heart without angina pectoris    Relevant Orders    Comprehensive metabolic panel    Lipid Panel with Direct LDL reflex    Essential hypertension       Genitourinary    Chronic kidney disease (CKD) stage G3a/A1, moderately decreased glomerular filtration rate (GFR) between 45-59 mL/min/1 73 square meter and albuminuria creatinine ratio less than 30 mg/g (HCC)    Relevant Orders    CBC and differential    Benign localized prostatic hyperplasia with lower urinary tract symptoms (LUTS)   Other Visit Diagnoses     Medicare annual wellness visit, subsequent    -  Primary    BMI 26 0-26 9,adult        Screening for osteoporosis            BMI Counseling: Body mass index is 26 46 kg/m²  The BMI is above normal  Nutrition recommendations include decreasing portion sizes  Exercise recommendations include moderate physical activity 150 minutes/week  No pharmacotherapy was ordered  Rationale for BMI follow-up plan is due to patient being overweight or obese  Depression Screening and Follow-up Plan: Patient was screened for depression during today's encounter  They screened negative with a PHQ-2 score of 0  Preventive health issues were discussed with patient, and age appropriate screening tests were ordered as noted in patient's After Visit Summary  Personalized health advice and appropriate referrals for health education or preventive services given if needed, as noted in patient's After Visit Summary       History of Present Illness:     Patient presents for a Medicare Wellness Visit    Pt is here for an AWV    Pt sees endo for the DM  No labs for today    Does not recall last diabetic eye exam or foot exam     Patient Care Team:  Jessy Morel DO as PCP - General (Family Medicine)  Marjan Davalos DPM as Consulting Physician (Podiatry)  Onel Guzman MD as Consulting Physician (Ophthalmology)  Magy Evans MD as Consulting Physician (Endocrinology)  Radha Birch PA-C as Consulting Physician (Urology)     Review of Systems:     Review of Systems   Constitutional: Negative for activity change, appetite change, chills, diaphoresis, fatigue, fever and unexpected weight change  HENT: Negative for congestion, dental problem, ear pain, mouth sores, sinus pressure, sinus pain, sore throat and trouble swallowing  Eyes: Negative for photophobia, discharge and itching  Respiratory: Negative for apnea, chest tightness and shortness of breath  Cardiovascular: Negative for chest pain, palpitations and leg swelling  Gastrointestinal: Negative for abdominal distention, abdominal pain, blood in stool, nausea and vomiting  Endocrine: Negative for cold intolerance, heat intolerance, polydipsia, polyphagia and polyuria  Genitourinary: Negative for difficulty urinating  Musculoskeletal: Negative for arthralgias  Skin: Negative for color change and wound  Neurological: Negative for dizziness, syncope, speech difficulty and headaches  Hematological: Negative for adenopathy  Psychiatric/Behavioral: Negative for agitation and behavioral problems          Problem List:     Patient Active Problem List   Diagnosis   • Hammond's esophagus with dysplasia   • Chronic kidney disease (CKD) stage G3a/A1, moderately decreased glomerular filtration rate (GFR) between 45-59 mL/min/1 73 square meter and albuminuria creatinine ratio less than 30 mg/g (HCC)   • Colon, diverticulosis   • Chronic constipation   • Coronary artery disease involving native coronary artery of native heart without angina pectoris   • Type 2 diabetes mellitus with stage 3a chronic kidney disease, with long-term current use of insulin ([facility])   • Disc degeneration, lumbar   • Hiatal hernia   • History of myocardial infarction   • Essential hypertension   • Acquired hypothyroidism   • Lumbar radiculopathy   • Mixed hyperlipidemia   • Chronic diastolic congestive heart failure ([facility])   • Age-related incipient cataract of both eyes   • Primary osteoarthritis of left foot   • Plantar fasciitis   • Peripheral arteriosclerosis ([address] )   • Diabetic polyneuropathy associated with type 2 diabetes mellitus ([facility])   • Presence of stent in coronary artery   • Dizzy spells   • Hx of adenomatous colonic polyps   • Family history of colon cancer in mother   • Anxiety   • Benign localized prostatic hyperplasia with lower urinary tract symptoms (LUTS)   • Person consulting for explanation of examination or test finding      Past Medical and Surgical History:     Past Medical History:   Diagnosis Date   • Aortic narrowing     Last Assessed:  9/28/15   • Arthritis    • Hammond esophagus    • Bilateral leg edema    • Bulla, lung ([facility])    • CHF (congestive heart failure) ([facility])    • Chronic kidney disease     stage 3   • Colon polyp    • COVID-19 virus infection 02/08/2021   • Diabetes mellitus ([facility])    • Enlarged prostate    • Heel pain     right heel slipped in the garage-landed on the right heel   • Herniated lumbar intervertebral disc     x 4 discs-fell off a roof   • Hiatal hernia    • High cholesterol    • History of kidney problems    • Hypertension    • Myocardial infarction [facility])    • Old myocardial infarction     x2-pt was unaware of the MI-one stent   • Thyroid disease     hypothyroid   • Transient cerebral ischemia     Last Assessed:  8/27/15   • Wears dentures     full upper, partial lower   • Wears glasses      Past Surgical History:   Procedure Laterality Date   • CATARACT EXTRACTION     • COLONOSCOPY      Resolved:  2015   • CORONARY ANGIOPLASTY WITH STENT PLACEMENT Stent implanted early ,    • ESOPHAGOGASTRODUODENOSCOPY     • KNEE SURGERY      right knee cartilage surgery-left meniscus repair   • CO XCAPSL CTRC RMVL INSJ IO LENS PROSTH W/O ECP Right 2019    Procedure: EXTRACTION EXTRACAPSULAR CATARACT PHACO INTRAOCULAR LENS (IOL); Surgeon: Anita Dickey MD;  Location: [facility] MAIN OR;  Service: Ophthalmology   • CO XCAPSL CTRC RMVL INSJ IO LENS PROSTH W/O ECP Left 2019    Procedure: EXTRACTION EXTRACAPSULAR CATARACT PHACO INTRAOCULAR LENS (IOL);   Surgeon: Anita Dickey MD;  Location: [facility] MAIN OR;  Service: Ophthalmology   • TONSILLECTOMY     • UMBILICAL HERNIA REPAIR      ,   • UPPER GASTROINTESTINAL ENDOSCOPY      Last Assessed:  8/27/15      Family History:     Family History   Problem Relation Age of Onset   • Colon cancer Mother    • Arthritis Mother    • Diabetes Mother    • Hypertension Mother    • Cancer Mother         Breast   • Hyperlipidemia Mother    • Cancer Sister         ovarian   • Other Brother         Cerebrovascular accident (CVA)   • Diabetes Other         Sibling   • Hypertension Other         Sibling   • Hernia Father         umbilical   • Hernia Son    • Cancer Sister         liver   • Kidney disease Sister    • Kidney disease Brother    • Mental illness Neg Hx       Social History:     Social History     Socioeconomic History   • Marital status: /Civil Union     Spouse name: None   • Number of children: None   • Years of education: None   • Highest education level: None   Occupational History   • None   Tobacco Use   • Smoking status: Former     Packs/day: 3 00     Years: 20 00     Pack years: 60 00     Types: Cigarettes     Quit date:      Years since quittin 2   • Smokeless tobacco: Never   Vaping Use   • Vaping Use: Never used   Substance and Sexual Activity   • Alcohol use: Not Currently   • Drug use: No   • Sexual activity: None   Other Topics Concern   • None   Social History Narrative    Lack of exercise Sleeps 6-7 hours a day     Social Determinants of Health     Financial Resource Strain: Low Risk    • Difficulty of Paying Living Expenses: Not hard at all   Food Insecurity: Not on file   Transportation Needs: No Transportation Needs   • Lack of Transportation (Medical): No   • Lack of Transportation (Non-Medical):  No   Physical Activity: Not on file   Stress: Not on file   Social Connections: Not on file   Intimate Partner Violence: Not on file   Housing Stability: Not on file      Medications and Allergies:     Current Outpatient Medications   Medication Sig Dispense Refill   • aspirin (ECOTRIN LOW STRENGTH) 81 mg EC tablet Take 81 mg by mouth every morning       • atorvastatin (LIPITOR) 40 mg tablet TAKE 1 TABLET BY MOUTH  DAILY 90 tablet 3   • Coenzyme Q10 (COQ-10) 100 MG CAPS Take 200 mg by mouth daily at bedtime      • Continuous Blood Gluc Sensor (FreeStyle Jess 2 Sensor) MISC Change sensor every 14 days 6 each 3   • doxazosin (CARDURA XL) 8 MG 24 hr tablet Take 1 tablet (8 mg total) by mouth daily with breakfast 30 tablet 3   • escitalopram (LEXAPRO) 10 mg tablet TAKE ONE TABLET BY MOUTH EVERY DAY 90 tablet 1   • furosemide (LASIX) 20 mg tablet Take 1 tablet (20 mg total) by mouth daily 90 tablet 1   • insulin detemir (Levemir FlexTouch) 100 Units/mL injection pen Inject 8 Units under the skin daily at bedtime 15 mL 3   • insulin lispro (HumaLOG KwikPen) 100 units/mL injection pen INJECT 7 UNITS  SUBCUTANEOUSLY BEFORE  BREAKFAST AND 5 UNITS  BEFORE LUNCH AND DINNER 15 mL 3   • Insulin Pen Needle (BD Pen Needle Sherie U/F) 32G X 4 MM MISC Use to inject insulin daily 100 each 3   • levothyroxine 75 mcg tablet TAKE 1 TABLET BY MOUTH  DAILY 90 tablet 3   • lisinopril (ZESTRIL) 2 5 mg tablet TAKE 1 TABLET BY MOUTH  DAILY 90 tablet 3   • metoprolol succinate (TOPROL-XL) 25 mg 24 hr tablet TAKE 1 TABLET BY MOUTH  DAILY 90 tablet 3   • multivitamin (THERAGRAN) TABS Take 1 tablet by mouth every morning     • pantoprazole (PROTONIX) 40 mg tablet TAKE 1 TABLET BY MOUTH  DAILY 90 tablet 3   • Semaglutide,0 25 or 0 5MG/DOS, (Ozempic, 0 25 or 0 5 MG/DOSE,) 2 MG/1 5ML SOPN Inject 0 25 mg under the skin once a week 3 mL 3   • donepezil (ARICEPT) 10 mg tablet Take 1 tablet (10 mg total) by mouth daily at bedtime 90 tablet 1   • finasteride (PROSCAR) 5 mg tablet Take 1 tablet (5 mg total) by mouth daily 90 tablet 3     No current facility-administered medications for this visit  No Known Allergies   Immunizations:     Immunization History   Administered Date(s) Administered   • INFLUENZA 01/31/2019   • Pneumococcal Conjugate 13-Valent 12/28/2015   • Pneumococcal Polysaccharide PPV23 04/23/2019      Health Maintenance:         Topic Date Due   • Colorectal Cancer Screening  06/09/2027   • Hepatitis C Screening  Completed         Topic Date Due   • COVID-19 Vaccine (1) Never done      Medicare Screening Tests and Risk Assessments:     Odalys Arnold is here for his Subsequent Wellness visit  Last Medicare Wellness visit information reviewed, patient interviewed, no change since last AWV  Health Risk Assessment:   Patient rates overall health as good  Patient feels that their physical health rating is slightly better  Patient is very satisfied with their life  Eyesight was rated as slightly worse  Hearing was rated as same  Patient feels that their emotional and mental health rating is same  Patients states they are never, rarely angry  Patient states they are sometimes unusually tired/fatigued  Pain experienced in the last 7 days has been none  Patient states that he has experienced no weight loss or gain in last 6 months  Depression Screening:   PHQ-2 Score: 0      Fall Risk Screening: In the past year, patient has experienced: no history of falling in past year      Home Safety:  Patient does not have trouble with stairs inside or outside of their home   Patient has working smoke alarms and has no working carbon monoxide detector  Home safety hazards include: none  Nutrition:   Current diet is Diabetic  Medications:   Patient is currently taking over-the-counter supplements  OTC medications include: see medication list  Patient is able to manage medications  Activities of Daily Living (ADLs)/Instrumental Activities of Daily Living (IADLs):   Walk and transfer into and out of bed and chair?: Yes  Dress and groom yourself?: Yes    Bathe or shower yourself?: Yes    Feed yourself?  Yes  Do your laundry/housekeeping?: Yes  Manage your money, pay your bills and track your expenses?: Yes  Make your own meals?: Yes    Do your own shopping?: Yes    Previous Hospitalizations:   Any hospitalizations or ED visits within the last 12 months?: No      Advance Care Planning:   Living will: No    Durable POA for healthcare: No    Advanced directive: No      Cognitive Screening:   Provider or family/friend/caregiver concerned regarding cognition?: No    PREVENTIVE SCREENINGS      Cardiovascular Screening:    General: Screening Not Indicated, History Lipid Disorder and Risks and Benefits Discussed    Due for: Lipid Panel      Diabetes Screening:     General: Screening Not Indicated, History Diabetes and Risks and Benefits Discussed    Due for: Blood Glucose      Colorectal Cancer Screening:     General: Screening Current      Prostate Cancer Screening:    General: Screening Not Indicated and Risks and Benefits Discussed      Osteoporosis Screening:    General: Risks and Benefits Discussed and Patient Declines      Abdominal Aortic Aneurysm (AAA) Screening:    Risk factors include: tobacco use        General: Screening Not Indicated      Lung Cancer Screening:     General: Screening Not Indicated      Hepatitis C Screening:    General: Screening Current    Screening, Brief Intervention, and Referral to Treatment (SBIRT)    Screening  Typical number of drinks in a day: 0  Typical number of drinks in a week: 0  Interpretation: Low risk drinking behavior  AUDIT-C Screenin) How often did you have a drink containing alcohol in the past year? never  2) How many drinks did you have on a typical day when you were drinking in the past year? 0  3) How often did you have 6 or more drinks on one occasion in the past year? never    AUDIT-C Score: 0  Interpretation: Score 0-3 (male): Negative screen for alcohol misuse    Single Item Drug Screening:  How often have you used an illegal drug (including marijuana) or a prescription medication for non-medical reasons in the past year? never    Single Item Drug Screen Score: 0  Interpretation: Negative screen for possible drug use disorder    Brief Intervention  Alcohol & drug use screenings were reviewed  No concerns regarding substance use disorder identified  No results found  Physical Exam:     /60   Pulse 60   Temp 98 °F (36 7 °C)   Resp 18   Ht 5' 8" (1 727 m)   Wt 78 9 kg (174 lb)   BMI 26 46 kg/m²     Physical Exam  Constitutional:       Appearance: He is well-developed  HENT:      Head: Normocephalic and atraumatic  Right Ear: External ear normal       Left Ear: External ear normal       Nose: Nose normal    Eyes:      Conjunctiva/sclera: Conjunctivae normal       Pupils: Pupils are equal, round, and reactive to light  Neck:      Thyroid: No thyromegaly  Cardiovascular:      Rate and Rhythm: Normal rate and regular rhythm  Pulses: no weak pulses          Dorsalis pedis pulses are 2+ on the right side and 2+ on the left side  Posterior tibial pulses are 2+ on the right side and 2+ on the left side  Heart sounds: Normal heart sounds  Pulmonary:      Effort: Pulmonary effort is normal       Breath sounds: Normal breath sounds  No wheezing  Abdominal:      General: Bowel sounds are normal  There is no distension  Palpations: Abdomen is soft  There is no mass  Tenderness: There is no abdominal tenderness  There is no guarding  Musculoskeletal:         General: No tenderness  Normal range of motion  Cervical back: Normal range of motion and neck supple  Feet:      Right foot:      Skin integrity: No ulcer, skin breakdown, erythema, warmth, callus or dry skin  Left foot:      Skin integrity: No ulcer, skin breakdown, erythema, warmth, callus or dry skin  Skin:     General: Skin is warm and dry  Capillary Refill: Capillary refill takes less than 2 seconds  Findings: No erythema  Neurological:      Mental Status: He is alert and oriented to person, place, and time  Psychiatric:         Behavior: Behavior normal          Thought Content: Thought content normal          Judgment: Judgment normal           Diabetic Foot Exam    Patient's shoes and socks removed  Right Foot/Ankle   Right Foot Inspection  Skin Exam: skin normal  Skin not intact, no dry skin, no warmth, no callus, no erythema, no maceration, no abnormal color, no pre-ulcer, no ulcer and no callus  Toe Exam: ROM and strength within normal limits  Sensory   Vibration: intact  Proprioception: intact  Monofilament testing: intact    Vascular  Capillary refills: < 3 seconds  The right DP pulse is 2+  The right PT pulse is 2+  Left Foot/Ankle  Left Foot Inspection  Skin Exam: skin normal  Skin not intact, no dry skin, no warmth, no erythema, no maceration, normal color, no pre-ulcer, no ulcer and no callus  Toe Exam: ROM and strength within normal limits  Sensory   Vibration: intact  Proprioception: intact  Monofilament testing: intact    Vascular  Capillary refills: < 3 seconds  The left DP pulse is 2+  The left PT pulse is 2+  Assign Risk Category  No deformity present  No loss of protective sensation  No weak pulses  Risk: 0      Jose Evangelista,   BMI Counseling: Body mass index is 26 46 kg/m²  The BMI is above normal  Nutrition recommendations include reducing portion sizes

## 2023-03-23 ENCOUNTER — TELEPHONE (OUTPATIENT)
Dept: FAMILY MEDICINE CLINIC | Facility: CLINIC | Age: 78
End: 2023-03-23

## 2023-03-23 NOTE — TELEPHONE ENCOUNTER
Please call pt iris exam performed in the office shows possible diabetic damage    Pt should have a formal exam at the [facility]

## 2023-03-27 ENCOUNTER — TELEPHONE (OUTPATIENT)
Dept: ENDOCRINOLOGY | Facility: CLINIC | Age: 78
End: 2023-03-27

## 2023-03-28 ENCOUNTER — DOCUMENTATION (OUTPATIENT)
Dept: ENDOCRINOLOGY | Facility: CLINIC | Age: 78
End: 2023-03-28

## 2023-03-30 LAB
ALBUMIN SERPL-MCNC: 4.4 G/DL (ref 3.7–4.7)
ALBUMIN/CREAT UR: 7 MG/G CREAT (ref 0–29)
ALBUMIN/GLOB SERPL: 2.6 {RATIO} (ref 1.2–2.2)
ALP SERPL-CCNC: 97 IU/L (ref 44–121)
ALT SERPL-CCNC: 17 IU/L (ref 0–44)
AST SERPL-CCNC: 16 IU/L (ref 0–40)
BASOPHILS # BLD AUTO: 0.1 X10E3/UL (ref 0–0.2)
BASOPHILS NFR BLD AUTO: 1 %
BILIRUB SERPL-MCNC: 0.3 MG/DL (ref 0–1.2)
BUN SERPL-MCNC: 27 MG/DL (ref 8–27)
BUN/CREAT SERPL: 17 (ref 10–24)
CALCIUM SERPL-MCNC: 9.2 MG/DL (ref 8.6–10.2)
CHLORIDE SERPL-SCNC: 107 MMOL/L (ref 96–106)
CHOLEST SERPL-MCNC: 145 MG/DL (ref 100–199)
CO2 SERPL-SCNC: 25 MMOL/L (ref 20–29)
CREAT SERPL-MCNC: 1.55 MG/DL (ref 0.76–1.27)
CREAT UR-MCNC: 91.7 MG/DL
EGFR: 46 ML/MIN/1.73
EOSINOPHIL # BLD AUTO: 0.1 X10E3/UL (ref 0–0.4)
EOSINOPHIL NFR BLD AUTO: 2 %
ERYTHROCYTE [DISTWIDTH] IN BLOOD BY AUTOMATED COUNT: 12.2 % (ref 11.6–15.4)
GLOBULIN SER-MCNC: 1.7 G/DL (ref 1.5–4.5)
GLUCOSE SERPL-MCNC: 176 MG/DL (ref 70–99)
HCT VFR BLD AUTO: 39.1 % (ref 37.5–51)
HDLC SERPL-MCNC: 67 MG/DL
HGB BLD-MCNC: 13.5 G/DL (ref 13–17.7)
IMM GRANULOCYTES # BLD: 0 X10E3/UL (ref 0–0.1)
IMM GRANULOCYTES NFR BLD: 0 %
LDLC SERPL CALC-MCNC: 66 MG/DL (ref 0–99)
LYMPHOCYTES # BLD AUTO: 2.1 X10E3/UL (ref 0.7–3.1)
LYMPHOCYTES NFR BLD AUTO: 33 %
MCH RBC QN AUTO: 32.5 PG (ref 26.6–33)
MCHC RBC AUTO-ENTMCNC: 34.5 G/DL (ref 31.5–35.7)
MCV RBC AUTO: 94 FL (ref 79–97)
MICROALBUMIN UR-MCNC: 6.6 UG/ML
MICRODELETION SYND BLD/T FISH: NORMAL
MICRODELETION SYND BLD/T FISH: NORMAL
MONOCYTES # BLD AUTO: 0.8 X10E3/UL (ref 0.1–0.9)
MONOCYTES NFR BLD AUTO: 12 %
NEUTROPHILS # BLD AUTO: 3.5 X10E3/UL (ref 1.4–7)
NEUTROPHILS NFR BLD AUTO: 52 %
PLATELET # BLD AUTO: 264 X10E3/UL (ref 150–450)
POTASSIUM SERPL-SCNC: 4.2 MMOL/L (ref 3.5–5.2)
PROT SERPL-MCNC: 6.1 G/DL (ref 6–8.5)
RBC # BLD AUTO: 4.16 X10E6/UL (ref 4.14–5.8)
SODIUM SERPL-SCNC: 144 MMOL/L (ref 134–144)
TRIGL SERPL-MCNC: 55 MG/DL (ref 0–149)
WBC # BLD AUTO: 6.5 X10E3/UL (ref 3.4–10.8)

## 2023-04-01 DIAGNOSIS — F41.9 ANXIETY: ICD-10-CM

## 2023-04-01 RX ORDER — ESCITALOPRAM OXALATE 10 MG/1
TABLET ORAL
Qty: 90 TABLET | Refills: 1 | Status: SHIPPED | OUTPATIENT
Start: 2023-04-01

## 2023-04-04 ENCOUNTER — OFFICE VISIT (OUTPATIENT)
Dept: ENDOCRINOLOGY | Facility: CLINIC | Age: 78
End: 2023-04-04

## 2023-04-04 VITALS
BODY MASS INDEX: 26.37 KG/M2 | HEIGHT: 68 IN | HEART RATE: 72 BPM | DIASTOLIC BLOOD PRESSURE: 60 MMHG | WEIGHT: 174 LBS | SYSTOLIC BLOOD PRESSURE: 120 MMHG

## 2023-04-04 DIAGNOSIS — E11.22 TYPE 2 DIABETES MELLITUS WITH STAGE 3A CHRONIC KIDNEY DISEASE, WITH LONG-TERM CURRENT USE OF INSULIN (HCC): ICD-10-CM

## 2023-04-04 DIAGNOSIS — N18.30 TYPE 2 DIABETES MELLITUS WITH STAGE 3 CHRONIC KIDNEY DISEASE, WITH LONG-TERM CURRENT USE OF INSULIN, UNSPECIFIED WHETHER STAGE 3A OR 3B CKD (HCC): ICD-10-CM

## 2023-04-04 DIAGNOSIS — N18.31 TYPE 2 DIABETES MELLITUS WITH STAGE 3A CHRONIC KIDNEY DISEASE, WITH LONG-TERM CURRENT USE OF INSULIN (HCC): ICD-10-CM

## 2023-04-04 DIAGNOSIS — Z79.4 TYPE 2 DIABETES MELLITUS WITH STAGE 3A CHRONIC KIDNEY DISEASE, WITH LONG-TERM CURRENT USE OF INSULIN (HCC): Primary | ICD-10-CM

## 2023-04-04 DIAGNOSIS — E11.22 TYPE 2 DIABETES MELLITUS WITH STAGE 3A CHRONIC KIDNEY DISEASE, WITH LONG-TERM CURRENT USE OF INSULIN (HCC): Primary | ICD-10-CM

## 2023-04-04 DIAGNOSIS — E03.9 ACQUIRED HYPOTHYROIDISM: Primary | ICD-10-CM

## 2023-04-04 DIAGNOSIS — N18.31 TYPE 2 DIABETES MELLITUS WITH STAGE 3A CHRONIC KIDNEY DISEASE, WITH LONG-TERM CURRENT USE OF INSULIN (HCC): Primary | ICD-10-CM

## 2023-04-04 DIAGNOSIS — E78.2 MIXED HYPERLIPIDEMIA: ICD-10-CM

## 2023-04-04 DIAGNOSIS — Z79.4 TYPE 2 DIABETES MELLITUS WITH STAGE 3 CHRONIC KIDNEY DISEASE, WITH LONG-TERM CURRENT USE OF INSULIN, UNSPECIFIED WHETHER STAGE 3A OR 3B CKD (HCC): ICD-10-CM

## 2023-04-04 DIAGNOSIS — E11.22 TYPE 2 DIABETES MELLITUS WITH STAGE 3 CHRONIC KIDNEY DISEASE, WITH LONG-TERM CURRENT USE OF INSULIN, UNSPECIFIED WHETHER STAGE 3A OR 3B CKD (HCC): ICD-10-CM

## 2023-04-04 DIAGNOSIS — Z79.4 TYPE 2 DIABETES MELLITUS WITH STAGE 3A CHRONIC KIDNEY DISEASE, WITH LONG-TERM CURRENT USE OF INSULIN (HCC): ICD-10-CM

## 2023-04-04 LAB — SL AMB POCT HEMOGLOBIN AIC: 6.9 (ref ?–6.5)

## 2023-04-04 PROCEDURE — 83036 HEMOGLOBIN GLYCOSYLATED A1C: CPT

## 2023-04-04 RX ORDER — INSULIN LISPRO 100 [IU]/ML
INJECTION, SOLUTION INTRAVENOUS; SUBCUTANEOUS
Qty: 15 ML | Refills: 3 | Status: SHIPPED | OUTPATIENT
Start: 2023-04-04

## 2023-04-04 NOTE — PROGRESS NOTES
Established Patient Progress Note      CC: Type 2 diabetes mellitus     Impression & Plan:    Problem List Items Addressed This Visit        Endocrine    Type 2 diabetes mellitus with stage 3a chronic kidney disease, with long-term current use of insulin ([address] )       Lab Results   Component Value Date    HGBA1C 6 9 (A) 04/04/2023     HGA1C at goal for age with some predicted hypoglycemia that he prevents approximately 2 hours after lunch  Recommended reduction on lunchtime insulin  Discussed increase of Ozempic to reduce insulin use but was not comfortable with the adjustment of Ozempic at this time  BGL Reviewed: Continue matt CGM  Treatment regimen:   Ozempic 0 25 mg weekly  Levemir 8 units in the morning  Humalog 7 units at breakfast, 3 units, 5 units at dinner  Advised lifestyle modifications including attention to diet including the amount and types of carbohydrates consumed and regular activity  Call for blood sugars less than 70 mg/dl or over 300 mg/dl  Discussed symptoms and treatment of hypoglycemia  Routine follow up for diabetic eye and foot exams  Ordered blood work to complete prior to next visit  Follow up in 3 months  Relevant Medications    insulin lispro (HumaLOG KwikPen) 100 units/mL injection pen    Acquired hypothyroidism - Primary     Clinically euthyroid  Continues on levothyroxine 75 mcg daily  Last thyroid labs September 2022  Recheck thyroid function tests           Relevant Orders    TSH, 3rd generation    T4, free- Lab Collect       Other    Mixed hyperlipidemia    Relevant Orders    Lipid panel- Lab Collect   Other Visit Diagnoses     Type 2 diabetes mellitus with stage 3 chronic kidney disease, with long-term current use of insulin, unspecified whether stage 3a or 3b CKD (HCC)        Relevant Medications    insulin lispro (HumaLOG KwikPen) 100 units/mL injection pen    Other Relevant Orders    Comprehensive metabolic panel    Microalbumin / creatinine urine ratio          Orders Placed This Encounter   Procedures   • Comprehensive metabolic panel     This is a patient instruction: Patient fasting for 8 hours or longer recommended  Standing Status:   Future     Standing Expiration Date:   4/4/2024   • Microalbumin / creatinine urine ratio     Standing Status:   Future     Standing Expiration Date:   10/4/2023   • Lipid panel- Lab Collect     This is a patient instruction: This test requires patient fasting for 10-12 hours or longer  Drinking of black coffee or black tea is acceptable  Standing Status:   Future     Standing Expiration Date:   4/4/2024   • TSH, 3rd generation     This is a patient instruction: This test is non-fasting  Please drink two glasses of water morning of bloodwork  Standing Status:   Future     Standing Expiration Date:   10/4/2023   • T4, free- Lab Collect     Standing Status:   Future     Standing Expiration Date:   4/4/2024       History of Present Illness:   Sarah Jha is a 65 y o  male with a history of type 2 diabetes with long term use of insulin   Reports complications: denies Two previous MIs  Denies recent illness or hospitalizations  Manolo Philip has been losing weight since Ozempic but has tapered off and remained steady since last appointment  Home glucose monitoring: are performed regularly with CGM  CGM Interpretation  Sarah Jha   Device used [place]  Home use   Indication:Type 2 Diabetes  More than 72 hours of data was reviewed  Report to be scanned to chart  Date Range: 3/22/2023-4/3/2023  Analysis of data:   Average Glucose: 148 mg/dL  Coefficient of Variation: 25 9%   SD : N/A   Time in Target Range: 81%   Time Above Range: 17% high, 2% very high   Time Below Range: 0%   Interpretation of data:   Experiencing some hypoglycemia he is preventing in the late morning/afternoon 2 hours after meals       Current regimen:   Ozempic 0 25 mg weekly  Levemir 8 units in the morning  Humalog 7 units at breakfast, 2-4 units, 5 units at dinner    Last Eye Exam: Has scheduled exam  Last Foot Exam: 3/23/2023, no history of neuropathy    Has hypertension: Taking metoprolol  Has hyperlipidemia: Taking lipitor    Thyroid disorders: Hypothyroidism on levothyroxine 75 mcg daily  Denies symptoms consistent with hypo/hyperthyroidism      Patient Active Problem List   Diagnosis   • Hammond's esophagus with dysplasia   • Chronic kidney disease (CKD) stage G3a/A1, moderately decreased glomerular filtration rate (GFR) between 45-59 mL/min/1 73 square meter and albuminuria creatinine ratio less than 30 mg/g ([facility])   • Colon, diverticulosis   • Chronic constipation   • Coronary artery disease involving native coronary artery of native heart without angina pectoris   • Type 2 diabetes mellitus with stage 3a chronic kidney disease, with long-term current use of insulin ([facility])   • Disc degeneration, lumbar   • Hiatal hernia   • History of myocardial infarction   • Essential hypertension   • Acquired hypothyroidism   • Lumbar radiculopathy   • Mixed hyperlipidemia   • Chronic diastolic congestive heart failure ([facility])   • Age-related incipient cataract of both eyes   • Primary osteoarthritis of left foot   • Plantar fasciitis   • Peripheral arteriosclerosis ([address] )   • Diabetic polyneuropathy associated with type 2 diabetes mellitus ([facility])   • Presence of stent in coronary artery   • Dizzy spells   • Hx of adenomatous colonic polyps   • Family history of colon cancer in mother   • Anxiety   • Benign localized prostatic hyperplasia with lower urinary tract symptoms (LUTS)   • Person consulting for explanation of examination or test finding      Past Medical History:   Diagnosis Date   • Aortic narrowing     Last Assessed:  9/28/15   • Arthritis    • Hammond esophagus    • Bilateral leg edema    • [place], lung ([facility])    • CHF (congestive heart failure) ([facility])    • Chronic kidney disease     stage 3   • Colon polyp    • COVID-19 virus infection 02/08/2021   • Diabetes mellitus ([facility] )    • Enlarged prostate    • Heel pain     right heel slipped in the garage-landed on the right heel   • Herniated lumbar intervertebral disc     x 4 discs-fell off a roof   • Hiatal hernia    • High cholesterol    • History of kidney problems    • Hypertension    • Myocardial infarction [facility])    • Old myocardial infarction     x2-pt was unaware of the MI-one stent   • Thyroid disease     hypothyroid   • Transient cerebral ischemia     Last Assessed:  8/27/15   • Wears dentures     full upper, partial lower   • Wears glasses       Past Surgical History:   Procedure Laterality Date   • CATARACT EXTRACTION     • COLONOSCOPY      Resolved:  2015   • CORONARY ANGIOPLASTY WITH STENT PLACEMENT      Stent implanted early 1990's,    • ESOPHAGOGASTRODUODENOSCOPY     • KNEE SURGERY      right knee cartilage surgery-left meniscus repair   • [place] XCAPSL CTRC RMVL INSJ IO LENS PROSTH W/O ECP Right 1/24/2019    Procedure: EXTRACTION EXTRACAPSULAR CATARACT PHACO INTRAOCULAR LENS (IOL); Surgeon: Michi Rogers MD;  Location: [facility] MAIN OR;  Service: Ophthalmology   • [place] XCAPSL CTRC RMVL INSJ IO LENS PROSTH W/O ECP Left 2/21/2019    Procedure: EXTRACTION EXTRACAPSULAR CATARACT PHACO INTRAOCULAR LENS (IOL);   Surgeon: Michi Rogers MD;  Location: [facility] MAIN OR;  Service: Ophthalmology   • TONSILLECTOMY     • UMBILICAL HERNIA REPAIR      1980's,   • UPPER GASTROINTESTINAL ENDOSCOPY      Last Assessed:  8/27/15      Family History   Problem Relation Age of Onset   • Colon cancer Mother    • Arthritis Mother    • Diabetes Mother    • Hypertension Mother    • Cancer Mother         Breast   • Hyperlipidemia Mother    • Cancer Sister         ovarian   • Other Brother         Cerebrovascular accident (CVA)   • Diabetes Other         Sibling   • Hypertension Other         Sibling   • Hernia Father         umbilical   • Hernia Son    • Cancer Sister         liver   • Kidney disease Sister    • Kidney disease Brother    • Mental illness Neg Hx      Social History     Tobacco Use   • Smoking status: Former     Packs/day: 3 00     Years: 20 00     Pack years: 60 00     Types: Cigarettes     Quit date:      Years since quittin 2   • Smokeless tobacco: Never   Substance Use Topics   • Alcohol use: Not Currently     No Known Allergies      Current Outpatient Medications:   •  aspirin (ECOTRIN LOW STRENGTH) 81 mg EC tablet, Take 81 mg by mouth every morning  , Disp: , Rfl:   •  atorvastatin (LIPITOR) 40 mg tablet, TAKE 1 TABLET BY MOUTH  DAILY, Disp: 90 tablet, Rfl: 3  •  Coenzyme Q10 (COQ-10) 100 MG CAPS, Take 200 mg by mouth daily at bedtime , Disp: , Rfl:   •  Continuous Blood Gluc Sensor (FreeStyle [place] 2 Sensor) MISC, Change sensor every 14 days, Disp: 6 each, Rfl: 3  •  donepezil (ARICEPT) 10 mg tablet, Take 1 tablet (10 mg total) by mouth daily at bedtime, Disp: 90 tablet, Rfl: 1  •  doxazosin (CARDURA XL) 8 MG 24 hr tablet, Take 1 tablet (8 mg total) by mouth daily with breakfast, Disp: 30 tablet, Rfl: 3  •  escitalopram (LEXAPRO) 10 mg tablet, TAKE ONE TABLET BY MOUTH EVERY DAY, Disp: 90 tablet, Rfl: 1  •  finasteride (PROSCAR) 5 mg tablet, Take 1 tablet (5 mg total) by mouth daily, Disp: 90 tablet, Rfl: 3  •  furosemide (LASIX) 20 mg tablet, Take 1 tablet (20 mg total) by mouth daily, Disp: 90 tablet, Rfl: 1  •  insulin detemir (Levemir FlexTouch) 100 Units/mL injection pen, Inject 8 Units under the skin daily at bedtime, Disp: 15 mL, Rfl: 3  •  insulin lispro (HumaLOG KwikPen) 100 units/mL injection pen, INJECT 7 UNITS  SUBCUTANEOUSLY BEFORE  BREAKFAST, 3 UNITS BEFORE LUNCH AND 5 UNITS  DINNER, Disp: 15 mL, Rfl: 3  •  Insulin Pen Needle (BD Pen Needle Sherie U/F) 32G X 4 MM MISC, Use to inject insulin daily, Disp: 100 each, Rfl: 3  •  levothyroxine 75 mcg tablet, TAKE 1 TABLET BY MOUTH  DAILY, Disp: 90 tablet, Rfl: 3  •  lisinopril (ZESTRIL) 2 5 mg tablet, TAKE 1 TABLET BY MOUTH  DAILY, Disp: 90 "tablet, Rfl: 3  •  metoprolol succinate (TOPROL-XL) 25 mg 24 hr tablet, TAKE 1 TABLET BY MOUTH  DAILY, Disp: 90 tablet, Rfl: 3  •  multivitamin (THERAGRAN) TABS, Take 1 tablet by mouth every morning, Disp: , Rfl:   •  pantoprazole (PROTONIX) 40 mg tablet, TAKE 1 TABLET BY MOUTH  DAILY, Disp: 90 tablet, Rfl: 3  •  Semaglutide,0 25 or 0 5MG/DOS, (Ozempic, 0 25 or 0 5 MG/DOSE,) 2 MG/1 5ML SOPN, Inject 0 25 mg under the skin once a week, Disp: 3 mL, Rfl: 3    Review of Systems   Constitutional: Negative for activity change, appetite change, fatigue and unexpected weight change  HENT: Negative for ear pain, sore throat, trouble swallowing and voice change  Eyes: Negative for visual disturbance  Respiratory: Negative for cough and shortness of breath  Cardiovascular: Negative for chest pain and palpitations  Gastrointestinal: Negative for abdominal distention, abdominal pain, constipation, diarrhea and vomiting  Endocrine: Negative for cold intolerance, heat intolerance, polydipsia and polyuria  Genitourinary: Negative for dysuria and hematuria  Musculoskeletal: Negative for arthralgias and back pain  Skin: Negative for color change and rash  Neurological: Negative for seizures and syncope  All other systems reviewed and are negative  Physical Exam:  Body mass index is 26 46 kg/m²  /60   Pulse 72   Ht 5' 8\" (1 727 m)   Wt 78 9 kg (174 lb)   BMI 26 46 kg/m²    Wt Readings from Last 3 Encounters:   04/04/23 78 9 kg (174 lb)   03/22/23 78 9 kg (174 lb)   02/27/23 78 5 kg (173 lb)        Physical Exam  Vitals reviewed  Constitutional:       Appearance: Normal appearance  Cardiovascular:      Rate and Rhythm: Normal rate and regular rhythm  Pulses: Normal pulses  Heart sounds: Normal heart sounds  Pulmonary:      Effort: Pulmonary effort is normal       Breath sounds: Normal breath sounds  Skin:     General: Skin is warm and dry        Capillary Refill: Capillary refill " takes less than 2 seconds  Neurological:      General: No focal deficit present  Mental Status: He is alert and oriented to person, place, and time  Psychiatric:         Mood and Affect: Mood normal          Behavior: Behavior normal      Labs:   Lab Results   Component Value Date    HGBA1C 6 9 (A) 04/04/2023    HGBA1C 8 0 (H) 09/14/2022    HGBA1C 7 8 (H) 04/12/2022     Lab Results   Component Value Date    CREATININE 1 55 (H) 03/29/2023    CREATININE 1 49 (H) 09/14/2022    CREATININE 1 48 (H) 04/12/2022    BUN 27 03/29/2023     12/12/2017    K 4 2 03/29/2023     (H) 03/29/2023    CO2 25 03/29/2023     eGFR   Date Value Ref Range Status   03/29/2023 46 (L) >59 mL/min/1 73 Final   02/12/2021 47 ml/min/1 73sq m Final     Lab Results   Component Value Date    CHOL 153 12/12/2017    HDL 67 03/29/2023    TRIG 55 03/29/2023     Lab Results   Component Value Date    ALT 17 03/29/2023    AST 16 03/29/2023    ALKPHOS 57 02/12/2021    BILITOT 0 4 12/12/2017     Lab Results   Component Value Date    NEK1NSKRBJBK 3 630 12/12/2017    CBO6AALPDZKV 2 950 08/03/2017    VYC2TFWIGPZN 3 180 10/11/2016     Lab Results   Component Value Date    FREET4 1 20 09/14/2022             Discussed with the patient and all questioned fully answered  He will call me if any problems arise  Follow-up appointment in 3 months  Counseled patient on diagnostic results, prognosis, risk and benefit of treatment options, instruction for management, importance of treatment compliance, Risk  factor reduction and impressions    There are no Patient Instructions on file for this visit      Kirsten Bonilla

## 2023-04-04 NOTE — ASSESSMENT & PLAN NOTE
Lab Results   Component Value Date    HGBA1C 6 9 (A) 04/04/2023     HGA1C at goal for age with some predicted hypoglycemia that he prevents approximately 2 hours after lunch  Recommended reduction on lunchtime insulin  Discussed increase of Ozempic to reduce insulin use but was not comfortable with the adjustment of Ozempic at this time  BGL Reviewed: Continue matt CGM  Treatment regimen:   Ozempic 0 25 mg weekly  Levemir 8 units in the morning  Humalog 7 units at breakfast, 3 units, 5 units at dinner  Advised lifestyle modifications including attention to diet including the amount and types of carbohydrates consumed and regular activity  Call for blood sugars less than 70 mg/dl or over 300 mg/dl  Discussed symptoms and treatment of hypoglycemia  Routine follow up for diabetic eye and foot exams  Ordered blood work to complete prior to next visit  Follow up in 3 months

## 2023-05-18 LAB
LEFT EYE DIABETIC RETINOPATHY: NORMAL
RIGHT EYE DIABETIC RETINOPATHY: NORMAL

## 2023-05-30 DIAGNOSIS — Z79.4 TYPE 2 DIABETES MELLITUS WITH STAGE 3B CHRONIC KIDNEY DISEASE, WITH LONG-TERM CURRENT USE OF INSULIN (HCC): ICD-10-CM

## 2023-05-30 DIAGNOSIS — N18.32 TYPE 2 DIABETES MELLITUS WITH STAGE 3B CHRONIC KIDNEY DISEASE, WITH LONG-TERM CURRENT USE OF INSULIN (HCC): ICD-10-CM

## 2023-05-30 DIAGNOSIS — E11.22 TYPE 2 DIABETES MELLITUS WITH STAGE 3B CHRONIC KIDNEY DISEASE, WITH LONG-TERM CURRENT USE OF INSULIN (HCC): ICD-10-CM

## 2023-05-30 DIAGNOSIS — N40.1 BENIGN LOCALIZED PROSTATIC HYPERPLASIA WITH LOWER URINARY TRACT SYMPTOMS (LUTS): ICD-10-CM

## 2023-05-30 RX ORDER — FINASTERIDE 5 MG/1
TABLET, FILM COATED ORAL
Qty: 90 TABLET | Refills: 3 | Status: SHIPPED | OUTPATIENT
Start: 2023-05-30

## 2023-05-31 DIAGNOSIS — Z79.4 TYPE 2 DIABETES MELLITUS WITH STAGE 3B CHRONIC KIDNEY DISEASE, WITH LONG-TERM CURRENT USE OF INSULIN (HCC): ICD-10-CM

## 2023-05-31 DIAGNOSIS — N18.32 TYPE 2 DIABETES MELLITUS WITH STAGE 3B CHRONIC KIDNEY DISEASE, WITH LONG-TERM CURRENT USE OF INSULIN (HCC): ICD-10-CM

## 2023-05-31 DIAGNOSIS — E11.22 TYPE 2 DIABETES MELLITUS WITH STAGE 3B CHRONIC KIDNEY DISEASE, WITH LONG-TERM CURRENT USE OF INSULIN (HCC): ICD-10-CM

## 2023-05-31 RX ORDER — SEMAGLUTIDE 1.34 MG/ML
INJECTION, SOLUTION SUBCUTANEOUS
Qty: 3 ML | Refills: 3 | Status: SHIPPED | OUTPATIENT
Start: 2023-05-31 | End: 2023-06-01

## 2023-06-01 DIAGNOSIS — N18.32 TYPE 2 DIABETES MELLITUS WITH STAGE 3B CHRONIC KIDNEY DISEASE, WITH LONG-TERM CURRENT USE OF INSULIN (HCC): ICD-10-CM

## 2023-06-01 DIAGNOSIS — E11.22 TYPE 2 DIABETES MELLITUS WITH STAGE 3B CHRONIC KIDNEY DISEASE, WITH LONG-TERM CURRENT USE OF INSULIN (HCC): ICD-10-CM

## 2023-06-01 DIAGNOSIS — Z79.4 TYPE 2 DIABETES MELLITUS WITH STAGE 3B CHRONIC KIDNEY DISEASE, WITH LONG-TERM CURRENT USE OF INSULIN (HCC): ICD-10-CM

## 2023-06-01 RX ORDER — SEMAGLUTIDE 1.34 MG/ML
INJECTION, SOLUTION SUBCUTANEOUS
Qty: 3 ML | Refills: 3 | Status: SHIPPED | OUTPATIENT
Start: 2023-06-01

## 2023-06-25 DIAGNOSIS — Z79.4 TYPE 2 DIABETES MELLITUS WITH STAGE 3 CHRONIC KIDNEY DISEASE, WITH LONG-TERM CURRENT USE OF INSULIN, UNSPECIFIED WHETHER STAGE 3A OR 3B CKD (HCC): ICD-10-CM

## 2023-06-25 DIAGNOSIS — E11.22 TYPE 2 DIABETES MELLITUS WITH STAGE 3 CHRONIC KIDNEY DISEASE, WITH LONG-TERM CURRENT USE OF INSULIN, UNSPECIFIED WHETHER STAGE 3A OR 3B CKD (HCC): ICD-10-CM

## 2023-06-25 DIAGNOSIS — N18.30 TYPE 2 DIABETES MELLITUS WITH STAGE 3 CHRONIC KIDNEY DISEASE, WITH LONG-TERM CURRENT USE OF INSULIN, UNSPECIFIED WHETHER STAGE 3A OR 3B CKD (HCC): ICD-10-CM

## 2023-06-26 RX ORDER — INSULIN LISPRO 100 [IU]/ML
INJECTION, SOLUTION INTRAVENOUS; SUBCUTANEOUS
Qty: 30 ML | Refills: 0 | Status: SHIPPED | OUTPATIENT
Start: 2023-06-26

## 2023-07-20 DIAGNOSIS — I10 ESSENTIAL HYPERTENSION: ICD-10-CM

## 2023-07-21 RX ORDER — LISINOPRIL 2.5 MG/1
TABLET ORAL
Qty: 90 TABLET | Refills: 3 | Status: SHIPPED | OUTPATIENT
Start: 2023-07-21

## 2023-08-16 ENCOUNTER — OFFICE VISIT (OUTPATIENT)
Dept: CARDIOLOGY CLINIC | Facility: CLINIC | Age: 78
End: 2023-08-16
Payer: MEDICARE

## 2023-08-16 VITALS
DIASTOLIC BLOOD PRESSURE: 58 MMHG | HEART RATE: 65 BPM | BODY MASS INDEX: 26.67 KG/M2 | HEIGHT: 68 IN | SYSTOLIC BLOOD PRESSURE: 124 MMHG | WEIGHT: 176 LBS | OXYGEN SATURATION: 97 %

## 2023-08-16 DIAGNOSIS — Z79.4 TYPE 2 DIABETES MELLITUS WITH STAGE 3 CHRONIC KIDNEY DISEASE, WITH LONG-TERM CURRENT USE OF INSULIN, UNSPECIFIED WHETHER STAGE 3A OR 3B CKD (HCC): ICD-10-CM

## 2023-08-16 DIAGNOSIS — I50.32 CHRONIC DIASTOLIC CONGESTIVE HEART FAILURE (HCC): ICD-10-CM

## 2023-08-16 DIAGNOSIS — N18.30 TYPE 2 DIABETES MELLITUS WITH STAGE 3 CHRONIC KIDNEY DISEASE, WITH LONG-TERM CURRENT USE OF INSULIN, UNSPECIFIED WHETHER STAGE 3A OR 3B CKD (HCC): ICD-10-CM

## 2023-08-16 DIAGNOSIS — E78.2 MIXED HYPERLIPIDEMIA: ICD-10-CM

## 2023-08-16 DIAGNOSIS — I10 ESSENTIAL HYPERTENSION: ICD-10-CM

## 2023-08-16 DIAGNOSIS — E11.22 TYPE 2 DIABETES MELLITUS WITH STAGE 3 CHRONIC KIDNEY DISEASE, WITH LONG-TERM CURRENT USE OF INSULIN, UNSPECIFIED WHETHER STAGE 3A OR 3B CKD (HCC): ICD-10-CM

## 2023-08-16 DIAGNOSIS — I25.10 CORONARY ARTERY DISEASE INVOLVING NATIVE CORONARY ARTERY OF NATIVE HEART WITHOUT ANGINA PECTORIS: Primary | ICD-10-CM

## 2023-08-16 PROCEDURE — 93000 ELECTROCARDIOGRAM COMPLETE: CPT | Performed by: INTERNAL MEDICINE

## 2023-08-16 PROCEDURE — 99214 OFFICE O/P EST MOD 30 MIN: CPT | Performed by: INTERNAL MEDICINE

## 2023-08-16 NOTE — PROGRESS NOTES
Cardiology   Tanesha De Dios DO, Guera Post MD, Jose F Sandoval MD, Srini Sandoval MD, [facility] - [place]  -------------------------------------------------------------------  [facility]  [address] [place] [address], [address] [place] [phone]    Cardiology Follow Up  Jean Johnson  1945  [id number]          Assessment/Plan:    1. Coronary artery disease involving native coronary artery of native heart without angina pectoris    2. Essential hypertension    3. Chronic diastolic congestive heart failure (HCC)      - LE edema resolved with discontinuation of amlodipine and increasing furosemide to 20 mg daily. Last echocardiogram showed a normal EF with diastolic dysfunction. - patient is predominantly sedentary and has multiple risk factors for CAD including previous PCI over 5 years ago. Most recent stress test did not show any ischemia or infarction. - blood pressure is stable off amlodipine. - Most recent blood work reviewed with him  - continue furosemide 20 mg daily. - Call if any change or if any dyspnea or LE edema.   - Discussed diet and weight loss. Interval History:     Jean Johnson is 77 y.o. male here for followup of CAD and CHF. Since his last visit, he has been feeling well.  he denies any palpitations, chest pain, shortness of breath, LE edema, orthopnea or PND. Diet is overall unchanged. There has not been a significant change in weight. He was previously having LE edema that resolved with discontinuing amlodipine and increasing furosemide dosage. His most recent blood work was in 2023 which showed a creatinine of 1.5 with LDL of 66, triglycerides of 55 and A1c of 6.9%. Patient follows with endocrinology as well. Past cardiac history:  He has a history of CAD. He has a h/o PCI to the circumflex vessel in 1992.  His last nuclear stress test was done in 5/2015 which showed inferior/inferolateral infarct c/w attenuation. EF 63%. He exercised for 6:38.   He had a treadmill stress in July 2018 which was normal.  He exercised for 7 minutes without ECG changes.    Echocardiogram in March 2021 showed ejection fraction of 55-60% with mild valve disease    The following portions of the patient's history were reviewed and updated as appropriate: allergies, current medications, past family history, past medical history, past social history, past surgical history and problem list.      Current Outpatient Medications:   •  aspirin (ECOTRIN LOW STRENGTH) 81 mg EC tablet, Take 81 mg by mouth every morning  , Disp: , Rfl:   •  atorvastatin (LIPITOR) 40 mg tablet, TAKE 1 TABLET BY MOUTH  DAILY, Disp: 90 tablet, Rfl: 1  •  Coenzyme Q10 (COQ-10) 100 MG CAPS, Take 200 mg by mouth daily at bedtime , Disp: , Rfl:   •  Continuous Blood Gluc Sensor (FreeStyle [place] 2 Sensor) MISC, Change sensor every 14 days, Disp: 6 each, Rfl: 3  •  doxazosin (CARDURA XL) 8 MG 24 hr tablet, Take 1 tablet (8 mg total) by mouth daily with breakfast, Disp: 30 tablet, Rfl: 3  •  escitalopram (LEXAPRO) 10 mg tablet, TAKE ONE TABLET BY MOUTH EVERY DAY, Disp: 90 tablet, Rfl: 1  •  finasteride (PROSCAR) 5 mg tablet, TAKE ONE TABLET BY MOUTH EVERY DAY, Disp: 90 tablet, Rfl: 3  •  furosemide (LASIX) 20 mg tablet, Take 1 tablet (20 mg total) by mouth daily, Disp: 90 tablet, Rfl: 1  •  insulin lispro (HumaLOG KwikPen) 100 units/mL injection pen, INJECT SUBCUTANEOUSLY 7  UNITS BEFORE BREAKFAST AND  5 UNITS BEFORE LUNCH AND  DINNER, Disp: 30 mL, Rfl: 0  •  Insulin Pen Needle (BD Pen Needle Sherie U/F) 32G X 4 MM MISC, Use to inject insulin daily, Disp: 100 each, Rfl: 3  •  levothyroxine 75 mcg tablet, TAKE 1 TABLET BY MOUTH  DAILY, Disp: 90 tablet, Rfl: 1  •  lisinopril (ZESTRIL) 2.5 mg tablet, TAKE 1 TABLET BY MOUTH  DAILY, Disp: 90 tablet, Rfl: 3  •  metoprolol succinate (TOPROL-XL) 25 mg 24 hr tablet, TAKE 1 TABLET BY MOUTH  DAILY, Disp: 90 tablet, Rfl: 3  •  multivitamin (THERAGRAN) TABS, Take 1 tablet by mouth every morning, Disp: , Rfl:   •  Ozempic, 0.25 or 0.5 MG/DOSE, 2 MG/1.5ML injection pen, INJECT 0.25MG UNDER THE SKIN ONCE A WEEK, Disp: 3 mL, Rfl: 3  •  pantoprazole (PROTONIX) 40 mg tablet, TAKE 1 TABLET BY MOUTH  DAILY, Disp: 90 tablet, Rfl: 1  •  semaglutide, 0.25 or 0.5 mg/dose, (Ozempic, 0.25 or 0.5 MG/DOSE,) 2 mg/1.5 mL injection pen, Inject 0.19 mL (0.25 mg total) under the skin once a week, Disp: 3 mL, Rfl: 3  •  donepezil (ARICEPT) 10 mg tablet, Take 1 tablet (10 mg total) by mouth daily at bedtime (Patient not taking: Reported on 8/16/2023), Disp: 90 tablet, Rfl: 1  •  insulin detemir (Levemir FlexTouch) 100 Units/mL injection pen, Inject 8 Units under the skin daily at bedtime, Disp: 15 mL, Rfl: 3        Review of Systems:  Review of Systems   Respiratory: Negative for shortness of breath. Cardiovascular: Negative for chest pain, palpitations and leg swelling. Musculoskeletal: Positive for arthralgias. All other systems reviewed and are negative. Physical Exam:  Vitals:  Vitals:    08/16/23 1048   BP: 124/58   BP Location: Left arm   Patient Position: Sitting   Cuff Size: Standard   Pulse: 65   SpO2: 97%   Weight: 79.8 kg (176 lb)   Height: 5' 8" (1.727 m)     Physical Exam   Constitutional: He appears healthy. No distress. Eyes: Pupils are equal, round, and reactive to light. Conjunctivae are normal.   Neck: No JVD present. Cardiovascular: Normal rate, regular rhythm and normal heart sounds. Exam reveals no gallop and no friction rub. No murmur heard. Pulmonary/Chest: Effort normal and breath sounds normal. He has no wheezes. He has no rales. Musculoskeletal:         General: No tenderness, deformity or edema. Cervical back: Normal range of motion and neck supple. Neurological: He is alert and oriented to person, place, and time. Skin: Skin is warm and dry.         Cardiographics:  EKG: Personally reviewed NSR with q wave lead 3 and poor R wave progression  Last known EF: 55%    This note was completed in part utilizing [employer] Software. Grammatical errors, random word insertions, spelling mistakes, and incomplete sentences can be an occasional consequence of this system secondary to software limitations, ambient noise, and hardware issues. If you have any questions or concerns about the content, text, or information contained within the body of this dictation, please contact the provider for clarification.

## 2023-08-27 DIAGNOSIS — I10 ESSENTIAL HYPERTENSION: ICD-10-CM

## 2023-08-28 RX ORDER — FUROSEMIDE 20 MG/1
TABLET ORAL
Qty: 90 TABLET | Refills: 1 | Status: SHIPPED | OUTPATIENT
Start: 2023-08-28

## 2023-09-06 ENCOUNTER — OFFICE VISIT (OUTPATIENT)
Dept: FAMILY MEDICINE CLINIC | Facility: CLINIC | Age: 78
End: 2023-09-06
Payer: MEDICARE

## 2023-09-06 VITALS
RESPIRATION RATE: 20 BRPM | DIASTOLIC BLOOD PRESSURE: 80 MMHG | TEMPERATURE: 100.7 F | SYSTOLIC BLOOD PRESSURE: 124 MMHG | WEIGHT: 177 LBS | BODY MASS INDEX: 26.91 KG/M2 | HEART RATE: 80 BPM

## 2023-09-06 DIAGNOSIS — U07.1 COVID-19 VIRUS INFECTION: Primary | ICD-10-CM

## 2023-09-06 LAB
SARS-COV-2 AG UPPER RESP QL IA: POSITIVE
VALID CONTROL: ABNORMAL

## 2023-09-06 PROCEDURE — 99213 OFFICE O/P EST LOW 20 MIN: CPT | Performed by: FAMILY MEDICINE

## 2023-09-06 PROCEDURE — 87811 SARS-COV-2 COVID19 W/OPTIC: CPT | Performed by: FAMILY MEDICINE

## 2023-09-06 NOTE — PROGRESS NOTES
Assessment/Plan:    1. COVID-19 virus infection    Advised on self isolation protocol  Vit c , d and zinc  Dayquil and nyquil        There are no Patient Instructions on file for this visit. No follow-ups on file. Subjective:      Patient ID: Mary Jo Benson is a 77 y.o. male. Chief Complaint   Patient presents with   • Fever   • Cough   • Nasal Congestion     Sas/cma   • Shortness of Breath     SX Monday                Pt is here for a same day appt "fever, sick"    Day 1 of symptoms 3 days ago - Sunday  Nasal congestion  Body achs  Elevated temp but did not rem number    Feeling a little better today than yesterday          The following portions of the patient's history were reviewed and updated as appropriate: allergies, current medications, past family history, past medical history, past social history, past surgical history and problem list.    Review of Systems   Constitutional: Positive for chills and fever. Negative for activity change, appetite change, diaphoresis, fatigue and unexpected weight change. HENT: Positive for congestion. Negative for dental problem, ear pain, mouth sores, sinus pressure, sinus pain, sore throat and trouble swallowing. Eyes: Negative for photophobia, discharge and itching. Respiratory: Negative for apnea, chest tightness and shortness of breath. Cardiovascular: Negative for chest pain, palpitations and leg swelling. Gastrointestinal: Negative for abdominal distention, abdominal pain, blood in stool, nausea and vomiting. Endocrine: Negative for cold intolerance, heat intolerance, polydipsia, polyphagia and polyuria. Genitourinary: Negative for difficulty urinating. Musculoskeletal: Positive for myalgias. Negative for arthralgias. Skin: Negative for color change and wound. Neurological: Negative for dizziness, syncope, speech difficulty and headaches. Hematological: Negative for adenopathy.    Psychiatric/Behavioral: Negative for agitation and behavioral problems.          Current Outpatient Medications   Medication Sig Dispense Refill   • aspirin (ECOTRIN LOW STRENGTH) 81 mg EC tablet Take 81 mg by mouth every morning       • atorvastatin (LIPITOR) 40 mg tablet TAKE 1 TABLET BY MOUTH  DAILY 90 tablet 1   • Coenzyme Q10 (COQ-10) 100 MG CAPS Take 200 mg by mouth daily at bedtime      • Continuous Blood Gluc Sensor (FreeStyle Jess 2 Sensor) MISC Change sensor every 14 days 6 each 3   • donepezil (ARICEPT) 10 mg tablet Take 1 tablet (10 mg total) by mouth daily at bedtime 90 tablet 1   • doxazosin (CARDURA XL) 8 MG 24 hr tablet Take 1 tablet (8 mg total) by mouth daily with breakfast 30 tablet 3   • escitalopram (LEXAPRO) 10 mg tablet TAKE ONE TABLET BY MOUTH EVERY DAY 90 tablet 1   • finasteride (PROSCAR) 5 mg tablet TAKE ONE TABLET BY MOUTH EVERY DAY 90 tablet 3   • furosemide (LASIX) 20 mg tablet TAKE ONE TABLET BY MOUTH EVERY DAY (GENERIC FOR LASIX) 90 tablet 1   • insulin detemir (Levemir FlexTouch) 100 Units/mL injection pen Inject 8 Units under the skin daily at bedtime 15 mL 3   • insulin lispro (HumaLOG KwikPen) 100 units/mL injection pen INJECT SUBCUTANEOUSLY 7  UNITS BEFORE BREAKFAST AND  5 UNITS BEFORE LUNCH AND  DINNER 30 mL 0   • Insulin Pen Needle (BD Pen Needle Sherie U/F) 32G X 4 MM MISC Use to inject insulin daily 100 each 3   • levothyroxine 75 mcg tablet TAKE 1 TABLET BY MOUTH  DAILY 90 tablet 1   • lisinopril (ZESTRIL) 2.5 mg tablet TAKE 1 TABLET BY MOUTH  DAILY 90 tablet 3   • metoprolol succinate (TOPROL-XL) 25 mg 24 hr tablet TAKE 1 TABLET BY MOUTH  DAILY 90 tablet 3   • multivitamin (THERAGRAN) TABS Take 1 tablet by mouth every morning     • pantoprazole (PROTONIX) 40 mg tablet TAKE 1 TABLET BY MOUTH  DAILY 90 tablet 1   • semaglutide, 0.25 or 0.5 mg/dose, (Ozempic, 0.25 or 0.5 MG/DOSE,) 2 mg/1.5 mL injection pen Inject 0.19 mL (0.25 mg total) under the skin once a week 3 mL 3   • Ozempic, 0.25 or 0.5 MG/DOSE, 2 MG/1.5ML injection pen INJECT 0.25MG UNDER THE SKIN ONCE A WEEK (Patient not taking: Reported on 9/6/2023) 3 mL 3     No current facility-administered medications for this visit. Objective:    /80   Pulse 80   Temp (!) 100.7 °F (38.2 °C)   Resp 20   Wt 80.3 kg (177 lb)   BMI 26.91 kg/m²        Physical Exam  Vitals reviewed. Constitutional:       General: He is not in acute distress. Appearance: He is well-developed. He is not diaphoretic. HENT:      Head: Normocephalic and atraumatic. Eyes:      General:         Right eye: No discharge. Left eye: No discharge. Conjunctiva/sclera: Conjunctivae normal.   Pulmonary:      Effort: Pulmonary effort is normal. No respiratory distress. Musculoskeletal:      Cervical back: Normal range of motion.                 Donell Meehan, DO

## 2023-10-04 ENCOUNTER — OFFICE VISIT (OUTPATIENT)
Dept: ENDOCRINOLOGY | Facility: CLINIC | Age: 78
End: 2023-10-04
Payer: MEDICARE

## 2023-10-04 VITALS
HEIGHT: 68 IN | BODY MASS INDEX: 26.55 KG/M2 | SYSTOLIC BLOOD PRESSURE: 138 MMHG | DIASTOLIC BLOOD PRESSURE: 66 MMHG | WEIGHT: 175.2 LBS | HEART RATE: 68 BPM

## 2023-10-04 DIAGNOSIS — E11.22 TYPE 2 DIABETES MELLITUS WITH STAGE 3A CHRONIC KIDNEY DISEASE, WITH LONG-TERM CURRENT USE OF INSULIN (HCC): ICD-10-CM

## 2023-10-04 DIAGNOSIS — E11.22 TYPE 2 DIABETES MELLITUS WITH STAGE 3 CHRONIC KIDNEY DISEASE, WITH LONG-TERM CURRENT USE OF INSULIN (HCC): ICD-10-CM

## 2023-10-04 DIAGNOSIS — N18.31 TYPE 2 DIABETES MELLITUS WITH STAGE 3A CHRONIC KIDNEY DISEASE, WITH LONG-TERM CURRENT USE OF INSULIN (HCC): ICD-10-CM

## 2023-10-04 DIAGNOSIS — N18.30 TYPE 2 DIABETES MELLITUS WITH STAGE 3 CHRONIC KIDNEY DISEASE, WITH LONG-TERM CURRENT USE OF INSULIN, UNSPECIFIED WHETHER STAGE 3A OR 3B CKD (HCC): Primary | ICD-10-CM

## 2023-10-04 DIAGNOSIS — N18.30 TYPE 2 DIABETES MELLITUS WITH STAGE 3 CHRONIC KIDNEY DISEASE, WITH LONG-TERM CURRENT USE OF INSULIN (HCC): ICD-10-CM

## 2023-10-04 DIAGNOSIS — Z79.4 TYPE 2 DIABETES MELLITUS WITH STAGE 3 CHRONIC KIDNEY DISEASE, WITH LONG-TERM CURRENT USE OF INSULIN (HCC): ICD-10-CM

## 2023-10-04 DIAGNOSIS — E03.9 ACQUIRED HYPOTHYROIDISM: ICD-10-CM

## 2023-10-04 DIAGNOSIS — E78.2 MIXED HYPERLIPIDEMIA: ICD-10-CM

## 2023-10-04 DIAGNOSIS — E11.22 TYPE 2 DIABETES MELLITUS WITH STAGE 3 CHRONIC KIDNEY DISEASE, WITH LONG-TERM CURRENT USE OF INSULIN, UNSPECIFIED WHETHER STAGE 3A OR 3B CKD (HCC): Primary | ICD-10-CM

## 2023-10-04 DIAGNOSIS — Z79.4 TYPE 2 DIABETES MELLITUS WITH STAGE 3A CHRONIC KIDNEY DISEASE, WITH LONG-TERM CURRENT USE OF INSULIN (HCC): ICD-10-CM

## 2023-10-04 DIAGNOSIS — Z79.4 TYPE 2 DIABETES MELLITUS WITH STAGE 3 CHRONIC KIDNEY DISEASE, WITH LONG-TERM CURRENT USE OF INSULIN, UNSPECIFIED WHETHER STAGE 3A OR 3B CKD (HCC): Primary | ICD-10-CM

## 2023-10-04 LAB — SL AMB POCT HEMOGLOBIN AIC: 7.2 (ref ?–6.5)

## 2023-10-04 PROCEDURE — 99214 OFFICE O/P EST MOD 30 MIN: CPT | Performed by: NURSE PRACTITIONER

## 2023-10-04 PROCEDURE — 83036 HEMOGLOBIN GLYCOSYLATED A1C: CPT | Performed by: NURSE PRACTITIONER

## 2023-10-04 RX ORDER — INSULIN LISPRO 100 [IU]/ML
INJECTION, SOLUTION INTRAVENOUS; SUBCUTANEOUS
Qty: 30 ML | Refills: 0 | Status: SHIPPED | OUTPATIENT
Start: 2023-10-04

## 2023-10-04 RX ORDER — INSULIN DETEMIR 100 [IU]/ML
6 INJECTION, SOLUTION SUBCUTANEOUS
Qty: 15 ML | Refills: 3 | Status: SHIPPED | OUTPATIENT
Start: 2023-10-04 | End: 2024-04-01

## 2023-10-04 NOTE — PROGRESS NOTES
Established Patient Progress Note    CC: Type 2 Diabetes Mellitus      Impression & Plan:    Problem List Items Addressed This Visit        Endocrine    Type 2 diabetes mellitus with stage 3a chronic kidney disease, with long-term current use of insulin ([address])       Lab Results   Component Value Date    HGBA1C 7.2 (A) 10/04/2023     HGA1C indicates tight control, is consistent with GMI. Treatment regimen: Due to near hypoglycemia and tight control given age, will reduce Levemir from 8 units to 6 units and ask that he send in [place] report in 1-2 weeks for review or if still experiencing, call sooner. Continue ozempic 0.25 mg daily as he is tolerating this dose without GI side effects. Aware to call our office if he experiences any adverse effects. Continue Humalog 7 units with breakfast 3 units with lunch, and 5 units with dinner. Discussed risks/complications associated with uncontrolled diabetes including organ involvement, heart attack, stroke, death. Advised lifestyle modifications including attention to diet including the amount and types of carbohydrates consumed and regular activity. Call for blood sugars less than 70 mg/dl or patterns over 250 mg/dl. Discussed symptoms and treatment of hypoglycemia. Reviewed risks associated with hypoglycemia. Always carry rapid acting carbohydrates and a glucometer (a way to check your blood sugar). Recommendation for medical identification either bracelet, necklace. Routine follow up for diabetic eye and foot exams. Ordered blood work to complete prior to next visit. Follow up in 3 months. Relevant Medications    insulin lispro (HumaLOG KwikPen) 100 units/mL injection pen    insulin detemir (Levemir FlexTouch) 100 Units/mL injection pen    Acquired hypothyroidism     Clinically euthyroid, continues on levothyroxine 75 mcg daily. Due for repeat thyroid function tests.              Other    Mixed hyperlipidemia     Continues on statin therapy. Other Visit Diagnoses     Type 2 diabetes mellitus with stage 3 chronic kidney disease, with long-term current use of insulin, unspecified whether stage 3a or 3b CKD ([facility])    -  Primary    Relevant Medications    insulin lispro (HumaLOG KwikPen) 100 units/mL injection pen    insulin detemir (Levemir FlexTouch) 100 Units/mL injection pen    Other Relevant Orders    POCT hemoglobin A1c (Completed)    Type 2 diabetes mellitus with stage 3 chronic kidney disease, with long-term current use of insulin ([facility])        Relevant Medications    insulin lispro (HumaLOG KwikPen) 100 units/mL injection pen    insulin detemir (Levemir FlexTouch) 100 Units/mL injection pen    Other Relevant Orders    POCT hemoglobin A1c (Completed)          Orders Placed This Encounter   Procedures   • POCT hemoglobin A1c       History of Present Illness:   Yan Hadley is a 77 y.o. male with a history of type 2 diabetes with long term use of insulin . Reports complications: denies Two previous MIs. Denies recent illness or hospitalizations. Rahul Doyle has been losing weight since Ozempic but has tapered off and remained steady since last appointment. Home glucose monitoring: are performed regularly with CGM. Denies adverse side effects of ozempic including abdominal pain, nausea, vomiting, diarrhea, constipation, severely decreased appetite.      CGM Interpretation  Yan Hadley   Device used [place] 2  Home use   Indication:Type 2 Diabetes  More than 72 hours of data was reviewed. Report to be scanned to chart. Date Range: September 21, 2023- October 4, 2023  Analysis of data:   Average Glucose: 167 mg/dL  Coefficient of Variation: 22.5%   GMI: 7.3%   Time in Target Range: 68%   Time Above Range: 29%, 3% very high   Time Below Range: 0% low or very low   Interpretation of data:   Has to treat near low numbers frequently overnight.      Current regimen:   Ozempic 0.25 mg weekly  Levemir 8 units in the morning  Humalog 7 units at breakfast, 3 units, 5 units at dinner     Has hypertension: Continues taking metoprolol  Has hyperlipidemia: Continues taking aking lipitor        Has hypothyroidism: Taking levothyroxine 75 mcg daily, regularly and properly. Continues to deny symptoms consistent with hypo/hyperthyroidism.     Patient Active Problem List   Diagnosis   • Hammond's esophagus with dysplasia   • Chronic kidney disease (CKD) stage G3a/A1, moderately decreased glomerular filtration rate (GFR) between 45-59 mL/min/1.73 square meter and albuminuria creatinine ratio less than 30 mg/g ([facility])   • Colon, diverticulosis   • Chronic constipation   • Coronary artery disease involving native coronary artery of native heart without angina pectoris   • Type 2 diabetes mellitus with stage 3a chronic kidney disease, with long-term current use of insulin ([facility])   • Disc degeneration, lumbar   • Hiatal hernia   • History of myocardial infarction   • Essential hypertension   • Acquired hypothyroidism   • Lumbar radiculopathy   • Mixed hyperlipidemia   • Chronic diastolic congestive heart failure ([facility])   • Age-related incipient cataract of both eyes   • Primary osteoarthritis of left foot   • Plantar fasciitis   • Peripheral arteriosclerosis ([address])   • Diabetic polyneuropathy associated with type 2 diabetes mellitus ([facility])   • Presence of stent in coronary artery   • Dizzy spells   • Hx of adenomatous colonic polyps   • Family history of colon cancer in mother   • Anxiety   • Benign localized prostatic hyperplasia with lower urinary tract symptoms (LUTS)   • Person consulting for explanation of examination or test finding      Past Medical History:   Diagnosis Date   • Aortic narrowing     Last Assessed:  9/28/15   • Arthritis    • Hammond esophagus    • Bilateral leg edema    • [place], lung ([facility])    • CHF (congestive heart failure) ([facility])    • Chronic kidney disease     stage 3   • Colon polyp    • COVID-19 virus infection 02/08/2021 • Diabetes mellitus ([address])    • Enlarged prostate    • Heel pain     right heel slipped in the garage-landed on the right heel   • Herniated lumbar intervertebral disc     x 4 discs-fell off a roof   • Hiatal hernia    • High cholesterol    • History of kidney problems    • Hypertension    • Myocardial infarction [facility])    • Old myocardial infarction     x2-pt was unaware of the MI-one stent   • Thyroid disease     hypothyroid   • Transient cerebral ischemia     Last Assessed:  8/27/15   • Wears dentures     full upper, partial lower   • Wears glasses       Past Surgical History:   Procedure Laterality Date   • CATARACT EXTRACTION     • COLONOSCOPY      Resolved:  2015   • CORONARY ANGIOPLASTY WITH STENT PLACEMENT      Stent implanted early 1990's,    • ESOPHAGOGASTRODUODENOSCOPY     • KNEE SURGERY      right knee cartilage surgery-left meniscus repair   • OK XCAPSL CTRC RMVL INSJ IO LENS PROSTH W/O ECP Right 1/24/2019    Procedure: EXTRACTION EXTRACAPSULAR CATARACT PHACO INTRAOCULAR LENS (IOL); Surgeon: Elba Elaine MD;  Location: [facility] MAIN OR;  Service: Ophthalmology   • OK XCAPSL CTRC RMVL INSJ IO LENS PROSTH W/O ECP Left 2/21/2019    Procedure: EXTRACTION EXTRACAPSULAR CATARACT PHACO INTRAOCULAR LENS (IOL);   Surgeon: Elba Elaine MD;  Location: [facility] MAIN OR;  Service: Ophthalmology   • TONSILLECTOMY     • UMBILICAL HERNIA REPAIR      1980's,   • UPPER GASTROINTESTINAL ENDOSCOPY      Last Assessed:  8/27/15      Family History   Problem Relation Age of Onset   • Colon cancer Mother    • Arthritis Mother    • Diabetes Mother    • Hypertension Mother    • Cancer Mother         Breast   • Hyperlipidemia Mother    • Cancer Sister         ovarian   • Other Brother         Cerebrovascular accident (CVA)   • Diabetes Other         Sibling   • Hypertension Other         Sibling   • Hernia Father         umbilical   • Hernia Son    • Cancer Sister         liver   • Kidney disease Sister    • Kidney disease Brother • Mental illness Neg Hx      Social History     Tobacco Use   • Smoking status: Former     Packs/day: 3.00     Years: 20.00     Total pack years: 60.00     Types: Cigarettes     Quit date: 12     Years since quittin.7   • Smokeless tobacco: Never   Substance Use Topics   • Alcohol use: Not Currently     No Known Allergies      Current Outpatient Medications:   •  aspirin (ECOTRIN LOW STRENGTH) 81 mg EC tablet, Take 81 mg by mouth every morning  , Disp: , Rfl:   •  atorvastatin (LIPITOR) 40 mg tablet, TAKE 1 TABLET BY MOUTH  DAILY, Disp: 90 tablet, Rfl: 1  •  Coenzyme Q10 (COQ-10) 100 MG CAPS, Take 200 mg by mouth daily at bedtime , Disp: , Rfl:   •  Continuous Blood Gluc Sensor (FreeStyle Jess 2 Sensor) MISC, Change sensor every 14 days, Disp: 6 each, Rfl: 3  •  donepezil (ARICEPT) 10 mg tablet, Take 1 tablet (10 mg total) by mouth daily at bedtime, Disp: 90 tablet, Rfl: 1  •  doxazosin (CARDURA XL) 8 MG 24 hr tablet, Take 1 tablet (8 mg total) by mouth daily with breakfast, Disp: 30 tablet, Rfl: 3  •  escitalopram (LEXAPRO) 10 mg tablet, TAKE ONE TABLET BY MOUTH EVERY DAY, Disp: 90 tablet, Rfl: 1  •  finasteride (PROSCAR) 5 mg tablet, TAKE ONE TABLET BY MOUTH EVERY DAY, Disp: 90 tablet, Rfl: 3  •  furosemide (LASIX) 20 mg tablet, TAKE ONE TABLET BY MOUTH EVERY DAY (GENERIC FOR LASIX), Disp: 90 tablet, Rfl: 1  •  insulin detemir (Levemir FlexTouch) 100 Units/mL injection pen, Inject 6 Units under the skin daily at bedtime, Disp: 15 mL, Rfl: 3  •  insulin lispro (HumaLOG KwikPen) 100 units/mL injection pen, INJECT SUBCUTANEOUSLY 7  UNITS BEFORE BREAKFAST AND  3 UNITS BEFORE LUNCH AND 5 DINNER, Disp: 30 mL, Rfl: 0  •  Insulin Pen Needle (BD Pen Needle Sherie U/F) 32G X 4 MM MISC, Use to inject insulin daily, Disp: 100 each, Rfl: 3  •  levothyroxine 75 mcg tablet, TAKE 1 TABLET BY MOUTH  DAILY, Disp: 90 tablet, Rfl: 1  •  lisinopril (ZESTRIL) 2.5 mg tablet, TAKE 1 TABLET BY MOUTH  DAILY, Disp: 90 tablet, Rfl: 3  •  metoprolol succinate (TOPROL-XL) 25 mg 24 hr tablet, TAKE 1 TABLET BY MOUTH  DAILY, Disp: 90 tablet, Rfl: 3  •  multivitamin (THERAGRAN) TABS, Take 1 tablet by mouth every morning, Disp: , Rfl:   •  pantoprazole (PROTONIX) 40 mg tablet, TAKE 1 TABLET BY MOUTH  DAILY, Disp: 90 tablet, Rfl: 1  •  semaglutide, 0.25 or 0.5 mg/dose, (Ozempic, 0.25 or 0.5 MG/DOSE,) 2 mg/1.5 mL injection pen, Inject 0.19 mL (0.25 mg total) under the skin once a week, Disp: 3 mL, Rfl: 3    Review of Systems   See HPI  All other systems reviewed and are negative. Physical Exam:  Body mass index is 26.64 kg/m². /66 (BP Location: Left arm, Patient Position: Sitting, Cuff Size: Large)   Pulse 68   Ht 5' 8" (1.727 m)   Wt 79.5 kg (175 lb 3.2 oz)   BMI 26.64 kg/m²    Wt Readings from Last 3 Encounters:   10/04/23 79.5 kg (175 lb 3.2 oz)   09/06/23 80.3 kg (177 lb)   08/16/23 79.8 kg (176 lb)       Physical Exam  Vitals reviewed. Constitutional:       Appearance: Normal appearance. Cardiovascular:      Rate and Rhythm: Normal rate and regular rhythm. Pulses: Normal pulses. Heart sounds: Normal heart sounds. Pulmonary:      Effort: Pulmonary effort is normal.      Breath sounds: Normal breath sounds. Skin:     General: Skin is warm and dry. Capillary Refill: Capillary refill takes less than 2 seconds. Neurological:      General: No focal deficit present. Mental Status: He is alert and oriented to person, place, and time.    Psychiatric:         Mood and Affect: Mood normal.         Behavior: Behavior normal.     Labs:   Lab Results   Component Value Date    HGBA1C 7.2 (A) 10/04/2023    HGBA1C 6.9 (A) 04/04/2023    HGBA1C 8.0 (H) 09/14/2022     Lab Results   Component Value Date    CREATININE 1.55 (H) 03/29/2023    CREATININE 1.49 (H) 09/14/2022    CREATININE 1.48 (H) 04/12/2022    BUN 27 03/29/2023     12/12/2017    K 4.2 03/29/2023     (H) 03/29/2023    CO2 25 03/29/2023     eGFR Date Value Ref Range Status   03/29/2023 46 (L) >59 mL/min/1.73 Final   02/12/2021 47 ml/min/1.73sq m Final     Lab Results   Component Value Date    CHOL 153 12/12/2017    HDL 67 03/29/2023    TRIG 55 03/29/2023     Lab Results   Component Value Date    ALT 17 03/29/2023    AST 16 03/29/2023    ALKPHOS 57 02/12/2021    BILITOT 0.4 12/12/2017     Lab Results   Component Value Date    MPZ8ZKOGCAQK 3.630 12/12/2017    [id number] 2.950 08/03/2017    UUD9NPVNXOQH 3.180 10/11/2016     Lab Results   Component Value Date    FREET4 1.20 09/14/2022     Discussed with the patient and all questioned fully answered. He will call me if any problems arise. Follow-up appointment in 3 months.      Counseled patient on diagnostic results, prognosis, risk and benefit of treatment options, instruction for management, importance of treatment compliance, Risk  factor reduction and impressions    Patient Instructions   Current regimen:   Ozempic 0.25 mg weekly  Levemir 6 units in the morning  Humalog 7 units at breakfast, 3 units, 5 units at dinner      LUCAS Lazar

## 2023-10-04 NOTE — PATIENT INSTRUCTIONS
Current regimen:   Ozempic 0.25 mg weekly  Levemir 6 units in the morning  Humalog 7 units at breakfast, 3 units, 5 units at dinner

## 2023-10-05 NOTE — ASSESSMENT & PLAN NOTE
Lab Results   Component Value Date    HGBA1C 7.2 (A) 10/04/2023     HGA1C indicates tight control, is consistent with GMI. Treatment regimen: Due to near hypoglycemia and tight control given age, will reduce Levemir from 8 units to 6 units and ask that he send in [place] report in 1-2 weeks for review or if still experiencing, call sooner. Continue ozempic 0.25 mg daily as he is tolerating this dose without GI side effects. Aware to call our office if he experiences any adverse effects. Continue Humalog 7 units with breakfast 3 units with lunch, and 5 units with dinner. Discussed risks/complications associated with uncontrolled diabetes including organ involvement, heart attack, stroke, death. Advised lifestyle modifications including attention to diet including the amount and types of carbohydrates consumed and regular activity. Call for blood sugars less than 70 mg/dl or patterns over 250 mg/dl. Discussed symptoms and treatment of hypoglycemia. Reviewed risks associated with hypoglycemia. Always carry rapid acting carbohydrates and a glucometer (a way to check your blood sugar). Recommendation for medical identification either bracelet, necklace. Routine follow up for diabetic eye and foot exams. Ordered blood work to complete prior to next visit. Follow up in 3 months.

## 2023-10-05 NOTE — ASSESSMENT & PLAN NOTE
Lab Results   Component Value Date    HGBA1C 7.2 (A) 10/04/2023     Follows regularly for foot exam.

## 2023-10-05 NOTE — ASSESSMENT & PLAN NOTE
Clinically euthyroid, continues on levothyroxine 75 mcg daily. Due for repeat thyroid function tests.

## 2023-10-12 ENCOUNTER — TELEPHONE (OUTPATIENT)
Dept: FAMILY MEDICINE CLINIC | Facility: CLINIC | Age: 78
End: 2023-10-12

## 2023-10-12 DIAGNOSIS — E11.22 TYPE 2 DIABETES MELLITUS WITH STAGE 3A CHRONIC KIDNEY DISEASE, WITH LONG-TERM CURRENT USE OF INSULIN (HCC): Primary | ICD-10-CM

## 2023-10-12 DIAGNOSIS — E03.9 ACQUIRED HYPOTHYROIDISM: ICD-10-CM

## 2023-10-12 DIAGNOSIS — K22.719 BARRETT'S ESOPHAGUS WITH DYSPLASIA: ICD-10-CM

## 2023-10-12 DIAGNOSIS — E03.1 CONGENITAL HYPOTHYROIDISM WITHOUT GOITER: ICD-10-CM

## 2023-10-12 DIAGNOSIS — Z79.4 TYPE 2 DIABETES MELLITUS WITH STAGE 3A CHRONIC KIDNEY DISEASE, WITH LONG-TERM CURRENT USE OF INSULIN (HCC): Primary | ICD-10-CM

## 2023-10-12 DIAGNOSIS — E78.2 MIXED HYPERLIPIDEMIA: ICD-10-CM

## 2023-10-12 DIAGNOSIS — N18.31 TYPE 2 DIABETES MELLITUS WITH STAGE 3A CHRONIC KIDNEY DISEASE, WITH LONG-TERM CURRENT USE OF INSULIN (HCC): Primary | ICD-10-CM

## 2023-10-12 DIAGNOSIS — I10 ESSENTIAL HYPERTENSION: ICD-10-CM

## 2023-10-12 RX ORDER — LEVOTHYROXINE SODIUM 0.07 MG/1
TABLET ORAL
Qty: 90 TABLET | Refills: 0 | Status: SHIPPED | OUTPATIENT
Start: 2023-10-12

## 2023-10-12 RX ORDER — PANTOPRAZOLE SODIUM 40 MG/1
TABLET, DELAYED RELEASE ORAL
Qty: 90 TABLET | Refills: 1 | Status: SHIPPED | OUTPATIENT
Start: 2023-10-12

## 2023-10-12 RX ORDER — ATORVASTATIN CALCIUM 40 MG/1
TABLET, FILM COATED ORAL
Qty: 90 TABLET | Refills: 1 | Status: SHIPPED | OUTPATIENT
Start: 2023-10-12

## 2023-10-12 NOTE — TELEPHONE ENCOUNTER
Left message for pt to call back.     Relay below message if he calls back    [address]

## 2023-10-12 NOTE — TELEPHONE ENCOUNTER
GENE PERKINS   to DO Charley   [facility]      10/12/23  8:19 AM  Patient needs updated blood work and has previously placed orders. Please contact patient to go for labs.

## 2023-10-13 NOTE — TELEPHONE ENCOUNTER
Left message on machine for patient to call us back.  If patient calls back please remind Pt to go for labs   [facility], [address]

## 2023-10-14 DIAGNOSIS — F41.9 ANXIETY: ICD-10-CM

## 2023-10-16 RX ORDER — ESCITALOPRAM OXALATE 10 MG/1
TABLET ORAL
Qty: 90 TABLET | Refills: 0 | Status: SHIPPED | OUTPATIENT
Start: 2023-10-16

## 2023-10-16 NOTE — TELEPHONE ENCOUNTER
Patient's wife informed.  She will come by later today to  a hard copy of the blood work  Syd Whalen, [address]

## 2023-11-06 ENCOUNTER — TELEPHONE (OUTPATIENT)
Dept: ENDOCRINOLOGY | Facility: CLINIC | Age: 78
End: 2023-11-06

## 2023-11-11 DIAGNOSIS — I10 ESSENTIAL HYPERTENSION: ICD-10-CM

## 2023-11-11 DIAGNOSIS — N18.30 TYPE 2 DIABETES MELLITUS WITH STAGE 3 CHRONIC KIDNEY DISEASE, WITH LONG-TERM CURRENT USE OF INSULIN, UNSPECIFIED WHETHER STAGE 3A OR 3B CKD (HCC): ICD-10-CM

## 2023-11-11 DIAGNOSIS — Z79.4 TYPE 2 DIABETES MELLITUS WITH STAGE 3 CHRONIC KIDNEY DISEASE, WITH LONG-TERM CURRENT USE OF INSULIN, UNSPECIFIED WHETHER STAGE 3A OR 3B CKD (HCC): ICD-10-CM

## 2023-11-11 DIAGNOSIS — E11.22 TYPE 2 DIABETES MELLITUS WITH STAGE 3 CHRONIC KIDNEY DISEASE, WITH LONG-TERM CURRENT USE OF INSULIN, UNSPECIFIED WHETHER STAGE 3A OR 3B CKD (HCC): ICD-10-CM

## 2023-11-13 ENCOUNTER — TELEPHONE (OUTPATIENT)
Dept: ENDOCRINOLOGY | Facility: CLINIC | Age: 78
End: 2023-11-13

## 2023-11-13 DIAGNOSIS — N18.30 TYPE 2 DIABETES MELLITUS WITH STAGE 3 CHRONIC KIDNEY DISEASE, WITH LONG-TERM CURRENT USE OF INSULIN, UNSPECIFIED WHETHER STAGE 3A OR 3B CKD (HCC): Primary | ICD-10-CM

## 2023-11-13 DIAGNOSIS — Z79.4 TYPE 2 DIABETES MELLITUS WITH STAGE 3 CHRONIC KIDNEY DISEASE, WITH LONG-TERM CURRENT USE OF INSULIN, UNSPECIFIED WHETHER STAGE 3A OR 3B CKD (HCC): Primary | ICD-10-CM

## 2023-11-13 DIAGNOSIS — E11.22 TYPE 2 DIABETES MELLITUS WITH STAGE 3 CHRONIC KIDNEY DISEASE, WITH LONG-TERM CURRENT USE OF INSULIN, UNSPECIFIED WHETHER STAGE 3A OR 3B CKD (HCC): Primary | ICD-10-CM

## 2023-11-13 RX ORDER — FLASH GLUCOSE SCANNING READER
1 EACH MISCELLANEOUS
Qty: 7 EACH | Refills: 3 | Status: SHIPPED | OUTPATIENT
Start: 2023-11-13

## 2023-11-13 RX ORDER — METOPROLOL SUCCINATE 25 MG/1
25 TABLET, EXTENDED RELEASE ORAL DAILY
Qty: 90 TABLET | Refills: 3 | Status: SHIPPED | OUTPATIENT
Start: 2023-11-13 | End: 2023-11-15 | Stop reason: SDUPTHER

## 2023-11-13 RX ORDER — BLOOD-GLUCOSE SENSOR
1 EACH MISCELLANEOUS ONCE
Qty: 1 EACH | Refills: 0 | Status: SHIPPED | OUTPATIENT
Start: 2023-11-13 | End: 2023-11-13

## 2023-11-13 RX ORDER — INSULIN LISPRO 100 [IU]/ML
INJECTION, SOLUTION INTRAVENOUS; SUBCUTANEOUS
Qty: 30 ML | Refills: 3 | Status: SHIPPED | OUTPATIENT
Start: 2023-11-13 | End: 2023-11-15 | Stop reason: SDUPTHER

## 2023-11-15 DIAGNOSIS — E03.1 CONGENITAL HYPOTHYROIDISM WITHOUT GOITER: ICD-10-CM

## 2023-11-15 DIAGNOSIS — E11.22 TYPE 2 DIABETES MELLITUS WITH STAGE 3 CHRONIC KIDNEY DISEASE, WITH LONG-TERM CURRENT USE OF INSULIN, UNSPECIFIED WHETHER STAGE 3A OR 3B CKD (HCC): ICD-10-CM

## 2023-11-15 DIAGNOSIS — I10 ESSENTIAL HYPERTENSION: ICD-10-CM

## 2023-11-15 DIAGNOSIS — Z79.4 TYPE 2 DIABETES MELLITUS WITH STAGE 3 CHRONIC KIDNEY DISEASE, WITH LONG-TERM CURRENT USE OF INSULIN, UNSPECIFIED WHETHER STAGE 3A OR 3B CKD (HCC): ICD-10-CM

## 2023-11-15 DIAGNOSIS — N18.30 TYPE 2 DIABETES MELLITUS WITH STAGE 3 CHRONIC KIDNEY DISEASE, WITH LONG-TERM CURRENT USE OF INSULIN, UNSPECIFIED WHETHER STAGE 3A OR 3B CKD (HCC): ICD-10-CM

## 2023-11-15 RX ORDER — LEVOTHYROXINE SODIUM 0.07 MG/1
75 TABLET ORAL DAILY
Qty: 90 TABLET | Refills: 0 | Status: SHIPPED | OUTPATIENT
Start: 2023-11-15

## 2023-11-15 RX ORDER — INSULIN LISPRO 100 [IU]/ML
INJECTION, SOLUTION INTRAVENOUS; SUBCUTANEOUS
Qty: 30 ML | Refills: 3 | Status: SHIPPED | OUTPATIENT
Start: 2023-11-15

## 2023-11-15 RX ORDER — METOPROLOL SUCCINATE 25 MG/1
25 TABLET, EXTENDED RELEASE ORAL DAILY
Qty: 90 TABLET | Refills: 3 | Status: SHIPPED | OUTPATIENT
Start: 2023-11-15

## 2023-12-28 DIAGNOSIS — Z79.4 TYPE 2 DIABETES MELLITUS WITH STAGE 3 CHRONIC KIDNEY DISEASE, WITH LONG-TERM CURRENT USE OF INSULIN (HCC): ICD-10-CM

## 2023-12-28 DIAGNOSIS — N18.30 TYPE 2 DIABETES MELLITUS WITH STAGE 3 CHRONIC KIDNEY DISEASE, WITH LONG-TERM CURRENT USE OF INSULIN (HCC): ICD-10-CM

## 2023-12-28 DIAGNOSIS — E11.22 TYPE 2 DIABETES MELLITUS WITH STAGE 3 CHRONIC KIDNEY DISEASE, WITH LONG-TERM CURRENT USE OF INSULIN (HCC): ICD-10-CM

## 2023-12-29 RX ORDER — INSULIN DETEMIR 100 [IU]/ML
INJECTION, SOLUTION SUBCUTANEOUS
Qty: 15 ML | Refills: 3 | Status: SHIPPED | OUTPATIENT
Start: 2023-12-29

## 2024-01-12 DIAGNOSIS — F41.9 ANXIETY: ICD-10-CM

## 2024-01-12 RX ORDER — ESCITALOPRAM OXALATE 10 MG/1
TABLET ORAL
Qty: 90 TABLET | Refills: 1 | Status: SHIPPED | OUTPATIENT
Start: 2024-01-12

## 2024-02-01 ENCOUNTER — TELEPHONE (OUTPATIENT)
Age: 79
End: 2024-02-01

## 2024-02-01 NOTE — TELEPHONE ENCOUNTER
I spoke with Gene, he stated his bloody nose stopped around 9:00 this morning.  I informed him to call the office for appointment if it started again.  Also reiterated he may go to the care now or Emergency room.  Patient understood.  No further action required,  Brynn/IZZY

## 2024-02-01 NOTE — TELEPHONE ENCOUNTER
Gene's wife Perla called to schedule appt today - none available    S/S: Bloody nose x 3 - 5 days - wife says he had hemorrhaged in his nose a couple years ago and now it's starting again     Offered appt w other provider, prefers to see Dr Lombardi    Please advise

## 2024-02-02 RX ORDER — SEMAGLUTIDE 0.68 MG/ML
INJECTION, SOLUTION SUBCUTANEOUS
COMMUNITY
Start: 2024-01-28

## 2024-02-05 ENCOUNTER — OFFICE VISIT (OUTPATIENT)
Dept: ENDOCRINOLOGY | Facility: CLINIC | Age: 79
End: 2024-02-05
Payer: MEDICARE

## 2024-02-05 VITALS
WEIGHT: 171 LBS | DIASTOLIC BLOOD PRESSURE: 68 MMHG | SYSTOLIC BLOOD PRESSURE: 128 MMHG | HEART RATE: 65 BPM | OXYGEN SATURATION: 96 % | HEIGHT: 68 IN | BODY MASS INDEX: 25.91 KG/M2

## 2024-02-05 DIAGNOSIS — E11.22 TYPE 2 DIABETES MELLITUS WITH STAGE 3A CHRONIC KIDNEY DISEASE, WITH LONG-TERM CURRENT USE OF INSULIN (HCC): Primary | ICD-10-CM

## 2024-02-05 DIAGNOSIS — I10 ESSENTIAL HYPERTENSION: ICD-10-CM

## 2024-02-05 DIAGNOSIS — E78.2 MIXED HYPERLIPIDEMIA: ICD-10-CM

## 2024-02-05 DIAGNOSIS — Z79.4 TYPE 2 DIABETES MELLITUS WITH STAGE 3A CHRONIC KIDNEY DISEASE, WITH LONG-TERM CURRENT USE OF INSULIN (HCC): Primary | ICD-10-CM

## 2024-02-05 DIAGNOSIS — I50.32 CHRONIC DIASTOLIC CONGESTIVE HEART FAILURE (HCC): ICD-10-CM

## 2024-02-05 DIAGNOSIS — E03.1 CONGENITAL HYPOTHYROIDISM WITHOUT GOITER: ICD-10-CM

## 2024-02-05 DIAGNOSIS — I70.209 PERIPHERAL ARTERIOSCLEROSIS (HCC): ICD-10-CM

## 2024-02-05 DIAGNOSIS — E11.22 TYPE 2 DIABETES MELLITUS WITH STAGE 3B CHRONIC KIDNEY DISEASE, WITH LONG-TERM CURRENT USE OF INSULIN (HCC): ICD-10-CM

## 2024-02-05 DIAGNOSIS — I25.10 CORONARY ARTERY DISEASE INVOLVING NATIVE CORONARY ARTERY OF NATIVE HEART WITHOUT ANGINA PECTORIS: ICD-10-CM

## 2024-02-05 DIAGNOSIS — N18.32 TYPE 2 DIABETES MELLITUS WITH STAGE 3B CHRONIC KIDNEY DISEASE, WITH LONG-TERM CURRENT USE OF INSULIN (HCC): ICD-10-CM

## 2024-02-05 DIAGNOSIS — Z79.4 TYPE 2 DIABETES MELLITUS WITH STAGE 3B CHRONIC KIDNEY DISEASE, WITH LONG-TERM CURRENT USE OF INSULIN (HCC): ICD-10-CM

## 2024-02-05 DIAGNOSIS — N18.31 TYPE 2 DIABETES MELLITUS WITH STAGE 3A CHRONIC KIDNEY DISEASE, WITH LONG-TERM CURRENT USE OF INSULIN (HCC): Primary | ICD-10-CM

## 2024-02-05 DIAGNOSIS — E03.9 ACQUIRED HYPOTHYROIDISM: ICD-10-CM

## 2024-02-05 LAB — SL AMB POCT HEMOGLOBIN AIC: 7.2 (ref ?–6.5)

## 2024-02-05 PROCEDURE — 95251 CONT GLUC MNTR ANALYSIS I&R: CPT | Performed by: INTERNAL MEDICINE

## 2024-02-05 PROCEDURE — 83036 HEMOGLOBIN GLYCOSYLATED A1C: CPT | Performed by: INTERNAL MEDICINE

## 2024-02-05 PROCEDURE — 99214 OFFICE O/P EST MOD 30 MIN: CPT | Performed by: INTERNAL MEDICINE

## 2024-02-05 RX ORDER — LEVOTHYROXINE SODIUM 0.07 MG/1
75 TABLET ORAL DAILY
Qty: 90 TABLET | Refills: 1 | Status: SHIPPED | OUTPATIENT
Start: 2024-02-05

## 2024-02-05 RX ORDER — INSULIN DEGLUDEC INJECTION 100 U/ML
6 INJECTION, SOLUTION SUBCUTANEOUS
Qty: 15 ML | Refills: 1 | Status: SHIPPED | OUTPATIENT
Start: 2024-02-05

## 2024-02-05 NOTE — PATIENT INSTRUCTIONS
Please get labs done as ordered ([facility]/ [facility])   Follow up with opthalmology for a diabetic exam   Continue current medications, please try to take mealtime insulin before eating rather than often  I have put in prescription for Tresiba to replace Levemir at the same dose  Plan for review of blood sugars in 2 to 3 weeks

## 2024-02-05 NOTE — PROGRESS NOTES
Gene Greene 78 y.o. male MRN: [id number]    Encounter: [id number]      Assessment/Plan     Type 2 diabetes mellitus on long term insulin with hyperglycemia  CKD  POCT A1c 7.2%, well-controlled for age, comorbidities    Recommend the following at this time  Continue current medications, iced to take mealtime insulin before eating rather than after   Blood sugars were fairly controlled with some postprandial hyperglycemia due to missed insulin on 2-week review of CGM download.  Since patient has resumed Ozempic, plan to do blood sugars in 2 to 3 weeks.  Discussed option of increasing Ozempic as long as it is tolerated, may be able to reduce the dose/need for prandial insulin.  Past, patient does not wanted to increase dose of Ozempic.  If no significant glycemic improvement with lower dose, can discontinue as well.  -Patient to have labs done as ordered  Follow-up with ophthalmology for diabetic eye exam    3. Hyperlipidemia  - continue statin therapy    4. Hypertension  5. CAD, CHF  Blood pressure at goal     6. Hypothyroidism   check TSH, free T4    CC: Diabetes    History of Present Illness     HPI:  Gene Greene is a 78 y.o. male presents for a follow-up visit regarding diabetes management.     Last seen in 10/2023  Last Eye exam: long time, possibly > 2 years    Current regimen:   Levemir 6 units subcutaneously at bedtime  Humalog 7 units with breakfast,  3-6 units with lunch and dinner - sometimes takes after eating   Ozempic 0.25 mg subcutaneously weekly - did not take for 2 weeks, resumed 2 days ago     Levothyroxine 75 mcg orally daily     No symptoms of hypo or hyperthyroidism   No vision changes   Overall feels well   No walking;/ exercise on a regular basis     Statin:  lipitor 40   ACE-I/ARB:  lisinopril     Home glucose monitoring: CGM interpretation   Jess 2  Time percentage CGM worn 98%   Time in range 67 (goal >65-70%)  High 26%  Very high 7%  Low 0%  Very low 0%     CV 26.3   % (goal  <33%)     Average glucose 174 mg/dL  GMI 7.5 %     All other systems were reviewed and are negative.    Review of Systems      Historical Information   Past Medical History:   Diagnosis Date    Aortic narrowing     Last Assessed:  9/28/15    Arthritis     Hammond esophagus     Bilateral leg edema     Bulla, lung (HCC)     CHF (congestive heart failure) (HCC)     Chronic kidney disease     stage 3    Colon polyp     COVID-19 virus infection 02/08/2021    Diabetes mellitus (HCC)     Enlarged prostate     Heel pain     right heel slipped in the garage-landed on the right heel    Herniated lumbar intervertebral disc     x 4 discs-fell off a roof    Hiatal hernia     High cholesterol     History of kidney problems     Hypertension     Myocardial infarction (HCC)     Old myocardial infarction     x2-pt was unaware of the MI-one stent    Thyroid disease     hypothyroid    Transient cerebral ischemia     Last Assessed:  8/27/15    Wears dentures     full upper, partial lower    Wears glasses      Past Surgical History:   Procedure Laterality Date    CATARACT EXTRACTION      COLONOSCOPY      Resolved:  2015    CORONARY ANGIOPLASTY WITH STENT PLACEMENT      Stent implanted early 1990's,     ESOPHAGOGASTRODUODENOSCOPY      KNEE SURGERY      right knee cartilage surgery-left meniscus repair    RI XCAPSL CTRC RMVL INSJ IO LENS PROSTH W/O ECP Right 1/24/2019    Procedure: EXTRACTION EXTRACAPSULAR CATARACT PHACO INTRAOCULAR LENS (IOL);  Surgeon: Geno Mckee MD;  Location: [facility] MAIN OR;  Service: Ophthalmology    RI XCAPSL CTRC RMVL INSJ IO LENS PROSTH W/O ECP Left 2/21/2019    Procedure: EXTRACTION EXTRACAPSULAR CATARACT PHACO INTRAOCULAR LENS (IOL);  Surgeon: Geno Mkcee MD;  Location: [facility] MAIN OR;  Service: Ophthalmology    TONSILLECTOMY      UMBILICAL HERNIA REPAIR      1980's,    UPPER GASTROINTESTINAL ENDOSCOPY      Last Assessed:  8/27/15     Social History   Social History     Substance and Sexual Activity    Alcohol Use Not Currently     Social History     Substance and Sexual Activity   Drug Use No     Social History     Tobacco Use   Smoking Status Former    Current packs/day: 0.00    Average packs/day: 3.0 packs/day for 20.0 years (60.0 ttl pk-yrs)    Types: Cigarettes    Start date:     Quit date:     Years since quittin.1   Smokeless Tobacco Never     Family History:   Family History   Problem Relation Age of Onset    Colon cancer Mother     Arthritis Mother     Diabetes Mother     Hypertension Mother     Cancer Mother         Breast    Hyperlipidemia Mother     Cancer Sister         ovarian    Other Brother         Cerebrovascular accident (CVA)    Diabetes Other         Sibling    Hypertension Other         Sibling    Hernia Father         umbilical    Hernia Son     Cancer Sister         liver    Kidney disease Sister     Kidney disease Brother     Mental illness Neg Hx        Meds/Allergies   Current Outpatient Medications   Medication Sig Dispense Refill    aspirin (ECOTRIN LOW STRENGTH) 81 mg EC tablet Take 81 mg by mouth every morning        atorvastatin (LIPITOR) 40 mg tablet TAKE 1 TABLET BY MOUTH DAILY 90 tablet 1    Coenzyme Q10 (COQ-10) 100 MG CAPS Take 200 mg by mouth daily at bedtime       Continuous Blood Gluc  (FreeStyle Jess 14 Day [place]) RHONDA Use 1 each every 14 (fourteen) days 7 each 3    Continuous Blood Gluc Sensor (FreeStyle Jess 2 Sensor) MISC Change sensor every 14 days 6 each 3    donepezil (ARICEPT) 10 mg tablet Take 1 tablet (10 mg total) by mouth daily at bedtime 90 tablet 1    doxazosin (CARDURA XL) 8 MG 24 hr tablet Take 1 tablet (8 mg total) by mouth daily with breakfast 30 tablet 3    escitalopram (LEXAPRO) 10 mg tablet TAKE ONE TABLET BY MOUTH EVERY DAY 90 tablet 1    finasteride (PROSCAR) 5 mg tablet TAKE ONE TABLET BY MOUTH EVERY DAY 90 tablet 3    furosemide (LASIX) 20 mg tablet TAKE ONE TABLET BY MOUTH EVERY DAY (GENERIC FOR LASIX) 90 tablet 1     "insulin lispro (HumaLOG KwikPen) 100 units/mL injection pen INJECT SUBCUTANEOUSLY 7 UNITS  BEFORE BREAKFAST , 5 UNITS  BEFORE LUNCH , AND 5 UNITS  BEFORE DINNER 30 mL 3    Insulin Pen Needle (BD Pen Needle Sherie U/F) 32G X 4 MM MISC Use to inject insulin daily 100 each 3    Levemir FlexPen 100 units/mL injection pen INJECT SUBCUTANEOUSLY 8 UNITS  DAILY AT BEDTIME 15 mL 3    levothyroxine 75 mcg tablet Take 1 tablet (75 mcg total) by mouth daily 90 tablet 0    lisinopril (ZESTRIL) 2.5 mg tablet TAKE 1 TABLET BY MOUTH  DAILY 90 tablet 3    metoprolol succinate (TOPROL-XL) 25 mg 24 hr tablet Take 1 tablet (25 mg total) by mouth daily 90 tablet 3    multivitamin (THERAGRAN) TABS Take 1 tablet by mouth every morning      pantoprazole (PROTONIX) 40 mg tablet TAKE 1 TABLET BY MOUTH DAILY 90 tablet 1    semaglutide, 0.25 or 0.5 mg/dose, (Ozempic, 0.25 or 0.5 MG/DOSE,) 2 mg/1.5 mL injection pen Inject 0.19 mL (0.25 mg total) under the skin once a week 3 mL 3    Ozempic, 0.25 or 0.5 MG/DOSE, 2 MG/3ML injection pen  (Patient not taking: Reported on 2/5/2024)       No current facility-administered medications for this visit.     No Known Allergies    Objective   Vitals: Blood pressure 128/68, pulse 65, height 5' 8\" (1.727 m), weight 77.6 kg (171 lb), SpO2 96%.    Physical Exam  Constitutional:       General: He is not in acute distress.     Appearance: He is well-developed. He is not diaphoretic.   HENT:      Head: Normocephalic and atraumatic.   Eyes:      Conjunctiva/sclera: Conjunctivae normal.      Pupils: Pupils are equal, round, and reactive to light.   Cardiovascular:      Rate and Rhythm: Normal rate and regular rhythm.      Pulses: no weak pulses          Posterior tibial pulses are 2+ on the right side and 2+ on the left side.      Heart sounds: Normal heart sounds. No murmur heard.  Pulmonary:      Effort: Pulmonary effort is normal. No respiratory distress.      Breath sounds: Normal breath sounds. No wheezing. "   Abdominal:      General: There is no distension.      Palpations: Abdomen is soft.      Tenderness: There is no abdominal tenderness. There is no guarding.   Musculoskeletal:      Cervical back: Normal range of motion and neck supple.   Feet:      Right foot:      Skin integrity: No ulcer, skin breakdown, erythema, warmth, callus or dry skin.      Left foot:      Skin integrity: No ulcer, skin breakdown, erythema, warmth, callus or dry skin.   Skin:     General: Skin is warm and dry.      Findings: No erythema or rash.   Neurological:      Mental Status: He is alert and oriented to person, place, and time.   Psychiatric:         Behavior: Behavior normal.         Thought Content: Thought content normal.   Diabetic Foot Exam    Patient's shoes and socks removed.    Right Foot/Ankle   Right Foot Inspection  Skin Exam: skin normal and skin intact. No dry skin, no warmth, no callus, no erythema, no maceration, no abnormal color, no pre-ulcer, no ulcer and no callus.     Sensory   Vibration: intact  Monofilament testing: intact    Vascular  Capillary refills: < 3 seconds  The right PT pulse is 2+.     Left Foot/Ankle  Left Foot Inspection  Skin Exam: skin normal and skin intact. No dry skin, no warmth, no erythema, no maceration, normal color, no pre-ulcer, no ulcer and no callus.     Sensory   Vibration: intact  Monofilament testing: intact    Vascular  Capillary refills: < 3 seconds  The left PT pulse is 2+.     Assign Risk Category  No deformity present  No loss of protective sensation  No weak pulses  Risk: 2  Right big toe - onychomycosis +  The history was obtained from the review of the chart, patient.    Lab Results:   Lab Results   Component Value Date/Time    Hemoglobin A1C 7.2 (A) 10/04/2023 11:06 AM    Hemoglobin A1C 6.9 (A) 04/04/2023 11:51 AM    White Blood Cell Count 6.5 03/29/2023 08:45 AM    Hemoglobin 13.5 03/29/2023 08:45 AM    HCT 39.1 03/29/2023 08:45 AM    MCV 94 03/29/2023 08:45 AM    Platelet  "Count 264 03/29/2023 08:45 AM    BUN 27 03/29/2023 08:45 AM    Potassium 4.2 03/29/2023 08:45 AM    Chloride 107 (H) 03/29/2023 08:45 AM    CO2 25 03/29/2023 08:45 AM    Creatinine 1.55 (H) 03/29/2023 08:45 AM    AST 16 03/29/2023 08:45 AM    ALT 17 03/29/2023 08:45 AM    Protein, Total 6.1 03/29/2023 08:45 AM    Albumin 4.4 03/29/2023 08:45 AM    Globulin, Total 1.7 03/29/2023 08:45 AM    HDL 67 03/29/2023 08:45 AM    Triglycerides 55 03/29/2023 08:45 AM         Imaging Studies: I have personally reviewed pertinent reports.      Portions of the record may have been created with voice recognition software. Occasional wrong word or \"sound a like\" substitutions may have occurred due to the inherent limitations of voice recognition software. Read the chart carefully and recognize, using context, where substitutions have occurred.    "

## 2024-02-07 ENCOUNTER — OFFICE VISIT (OUTPATIENT)
Dept: CARDIOLOGY CLINIC | Facility: CLINIC | Age: 79
End: 2024-02-07
Payer: MEDICARE

## 2024-02-07 VITALS
BODY MASS INDEX: 25.91 KG/M2 | SYSTOLIC BLOOD PRESSURE: 112 MMHG | HEART RATE: 69 BPM | OXYGEN SATURATION: 97 % | DIASTOLIC BLOOD PRESSURE: 66 MMHG | HEIGHT: 68 IN | WEIGHT: 171 LBS

## 2024-02-07 DIAGNOSIS — I25.10 CORONARY ARTERY DISEASE INVOLVING NATIVE CORONARY ARTERY OF NATIVE HEART WITHOUT ANGINA PECTORIS: ICD-10-CM

## 2024-02-07 DIAGNOSIS — E78.2 MIXED HYPERLIPIDEMIA: ICD-10-CM

## 2024-02-07 DIAGNOSIS — I10 ESSENTIAL HYPERTENSION: Primary | ICD-10-CM

## 2024-02-07 DIAGNOSIS — I50.32 CHRONIC DIASTOLIC CONGESTIVE HEART FAILURE (HCC): ICD-10-CM

## 2024-02-07 PROCEDURE — 93000 ELECTROCARDIOGRAM COMPLETE: CPT | Performed by: INTERNAL MEDICINE

## 2024-02-07 PROCEDURE — 99214 OFFICE O/P EST MOD 30 MIN: CPT | Performed by: INTERNAL MEDICINE

## 2024-02-07 NOTE — PROGRESS NOTES
Cardiology   Morgan Henley DO, [facility]  Jose Donato MD, [facility]  Jose Clark MD, [facility]  Audra Muse MD, [facility]  -------------------------------------------------------------------  [facility]  [address], [address], [address]  [place], [place], [address]  [phone]    Cardiology Follow Up  Gene Greene  1945  [id number]          Assessment/Plan:    1. Essential hypertension    2. Coronary artery disease involving native coronary artery of native heart without angina pectoris    3. Chronic diastolic congestive heart failure (HCC)    4. Mixed hyperlipidemia      - LE edema resolved with discontinuation of amlodipine and increasing furosemide to 20 mg daily.    Last echocardiogram showed a normal EF with diastolic dysfunction.  - patient is predominantly sedentary and has multiple risk factors for CAD including previous PCI over 5 years ago. Most recent stress test did not show any ischemia or infarction.    - blood pressure is stable off amlodipine.  - Blood work ordered including lipid panel and CMP  - continue furosemide 20 mg daily.  - Call if any change or if any dyspnea or LE edema.   - Discussed diet and weight loss.        Interval History:     Gene Greene is 78 y.o. male here for followup of CAD and CHF.  Since his last visit, he has been feeling well.  he denies any palpitations, chest pain, shortness of breath, LE edema, orthopnea or PND.   Diet is overall unchanged.  There has not been a significant change in weight.    He was previously having LE edema that resolved with discontinuing amlodipine and increasing furosemide dosage.     His most recent blood work was in March 2023 which showed a creatinine of 1.5 with LDL of 66, triglycerides of 55.  Patient follows with endocrinology as well.     Past cardiac history:  He has a history of CAD.  He has a h/o PCI to the circumflex vessel in 1992. His last nuclear stress test was done  in 5/2015 which showed inferior/inferolateral infarct c/w attenuation. EF 63%. He exercised for 6:38.   He had a treadmill stress in July 2018 which was normal.  He exercised for 7 minutes without ECG changes.   Echocardiogram in March 2021 showed ejection fraction of 55-60% with mild valve disease    The following portions of the patient's history were reviewed and updated as appropriate: allergies, current medications, past family history, past medical history, past social history, past surgical history and problem list.      Current Outpatient Medications:     aspirin (ECOTRIN LOW STRENGTH) 81 mg EC tablet, Take 81 mg by mouth every morning  , Disp: , Rfl:     atorvastatin (LIPITOR) 40 mg tablet, TAKE 1 TABLET BY MOUTH DAILY, Disp: 90 tablet, Rfl: 1    Coenzyme Q10 (COQ-10) 100 MG CAPS, Take 200 mg by mouth daily at bedtime , Disp: , Rfl:     Continuous Blood Gluc  (FreeStyle Jess 14 Day [place]) RHONDA, Use 1 each every 14 (fourteen) days, Disp: 7 each, Rfl: 3    Continuous Blood Gluc Sensor (FreeStyle Jess 2 Sensor) MISC, Change sensor every 14 days, Disp: 6 each, Rfl: 3    doxazosin (CARDURA XL) 8 MG 24 hr tablet, Take 1 tablet (8 mg total) by mouth daily with breakfast, Disp: 30 tablet, Rfl: 3    escitalopram (LEXAPRO) 10 mg tablet, TAKE ONE TABLET BY MOUTH EVERY DAY, Disp: 90 tablet, Rfl: 1    finasteride (PROSCAR) 5 mg tablet, TAKE ONE TABLET BY MOUTH EVERY DAY, Disp: 90 tablet, Rfl: 3    furosemide (LASIX) 20 mg tablet, TAKE ONE TABLET BY MOUTH EVERY DAY (GENERIC FOR LASIX), Disp: 90 tablet, Rfl: 1    insulin degludec (Tresiba FlexTouch) 100 units/mL injection pen, Inject 6 Units under the skin daily at bedtime, Disp: 15 mL, Rfl: 1    insulin lispro (HumaLOG KwikPen) 100 units/mL injection pen, INJECT SUBCUTANEOUSLY 7 UNITS  BEFORE BREAKFAST , 5 UNITS  BEFORE LUNCH , AND 5 UNITS  BEFORE DINNER, Disp: 30 mL, Rfl: 3    Insulin Pen Needle (BD Pen Needle Sherie U/F) 32G X 4 MM MISC, Use to inject insulin  "daily, Disp: 100 each, Rfl: 3    levothyroxine 75 mcg tablet, Take 1 tablet (75 mcg total) by mouth daily, Disp: 90 tablet, Rfl: 1    lisinopril (ZESTRIL) 2.5 mg tablet, TAKE 1 TABLET BY MOUTH  DAILY, Disp: 90 tablet, Rfl: 3    metoprolol succinate (TOPROL-XL) 25 mg 24 hr tablet, Take 1 tablet (25 mg total) by mouth daily, Disp: 90 tablet, Rfl: 3    multivitamin (THERAGRAN) TABS, Take 1 tablet by mouth every morning, Disp: , Rfl:     pantoprazole (PROTONIX) 40 mg tablet, TAKE 1 TABLET BY MOUTH DAILY, Disp: 90 tablet, Rfl: 1    semaglutide, 0.25 or 0.5 mg/dose, (Ozempic, 0.25 or 0.5 MG/DOSE,) 2 mg/1.5 mL injection pen, Inject 0.19 mL (0.25 mg total) under the skin once a week, Disp: 3 mL, Rfl: 3    donepezil (ARICEPT) 10 mg tablet, Take 1 tablet (10 mg total) by mouth daily at bedtime, Disp: 90 tablet, Rfl: 1    Ozempic, 0.25 or 0.5 MG/DOSE, 2 MG/3ML injection pen, , Disp: , Rfl:         Review of Systems:  Review of Systems   Respiratory:  Negative for shortness of breath.    Cardiovascular:  Negative for chest pain, palpitations and leg swelling.   All other systems reviewed and are negative.        Physical Exam:  Vitals:  Vitals:    02/07/24 1018   BP: 112/66   BP Location: Right arm   Patient Position: Sitting   Cuff Size: Standard   Pulse: 69   SpO2: 97%   Weight: 77.6 kg (171 lb)   Height: 5' 8\" (1.727 m)     Physical Exam   Constitutional: He appears healthy. No distress.   Eyes: Pupils are equal, round, and reactive to light. Conjunctivae are normal.   Neck: No JVD present.   Cardiovascular: Normal rate, regular rhythm and normal heart sounds. Exam reveals no gallop and no friction rub.   No murmur heard.  Pulmonary/Chest: Effort normal and breath sounds normal. He has no wheezes. He has no rales.   Musculoskeletal:         General: No tenderness, deformity or edema.      Cervical back: Normal range of motion and neck supple.   Neurological: He is alert and oriented to person, place, and time.   Skin: Skin " is warm and dry.        Cardiographics:  EKG: Personally reviewed NSR with PAC  Last known EF: 55%    This note was completed in part utilizing [employer] Software.  Grammatical errors, random word insertions, spelling mistakes, and incomplete sentences can be an occasional consequence of this system secondary to software limitations, ambient noise, and hardware issues.  If you have any questions or concerns about the content, text, or information contained within the body of this dictation, please contact the provider for clarification.

## 2024-02-15 DIAGNOSIS — K22.719 BARRETT'S ESOPHAGUS WITH DYSPLASIA: ICD-10-CM

## 2024-02-15 DIAGNOSIS — E78.2 MIXED HYPERLIPIDEMIA: ICD-10-CM

## 2024-02-16 RX ORDER — PANTOPRAZOLE SODIUM 40 MG/1
TABLET, DELAYED RELEASE ORAL
Qty: 90 TABLET | Refills: 0 | Status: SHIPPED | OUTPATIENT
Start: 2024-02-16

## 2024-02-16 RX ORDER — ATORVASTATIN CALCIUM 40 MG/1
TABLET, FILM COATED ORAL
Qty: 90 TABLET | Refills: 0 | Status: SHIPPED | OUTPATIENT
Start: 2024-02-16

## 2024-03-03 DIAGNOSIS — I10 ESSENTIAL HYPERTENSION: ICD-10-CM

## 2024-03-04 RX ORDER — FUROSEMIDE 20 MG/1
TABLET ORAL
Qty: 90 TABLET | Refills: 1 | Status: SHIPPED | OUTPATIENT
Start: 2024-03-04

## 2024-03-21 DIAGNOSIS — N18.32 TYPE 2 DIABETES MELLITUS WITH STAGE 3B CHRONIC KIDNEY DISEASE, WITH LONG-TERM CURRENT USE OF INSULIN (HCC): ICD-10-CM

## 2024-03-21 DIAGNOSIS — Z79.4 TYPE 2 DIABETES MELLITUS WITH STAGE 3B CHRONIC KIDNEY DISEASE, WITH LONG-TERM CURRENT USE OF INSULIN (HCC): ICD-10-CM

## 2024-03-21 DIAGNOSIS — E11.22 TYPE 2 DIABETES MELLITUS WITH STAGE 3B CHRONIC KIDNEY DISEASE, WITH LONG-TERM CURRENT USE OF INSULIN (HCC): ICD-10-CM

## 2024-03-21 RX ORDER — SEMAGLUTIDE 0.68 MG/ML
INJECTION, SOLUTION SUBCUTANEOUS
Qty: 3 ML | Refills: 3 | Status: SHIPPED | OUTPATIENT
Start: 2024-03-21

## 2024-04-18 DIAGNOSIS — E78.2 MIXED HYPERLIPIDEMIA: ICD-10-CM

## 2024-04-18 DIAGNOSIS — K22.719 BARRETT'S ESOPHAGUS WITH DYSPLASIA: ICD-10-CM

## 2024-04-19 RX ORDER — PANTOPRAZOLE SODIUM 40 MG/1
TABLET, DELAYED RELEASE ORAL
Qty: 90 TABLET | Refills: 1 | Status: SHIPPED | OUTPATIENT
Start: 2024-04-19

## 2024-04-19 RX ORDER — ATORVASTATIN CALCIUM 40 MG/1
TABLET, FILM COATED ORAL
Qty: 90 TABLET | Refills: 1 | Status: SHIPPED | OUTPATIENT
Start: 2024-04-19

## 2024-05-29 DIAGNOSIS — N40.1 BENIGN LOCALIZED PROSTATIC HYPERPLASIA WITH LOWER URINARY TRACT SYMPTOMS (LUTS): ICD-10-CM

## 2024-05-29 RX ORDER — FINASTERIDE 5 MG/1
TABLET, FILM COATED ORAL
Qty: 90 TABLET | Refills: 1 | Status: SHIPPED | OUTPATIENT
Start: 2024-05-29

## 2024-06-07 DIAGNOSIS — Z79.4 TYPE 2 DIABETES MELLITUS WITH STAGE 3 CHRONIC KIDNEY DISEASE, WITH LONG-TERM CURRENT USE OF INSULIN (HCC): ICD-10-CM

## 2024-06-07 DIAGNOSIS — E11.22 TYPE 2 DIABETES MELLITUS WITH STAGE 3 CHRONIC KIDNEY DISEASE, WITH LONG-TERM CURRENT USE OF INSULIN (HCC): ICD-10-CM

## 2024-06-07 DIAGNOSIS — N18.30 TYPE 2 DIABETES MELLITUS WITH STAGE 3 CHRONIC KIDNEY DISEASE, WITH LONG-TERM CURRENT USE OF INSULIN (HCC): ICD-10-CM

## 2024-06-07 RX ORDER — PEN NEEDLE, DIABETIC 32GX 5/32"
NEEDLE, DISPOSABLE MISCELLANEOUS
Qty: 360 EACH | Refills: 1 | Status: SHIPPED | OUTPATIENT
Start: 2024-06-07

## 2024-06-21 DIAGNOSIS — I10 ESSENTIAL HYPERTENSION: ICD-10-CM

## 2024-06-22 RX ORDER — LISINOPRIL 2.5 MG/1
TABLET ORAL
Qty: 90 TABLET | Refills: 1 | Status: SHIPPED | OUTPATIENT
Start: 2024-06-22

## 2024-06-25 ENCOUNTER — TELEPHONE (OUTPATIENT)
Dept: ENDOCRINOLOGY | Facility: CLINIC | Age: 79
End: 2024-06-25

## 2024-06-25 NOTE — TELEPHONE ENCOUNTER
ADS sent request to get record from last 6 mths for pt.'s Jess but he never seen in our [facility]'s Endo dept. Also last seen by his pcp 5 yrs. Ago.Request sent back to the sender with message . Thanks

## 2024-06-26 DIAGNOSIS — F41.9 ANXIETY: ICD-10-CM

## 2024-06-27 ENCOUNTER — OFFICE VISIT (OUTPATIENT)
Dept: INTERNAL MEDICINE CLINIC | Facility: CLINIC | Age: 79
End: 2024-06-27
Payer: MEDICARE

## 2024-06-27 VITALS
HEART RATE: 63 BPM | HEIGHT: 68 IN | BODY MASS INDEX: 26.83 KG/M2 | TEMPERATURE: 98.7 F | WEIGHT: 177 LBS | OXYGEN SATURATION: 97 % | SYSTOLIC BLOOD PRESSURE: 126 MMHG | RESPIRATION RATE: 16 BRPM | DIASTOLIC BLOOD PRESSURE: 60 MMHG

## 2024-06-27 DIAGNOSIS — L23.7 POISON IVY DERMATITIS: Primary | ICD-10-CM

## 2024-06-27 DIAGNOSIS — E03.1 CONGENITAL HYPOTHYROIDISM WITHOUT GOITER: ICD-10-CM

## 2024-06-27 PROCEDURE — 99213 OFFICE O/P EST LOW 20 MIN: CPT | Performed by: STUDENT IN AN ORGANIZED HEALTH CARE EDUCATION/TRAINING PROGRAM

## 2024-06-27 PROCEDURE — G2211 COMPLEX E/M VISIT ADD ON: HCPCS | Performed by: STUDENT IN AN ORGANIZED HEALTH CARE EDUCATION/TRAINING PROGRAM

## 2024-06-27 RX ORDER — DIPHENHYDRAMINE HCL 25 MG
25 TABLET ORAL EVERY 6 HOURS PRN
Qty: 30 TABLET | Refills: 0 | Status: SHIPPED | OUTPATIENT
Start: 2024-06-27

## 2024-06-27 RX ORDER — PREDNISONE 10 MG/1
TABLET ORAL
Qty: 35 TABLET | Refills: 0 | Status: SHIPPED | OUTPATIENT
Start: 2024-06-27 | End: 2024-07-11

## 2024-06-27 RX ORDER — ESCITALOPRAM OXALATE 10 MG/1
TABLET ORAL
Qty: 90 TABLET | Refills: 0 | Status: SHIPPED | OUTPATIENT
Start: 2024-06-27

## 2024-06-27 NOTE — ASSESSMENT & PLAN NOTE
Start prednisone as prescribed  Benadryl as needed for itching  Given history of DM, strongly advised to monitor blood glucose closely.  Driving and fall precautions while taking Benadryl discussed in details.  To ER if symptoms worsen or develop any new symptoms.  Patient agreed.

## 2024-06-27 NOTE — PROGRESS NOTES
Ambulatory Visit  Name: Gene Greene      : 1945      MRN: [id number]  Encounter Provider: Contreras Lopez MD  Encounter Date: 2024   Encounter department: [facility] INTERNAL MEDICINE    Assessment & Plan   1. Poison ivy dermatitis  Assessment & Plan:  Start prednisone as prescribed  Benadryl as needed for itching  Given history of DM, strongly advised to monitor blood glucose closely.  Driving and fall precautions while taking Benadryl discussed in details.  To ER if symptoms worsen or develop any new symptoms.  Patient agreed.  Orders:  -     predniSONE 10 mg tablet; Take 4 tablets (40 mg total) by mouth daily for 4 days, THEN 3 tablets (30 mg total) daily for 3 days, THEN 2 tablets (20 mg total) daily for 3 days, THEN 1 tablet (10 mg total) daily for 4 days.  -     diphenhydrAMINE (BENADRYL) 25 mg tablet; Take 1 tablet (25 mg total) by mouth every 6 (six) hours as needed for itching       History of Present Illness     Pt is here with c/o poison ivy rash b/l upper and lower extremities. Reports exposure 2 days ago.  Symptoms associated with itchiness.  Denies any other complaints at this time  Today, denies any fever, chills, headache, dizziness, chest pain, shortness of breath, abdominal pain, nausea/vomiting, diarrhea/constipation, any numbness or weakness.    Poison Ivy  Pertinent negatives include no cough, eye pain, fever, shortness of breath, sore throat or vomiting.       Review of Systems   Constitutional:  Negative for chills and fever.   HENT:  Negative for ear pain and sore throat.    Eyes:  Negative for pain and visual disturbance.   Respiratory:  Negative for cough and shortness of breath.    Cardiovascular:  Negative for chest pain and palpitations.   Gastrointestinal:  Negative for abdominal pain and vomiting.   Genitourinary:  Negative for dysuria and hematuria.   Musculoskeletal:  Negative for arthralgias.   Skin:  Positive for rash.   All other systems  "reviewed and are negative.      Objective     /60   Pulse 63   Temp 98.7 °F (37.1 °C)   Resp 16   Ht 5' 8\" (1.727 m)   Wt 80.3 kg (177 lb)   SpO2 97%   BMI 26.91 kg/m²     Physical Exam  Vitals reviewed.   Constitutional:       General: He is not in acute distress.     Appearance: He is well-developed.   HENT:      Head: Normocephalic and atraumatic.      Mouth/Throat:      Mouth: Mucous membranes are moist.      Pharynx: Oropharynx is clear.   Eyes:      Conjunctiva/sclera: Conjunctivae normal.   Cardiovascular:      Rate and Rhythm: Normal rate and regular rhythm.   Pulmonary:      Effort: Pulmonary effort is normal. No respiratory distress.      Breath sounds: Normal breath sounds. No wheezing.   Abdominal:      Palpations: Abdomen is soft.      Tenderness: There is no abdominal tenderness.   Musculoskeletal:      Cervical back: Neck supple.      Right lower leg: No edema.      Left lower leg: No edema.   Skin:     General: Skin is warm and dry.      Capillary Refill: Capillary refill takes less than 2 seconds.      Findings: Rash present.      Comments: Maculopapular rash with erythematous base noted on bilateral upper and lower extremities.  No open wounds, no drainage noted.   Neurological:      General: No focal deficit present.      Mental Status: He is alert and oriented to person, place, and time.      Gait: Gait normal.   Psychiatric:         Mood and Affect: Mood normal.               "

## 2024-06-28 RX ORDER — LEVOTHYROXINE SODIUM 0.07 MG/1
75 TABLET ORAL DAILY
Qty: 30 TABLET | Refills: 1 | Status: SHIPPED | OUTPATIENT
Start: 2024-06-28

## 2024-06-28 NOTE — TELEPHONE ENCOUNTER
Patient was wondering if PCP could order some blood work for her autoimmune diease that was discussed at her 8/25/23 visit when she saw Dr. Maldonado        Pls call pt. To sched visit . Thanks

## 2024-06-28 NOTE — TELEPHONE ENCOUNTER
I have put in a 30-day refill  Patient has not had thyroid function test checked since 2022  He is also due for other labs which were previously ordered.  Please remind patient to have these done

## 2024-08-01 ENCOUNTER — OFFICE VISIT (OUTPATIENT)
Dept: ENDOCRINOLOGY | Facility: CLINIC | Age: 79
End: 2024-08-01
Payer: MEDICARE

## 2024-08-01 VITALS
OXYGEN SATURATION: 97 % | DIASTOLIC BLOOD PRESSURE: 80 MMHG | SYSTOLIC BLOOD PRESSURE: 133 MMHG | HEART RATE: 73 BPM | HEIGHT: 68 IN | WEIGHT: 180.2 LBS | BODY MASS INDEX: 27.31 KG/M2

## 2024-08-01 DIAGNOSIS — I70.209 PERIPHERAL ARTERIOSCLEROSIS (HCC): ICD-10-CM

## 2024-08-01 DIAGNOSIS — E11.22 TYPE 2 DIABETES MELLITUS WITH STAGE 3 CHRONIC KIDNEY DISEASE, WITH LONG-TERM CURRENT USE OF INSULIN, UNSPECIFIED WHETHER STAGE 3A OR 3B CKD (HCC): ICD-10-CM

## 2024-08-01 DIAGNOSIS — E11.22 TYPE 2 DIABETES MELLITUS WITH STAGE 3B CHRONIC KIDNEY DISEASE, WITH LONG-TERM CURRENT USE OF INSULIN (HCC): Primary | ICD-10-CM

## 2024-08-01 DIAGNOSIS — E78.2 MIXED HYPERLIPIDEMIA: ICD-10-CM

## 2024-08-01 DIAGNOSIS — N18.32 TYPE 2 DIABETES MELLITUS WITH STAGE 3B CHRONIC KIDNEY DISEASE, WITH LONG-TERM CURRENT USE OF INSULIN (HCC): Primary | ICD-10-CM

## 2024-08-01 DIAGNOSIS — I50.32 CHRONIC DIASTOLIC CONGESTIVE HEART FAILURE (HCC): ICD-10-CM

## 2024-08-01 DIAGNOSIS — E03.9 ACQUIRED HYPOTHYROIDISM: ICD-10-CM

## 2024-08-01 DIAGNOSIS — I25.10 CORONARY ARTERY DISEASE INVOLVING NATIVE CORONARY ARTERY OF NATIVE HEART WITHOUT ANGINA PECTORIS: ICD-10-CM

## 2024-08-01 DIAGNOSIS — I10 ESSENTIAL HYPERTENSION: ICD-10-CM

## 2024-08-01 DIAGNOSIS — Z79.4 TYPE 2 DIABETES MELLITUS WITH STAGE 3 CHRONIC KIDNEY DISEASE, WITH LONG-TERM CURRENT USE OF INSULIN, UNSPECIFIED WHETHER STAGE 3A OR 3B CKD (HCC): ICD-10-CM

## 2024-08-01 DIAGNOSIS — Z79.4 TYPE 2 DIABETES MELLITUS WITH STAGE 3B CHRONIC KIDNEY DISEASE, WITH LONG-TERM CURRENT USE OF INSULIN (HCC): Primary | ICD-10-CM

## 2024-08-01 DIAGNOSIS — N18.30 TYPE 2 DIABETES MELLITUS WITH STAGE 3 CHRONIC KIDNEY DISEASE, WITH LONG-TERM CURRENT USE OF INSULIN, UNSPECIFIED WHETHER STAGE 3A OR 3B CKD (HCC): ICD-10-CM

## 2024-08-01 LAB — SL AMB POCT HEMOGLOBIN AIC: 7.1 (ref ?–6.5)

## 2024-08-01 PROCEDURE — 83036 HEMOGLOBIN GLYCOSYLATED A1C: CPT | Performed by: INTERNAL MEDICINE

## 2024-08-01 PROCEDURE — 99214 OFFICE O/P EST MOD 30 MIN: CPT | Performed by: INTERNAL MEDICINE

## 2024-08-01 PROCEDURE — 95251 CONT GLUC MNTR ANALYSIS I&R: CPT | Performed by: INTERNAL MEDICINE

## 2024-08-01 RX ORDER — INSULIN LISPRO 100 [IU]/ML
INJECTION, SOLUTION INTRAVENOUS; SUBCUTANEOUS
Qty: 30 ML | Refills: 3 | Status: SHIPPED | OUTPATIENT
Start: 2024-08-01

## 2024-08-01 NOTE — PATIENT INSTRUCTIONS
Please get labs done as ordered now and in 3 to 4 months prior to follow-up  Continue Ozempic, Tresiba at current dose  Continue Humalog 5 to 6 units with breakfast, lunch, please decrease by 1 to 2 units with dinner especially if you are eating less carbohydrates  If you continue to have low blood sugars, please let me know so we can make further adjustments    If you have a low blood sugar on your freestyle matt, please confirm with a fingerstick blood sugar    Follow-up with your eye doctor for a diabetic eye exam  Follow-up in 3 to 4 months

## 2024-08-01 NOTE — PROGRESS NOTES
Gene Greene 79 y.o. male MRN: [id number]    Encounter: [id number]      Assessment & Plan     Type 2 diabetes mellitus on long-term insulin therapy with hypoglycemia   CKD  POCT A1c 7.2% remains stable, well controlled for age  Patient is overdue for labs, reminded patient to have blood work done as ordered.  Discussed again that the dose of Ozempic could be increased if there is no contraindication once lab results are back and we will likely be able to reduce the amount of total daily insulin.  At this time, patient prefers to continue Ozempic at the current dose.  Noted to have some low/tight blood sugars post dinner.    Recommend the following at this time  Continue Ozempic, Tresiba at current dose  Continue Humalog 5 to 6 units with breakfast, lunch,decrease by 1 to 2 units with dinner especially if eating less carbohydrates  Follow-up with ophthalmology for a diabetic eye exam  Follow-up in 3 to 4 months    3. Hyperlipidemia  - continue statin therapy    4. Hypertension  5. CAD, CHF  Blood pressure at goal   - continue current medications     6. Hypothyroidism -levothyroxine at current dose.  Check TSH, free T4    Follow-up in 3 to 4 months      CC: Diabetes    History of Present Illness     HPI:  Gene Greene is a 79 y.o. male presents for a follow-up visit regarding diabetes management.     Last seen in 2/2024  Last Eye exam: > 2 years ago     Current regimen:   Tresiba 6 units at bedtime  Humalog 5-6 units with meals three times a day   Ozempic 0.25 mg subcutaneously weekly    For hypothyroidism, taking levothyroxine 75 mcg orally daily  Compliant, takes on an empty stomach    Overall feels well   Appetite is ok, no change in diet   Gained 9 lbs since e last visit. Possibly less active as compared to before.  Walks sometimes  No blurriness in vision   No CP/SOB   No diarrhea/ constipation   No symptoms of hypo or hyperthyroidism     Statin: Atorvastatin 40  ACE-I/ARB:  lisinopril     Home  glucose monitoring: CGM interpretation  Libre2  Time percentage CGM worn 94%   Time in range 69 (goal >65-70%)  High  28%  Very high 3%  Low 0%  Very low 0%     CV   22.7 % (goal <33%)  Average glucose 165 mg/dL  GMI 7.3%     Symptoms of hypoglycemia :  no lows     All other systems were reviewed and are negative.    Review of Systems      Historical Information   Past Medical History:   Diagnosis Date    Aortic narrowing     Last Assessed:  9/28/15    Arthritis     Hammond esophagus     Bilateral leg edema     Bulla, lung (HCC)     CHF (congestive heart failure) (HCC)     Chronic kidney disease     stage 3    Colon polyp     COVID-19 virus infection 02/08/2021    Diabetes mellitus (HCC)     Enlarged prostate     Heel pain     right heel slipped in the garage-landed on the right heel    Herniated lumbar intervertebral disc     x 4 discs-fell off a roof    Hiatal hernia     High cholesterol     History of kidney problems     Hypertension     Myocardial infarction (HCC)     Old myocardial infarction     x2-pt was unaware of the MI-one stent    Thyroid disease     hypothyroid    Transient cerebral ischemia     Last Assessed:  8/27/15    Wears dentures     full upper, partial lower    Wears glasses      Past Surgical History:   Procedure Laterality Date    CATARACT EXTRACTION      COLONOSCOPY      Resolved:  2015    CORONARY ANGIOPLASTY WITH STENT PLACEMENT      Stent implanted early 1990's,     ESOPHAGOGASTRODUODENOSCOPY      KNEE SURGERY      right knee cartilage surgery-left meniscus repair    MD XCAPSL CTRC RMVL INSJ IO LENS PROSTH W/O ECP Right 1/24/2019    Procedure: EXTRACTION EXTRACAPSULAR CATARACT PHACO INTRAOCULAR LENS (IOL);  Surgeon: Geno Mckee MD;  Location: [facility] MAIN OR;  Service: Ophthalmology    MD XCAPSL CTRC RMVL INSJ IO LENS PROSTH W/O ECP Left 2/21/2019    Procedure: EXTRACTION EXTRACAPSULAR CATARACT PHACO INTRAOCULAR LENS (IOL);  Surgeon: Geno Mckee MD;  Location: [facility] MAIN OR;  Service:  Ophthalmology    TONSILLECTOMY      UMBILICAL HERNIA REPAIR      ,    UPPER GASTROINTESTINAL ENDOSCOPY      Last Assessed:  8/27/15     Social History   Social History     Substance and Sexual Activity   Alcohol Use Not Currently     Social History     Substance and Sexual Activity   Drug Use No     Social History     Tobacco Use   Smoking Status Former    Current packs/day: 0.00    Average packs/day: 3.0 packs/day for 20.0 years (60.0 ttl pk-yrs)    Types: Cigarettes    Start date:     Quit date:     Years since quittin.6   Smokeless Tobacco Never     Family History:   Family History   Problem Relation Age of Onset    Colon cancer Mother     Arthritis Mother     Diabetes Mother     Hypertension Mother     Cancer Mother         Breast    Hyperlipidemia Mother     Cancer Sister         ovarian    Other Brother         Cerebrovascular accident (CVA)    Diabetes Other         Sibling    Hypertension Other         Sibling    Hernia Father         umbilical    Hernia Son     Cancer Sister         liver    Kidney disease Sister     Kidney disease Brother     Mental illness Neg Hx        Meds/Allergies   Current Outpatient Medications   Medication Sig Dispense Refill    aspirin (ECOTRIN LOW STRENGTH) 81 mg EC tablet Take 81 mg by mouth every morning        atorvastatin (LIPITOR) 40 mg tablet TAKE 1 TABLET BY MOUTH DAILY 90 tablet 1    Coenzyme Q10 (COQ-10) 100 MG CAPS Take 200 mg by mouth daily at bedtime       diphenhydrAMINE (BENADRYL) 25 mg tablet Take 1 tablet (25 mg total) by mouth every 6 (six) hours as needed for itching 30 tablet 0    donepezil (ARICEPT) 10 mg tablet Take 1 tablet (10 mg total) by mouth daily at bedtime 90 tablet 1    doxazosin (CARDURA XL) 8 MG 24 hr tablet Take 1 tablet (8 mg total) by mouth daily with breakfast 30 tablet 3    escitalopram (LEXAPRO) 10 mg tablet TAKE ONE TABLET BY MOUTH EVERY DAY 90 tablet 0    finasteride (PROSCAR) 5 mg tablet TAKE ONE TABLET BY MOUTH EVERY  "DAY 90 tablet 1    furosemide (LASIX) 20 mg tablet TAKE ONE TABLET BY MOUTH EVERY DAY (GENERIC FOR LASIX) 90 tablet 1    insulin degludec (Tresiba FlexTouch) 100 units/mL injection pen Inject 6 Units under the skin daily at bedtime 15 mL 1    insulin lispro (HumaLOG KwikPen) 100 units/mL injection pen INJECT SUBCUTANEOUSLY 7 UNITS  BEFORE BREAKFAST , 5 UNITS  BEFORE LUNCH , AND 5 UNITS  BEFORE DINNER 30 mL 3    levothyroxine 75 mcg tablet Take 1 tablet (75 mcg total) by mouth daily 30 tablet 1    lisinopril (ZESTRIL) 2.5 mg tablet TAKE 1 TABLET BY MOUTH DAILY 90 tablet 1    metoprolol succinate (TOPROL-XL) 25 mg 24 hr tablet Take 1 tablet (25 mg total) by mouth daily 90 tablet 3    multivitamin (THERAGRAN) TABS Take 1 tablet by mouth every morning      Ozempic, 0.25 or 0.5 MG/DOSE, 2 MG/3ML injection pen INJECT 0.25MG UNDER THE SKIN ONCE A WEEK 3 mL 3    pantoprazole (PROTONIX) 40 mg tablet TAKE 1 TABLET BY MOUTH DAILY 90 tablet 1    Continuous Blood Gluc  (FreeStyle Jess 14 Day Garner) RHONDA Use 1 each every 14 (fourteen) days 7 each 3    Continuous Blood Gluc Sensor (FreeStyle Jess 2 Sensor) MISC Change sensor every 14 days 6 each 3    Insulin Pen Needle (BD Pen Needle Sherie U/F) 32G X 4 MM MISC USE 1 NEEDLE TO INJECT  SUBCUTANEOUSLY 4 TIMES DAILY 360 each 1    Ozempic, 0.25 or 0.5 MG/DOSE, 2 MG/3ML injection pen  (Patient not taking: Reported on 2/5/2024)       No current facility-administered medications for this visit.     No Known Allergies    Objective   Vitals: Blood pressure 133/80, pulse 73, height 5' 8\" (1.727 m), weight 81.7 kg (180 lb 3.2 oz), SpO2 97%.    Physical Exam  Constitutional:       General: He is not in acute distress.     Appearance: He is well-developed. He is not diaphoretic.   HENT:      Head: Normocephalic and atraumatic.   Eyes:      Conjunctiva/sclera: Conjunctivae normal.   Cardiovascular:      Rate and Rhythm: Normal rate and regular rhythm.      Heart sounds: Normal heart " "sounds. No murmur heard.  Pulmonary:      Effort: Pulmonary effort is normal. No respiratory distress.      Breath sounds: Normal breath sounds. No wheezing.   Abdominal:      General: There is no distension.      Palpations: Abdomen is soft.      Tenderness: There is no abdominal tenderness. There is no guarding.   Musculoskeletal:      Cervical back: Normal range of motion and neck supple.   Skin:     General: Skin is warm and dry.      Findings: No erythema or rash.   Neurological:      Mental Status: He is alert and oriented to person, place, and time.   Psychiatric:         Behavior: Behavior normal.         Thought Content: Thought content normal.         The history was obtained from the review of the chart, patient.    Lab Results:   Lab Results   Component Value Date/Time    Hemoglobin A1C 7.2 (A) 02/05/2024 12:37 PM    Hemoglobin A1C 7.2 (A) 10/04/2023 11:06 AM         Imaging Studies: I have personally reviewed pertinent reports.      Portions of the record may have been created with voice recognition software. Occasional wrong word or \"sound a like\" substitutions may have occurred due to the inherent limitations of voice recognition software. Read the chart carefully and recognize, using context, where substitutions have occurred.     "

## 2024-08-09 DIAGNOSIS — E03.1 CONGENITAL HYPOTHYROIDISM WITHOUT GOITER: ICD-10-CM

## 2024-08-09 RX ORDER — LEVOTHYROXINE SODIUM 0.07 MG/1
75 TABLET ORAL DAILY
Qty: 30 TABLET | Refills: 0 | Status: SHIPPED | OUTPATIENT
Start: 2024-08-09

## 2024-09-01 DIAGNOSIS — E03.1 CONGENITAL HYPOTHYROIDISM WITHOUT GOITER: ICD-10-CM

## 2024-09-03 RX ORDER — LEVOTHYROXINE SODIUM 75 UG/1
75 TABLET ORAL DAILY
Qty: 30 TABLET | Refills: 11 | Status: SHIPPED | OUTPATIENT
Start: 2024-09-03

## 2024-09-14 DIAGNOSIS — E78.2 MIXED HYPERLIPIDEMIA: ICD-10-CM

## 2024-09-14 DIAGNOSIS — K22.719 BARRETT'S ESOPHAGUS WITH DYSPLASIA: ICD-10-CM

## 2024-09-18 RX ORDER — ATORVASTATIN CALCIUM 40 MG/1
TABLET, FILM COATED ORAL
Qty: 90 TABLET | Refills: 3 | Status: SHIPPED | OUTPATIENT
Start: 2024-09-18

## 2024-09-18 RX ORDER — PANTOPRAZOLE SODIUM 40 MG/1
TABLET, DELAYED RELEASE ORAL
Qty: 90 TABLET | Refills: 3 | Status: SHIPPED | OUTPATIENT
Start: 2024-09-18

## 2024-09-24 DIAGNOSIS — I10 ESSENTIAL HYPERTENSION: ICD-10-CM

## 2024-09-25 RX ORDER — METOPROLOL SUCCINATE 25 MG/1
25 TABLET, EXTENDED RELEASE ORAL DAILY
Qty: 90 TABLET | Refills: 1 | Status: SHIPPED | OUTPATIENT
Start: 2024-09-25

## 2024-09-26 ENCOUNTER — OFFICE VISIT (OUTPATIENT)
Dept: FAMILY MEDICINE CLINIC | Facility: CLINIC | Age: 79
End: 2024-09-26
Payer: MEDICARE

## 2024-09-26 VITALS
DIASTOLIC BLOOD PRESSURE: 60 MMHG | HEIGHT: 68 IN | HEART RATE: 74 BPM | BODY MASS INDEX: 28.04 KG/M2 | WEIGHT: 185 LBS | RESPIRATION RATE: 18 BRPM | SYSTOLIC BLOOD PRESSURE: 124 MMHG | TEMPERATURE: 98.5 F

## 2024-09-26 DIAGNOSIS — E03.9 ACQUIRED HYPOTHYROIDISM: ICD-10-CM

## 2024-09-26 DIAGNOSIS — E11.22 TYPE 2 DIABETES MELLITUS WITH STAGE 3B CHRONIC KIDNEY DISEASE, WITH LONG-TERM CURRENT USE OF INSULIN (HCC): ICD-10-CM

## 2024-09-26 DIAGNOSIS — Z79.4 TYPE 2 DIABETES MELLITUS WITH STAGE 3B CHRONIC KIDNEY DISEASE, WITH LONG-TERM CURRENT USE OF INSULIN (HCC): ICD-10-CM

## 2024-09-26 DIAGNOSIS — I10 ESSENTIAL HYPERTENSION: Primary | ICD-10-CM

## 2024-09-26 DIAGNOSIS — Z12.5 SCREENING FOR PROSTATE CANCER: ICD-10-CM

## 2024-09-26 DIAGNOSIS — Z00.00 MEDICARE ANNUAL WELLNESS VISIT, SUBSEQUENT: ICD-10-CM

## 2024-09-26 DIAGNOSIS — I25.10 CORONARY ARTERY DISEASE INVOLVING NATIVE CORONARY ARTERY OF NATIVE HEART WITHOUT ANGINA PECTORIS: ICD-10-CM

## 2024-09-26 DIAGNOSIS — E78.2 MIXED HYPERLIPIDEMIA: ICD-10-CM

## 2024-09-26 DIAGNOSIS — N18.32 TYPE 2 DIABETES MELLITUS WITH STAGE 3B CHRONIC KIDNEY DISEASE, WITH LONG-TERM CURRENT USE OF INSULIN (HCC): ICD-10-CM

## 2024-09-26 DIAGNOSIS — I50.32 CHRONIC DIASTOLIC CONGESTIVE HEART FAILURE (HCC): ICD-10-CM

## 2024-09-26 PROCEDURE — G0439 PPPS, SUBSEQ VISIT: HCPCS | Performed by: FAMILY MEDICINE

## 2024-09-26 PROCEDURE — 99214 OFFICE O/P EST MOD 30 MIN: CPT | Performed by: FAMILY MEDICINE

## 2024-09-26 NOTE — ASSESSMENT & PLAN NOTE
Wt Readings from Last 3 Encounters:   09/26/24 83.9 kg (185 lb)   08/01/24 81.7 kg (180 lb 3.2 oz)   06/27/24 80.3 kg (177 lb)       Daily weights have been stable

## 2024-09-26 NOTE — ASSESSMENT & PLAN NOTE
Orders:    Albumin / creatinine urine ratio; Future    Comprehensive metabolic panel; Future    Hemoglobin A1C; Future    CBC and differential; Future    TSH, 3rd generation with Free T4 reflex; Future    Lipid Panel with Direct LDL reflex; Future

## 2024-09-26 NOTE — ASSESSMENT & PLAN NOTE
Stable    Orders:    Albumin / creatinine urine ratio; Future    Comprehensive metabolic panel; Future    Hemoglobin A1C; Future    CBC and differential; Future    TSH, 3rd generation with Free T4 reflex; Future    Lipid Panel with Direct LDL reflex; Future

## 2024-09-26 NOTE — ASSESSMENT & PLAN NOTE
Lab Results   Component Value Date    HGBA1C 7.1 (A) 08/01/2024       Orders:    Albumin / creatinine urine ratio; Future    Comprehensive metabolic panel; Future    Hemoglobin A1C; Future    CBC and differential; Future    TSH, 3rd generation with Free T4 reflex; Future    Lipid Panel with Direct LDL reflex; Future

## 2024-09-26 NOTE — PROGRESS NOTES
Ambulatory Visit  Name: Gene Greene      : 1945      MRN: [id number]  Encounter Provider: Frank Lombardi, DO  Encounter Date: 2024   Encounter department: [facility]    Assessment & Plan  Essential hypertension  Stable    Orders:    Albumin / creatinine urine ratio; Future    Comprehensive metabolic panel; Future    Hemoglobin A1C; Future    CBC and differential; Future    TSH, 3rd generation with Free T4 reflex; Future    Lipid Panel with Direct LDL reflex; Future    Coronary artery disease involving native coronary artery of native heart without angina pectoris  Denies CP no SOB         Chronic diastolic congestive heart failure (HCC)  Wt Readings from Last 3 Encounters:   24 83.9 kg (185 lb)   24 81.7 kg (180 lb 3.2 oz)   24 80.3 kg (177 lb)       Daily weights have been stable             Type 2 diabetes mellitus with stage 3b chronic kidney disease, with long-term current use of insulin (HCC)    Lab Results   Component Value Date    HGBA1C 7.1 (A) 2024       Orders:    Albumin / creatinine urine ratio; Future    Comprehensive metabolic panel; Future    Hemoglobin A1C; Future    CBC and differential; Future    TSH, 3rd generation with Free T4 reflex; Future    Lipid Panel with Direct LDL reflex; Future    Acquired hypothyroidism    Orders:    Albumin / creatinine urine ratio; Future    Comprehensive metabolic panel; Future    Hemoglobin A1C; Future    CBC and differential; Future    TSH, 3rd generation with Free T4 reflex; Future    Lipid Panel with Direct LDL reflex; Future    Mixed hyperlipidemia    Orders:    Albumin / creatinine urine ratio; Future    Comprehensive metabolic panel; Future    Hemoglobin A1C; Future    CBC and differential; Future    TSH, 3rd generation with Free T4 reflex; Future    Lipid Panel with Direct LDL reflex; Future    Screening for prostate cancer    Orders:    PSA, Total Screen; Future    Medicare annual wellness visit,  subsequent            Preventive health issues were discussed with patient, and age appropriate screening tests were ordered as noted in patient's After Visit Summary. Personalized health advice and appropriate referrals for health education or preventive services given if needed, as noted in patient's After Visit Summary.    History of Present Illness     Pt is sched for a med check.  Pt is also due for an AWV  No labs drawn  Pt states he is feeling well  No CP, no SOB  No falls at home  No depression       Patient Care Team:  Frank Lombardi, DO as PCP - General (Family Medicine)  Mohan Chin DPM as Consulting Physician (Podiatry)  Geno Mckee MD as Consulting Physician (Ophthalmology)  Elida Muse MD as Consulting Physician (Endocrinology)  Tony Birch PA-C as Consulting Physician (Urology)    Review of Systems   Constitutional:  Negative for activity change, appetite change, chills, diaphoresis, fatigue, fever and unexpected weight change.   HENT:  Negative for congestion, dental problem, ear pain, mouth sores, sinus pressure, sinus pain, sore throat and trouble swallowing.    Eyes:  Negative for photophobia, discharge and itching.   Respiratory:  Negative for apnea, chest tightness and shortness of breath.    Cardiovascular:  Negative for chest pain, palpitations and leg swelling.   Gastrointestinal:  Negative for abdominal distention, abdominal pain, blood in stool, nausea and vomiting.   Endocrine: Negative for cold intolerance, heat intolerance, polydipsia, polyphagia and polyuria.   Genitourinary:  Negative for difficulty urinating.   Musculoskeletal:  Negative for arthralgias.   Skin:  Negative for color change and wound.   Neurological:  Negative for dizziness, syncope, speech difficulty and headaches.   Hematological:  Negative for adenopathy.   Psychiatric/Behavioral:  Negative for agitation and behavioral problems.      Medical History Reviewed by provider this encounter:  Tobacco   Allergies  Meds  Problems  Med Hx  Surg Hx  Fam Hx       Annual Wellness Visit Questionnaire   Gene is here for his Subsequent Wellness visit. Last Medicare Wellness visit information reviewed, patient interviewed, no change since last AWV.     Health Risk Assessment:   Patient rates overall health as good. Patient feels that their physical health rating is same. Patient is very satisfied with their life. Eyesight was rated as same. Hearing was rated as same. Patient feels that their emotional and mental health rating is same. Patients states they are never, rarely angry. Patient states they are sometimes unusually tired/fatigued. Pain experienced in the last 7 days has been none. Patient states that he has experienced no weight loss or gain in last 6 months.     Depression Screening:   PHQ-2 Score: 0      Fall Risk Screening:   In the past year, patient has experienced: no history of falling in past year      Home Safety:  Patient does not have trouble with stairs inside or outside of their home. Patient has working smoke alarms and has working carbon monoxide detector. Home safety hazards include: none.     Nutrition:   Current diet is Regular.     Medications:   Patient is currently taking over-the-counter supplements. OTC medications include: see medication list. Patient is able to manage medications.     Activities of Daily Living (ADLs)/Instrumental Activities of Daily Living (IADLs):   Walk and transfer into and out of bed and chair?: Yes  Dress and groom yourself?: Yes    Bathe or shower yourself?: Yes    Feed yourself? Yes  Do your laundry/housekeeping?: Yes  Manage your money, pay your bills and track your expenses?: Yes  Make your own meals?: Yes    Do your own shopping?: Yes    Previous Hospitalizations:   Any hospitalizations or ED visits within the last 12 months?: No      Advance Care Planning:   Living will: Yes    Advanced directive: Yes      Cognitive Screening:   Provider or  family/friend/caregiver concerned regarding cognition?: No    PREVENTIVE SCREENINGS      Cardiovascular Screening:    General: Screening Not Indicated, History Lipid Disorder and Risks and Benefits Discussed    Due for: Lipid Panel      Diabetes Screening:     General: Screening Not Indicated, History Diabetes and Risks and Benefits Discussed    Due for: Blood Glucose      Colorectal Cancer Screening:     General: Screening Current      Prostate Cancer Screening:    General: Screening Not Indicated    Due for: PSA      Osteoporosis Screening:    General: Patient Declines and Risks and Benefits Discussed      Abdominal Aortic Aneurysm (AAA) Screening:    Risk factors include: tobacco use        General: Screening Not Indicated      Lung Cancer Screening:     General: Screening Not Indicated      Hepatitis C Screening:    General: Screening Current    Screening, Brief Intervention, and Referral to Treatment (SBIRT)    Screening      AUDIT-C Screenin) How often did you have a drink containing alcohol in the past year? never  2) How many drinks did you have on a typical day when you were drinking in the past year? 0  3) How often did you have 6 or more drinks on one occasion in the past year? never    AUDIT-C Score: 0  Interpretation: Score 0-3 (male): Negative screen for alcohol misuse    Single Item Drug Screening:  How often have you used an illegal drug (including marijuana) or a prescription medication for non-medical reasons in the past year? never    Single Item Drug Screen Score: 0  Interpretation: Negative screen for possible drug use disorder    Brief Intervention  Alcohol & drug use screenings were reviewed. No concerns regarding substance use disorder identified.     Social Determinants of Health     Financial Resource Strain: Low Risk  (3/22/2023)    Overall Financial Resource Strain (CARDIA)     Difficulty of Paying Living Expenses: Not hard at all   Food Insecurity: No Food Insecurity (2024)     "Hunger Vital Sign     Worried About Running Out of Food in the Last Year: Never true     Ran Out of Food in the Last Year: Never true   Transportation Needs: No Transportation Needs (9/26/2024)    PRAPARE - Transportation     Lack of Transportation (Medical): No     Lack of Transportation (Non-Medical): No   Housing Stability: Low Risk  (9/26/2024)    Housing Stability Vital Sign     Unable to Pay for Housing in the Last Year: No     Number of Times Moved in the Last Year: 0     Homeless in the Last Year: No   Utilities: Not At Risk (9/26/2024)    [facility] Utilities     Threatened with loss of utilities: No     No results found.    Objective     /60   Pulse 74   Temp 98.5 °F (36.9 °C)   Resp 18   Ht 5' 8\" (1.727 m)   Wt 83.9 kg (185 lb)   BMI 28.13 kg/m²     Physical Exam  Vitals and nursing note reviewed.   Constitutional:       General: He is not in acute distress.     Appearance: He is well-developed. He is not diaphoretic.   HENT:      Head: Normocephalic and atraumatic.      Right Ear: External ear normal.      Left Ear: External ear normal.      Nose: Nose normal.      Mouth/Throat:      Pharynx: No oropharyngeal exudate.   Eyes:      General: No scleral icterus.        Right eye: No discharge.         Left eye: No discharge.      Pupils: Pupils are equal, round, and reactive to light.   Neck:      Thyroid: No thyromegaly.   Cardiovascular:      Rate and Rhythm: Normal rate.      Heart sounds: Normal heart sounds. No murmur heard.  Pulmonary:      Effort: Pulmonary effort is normal. No respiratory distress.      Breath sounds: Normal breath sounds. No wheezing.   Abdominal:      General: Bowel sounds are normal. There is no distension.      Palpations: Abdomen is soft. There is no mass.      Tenderness: There is no abdominal tenderness. There is no guarding or rebound.   Musculoskeletal:         General: Normal range of motion.   Skin:     General: Skin is warm and dry.      Findings: No erythema or " rash.   Neurological:      Mental Status: He is alert.      Coordination: Coordination normal.      Deep Tendon Reflexes: Reflexes normal.   Psychiatric:         Behavior: Behavior normal.

## 2024-09-26 NOTE — PATIENT INSTRUCTIONS
Medicare Preventive Visit Patient Instructions  Thank you for completing your Welcome to Medicare Visit or Medicare Annual Wellness Visit today. Your next wellness visit will be due in one year (9/27/2025).  The screening/preventive services that you may require over the next 5-10 years are detailed below. Some tests may not apply to you based off risk factors and/or age. Screening tests ordered at today's visit but not completed yet may show as past due. Also, please note that scanned in results may not display below.  Preventive Screenings:  Service Recommendations Previous Testing/Comments   Colorectal Cancer Screening  Colonoscopy    Fecal Occult Blood Test (FOBT)/Fecal Immunochemical Test (FIT)  Fecal DNA/Cologuard Test  Flexible Sigmoidoscopy Age: 45-75 years old   Colonoscopy: every 10 years (May be performed more frequently if at higher risk)  OR  FOBT/FIT: every 1 year  OR  Cologuard: every 3 years  OR  Sigmoidoscopy: every 5 years  Screening may be recommended earlier than age 45 if at higher risk for colorectal cancer. Also, an individualized decision between you and your healthcare provider will decide whether screening between the ages of 76-85 would be appropriate. Colonoscopy: 06/10/2022  FOBT/FIT: Not on file  Cologuard: Not on file  Sigmoidoscopy: Not on file    Screening Current     Prostate Cancer Screening Individualized decision between patient and health care provider in men between ages of 55-69   Medicare will cover every 12 months beginning on the day after your 50th birthday PSA: 3.9 ng/mL     Screening Not Indicated     Hepatitis C Screening Once for adults born between 1945 and 1965  More frequently in patients at high risk for Hepatitis C Hep C Antibody: 04/12/2022    Screening Current   Diabetes Screening 1-2 times per year if you're at risk for diabetes or have pre-diabetes Fasting glucose: No results in last 5 years (No results in last 5 years)  A1C: 7.1 (8/1/2024)  Screening Not  Indicated  History Diabetes   Cholesterol Screening Once every 5 years if you don't have a lipid disorder. May order more often based on risk factors. Lipid panel: 03/29/2023  Screening Not Indicated  History Lipid Disorder      Other Preventive Screenings Covered by Medicare:  Abdominal Aortic Aneurysm (AAA) Screening: covered once if your at risk. You're considered to be at risk if you have a family history of AAA or a male between the age of 65-75 who smoking at least 100 cigarettes in your lifetime.  Lung Cancer Screening: covers low dose CT scan once per year if you meet all of the following conditions: (1) Age 55-77; (2) No signs or symptoms of lung cancer; (3) Current smoker or have quit smoking within the last 15 years; (4) You have a tobacco smoking history of at least 20 pack years (packs per day x number of years you smoked); (5) You get a written order from a healthcare provider.  Glaucoma Screening: covered annually if you're considered high risk: (1) You have diabetes OR (2) Family history of glaucoma OR (3)  aged 50 and older OR (4)  American aged 65 and older  Osteoporosis Screening: covered every 2 years if you meet one of the following conditions: (1) Have a vertebral abnormality; (2) On glucocorticoid therapy for more than 3 months; (3) Have primary hyperparathyroidism; (4) On osteoporosis medications and need to assess response to drug therapy.  HIV Screening: covered annually if you're between the age of 15-65. Also covered annually if you are younger than 15 and older than 65 with risk factors for HIV infection. For pregnant patients, it is covered up to 3 times per pregnancy.    Immunizations:  Immunization Recommendations   Influenza Vaccine Annual influenza vaccination during flu season is recommended for all persons aged >= 6 months who do not have contraindications   Pneumococcal Vaccine   * Pneumococcal conjugate vaccine = PCV13 (Prevnar 13), PCV15 (Vaxneuvance),  PCV20 (Prevnar 20)  * Pneumococcal polysaccharide vaccine = PPSV23 (Pneumovax) Adults 19-65 yo with certain risk factors or if 65+ yo  If never received any pneumonia vaccine: recommend Prevnar 20 (PCV20)  Give PCV20 if previously received 1 dose of PCV13 or PPSV23   Hepatitis B Vaccine 3 dose series if at intermediate or high risk (ex: diabetes, end stage renal disease, liver disease)   Respiratory syncytial virus (RSV) Vaccine - COVERED BY MEDICARE PART D  * RSVPreF3 (Arexvy) CDC recommends that adults 60 years of age and older may receive a single dose of RSV vaccine using shared clinical decision-making (SCDM)   Tetanus (Td) Vaccine - COST NOT COVERED BY MEDICARE PART B Following completion of primary series, a booster dose should be given every 10 years to maintain immunity against tetanus. Td may also be given as tetanus wound prophylaxis.   Tdap Vaccine - COST NOT COVERED BY MEDICARE PART B Recommended at least once for all adults. For pregnant patients, recommended with each pregnancy.   Shingles Vaccine (Shingrix) - COST NOT COVERED BY MEDICARE PART B  2 shot series recommended in those 19 years and older who have or will have weakened immune systems or those 50 years and older     Health Maintenance Due:      Topic Date Due   • Colorectal Cancer Screening  06/09/2027   • Hepatitis C Screening  Completed     Immunizations Due:      Topic Date Due   • COVID-19 Vaccine (1 - 2023-24 season) Never done   • Influenza Vaccine (1) 09/01/2024     Advance Directives   What are advance directives?  Advance directives are legal documents that state your wishes and plans for medical care. These plans are made ahead of time in case you lose your ability to make decisions for yourself. Advance directives can apply to any medical decision, such as the treatments you want, and if you want to donate organs.   What are the types of advance directives?  There are many types of advance directives, and each state has rules  about how to use them. You may choose a combination of any of the following:  Living will:  This is a written record of the treatment you want. You can also choose which treatments you do not want, which to limit, and which to stop at a certain time. This includes surgery, medicine, IV fluid, and tube feedings.   Durable power of  for healthcare (DPAHC):  This is a written record that states who you want to make healthcare choices for you when you are unable to make them for yourself. This person, called a proxy, is usually a family member or a friend. You may choose more than 1 proxy.  Do not resuscitate (DNR) order:  A DNR order is used in case your heart stops beating or you stop breathing. It is a request not to have certain forms of treatment, such as CPR. A DNR order may be included in other types of advance directives.  Medical directive:  This covers the care that you want if you are in a coma, near death, or unable to make decisions for yourself. You can list the treatments you want for each condition. Treatment may include pain medicine, surgery, blood transfusions, dialysis, IV or tube feedings, and a ventilator (breathing machine).  Values history:  This document has questions about your views, beliefs, and how you feel and think about life. This information can help others choose the care that you would choose.  Why are advance directives important?  An advance directive helps you control your care. Although spoken wishes may be used, it is better to have your wishes written down. Spoken wishes can be misunderstood, or not followed. Treatments may be given even if you do not want them. An advance directive may make it easier for your family to make difficult choices about your care.   Weight Management   Why it is important to manage your weight:  Being overweight increases your risk of health conditions such as heart disease, high blood pressure, type 2 diabetes, and certain types of cancer. It  can also increase your risk for osteoarthritis, sleep apnea, and other respiratory problems. Aim for a slow, steady weight loss. Even a small amount of weight loss can lower your risk of health problems.  How to lose weight safely:  A safe and healthy way to lose weight is to eat fewer calories and get regular exercise. You can lose up about 1 pound a week by decreasing the number of calories you eat by 500 calories each day.   Healthy meal plan for weight management:  A healthy meal plan includes a variety of foods, contains fewer calories, and helps you stay healthy. A healthy meal plan includes the following:  Eat whole-grain foods more often.  A healthy meal plan should contain fiber. Fiber is the part of grains, fruits, and vegetables that is not broken down by your body. Whole-grain foods are healthy and provide extra fiber in your diet. Some examples of whole-grain foods are whole-wheat breads and pastas, oatmeal, brown rice, and bulgur.  Eat a variety of vegetables every day.  Include dark, leafy greens such as spinach, kale, arnold greens, and mustard greens. Eat yellow and orange vegetables such as carrots, sweet potatoes, and winter squash.   Eat a variety of fruits every day.  Choose fresh or canned fruit (canned in its own juice or light syrup) instead of juice. Fruit juice has very little or no fiber.  Eat low-fat dairy foods.  Drink fat-free (skim) milk or 1% milk. Eat fat-free yogurt and low-fat cottage cheese. Try low-fat cheeses such as mozzarella and other reduced-fat cheeses.  Choose meat and other protein foods that are low in fat.  Choose beans or other legumes such as split peas or lentils. Choose fish, skinless poultry (chicken or turkey), or lean cuts of red meat (beef or pork). Before you cook meat or poultry, cut off any visible fat.   Use less fat and oil.  Try baking foods instead of frying them. Add less fat, such as margarine, sour cream, regular salad dressing and mayonnaise to  foods. Eat fewer high-fat foods. Some examples of high-fat foods include french fries, doughnuts, ice cream, and cakes.  Eat fewer sweets.  Limit foods and drinks that are high in sugar. This includes candy, cookies, regular soda, and sweetened drinks.  Exercise:  Exercise at least 30 minutes per day on most days of the week. Some examples of exercise include walking, biking, dancing, and swimming. You can also fit in more physical activity by taking the stairs instead of the elevator or parking farther away from stores. Ask your healthcare provider about the best exercise plan for you.      © Copyright [employer] 2018 Information is for End User's use only and may not be sold, redistributed or otherwise used for commercial purposes. All illustrations and images included in CareNotes® are the copyrighted property of [employer] Inc[employer] or [employer]

## 2024-09-28 DIAGNOSIS — I10 ESSENTIAL HYPERTENSION: ICD-10-CM

## 2024-09-30 RX ORDER — FUROSEMIDE 20 MG
TABLET ORAL
Qty: 90 TABLET | Refills: 0 | Status: SHIPPED | OUTPATIENT
Start: 2024-09-30

## 2024-10-03 DIAGNOSIS — E11.22 TYPE 2 DIABETES MELLITUS WITH STAGE 3B CHRONIC KIDNEY DISEASE, WITH LONG-TERM CURRENT USE OF INSULIN (HCC): Primary | ICD-10-CM

## 2024-10-03 DIAGNOSIS — Z79.4 TYPE 2 DIABETES MELLITUS WITH STAGE 3B CHRONIC KIDNEY DISEASE, WITH LONG-TERM CURRENT USE OF INSULIN (HCC): Primary | ICD-10-CM

## 2024-10-03 DIAGNOSIS — N18.32 TYPE 2 DIABETES MELLITUS WITH STAGE 3B CHRONIC KIDNEY DISEASE, WITH LONG-TERM CURRENT USE OF INSULIN (HCC): Primary | ICD-10-CM

## 2024-10-03 NOTE — TELEPHONE ENCOUNTER
Dr. Muse: pt. Walk in the office stating his Rx. For Levemir will not be cover by the [facility]. So [facility]. Thanks

## 2024-10-07 ENCOUNTER — TELEPHONE (OUTPATIENT)
Age: 79
End: 2024-10-07

## 2024-10-07 DIAGNOSIS — Z79.4 TYPE 2 DIABETES MELLITUS WITH STAGE 3B CHRONIC KIDNEY DISEASE, WITH LONG-TERM CURRENT USE OF INSULIN (HCC): Primary | ICD-10-CM

## 2024-10-07 DIAGNOSIS — N18.32 TYPE 2 DIABETES MELLITUS WITH STAGE 3B CHRONIC KIDNEY DISEASE, WITH LONG-TERM CURRENT USE OF INSULIN (HCC): ICD-10-CM

## 2024-10-07 DIAGNOSIS — N18.32 TYPE 2 DIABETES MELLITUS WITH STAGE 3B CHRONIC KIDNEY DISEASE, WITH LONG-TERM CURRENT USE OF INSULIN (HCC): Primary | ICD-10-CM

## 2024-10-07 DIAGNOSIS — Z79.4 TYPE 2 DIABETES MELLITUS WITH STAGE 3B CHRONIC KIDNEY DISEASE, WITH LONG-TERM CURRENT USE OF INSULIN (HCC): ICD-10-CM

## 2024-10-07 DIAGNOSIS — E11.22 TYPE 2 DIABETES MELLITUS WITH STAGE 3B CHRONIC KIDNEY DISEASE, WITH LONG-TERM CURRENT USE OF INSULIN (HCC): Primary | ICD-10-CM

## 2024-10-07 DIAGNOSIS — E11.22 TYPE 2 DIABETES MELLITUS WITH STAGE 3B CHRONIC KIDNEY DISEASE, WITH LONG-TERM CURRENT USE OF INSULIN (HCC): ICD-10-CM

## 2024-10-07 RX ORDER — INSULIN DETEMIR 100 [IU]/ML
6 INJECTION, SOLUTION SUBCUTANEOUS
Qty: 15 ML | Refills: 3 | Status: SHIPPED | OUTPATIENT
Start: 2024-10-07

## 2024-10-07 NOTE — TELEPHONE ENCOUNTER
Pharmacy received a rx for Levemir vials. Patient states he uses pens. Pharmacy requesting rx. Please advise, thank you.

## 2024-11-04 DIAGNOSIS — E11.22 TYPE 2 DIABETES MELLITUS WITH STAGE 3B CHRONIC KIDNEY DISEASE, WITH LONG-TERM CURRENT USE OF INSULIN (HCC): ICD-10-CM

## 2024-11-04 DIAGNOSIS — Z79.4 TYPE 2 DIABETES MELLITUS WITH STAGE 3B CHRONIC KIDNEY DISEASE, WITH LONG-TERM CURRENT USE OF INSULIN (HCC): ICD-10-CM

## 2024-11-04 DIAGNOSIS — N18.32 TYPE 2 DIABETES MELLITUS WITH STAGE 3B CHRONIC KIDNEY DISEASE, WITH LONG-TERM CURRENT USE OF INSULIN (HCC): ICD-10-CM

## 2024-11-05 RX ORDER — SEMAGLUTIDE 0.68 MG/ML
INJECTION, SOLUTION SUBCUTANEOUS
Qty: 3 ML | Refills: 3 | Status: SHIPPED | OUTPATIENT
Start: 2024-11-05

## 2024-11-06 ENCOUNTER — TELEPHONE (OUTPATIENT)
Age: 79
End: 2024-11-06

## 2024-11-06 NOTE — TELEPHONE ENCOUNTER
Patient was having issues calling us for quite a bit now.  He was frustrated and wasn't sure why when he would call the number would say it wasn't working.    I gave him our correct number to reach us.

## 2024-11-13 ENCOUNTER — OFFICE VISIT (OUTPATIENT)
Dept: ENDOCRINOLOGY | Facility: CLINIC | Age: 79
End: 2024-11-13
Payer: MEDICARE

## 2024-11-13 VITALS
HEIGHT: 68 IN | HEART RATE: 78 BPM | WEIGHT: 185.4 LBS | DIASTOLIC BLOOD PRESSURE: 70 MMHG | OXYGEN SATURATION: 98 % | SYSTOLIC BLOOD PRESSURE: 138 MMHG | BODY MASS INDEX: 28.1 KG/M2

## 2024-11-13 DIAGNOSIS — E03.9 ACQUIRED HYPOTHYROIDISM: ICD-10-CM

## 2024-11-13 DIAGNOSIS — E11.22 TYPE 2 DIABETES MELLITUS WITH STAGE 3B CHRONIC KIDNEY DISEASE, WITH LONG-TERM CURRENT USE OF INSULIN (HCC): Primary | ICD-10-CM

## 2024-11-13 DIAGNOSIS — I10 ESSENTIAL HYPERTENSION: ICD-10-CM

## 2024-11-13 DIAGNOSIS — E78.2 MIXED HYPERLIPIDEMIA: ICD-10-CM

## 2024-11-13 DIAGNOSIS — N18.32 TYPE 2 DIABETES MELLITUS WITH STAGE 3B CHRONIC KIDNEY DISEASE, WITH LONG-TERM CURRENT USE OF INSULIN (HCC): Primary | ICD-10-CM

## 2024-11-13 DIAGNOSIS — Z79.4 TYPE 2 DIABETES MELLITUS WITH STAGE 3B CHRONIC KIDNEY DISEASE, WITH LONG-TERM CURRENT USE OF INSULIN (HCC): Primary | ICD-10-CM

## 2024-11-13 LAB — SL AMB POCT HEMOGLOBIN AIC: 7.3 (ref ?–6.5)

## 2024-11-13 PROCEDURE — 83036 HEMOGLOBIN GLYCOSYLATED A1C: CPT | Performed by: NURSE PRACTITIONER

## 2024-11-13 PROCEDURE — 99204 OFFICE O/P NEW MOD 45 MIN: CPT | Performed by: NURSE PRACTITIONER

## 2024-11-13 NOTE — PROGRESS NOTES
Ambulatory Visit  Name: Gene Greene      : 1945      MRN: [id number]  Encounter Provider: LUCAS Lomeli  Encounter Date: 2024   Encounter department: [facility]    Assessment & Plan  Type 2 diabetes mellitus with stage 3b chronic kidney disease, with long-term current use of insulin ([facility])    Lab Results   Component Value Date    HGBA1C 7.3 (A) 2024     HGA1C reasonable for age. Recommend continue regimen.     Discussed risks/complications associated with uncontrolled diabetes including organ involvement, heart attack, stroke, death.    Advised lifestyle modifications including attention to diet including the amount and types of carbohydrates consumed and regular activity.     Call for blood sugars less than 70 mg/dl or patterns over 250 mg/dl.     Discussed symptoms and treatment of hypoglycemia.  Reviewed risks associated with hypoglycemia. Always carry rapid acting carbohydrates and a glucometer (a way to check your blood sugar).    Recommendation for medical identification either bracelet, necklace.    Recommend routine follow up for diabetic eye and foot exams.     Ordered blood work to complete prior to next visit.    Send glucose logs/CGM download in 1-2 weeks for review    Follow up in 3 months.       Orders:    POCT hemoglobin A1c    Comprehensive metabolic panel; Future    Albumin / creatinine urine ratio; Future    Acquired hypothyroidism  Clinically euthyroid. Due for thyroid function tests.  Continues on levothyroxine 75 mcg daily.       Orders:    TSH, 3rd generation; Future    T4, free; Future    Mixed hyperlipidemia  Continues on statin.  Recommend repeat lipid panel.     Orders:    Lipid panel; Future    Essential hypertension  BP stable.   Continues on ACE.           History of Present Illness     Gene Greene is a 79 y.o. male who presents for follow up of diabetes mellitus and hypothyroidism. Last office visit  from August 2024 with Dr. Muse.     Denies changes in thirst, urination, vision, or sensation in feet.     Denies symptomatic or documented hypoglycemia.     Unable to download CGM for yesterday's appointment for review.     Current regimen:   Levemir 6 units at bedtime  Humalog 6 units at breakfast and lunch and 5 units at dinner.   Ozempic 0.25 mg weekly    Denies side effects of treatment including abdominal pain, nausea, vomiting, constipation, diarrhea, or severe appetite suppression.     Continues on ACE and statin.     For hypothyroidism, continues on levothyroxine 75 mcg daily is taking regularly and properly. Denies symptoms consistent with hypo/hyperthyroidism. Feeling well overall.       History obtained from : patient  Review of Systems  Medical History Reviewed by provider this encounter:  Tobacco  Allergies  Meds  Problems  Med Hx  Surg Hx  Fam Hx     .  Current Outpatient Medications on File Prior to Visit   Medication Sig Dispense Refill    aspirin (ECOTRIN LOW STRENGTH) 81 mg EC tablet Take 81 mg by mouth every morning        atorvastatin (LIPITOR) 40 mg tablet TAKE 1 TABLET BY MOUTH DAILY 90 tablet 3    Coenzyme Q10 (COQ-10) 100 MG CAPS Take 200 mg by mouth daily at bedtime       Continuous Blood Gluc  (FreeStyle Jess 14 Day [place]) RHONDA Use 1 each every 14 (fourteen) days 7 each 3    Continuous Blood Gluc Sensor (FreeStyle Jess 2 Sensor) MISC Change sensor every 14 days 6 each 3    diphenhydrAMINE (BENADRYL) 25 mg tablet Take 1 tablet (25 mg total) by mouth every 6 (six) hours as needed for itching 30 tablet 0    doxazosin (CARDURA XL) 8 MG 24 hr tablet Take 1 tablet (8 mg total) by mouth daily with breakfast 30 tablet 3    escitalopram (LEXAPRO) 10 mg tablet TAKE ONE TABLET BY MOUTH EVERY DAY 90 tablet 0    finasteride (PROSCAR) 5 mg tablet TAKE ONE TABLET BY MOUTH EVERY DAY 90 tablet 1    furosemide (LASIX) 20 mg tablet TAKE ONE TABLET BY MOUTH EVERY DAY (GENERIC FOR LASIX) 90  "tablet 0    insulin degludec (Tresiba FlexTouch) 100 units/mL injection pen Inject 6 Units under the skin daily at bedtime 15 mL 1    insulin detemir (Levemir FlexPen) 100 Units/mL injection pen Inject 6 Units under the skin daily at bedtime 15 mL 3    insulin lispro (HumaLOG KwikPen) 100 units/mL injection pen INJECT 6 units SUBCUTANEOUSLY  BEFORE BREAKFAST and LUNCH , AND 5 UNITS  BEFORE DINNER 30 mL 3    Insulin Pen Needle (BD Pen Needle Sherie U/F) 32G X 4 MM MISC USE 1 NEEDLE TO INJECT  SUBCUTANEOUSLY 4 TIMES DAILY 360 each 1    levothyroxine 75 mcg tablet TAKE 1 TABLET BY MOUTH DAILY 30 tablet 11    lisinopril (ZESTRIL) 2.5 mg tablet TAKE 1 TABLET BY MOUTH DAILY 90 tablet 1    metoprolol succinate (TOPROL-XL) 25 mg 24 hr tablet TAKE 1 TABLET BY MOUTH DAILY 90 tablet 1    multivitamin (THERAGRAN) TABS Take 1 tablet by mouth every morning      Ozempic, 0.25 or 0.5 MG/DOSE, 2 MG/3ML injection pen INJECT 0.25 MG UNDER THE SKIN ONCE A WEEK 3 mL 3    pantoprazole (PROTONIX) 40 mg tablet TAKE 1 TABLET BY MOUTH DAILY 90 tablet 3    donepezil (ARICEPT) 10 mg tablet Take 1 tablet (10 mg total) by mouth daily at bedtime 90 tablet 1     No current facility-administered medications on file prior to visit.          Objective     /70 (BP Location: Left arm, Patient Position: Sitting, Cuff Size: Large)   Pulse 78   Ht 5' 8\" (1.727 m)   Wt 84.1 kg (185 lb 6.4 oz)   SpO2 98%   BMI 28.19 kg/m²        Physical Exam  Vitals reviewed.   Constitutional:       Appearance: Normal appearance.   Cardiovascular:      Rate and Rhythm: Normal rate and regular rhythm.      Pulses: Normal pulses.      Heart sounds: Normal heart sounds.   Pulmonary:      Effort: Pulmonary effort is normal.      Breath sounds: Normal breath sounds.   Skin:     General: Skin is warm and dry.      Capillary Refill: Capillary refill takes less than 2 seconds.   Neurological:      General: No focal deficit present.      Mental Status: He is alert and " oriented to person, place, and time.   Psychiatric:         Mood and Affect: Mood normal.         Behavior: Behavior normal.     Administrative Statements

## 2024-11-13 NOTE — ASSESSMENT & PLAN NOTE
Lab Results   Component Value Date    HGBA1C 7.3 (A) 11/13/2024     HGA1C reasonable for age. Recommend continue regimen.     Discussed risks/complications associated with uncontrolled diabetes including organ involvement, heart attack, stroke, death.    Advised lifestyle modifications including attention to diet including the amount and types of carbohydrates consumed and regular activity.     Call for blood sugars less than 70 mg/dl or patterns over 250 mg/dl.     Discussed symptoms and treatment of hypoglycemia.  Reviewed risks associated with hypoglycemia. Always carry rapid acting carbohydrates and a glucometer (a way to check your blood sugar).    Recommendation for medical identification either bracelet, necklace.    Recommend routine follow up for diabetic eye and foot exams.     Ordered blood work to complete prior to next visit.    Send glucose logs/CGM download in 1-2 weeks for review    Follow up in 3 months.       Orders:    POCT hemoglobin A1c    Comprehensive metabolic panel; Future    Albumin / creatinine urine ratio; Future

## 2024-11-13 NOTE — PATIENT INSTRUCTIONS
"Low blood sugar in people with diabetes   The Basics   Written by the doctors and editors at [facility]   What is low blood sugar? -- This is when the level of sugar in a person's blood gets too low. It is also called \"hypoglycemia.\"  Low blood sugar can cause symptoms ranging from sweating and feeling hungry to passing out.  Low blood sugar can happen in people with diabetes who take certain medicines, including insulin, other medicines given as shots, and some types of pills.  When can people with diabetes get low blood sugar? -- People with diabetes can get low blood sugar when they:   Take too much medicine, including insulin, other medicines given as shots, or certain diabetes pills   Do not eat enough food   Exercise too much without eating a snack or reducing their insulin dose   Wait too long between meals   Drink too much alcohol or drink alcohol on an empty stomach  What are the symptoms of low blood sugar? -- The symptoms can be different from person to person, and can change over time. During the early stages of low blood sugar, a person can:   Sweat or tremble   Feel hungry   Feel worried  People who have early symptoms should check their blood sugar level to see if it is low and needs to be treated. If low blood sugar levels are not treated, severe symptoms can occur. These can include:   Trouble walking or feeling weak   Trouble seeing clearly   Being confused or acting in a strange way   Passing out or having a seizure  Some people do not get symptoms during the early stages of low blood sugar. Doctors sometimes call this \"hypoglycemia unawareness.\" People with hypoglycemia unawareness are more likely to have severe symptoms, because they might not know that they have low blood sugar until they have severe symptoms. Hypoglycemia unawareness is more likely in people who:   Have had type 1 diabetes for more than 5 to 10 years   Have frequent episodes of low blood sugar   Use insulin to keep their blood " sugar level tightly managed   Are tired   Drink a lot of alcohol   Take certain medicines for high blood pressure or diabetes  How is low blood sugar treated? -- It can be treated with:   Quick sources of sugar - People can eat or drink quick sources of sugar (table 1). Foods that have fat, such as chocolate or cheese, do not treat low blood sugar as quickly. You and a family member should carry a quick source of sugar at all times.   A dose of glucagon - Glucagon is a hormone that can quickly raise blood sugar levels and stop severe symptoms. It comes as a shot (figure 1) or a nose spray. If your doctor recommends that you carry glucagon with you, they will tell you when and how to use it. If possible, it's also a good idea to have a family member, friend, or roommate learn how to give you glucagon. That way, they can give it to you if you can't do it yourself.  After treating low blood sugar, it is very important to recheck your blood sugar level to make sure that it rises and stays in the normal range. Once your blood sugar is normal, eat a small snack that contains protein, fat, and carbohydrate. This can help keep your blood sugar stable.  What should I do after treatment? -- After treatment for low blood sugar, most people can get back to their usual routine. But your doctor or nurse might recommend that you check your blood sugar level more often during the next 2 to 3 days.  If your low blood sugar was treated with glucagon, call your doctor or nurse. They might change the dose of your diabetes medicine.  How can I prevent low blood sugar? -- The best way is to:   Check your blood sugar levels often - Your doctor or nurse will tell you how and when to check your blood sugar levels at home. They will also tell you what your blood sugar levels should be, and when to treat low blood sugar.   Learn the symptoms of low blood sugar, and be ready to treat it in the early stages. Treating low blood sugar early can  "prevent severe symptoms.  When should I go to a hospital or call for an ambulance? -- A family member or friend should take you to a hospital or call for an ambulance (in the [place] and [place], call 9-1-1) if you:   Are still confused 15 minutes after being treated with a dose of glucagon   Have passed out, and there is no glucagon nearby   Still have low blood sugar after treatment  If you have low blood sugar, do not try to drive yourself to the hospital. Driving with low blood sugar can be dangerous.  All topics are updated as new evidence becomes available and our peer review process is complete.  This topic retrieved from [employer] on: Apr 11, 2024.  Topic 10563 Version 22.0  Release: 32.3.2 - C32.100  © 2024 UpToDate, Inc. and/or its affiliates. All rights reserved.  table 1: Quick sources of sugar to treat low blood sugar  3 or 4 glucose tablets   ½ cup of juice or regular soda (not sugar-free)   2 tablespoons of raisins   4 or 5 saltine crackers   1 tablespoon of sugar   1 tablespoon of honey or corn syrup   6 to 8 hard candies   These sources of sugar act quickly to treat low blood sugar levels. People with diabetes who use insulin or certain other diabetes medicines should carry at least 1 of these items at all times.  Graphic 82525 Version 4.0  figure 1: Glucagon autoinjector     Some people carry glucagon in the form of an autoinjector \"pen.\" This makes it easy to give a dose into the upper arm, thigh, or belly.  Graphic 836595 Version 2.0  Consumer Information Use and Disclaimer   Disclaimer: This generalized information is a limited summary of diagnosis, treatment, and/or medication information. It is not meant to be comprehensive and should be used as a tool to help the user understand and/or assess potential diagnostic and treatment options. It does NOT include all information about conditions, treatments, medications, side effects, or risks that may apply to a specific patient. It is not intended to be " medical advice or a substitute for the medical advice, diagnosis, or treatment of a health care provider based on the health care provider's examination and assessment of a patient's specific and unique circumstances. Patients must speak with a health care provider for complete information about their health, medical questions, and treatment options, including any risks or benefits regarding use of medications. This information does not endorse any treatments or medications as safe, effective, or approved for treating a specific patient. UpToDate, Inc. and its affiliates disclaim any warranty or liability relating to this information or the use thereof.The use of this information is governed by the Terms of Use, available at https://www.[employer]ant[employer].com/en/know/clinical-effectiveness-terms. 2024© UpToDate, Inc. and its affiliates and/or licensors. All rights reserved.  Copyright   © 2024 UpToDate, Inc. and/or its affiliates. All rights reserved.

## 2024-11-14 NOTE — ASSESSMENT & PLAN NOTE
Clinically euthyroid. Due for thyroid function tests.  Continues on levothyroxine 75 mcg daily.       Orders:    TSH, 3rd generation; Future    T4, free; Future

## 2024-11-16 DIAGNOSIS — I10 ESSENTIAL HYPERTENSION: ICD-10-CM

## 2024-11-18 RX ORDER — LISINOPRIL 2.5 MG/1
2.5 TABLET ORAL DAILY
Qty: 90 TABLET | Refills: 0 | Status: SHIPPED | OUTPATIENT
Start: 2024-11-18

## 2024-11-22 DIAGNOSIS — E11.22 TYPE 2 DIABETES MELLITUS WITH STAGE 3A CHRONIC KIDNEY DISEASE, WITH LONG-TERM CURRENT USE OF INSULIN (HCC): ICD-10-CM

## 2024-11-22 DIAGNOSIS — Z79.4 TYPE 2 DIABETES MELLITUS WITH STAGE 3A CHRONIC KIDNEY DISEASE, WITH LONG-TERM CURRENT USE OF INSULIN (HCC): ICD-10-CM

## 2024-11-22 DIAGNOSIS — N18.31 TYPE 2 DIABETES MELLITUS WITH STAGE 3A CHRONIC KIDNEY DISEASE, WITH LONG-TERM CURRENT USE OF INSULIN (HCC): ICD-10-CM

## 2024-11-22 NOTE — TELEPHONE ENCOUNTER
Reason for call:   [x] Refill   [] Prior Auth  [] Other:     Office:   [] PCP/Provider -   [x] Specialty/Provider - Endo    Medication: Continuous Blood Gluc Sensor (FreeStyle Jess 2 Sensor) MISC     Dose/Frequency: Change sensor every 14 days     Quantity: 6    Pharmacy: [facility] - [address]      Does the patient have enough for 3 days?   [] Yes   [x] No - Send as HP to POD

## 2024-12-05 DIAGNOSIS — Z79.4 TYPE 2 DIABETES MELLITUS WITH STAGE 3 CHRONIC KIDNEY DISEASE, WITH LONG-TERM CURRENT USE OF INSULIN, UNSPECIFIED WHETHER STAGE 3A OR 3B CKD (HCC): ICD-10-CM

## 2024-12-05 DIAGNOSIS — N18.30 TYPE 2 DIABETES MELLITUS WITH STAGE 3 CHRONIC KIDNEY DISEASE, WITH LONG-TERM CURRENT USE OF INSULIN, UNSPECIFIED WHETHER STAGE 3A OR 3B CKD (HCC): ICD-10-CM

## 2024-12-05 DIAGNOSIS — E11.22 TYPE 2 DIABETES MELLITUS WITH STAGE 3 CHRONIC KIDNEY DISEASE, WITH LONG-TERM CURRENT USE OF INSULIN, UNSPECIFIED WHETHER STAGE 3A OR 3B CKD (HCC): ICD-10-CM

## 2024-12-05 RX ORDER — INSULIN LISPRO 100 [IU]/ML
INJECTION, SOLUTION INTRAVENOUS; SUBCUTANEOUS
Qty: 30 ML | Refills: 1 | Status: SHIPPED | OUTPATIENT
Start: 2024-12-05

## 2024-12-05 NOTE — TELEPHONE ENCOUNTER
Patient states that he needs to have this script resent to the pharmacy.     Reason for call:   [x] Refill   [] Prior Auth  [] Other:     Office:   [] PCP/Provider -   [x] Specialty/Provider - Endo    Medication:   Humalgo KwikPen 100 units/ml injection pen- Inject 6 units subcutaneously before breakfast and lunch and 5 units before dinner       Pharmacy:  [facility]    Does the patient have enough for 3 days?   [] Yes   [x] No - Send as HP to POD

## 2024-12-10 DIAGNOSIS — Z79.4 TYPE 2 DIABETES MELLITUS WITH STAGE 3 CHRONIC KIDNEY DISEASE, WITH LONG-TERM CURRENT USE OF INSULIN (HCC): ICD-10-CM

## 2024-12-10 DIAGNOSIS — N18.30 TYPE 2 DIABETES MELLITUS WITH STAGE 3 CHRONIC KIDNEY DISEASE, WITH LONG-TERM CURRENT USE OF INSULIN (HCC): ICD-10-CM

## 2024-12-10 DIAGNOSIS — E11.22 TYPE 2 DIABETES MELLITUS WITH STAGE 3 CHRONIC KIDNEY DISEASE, WITH LONG-TERM CURRENT USE OF INSULIN (HCC): ICD-10-CM

## 2024-12-10 RX ORDER — PEN NEEDLE, DIABETIC 32GX 5/32"
NEEDLE, DISPOSABLE MISCELLANEOUS
Qty: 360 EACH | Refills: 1 | Status: SHIPPED | OUTPATIENT
Start: 2024-12-10

## 2024-12-10 NOTE — TELEPHONE ENCOUNTER
Reason for call:   [x] Refill   [] Prior Auth  [] Other:     Office:   [] PCP/Provider -   [x] Specialty/Provider - Elida Muse MD     Medication: (BD Pen Needle Sherie U/F) 32G X 4 MM     Dose/Frequency: use 4 times daily    Quantity: 360 each    Pharmacy: [id number] - [place], [place] - [address]      Does the patient have enough for 3 days?   [] Yes   [x] No - Send as HP to POD

## 2024-12-12 DIAGNOSIS — I10 ESSENTIAL HYPERTENSION: ICD-10-CM

## 2024-12-12 DIAGNOSIS — N40.1 BENIGN LOCALIZED PROSTATIC HYPERPLASIA WITH LOWER URINARY TRACT SYMPTOMS (LUTS): ICD-10-CM

## 2024-12-12 RX ORDER — FINASTERIDE 5 MG/1
5 TABLET, FILM COATED ORAL DAILY
Qty: 90 TABLET | Refills: 1 | Status: SHIPPED | OUTPATIENT
Start: 2024-12-12

## 2024-12-13 RX ORDER — FUROSEMIDE 20 MG/1
TABLET ORAL
Qty: 90 TABLET | Refills: 0 | Status: SHIPPED | OUTPATIENT
Start: 2024-12-13

## 2024-12-13 NOTE — TELEPHONE ENCOUNTER
Refill must be reviewed and completed by the office or provider. The refill is unable to be approved or denied by the medication management team.      Courtesy refill (90 day) previously given 10.2024. patient need updated blood work and have previously place labs.  Please review to see if the refill is appropriate.

## 2025-01-07 ENCOUNTER — TELEPHONE (OUTPATIENT)
Dept: ENDOCRINOLOGY | Facility: CLINIC | Age: 80
End: 2025-01-07

## 2025-01-07 NOTE — TELEPHONE ENCOUNTER
Patient came in to see if we could get his Freestyle Jess charged up.MA tried to get it working and said he will need a new one. Please send to [facility] in [place]. Patient will get it as soon as they fill it.

## 2025-01-08 DIAGNOSIS — Z79.4 TYPE 2 DIABETES MELLITUS WITH STAGE 3B CHRONIC KIDNEY DISEASE, WITH LONG-TERM CURRENT USE OF INSULIN (HCC): Primary | ICD-10-CM

## 2025-01-08 DIAGNOSIS — N18.32 TYPE 2 DIABETES MELLITUS WITH STAGE 3B CHRONIC KIDNEY DISEASE, WITH LONG-TERM CURRENT USE OF INSULIN (HCC): Primary | ICD-10-CM

## 2025-01-08 DIAGNOSIS — E11.22 TYPE 2 DIABETES MELLITUS WITH STAGE 3B CHRONIC KIDNEY DISEASE, WITH LONG-TERM CURRENT USE OF INSULIN (HCC): Primary | ICD-10-CM

## 2025-01-09 NOTE — TELEPHONE ENCOUNTER
PA for FreeStyle Jess 2 [place] SUBMITTED to [employer]    via    []CMM-KEY:   [x]Surescripts-Case ID #     [id number]     []Availity-Auth ID # NDC #   []Faxed to plan   []Other website   []Phone call Case ID #     [x]PA sent as URGENT    All office notes, labs and other pertaining documents and studies sent. Clinical questions answered. Awaiting determination from insurance company.     Turnaround time for your insurance to make a decision on your Prior Authorization can take 7-21 business days.

## 2025-01-13 LAB
DME PARACHUTE DELIVERY DATE REQUESTED: NORMAL
DME PARACHUTE ITEM DESCRIPTION: NORMAL
DME PARACHUTE ITEM DESCRIPTION: NORMAL
DME PARACHUTE ORDER STATUS: NORMAL
DME PARACHUTE SUPPLIER NAME: NORMAL
DME PARACHUTE SUPPLIER PHONE: NORMAL

## 2025-01-13 NOTE — TELEPHONE ENCOUNTER
PA for FreeStyle Jess 2 [place] DENIED    Reason:  Please process through [employer]      Message sent to office clinical pool Yes    Denial letter scanned into Media Yes    Appeal started No (Provider will need to decide if appeal is warranted and send clinical documentation to Prior Authorization Team for initiation.)    **Please follow up with your patient regarding denial and next steps**

## 2025-01-13 NOTE — PROGRESS NOTES
FreeStyle Jess 3 / 3 Plus System, Continuous Glucose Monitoring Package  Jess 3 Sensor, change every 14 days - CGMS  Jess 3 [place] - [facility]    Quantity  1  Prescription Details  Day Supply - 90  Refills - 3  Directions for CGM use - Use per  directions  Supplier  [facility] (National Medicare CGM Supplier)

## 2025-01-13 NOTE — TELEPHONE ENCOUNTER
FreeStyle Jess 3 / 3 Plus System, Continuous Glucose Monitoring Package  Jess 3 Sensor, change every 14 days - CGMS  Jess 3 [place] - [facility]    Quantity  1  Prescription Details  Day Supply - 90  Refills - 3  Directions for CGM use - Use per  directions  Supplier  [facility] (National Medicare CGM Supplier)

## 2025-02-01 DIAGNOSIS — I10 ESSENTIAL HYPERTENSION: ICD-10-CM

## 2025-02-03 RX ORDER — LISINOPRIL 2.5 MG/1
2.5 TABLET ORAL DAILY
Qty: 100 TABLET | Refills: 3 | Status: SHIPPED | OUTPATIENT
Start: 2025-02-03 | End: 2026-02-03

## 2025-02-04 ENCOUNTER — OFFICE VISIT (OUTPATIENT)
Dept: CARDIOLOGY CLINIC | Facility: CLINIC | Age: 80
End: 2025-02-04
Payer: MEDICARE

## 2025-02-04 VITALS
WEIGHT: 185 LBS | DIASTOLIC BLOOD PRESSURE: 70 MMHG | BODY MASS INDEX: 28.04 KG/M2 | SYSTOLIC BLOOD PRESSURE: 122 MMHG | OXYGEN SATURATION: 97 % | HEIGHT: 68 IN | HEART RATE: 72 BPM

## 2025-02-04 DIAGNOSIS — E78.2 MIXED HYPERLIPIDEMIA: ICD-10-CM

## 2025-02-04 DIAGNOSIS — I70.209 PERIPHERAL ARTERIOSCLEROSIS (HCC): ICD-10-CM

## 2025-02-04 DIAGNOSIS — E11.22 TYPE 2 DIABETES MELLITUS WITH STAGE 3B CHRONIC KIDNEY DISEASE, WITH LONG-TERM CURRENT USE OF INSULIN (HCC): ICD-10-CM

## 2025-02-04 DIAGNOSIS — Z79.4 TYPE 2 DIABETES MELLITUS WITH STAGE 3B CHRONIC KIDNEY DISEASE, WITH LONG-TERM CURRENT USE OF INSULIN (HCC): ICD-10-CM

## 2025-02-04 DIAGNOSIS — I10 ESSENTIAL HYPERTENSION: ICD-10-CM

## 2025-02-04 DIAGNOSIS — I25.10 CORONARY ARTERY DISEASE INVOLVING NATIVE CORONARY ARTERY OF NATIVE HEART WITHOUT ANGINA PECTORIS: Primary | ICD-10-CM

## 2025-02-04 DIAGNOSIS — I50.32 CHRONIC DIASTOLIC CONGESTIVE HEART FAILURE (HCC): ICD-10-CM

## 2025-02-04 DIAGNOSIS — N18.32 TYPE 2 DIABETES MELLITUS WITH STAGE 3B CHRONIC KIDNEY DISEASE, WITH LONG-TERM CURRENT USE OF INSULIN (HCC): ICD-10-CM

## 2025-02-04 PROCEDURE — 99214 OFFICE O/P EST MOD 30 MIN: CPT | Performed by: INTERNAL MEDICINE

## 2025-02-04 PROCEDURE — 93000 ELECTROCARDIOGRAM COMPLETE: CPT | Performed by: INTERNAL MEDICINE

## 2025-02-04 NOTE — PROGRESS NOTES
Cardiology   Morgan Henley DO, [facility]  Jose Donato MD, [facility]  Jose Clark MD, [facility]  Audra Muse MD, [facility]  -------------------------------------------------------------------  [facility]  [address], [address], [address]  [place], [place], [address]  [phone]    Cardiology Follow Up  Gene Greene  1945  [id number]          Assessment/Plan:    1. Coronary artery disease involving native coronary artery of native heart without angina pectoris    2. Essential hypertension    3. Chronic diastolic congestive heart failure (HCC)    4. Mixed hyperlipidemia      - LE edema resolved with discontinuation of amlodipine and increasing furosemide to 20 mg daily.    Last echocardiogram showed a normal EF with diastolic dysfunction.  - patient is predominantly sedentary and has multiple risk factors for CAD including previous PCI over 5 years ago. Most recent stress test did not show any ischemia or infarction.    - blood pressure is stable off amlodipine.  - Blood work ordered including lipid panel and CMP  - continue furosemide 20 mg daily.  - Call if any change or if any dyspnea or LE edema.   - Discussed diet and weight loss.        Interval History:     Gene Greene is 79 y.o. male here for followup of CAD and CHF.  Since his last visit, he has been feeling well.  he denies any palpitations, chest pain, shortness of breath, LE edema, orthopnea or PND.   Diet is overall unchanged.  There has not been a significant change in weight.    He was previously having LE edema that resolved with discontinuing amlodipine and increasing furosemide dosage.     His most recent blood work was in March 2023 which showed a creatinine of 1.5 with LDL of 66, triglycerides of 55.  Patient follows with endocrinology as well.     Past cardiac history:  He has a history of CAD.  He has a h/o PCI to the circumflex vessel in 1992. His last nuclear stress test was done  in 5/2015 which showed inferior/inferolateral infarct c/w attenuation. EF 63%. He exercised for 6:38.   He had a treadmill stress in July 2018 which was normal.  He exercised for 7 minutes without ECG changes.   Echocardiogram in March 2021 showed ejection fraction of 55-60% with mild valve disease    The following portions of the patient's history were reviewed and updated as appropriate: allergies, current medications, past family history, past medical history, past social history, past surgical history and problem list.      Current Outpatient Medications:     aspirin (ECOTRIN LOW STRENGTH) 81 mg EC tablet, Take 81 mg by mouth every morning  , Disp: , Rfl:     atorvastatin (LIPITOR) 40 mg tablet, TAKE 1 TABLET BY MOUTH DAILY, Disp: 90 tablet, Rfl: 3    Coenzyme Q10 (COQ-10) 100 MG CAPS, Take 200 mg by mouth daily at bedtime , Disp: , Rfl:     Continuous Glucose  (FreeStyle Jess 2 [place]) RHONDA, Use as directed to check blood sugars at least 4 times per day., Disp: 1 each, Rfl: 0    Continuous Glucose Sensor (FreeStyle Jess 2 Sensor) MISC, Change sensor every 14 days, Disp: 6 each, Rfl: 1    diphenhydrAMINE (BENADRYL) 25 mg tablet, Take 1 tablet (25 mg total) by mouth every 6 (six) hours as needed for itching, Disp: 30 tablet, Rfl: 0    doxazosin (CARDURA XL) 8 MG 24 hr tablet, Take 1 tablet (8 mg total) by mouth daily with breakfast, Disp: 30 tablet, Rfl: 3    escitalopram (LEXAPRO) 10 mg tablet, TAKE ONE TABLET BY MOUTH EVERY DAY, Disp: 90 tablet, Rfl: 0    finasteride (PROSCAR) 5 mg tablet, TAKE ONE TABLET BY MOUTH EVERY DAY, Disp: 90 tablet, Rfl: 1    furosemide (LASIX) 20 mg tablet, TAKE ONE TABLET BY MOUTH EVERY DAY (GENERIC FOR LASIX), Disp: 90 tablet, Rfl: 0    insulin degludec (Tresiba FlexTouch) 100 units/mL injection pen, Inject 6 Units under the skin daily at bedtime, Disp: 15 mL, Rfl: 1    insulin detemir (Levemir FlexPen) 100 Units/mL injection pen, Inject 6 Units under the skin daily at  "bedtime, Disp: 15 mL, Rfl: 3    insulin lispro (HumaLOG KwikPen) 100 units/mL injection pen, INJECT 6 units SUBCUTANEOUSLY  BEFORE BREAKFAST and LUNCH , AND 5 UNITS  BEFORE DINNER, Disp: 30 mL, Rfl: 1    Insulin Pen Needle (BD Pen Needle Sherie U/F) 32G X 4 MM MISC, USE 1 NEEDLE TO INJECT  SUBCUTANEOUSLY 4 TIMES DAILY, Disp: 360 each, Rfl: 1    levothyroxine 75 mcg tablet, TAKE 1 TABLET BY MOUTH DAILY, Disp: 30 tablet, Rfl: 11    lisinopril (ZESTRIL) 2.5 mg tablet, Take 1 tablet (2.5 mg total) by mouth in the morning, Disp: 100 tablet, Rfl: 3    metoprolol succinate (TOPROL-XL) 25 mg 24 hr tablet, TAKE 1 TABLET BY MOUTH DAILY, Disp: 90 tablet, Rfl: 1    multivitamin (THERAGRAN) TABS, Take 1 tablet by mouth every morning, Disp: , Rfl:     Ozempic, 0.25 or 0.5 MG/DOSE, 2 MG/3ML injection pen, INJECT 0.25 MG UNDER THE SKIN ONCE A WEEK, Disp: 3 mL, Rfl: 3    pantoprazole (PROTONIX) 40 mg tablet, TAKE 1 TABLET BY MOUTH DAILY, Disp: 90 tablet, Rfl: 3    donepezil (ARICEPT) 10 mg tablet, Take 1 tablet (10 mg total) by mouth daily at bedtime, Disp: 90 tablet, Rfl: 1        Review of Systems:  Review of Systems   Respiratory:  Negative for shortness of breath.    Cardiovascular:  Negative for chest pain, palpitations and leg swelling.   Musculoskeletal:  Positive for arthralgias.   All other systems reviewed and are negative.        Physical Exam:  Vitals:  Vitals:    02/04/25 1413   BP: 122/70   BP Location: Left arm   Patient Position: Sitting   Cuff Size: Standard   Pulse: 72   SpO2: 97%   Weight: 83.9 kg (185 lb)   Height: 5' 8\" (1.727 m)     Physical Exam   Constitutional: He appears healthy. No distress.   Eyes: Pupils are equal, round, and reactive to light. Conjunctivae are normal.   Neck: No JVD present.   Cardiovascular: Normal rate, regular rhythm and normal heart sounds. Exam reveals no gallop and no friction rub.   No murmur heard.  Pulmonary/Chest: Effort normal and breath sounds normal. He has no wheezes. He has " no rales.   Musculoskeletal:         General: No tenderness, deformity or edema.      Cervical back: Normal range of motion and neck supple.   Neurological: He is alert and oriented to person, place, and time.   Skin: Skin is warm and dry.        Cardiographics:  EKG: Personally reviewed NSR with PAC  Last known EF: 55%    This note was completed in part utilizing M-Modal Fluency Direct Software.  Grammatical errors, random word insertions, spelling mistakes, and incomplete sentences can be an occasional consequence of this system secondary to software limitations, ambient noise, and hardware issues.  If you have any questions or concerns about the content, text, or information contained within the body of this dictation, please contact the provider for clarification.

## 2025-02-18 ENCOUNTER — APPOINTMENT (OUTPATIENT)
Dept: LAB | Facility: CLINIC | Age: 80
End: 2025-02-18
Payer: MEDICARE

## 2025-02-18 DIAGNOSIS — Z79.4 TYPE 2 DIABETES MELLITUS WITH STAGE 3B CHRONIC KIDNEY DISEASE, WITH LONG-TERM CURRENT USE OF INSULIN (HCC): ICD-10-CM

## 2025-02-18 DIAGNOSIS — I50.32 CHRONIC DIASTOLIC CONGESTIVE HEART FAILURE (HCC): ICD-10-CM

## 2025-02-18 DIAGNOSIS — I10 ESSENTIAL HYPERTENSION: ICD-10-CM

## 2025-02-18 DIAGNOSIS — N18.32 TYPE 2 DIABETES MELLITUS WITH STAGE 3B CHRONIC KIDNEY DISEASE, WITH LONG-TERM CURRENT USE OF INSULIN (HCC): ICD-10-CM

## 2025-02-18 DIAGNOSIS — E78.2 MIXED HYPERLIPIDEMIA: ICD-10-CM

## 2025-02-18 DIAGNOSIS — Z12.5 SCREENING FOR PROSTATE CANCER: ICD-10-CM

## 2025-02-18 DIAGNOSIS — I70.209 PERIPHERAL ARTERIOSCLEROSIS (HCC): ICD-10-CM

## 2025-02-18 DIAGNOSIS — E03.9 ACQUIRED HYPOTHYROIDISM: ICD-10-CM

## 2025-02-18 DIAGNOSIS — I25.10 CORONARY ARTERY DISEASE INVOLVING NATIVE CORONARY ARTERY OF NATIVE HEART WITHOUT ANGINA PECTORIS: ICD-10-CM

## 2025-02-18 DIAGNOSIS — E11.22 TYPE 2 DIABETES MELLITUS WITH STAGE 3B CHRONIC KIDNEY DISEASE, WITH LONG-TERM CURRENT USE OF INSULIN (HCC): ICD-10-CM

## 2025-02-18 LAB
BASOPHILS # BLD AUTO: 0.08 THOUSANDS/ΜL (ref 0–0.1)
BASOPHILS NFR BLD AUTO: 1 % (ref 0–1)
CREAT UR-MCNC: 82.2 MG/DL
EOSINOPHIL # BLD AUTO: 0.13 THOUSAND/ΜL (ref 0–0.61)
EOSINOPHIL NFR BLD AUTO: 2 % (ref 0–6)
ERYTHROCYTE [DISTWIDTH] IN BLOOD BY AUTOMATED COUNT: 12.2 % (ref 11.6–15.1)
HCT VFR BLD AUTO: 42.6 % (ref 36.5–49.3)
HGB BLD-MCNC: 14.1 G/DL (ref 12–17)
IMM GRANULOCYTES # BLD AUTO: 0.02 THOUSAND/UL (ref 0–0.2)
IMM GRANULOCYTES NFR BLD AUTO: 0 % (ref 0–2)
LYMPHOCYTES # BLD AUTO: 2.37 THOUSANDS/ΜL (ref 0.6–4.47)
LYMPHOCYTES NFR BLD AUTO: 34 % (ref 14–44)
MCH RBC QN AUTO: 32.3 PG (ref 26.8–34.3)
MCHC RBC AUTO-ENTMCNC: 33.1 G/DL (ref 31.4–37.4)
MCV RBC AUTO: 98 FL (ref 82–98)
MICROALBUMIN UR-MCNC: 9.7 MG/L
MICROALBUMIN/CREAT 24H UR: 12 MG/G CREATININE (ref 0–30)
MONOCYTES # BLD AUTO: 0.6 THOUSAND/ΜL (ref 0.17–1.22)
MONOCYTES NFR BLD AUTO: 9 % (ref 4–12)
NEUTROPHILS # BLD AUTO: 3.83 THOUSANDS/ΜL (ref 1.85–7.62)
NEUTS SEG NFR BLD AUTO: 54 % (ref 43–75)
NRBC BLD AUTO-RTO: 0 /100 WBCS
PLATELET # BLD AUTO: 285 THOUSANDS/UL (ref 149–390)
PMV BLD AUTO: 9.6 FL (ref 8.9–12.7)
RBC # BLD AUTO: 4.36 MILLION/UL (ref 3.88–5.62)
WBC # BLD AUTO: 7.03 THOUSAND/UL (ref 4.31–10.16)

## 2025-02-18 PROCEDURE — 36415 COLL VENOUS BLD VENIPUNCTURE: CPT

## 2025-02-18 PROCEDURE — 85025 COMPLETE CBC W/AUTO DIFF WBC: CPT

## 2025-02-18 PROCEDURE — 82570 ASSAY OF URINE CREATININE: CPT

## 2025-02-18 PROCEDURE — 84443 ASSAY THYROID STIM HORMONE: CPT

## 2025-02-18 PROCEDURE — 80061 LIPID PANEL: CPT

## 2025-02-18 PROCEDURE — 80053 COMPREHEN METABOLIC PANEL: CPT

## 2025-02-18 PROCEDURE — 82043 UR ALBUMIN QUANTITATIVE: CPT

## 2025-02-18 PROCEDURE — 84439 ASSAY OF FREE THYROXINE: CPT

## 2025-02-18 PROCEDURE — G0103 PSA SCREENING: HCPCS

## 2025-02-18 PROCEDURE — 83036 HEMOGLOBIN GLYCOSYLATED A1C: CPT

## 2025-02-19 ENCOUNTER — RESULTS FOLLOW-UP (OUTPATIENT)
Dept: FAMILY MEDICINE CLINIC | Facility: CLINIC | Age: 80
End: 2025-02-19

## 2025-02-19 DIAGNOSIS — E03.1 CONGENITAL HYPOTHYROIDISM WITHOUT GOITER: ICD-10-CM

## 2025-02-19 LAB
ALBUMIN SERPL BCG-MCNC: 4.2 G/DL (ref 3.5–5)
ALP SERPL-CCNC: 73 U/L (ref 34–104)
ALT SERPL W P-5'-P-CCNC: 19 U/L (ref 7–52)
ANION GAP SERPL CALCULATED.3IONS-SCNC: 9 MMOL/L (ref 4–13)
AST SERPL W P-5'-P-CCNC: 19 U/L (ref 13–39)
BILIRUB SERPL-MCNC: 0.73 MG/DL (ref 0.2–1)
BUN SERPL-MCNC: 26 MG/DL (ref 5–25)
CALCIUM SERPL-MCNC: 9.2 MG/DL (ref 8.4–10.2)
CHLORIDE SERPL-SCNC: 105 MMOL/L (ref 96–108)
CHOLEST SERPL-MCNC: 150 MG/DL (ref ?–200)
CO2 SERPL-SCNC: 27 MMOL/L (ref 21–32)
CREAT SERPL-MCNC: 1.6 MG/DL (ref 0.6–1.3)
EST. AVERAGE GLUCOSE BLD GHB EST-MCNC: 177 MG/DL
GFR SERPL CREATININE-BSD FRML MDRD: 40 ML/MIN/1.73SQ M
GLUCOSE P FAST SERPL-MCNC: 195 MG/DL (ref 65–99)
HBA1C MFR BLD: 7.8 %
HDLC SERPL-MCNC: 58 MG/DL
LDLC SERPL CALC-MCNC: 75 MG/DL (ref 0–100)
POTASSIUM SERPL-SCNC: 4.4 MMOL/L (ref 3.5–5.3)
PROT SERPL-MCNC: 6.3 G/DL (ref 6.4–8.4)
PSA SERPL-MCNC: 0.75 NG/ML (ref 0–4)
SODIUM SERPL-SCNC: 141 MMOL/L (ref 135–147)
T4 FREE SERPL-MCNC: 0.67 NG/DL (ref 0.61–1.12)
TRIGL SERPL-MCNC: 87 MG/DL (ref ?–150)
TSH SERPL DL<=0.05 MIU/L-ACNC: 13.66 UIU/ML (ref 0.45–4.5)

## 2025-02-19 RX ORDER — LEVOTHYROXINE SODIUM 100 UG/1
100 TABLET ORAL DAILY
Qty: 100 TABLET | Refills: 3 | Status: SHIPPED | OUTPATIENT
Start: 2025-02-19 | End: 2026-02-19

## 2025-02-19 NOTE — RESULT ENCOUNTER NOTE
Looks like the TSH is elevated at this time you are taking levothyroxine - this dose sheould be increased and the TSH would need to be redrawn

## 2025-02-25 ENCOUNTER — TELEPHONE (OUTPATIENT)
Dept: ENDOCRINOLOGY | Facility: CLINIC | Age: 80
End: 2025-02-25

## 2025-02-27 NOTE — TELEPHONE ENCOUNTER
PA for Ozempic, 0.25 or 0.5 MGSUBMITTED to Medicare     via      [x][employer]-Case ID # [id number]       [x]PA sent as URGENT    All office notes, labs and other pertaining documents and studies sent. Clinical questions answered. Awaiting determination from insurance company.     Turnaround time for your insurance to make a decision on your Prior Authorization can take 7-21 business days.

## 2025-02-27 NOTE — TELEPHONE ENCOUNTER
PA for  Ozempic, 0.25 or 0.5 MG APPROVED     Date(s) approved  February 27, 2025 to December 31, 2025    Case #[id number]     Patient advised by          []MyChart Message  [x]Phone call   []LMOM  []L/M to call office as no active Communication consent on file  []Unable to leave detailed message as VM not approved on Communication consent       Pharmacy advised by    [x]Fax  []Phone call    Specialty Pharmacy    []     Approval letter scanned into Media Yes

## 2025-03-05 DIAGNOSIS — I10 ESSENTIAL HYPERTENSION: ICD-10-CM

## 2025-03-06 RX ORDER — METOPROLOL SUCCINATE 25 MG/1
25 TABLET, EXTENDED RELEASE ORAL DAILY
Qty: 90 TABLET | Refills: 1 | Status: SHIPPED | OUTPATIENT
Start: 2025-03-06

## 2025-03-13 ENCOUNTER — OFFICE VISIT (OUTPATIENT)
Dept: ENDOCRINOLOGY | Facility: CLINIC | Age: 80
End: 2025-03-13
Payer: MEDICARE

## 2025-03-13 VITALS
SYSTOLIC BLOOD PRESSURE: 138 MMHG | DIASTOLIC BLOOD PRESSURE: 62 MMHG | WEIGHT: 184.2 LBS | HEIGHT: 68 IN | OXYGEN SATURATION: 99 % | BODY MASS INDEX: 27.92 KG/M2 | HEART RATE: 72 BPM

## 2025-03-13 DIAGNOSIS — I10 ESSENTIAL HYPERTENSION: ICD-10-CM

## 2025-03-13 DIAGNOSIS — E11.22 TYPE 2 DIABETES MELLITUS WITH STAGE 3 CHRONIC KIDNEY DISEASE, WITH LONG-TERM CURRENT USE OF INSULIN, UNSPECIFIED WHETHER STAGE 3A OR 3B CKD (HCC): ICD-10-CM

## 2025-03-13 DIAGNOSIS — E03.9 ACQUIRED HYPOTHYROIDISM: Primary | ICD-10-CM

## 2025-03-13 DIAGNOSIS — N18.30 TYPE 2 DIABETES MELLITUS WITH STAGE 3 CHRONIC KIDNEY DISEASE, WITH LONG-TERM CURRENT USE OF INSULIN, UNSPECIFIED WHETHER STAGE 3A OR 3B CKD (HCC): ICD-10-CM

## 2025-03-13 DIAGNOSIS — N18.32 TYPE 2 DIABETES MELLITUS WITH STAGE 3B CHRONIC KIDNEY DISEASE, WITH LONG-TERM CURRENT USE OF INSULIN (HCC): ICD-10-CM

## 2025-03-13 DIAGNOSIS — Z79.4 TYPE 2 DIABETES MELLITUS WITH STAGE 3B CHRONIC KIDNEY DISEASE, WITH LONG-TERM CURRENT USE OF INSULIN (HCC): ICD-10-CM

## 2025-03-13 DIAGNOSIS — Z79.4 TYPE 2 DIABETES MELLITUS WITH STAGE 3 CHRONIC KIDNEY DISEASE, WITH LONG-TERM CURRENT USE OF INSULIN, UNSPECIFIED WHETHER STAGE 3A OR 3B CKD (HCC): ICD-10-CM

## 2025-03-13 DIAGNOSIS — E78.2 MIXED HYPERLIPIDEMIA: ICD-10-CM

## 2025-03-13 DIAGNOSIS — E11.22 TYPE 2 DIABETES MELLITUS WITH STAGE 3B CHRONIC KIDNEY DISEASE, WITH LONG-TERM CURRENT USE OF INSULIN (HCC): ICD-10-CM

## 2025-03-13 PROBLEM — E11.42 DIABETIC POLYNEUROPATHY ASSOCIATED WITH TYPE 2 DIABETES MELLITUS (HCC): Status: RESOLVED | Noted: 2019-05-14 | Resolved: 2025-03-13

## 2025-03-13 PROCEDURE — 95251 CONT GLUC MNTR ANALYSIS I&R: CPT | Performed by: NURSE PRACTITIONER

## 2025-03-13 PROCEDURE — 99204 OFFICE O/P NEW MOD 45 MIN: CPT | Performed by: NURSE PRACTITIONER

## 2025-03-13 RX ORDER — INSULIN LISPRO 100 [IU]/ML
5 INJECTION, SOLUTION INTRAVENOUS; SUBCUTANEOUS
Qty: 30 ML | Refills: 1 | Status: SHIPPED | OUTPATIENT
Start: 2025-03-13

## 2025-03-13 NOTE — ASSESSMENT & PLAN NOTE
Recent increase in levothyroxine 100 mcg daily after February 2025 lab work with TSH 13.664 and free T4 0.67. Repeat thyroid function test in six weeks.     Orders:    TSH, 3rd generation; Future    T4, free; Future

## 2025-03-13 NOTE — PROGRESS NOTES
Name: Gene Greene      : 1945      MRN: [id number]  Encounter Provider: LUCAS Lomeli  Encounter Date: 3/13/2025   Encounter department: [facility]    No chief complaint on file.  :  Assessment & Plan  Type 2 diabetes mellitus with stage 3 chronic kidney disease, with long-term current use of insulin, unspecified whether stage 3a or 3b CKD (HCC)    Lab Results   Component Value Date    HGBA1C 7.8 (H) 2025     HGA1C above goal.   Humalog 5 units TID   Stop Levemir/Tresiba  Increase Ozempic 0.5 mg weekly    Discussed risks/complications associated with uncontrolled diabetes including organ involvement, heart attack, stroke, death.      Advised lifestyle modifications including attention to diet including the amount and types of carbohydrates consumed and regular activity.     Call for blood sugars less than 70 mg/dl or patterns over 250 mg/dl.     Monitor blood glucose levels at least 2 times a day, if on insulin ideally before all insulin administration times.     Discussed use of CGM to collect additional blood glucose data to reveal patterns that can be used to improve glucose levels and adjust insulin regimen.    Discussed symptoms and treatment of hypoglycemia.  Reviewed risks associated with hypoglycemia. Always carry rapid acting carbohydrates and a glucometer (a way to check your blood sugar).    Recommendation for medical identification either bracelet, necklace.    Recommendation for glucagon if on insulin.     Routine follow up for diabetic eye and foot exams.     Ordered blood work to complete prior to next visit.    Send glucose logs/CGM download in 1-2 weeks for review    Follow up in 3 months.      Orders:    insulin lispro (HumaLOG KwikPen) 100 units/mL injection pen; Inject 5 Units under the skin 3 (three) times a day with meals INJECT 6 units SUBCUTANEOUSLY  BEFORE BREAKFAST and LUNCH , AND 5 UNITS  BEFORE DINNER    Acquired  hypothyroidism  Recent increase in levothyroxine 100 mcg daily after February 2025 lab work with TSH 13.664 and free T4 0.67. Repeat thyroid function test in six weeks.     Orders:    TSH, 3rd generation; Future    T4, free; Future    Mixed hyperlipidemia  Continues on statin.          Essential hypertension  BP stable on regimen including ACE.              History of Present Illness     Gene Greene is a 79 y.o. male who presents for follow up of diabetes mellitus and hypothyroidism.     Denies recent hospitalization or illness.      Denies changes in thirst, urination, vision, or sensation in feet.      Denies symptomatic or documented hypoglycemia.      CGM Interpretation  Gene Greene   Device used Dexcom for Personal Use  Indication: Type of Diabetes: Type 2 Diabetes  More than 72 hours of data was reviewed. Report to be scanned to chart.   Date Range: February 27- March 24, 2025  Analysis of data:   Average Glucose: 177 mg/dl  Coefficient of Variation: 25.2%  GMI: 7.5%  Time in Target Range: 60%  Time Above Range: 40%  Time Below Range: 0%   Interpretation of data:   Post-pranidal hyperglycemia     Current regimen:   Levemir 6 units at bedtime  Humalog 6 units at breakfast and lunch and 5 units at dinner.   Ozempic 0.25 mg weekly     Denies side effects of treatment including abdominal pain, nausea, vomiting, constipation, diarrhea, or severe appetite suppression.      Continues on ACE and statin.      For hypothyroidism, recently increased to levothyroxine 100 mcg daily by primary care and is taking regularly and properly. Denies symptoms consistent with hypo/hyperthyroidism. Feeling well overall.        Review of Systems as per HPI  Medical History Reviewed by provider this encounter:  Tobacco  Allergies  Meds  Problems  Med Hx  Surg Hx  Fam Hx     .  Current Outpatient Medications on File Prior to Visit   Medication Sig Dispense Refill    aspirin (ECOTRIN LOW STRENGTH) 81 mg EC tablet  Take 81 mg by mouth every morning        atorvastatin (LIPITOR) 40 mg tablet TAKE 1 TABLET BY MOUTH DAILY 90 tablet 3    Coenzyme Q10 (COQ-10) 100 MG CAPS Take 200 mg by mouth daily at bedtime       Continuous Glucose  (FreeStyle Jess 2 Gregory) RHONDA Use as directed to check blood sugars at least 4 times per day. 1 each 0    Continuous Glucose Sensor (FreeStyle Jess 2 Sensor) MISC Change sensor every 14 days 6 each 1    diphenhydrAMINE (BENADRYL) 25 mg tablet Take 1 tablet (25 mg total) by mouth every 6 (six) hours as needed for itching 30 tablet 0    donepezil (ARICEPT) 10 mg tablet Take 1 tablet (10 mg total) by mouth daily at bedtime 90 tablet 1    doxazosin (CARDURA XL) 8 MG 24 hr tablet Take 1 tablet (8 mg total) by mouth daily with breakfast 30 tablet 3    escitalopram (LEXAPRO) 10 mg tablet TAKE ONE TABLET BY MOUTH EVERY DAY 90 tablet 0    finasteride (PROSCAR) 5 mg tablet TAKE ONE TABLET BY MOUTH EVERY DAY 90 tablet 1    furosemide (LASIX) 20 mg tablet TAKE ONE TABLET BY MOUTH EVERY DAY (GENERIC FOR LASIX) 90 tablet 0    Insulin Pen Needle (BD Pen Needle Sherie U/F) 32G X 4 MM MISC USE 1 NEEDLE TO INJECT  SUBCUTANEOUSLY 4 TIMES DAILY 360 each 1    levothyroxine 100 mcg tablet Take 1 tablet (100 mcg total) by mouth daily 100 tablet 3    lisinopril (ZESTRIL) 2.5 mg tablet Take 1 tablet (2.5 mg total) by mouth in the morning 100 tablet 3    metoprolol succinate (TOPROL-XL) 25 mg 24 hr tablet TAKE 1 TABLET BY MOUTH DAILY 90 tablet 1    multivitamin (THERAGRAN) TABS Take 1 tablet by mouth every morning      pantoprazole (PROTONIX) 40 mg tablet TAKE 1 TABLET BY MOUTH DAILY 90 tablet 3    [DISCONTINUED] insulin degludec (Tresiba FlexTouch) 100 units/mL injection pen Inject 6 Units under the skin daily at bedtime 15 mL 1    [DISCONTINUED] insulin detemir (Levemir FlexPen) 100 Units/mL injection pen Inject 6 Units under the skin daily at bedtime 15 mL 3    [DISCONTINUED] insulin lispro (HumaLOG KwikPen) 100  "units/mL injection pen INJECT 6 units SUBCUTANEOUSLY  BEFORE BREAKFAST and LUNCH , AND 5 UNITS  BEFORE DINNER 30 mL 1    [DISCONTINUED] Ozempic, 0.25 or 0.5 MG/DOSE, 2 MG/3ML injection pen INJECT 0.25 MG UNDER THE SKIN ONCE A WEEK 3 mL 3     No current facility-administered medications on file prior to visit.         Medical History Reviewed by provider this encounter:     .    Objective   /62 (BP Location: Left arm, Patient Position: Sitting, Cuff Size: Standard)   Pulse 72   Ht 5' 8\" (1.727 m)   Wt 83.6 kg (184 lb 3.2 oz)   SpO2 99%   BMI 28.01 kg/m²      Body mass index is 28.01 kg/m².  Wt Readings from Last 3 Encounters:   03/13/25 83.6 kg (184 lb 3.2 oz)   02/04/25 83.9 kg (185 lb)   11/13/24 84.1 kg (185 lb 6.4 oz)        Physical Exam  Vitals reviewed.   Constitutional:       Appearance: Normal appearance.   Cardiovascular:      Rate and Rhythm: Normal rate and regular rhythm.      Pulses: Normal pulses.      Heart sounds: Normal heart sounds.   Pulmonary:      Effort: Pulmonary effort is normal.      Breath sounds: Normal breath sounds.   Skin:     General: Skin is warm and dry.      Capillary Refill: Capillary refill takes less than 2 seconds.   Neurological:      General: No focal deficit present.      Mental Status: He is alert and oriented to person, place, and time.   Psychiatric:         Mood and Affect: Mood normal.         Behavior: Behavior normal.     Labs:   Lab Results   Component Value Date    HGBA1C 7.8 (H) 02/18/2025    HGBA1C 7.3 (A) 11/13/2024    HGBA1C 7.1 (A) 08/01/2024     Lab Results   Component Value Date    CREATININE 1.60 (H) 02/18/2025    CREATININE 1.55 (H) 03/29/2023    CREATININE 1.49 (H) 09/14/2022    BUN 26 (H) 02/18/2025     12/12/2017    K 4.4 02/18/2025     02/18/2025    CO2 27 02/18/2025     eGFR   Date Value Ref Range Status   02/18/2025 40 ml/min/1.73sq m Final     Lab Results   Component Value Date    CHOL 153 12/12/2017    HDL 58 02/18/2025    TRIG " 87 02/18/2025     Lab Results   Component Value Date    ALT 19 02/18/2025    AST 19 02/18/2025    ALKPHOS 73 02/18/2025    BILITOT 0.4 12/12/2017     Lab Results   Component Value Date    OCL8RNUXAOAU 13.664 (H) 02/18/2025    REB4EOVTDQUB 3.630 12/12/2017    DCI9EIEFPOKY 2.950 08/03/2017     Lab Results   Component Value Date    FREET4 0.67 02/18/2025       There are no Patient Instructions on file for this visit.    Discussed with the patient and all questioned fully answered. He will call me if any problems arise.    Administrative Statements

## 2025-03-20 ENCOUNTER — TELEPHONE (OUTPATIENT)
Dept: ENDOCRINOLOGY | Facility: CLINIC | Age: 80
End: 2025-03-20

## 2025-03-20 NOTE — TELEPHONE ENCOUNTER
Please review the message above, I don't need Levimor on the patient current medication list. Thank you

## 2025-03-20 NOTE — TELEPHONE ENCOUNTER
Levimor flex no longer available. Pharmacy not sure what is covered under patient plans  but wanted to know if you wanted to prescribed an alternative.

## 2025-03-24 NOTE — TELEPHONE ENCOUNTER
[facility] Pharmacist called rx refill line inquiring a refill on Levimor. Informed that patient was taken off during last OV. Patient will be informed and guided to contact office for any additional questions.

## 2025-05-27 ENCOUNTER — TELEPHONE (OUTPATIENT)
Dept: ENDOCRINOLOGY | Facility: CLINIC | Age: 80
End: 2025-05-27

## 2025-05-27 DIAGNOSIS — E11.22 TYPE 2 DIABETES MELLITUS WITH STAGE 3 CHRONIC KIDNEY DISEASE, WITH LONG-TERM CURRENT USE OF INSULIN, UNSPECIFIED WHETHER STAGE 3A OR 3B CKD (HCC): ICD-10-CM

## 2025-05-27 DIAGNOSIS — Z79.4 TYPE 2 DIABETES MELLITUS WITH STAGE 3 CHRONIC KIDNEY DISEASE, WITH LONG-TERM CURRENT USE OF INSULIN, UNSPECIFIED WHETHER STAGE 3A OR 3B CKD (HCC): ICD-10-CM

## 2025-05-27 DIAGNOSIS — N18.30 TYPE 2 DIABETES MELLITUS WITH STAGE 3 CHRONIC KIDNEY DISEASE, WITH LONG-TERM CURRENT USE OF INSULIN (HCC): ICD-10-CM

## 2025-05-27 DIAGNOSIS — E11.22 TYPE 2 DIABETES MELLITUS WITH STAGE 3 CHRONIC KIDNEY DISEASE, WITH LONG-TERM CURRENT USE OF INSULIN (HCC): ICD-10-CM

## 2025-05-27 DIAGNOSIS — N18.30 TYPE 2 DIABETES MELLITUS WITH STAGE 3 CHRONIC KIDNEY DISEASE, WITH LONG-TERM CURRENT USE OF INSULIN, UNSPECIFIED WHETHER STAGE 3A OR 3B CKD (HCC): ICD-10-CM

## 2025-05-27 DIAGNOSIS — Z79.4 TYPE 2 DIABETES MELLITUS WITH STAGE 3 CHRONIC KIDNEY DISEASE, WITH LONG-TERM CURRENT USE OF INSULIN (HCC): ICD-10-CM

## 2025-05-27 NOTE — TELEPHONE ENCOUNTER
Patient states he does not have enough Insulin due to adjustments of sometimes 10,12 and or 15 units. Please review downloaded log and advise

## 2025-05-29 RX ORDER — INSULIN LISPRO 100 [IU]/ML
INJECTION, SOLUTION INTRAVENOUS; SUBCUTANEOUS
Qty: 45 ML | Refills: 1 | Status: SHIPPED | OUTPATIENT
Start: 2025-05-29 | End: 2025-05-29 | Stop reason: SDUPTHER

## 2025-05-29 RX ORDER — INSULIN LISPRO 100 [IU]/ML
INJECTION, SOLUTION INTRAVENOUS; SUBCUTANEOUS
Qty: 45 ML | Refills: 1 | Status: SHIPPED | OUTPATIENT
Start: 2025-05-29

## 2025-05-29 NOTE — TELEPHONE ENCOUNTER
Covering for Evy Larios. I adjusted his script to allow for a higher dispense of humalog based on need for correctional insulin. A new script has been sent to [facility] in [place]

## 2025-05-29 NOTE — TELEPHONE ENCOUNTER
I called and left a voicemail for the patient Covering for Evy Larios. I adjusted his script to allow for a higher dispense of humalog based on need for correctional insulin. A new script has been sent to [facility] in [facility]

## 2025-06-12 ENCOUNTER — TELEPHONE (OUTPATIENT)
Dept: ENDOCRINOLOGY | Facility: CLINIC | Age: 80
End: 2025-06-12

## 2025-06-12 NOTE — TELEPHONE ENCOUNTER
EARLINE with patient to reschedule July apt. Made patient aware we are scheduling out until Dec with Evy and Jan with Dr kan. He will be added to wait list once scheduled.

## 2025-06-13 DIAGNOSIS — N40.1 BENIGN LOCALIZED PROSTATIC HYPERPLASIA WITH LOWER URINARY TRACT SYMPTOMS (LUTS): ICD-10-CM

## 2025-06-13 RX ORDER — FINASTERIDE 5 MG/1
5 TABLET, FILM COATED ORAL DAILY
Qty: 90 TABLET | Refills: 1 | Status: SHIPPED | OUTPATIENT
Start: 2025-06-13

## 2025-07-14 DIAGNOSIS — K22.719 BARRETT'S ESOPHAGUS WITH DYSPLASIA: ICD-10-CM

## 2025-07-14 DIAGNOSIS — E78.2 MIXED HYPERLIPIDEMIA: ICD-10-CM

## 2025-07-16 RX ORDER — PANTOPRAZOLE SODIUM 40 MG/1
40 TABLET, DELAYED RELEASE ORAL DAILY
Qty: 90 TABLET | Refills: 3 | Status: SHIPPED | OUTPATIENT
Start: 2025-07-16

## 2025-07-16 RX ORDER — ATORVASTATIN CALCIUM 40 MG/1
40 TABLET, FILM COATED ORAL DAILY
Qty: 90 TABLET | Refills: 3 | Status: SHIPPED | OUTPATIENT
Start: 2025-07-16

## 2025-07-18 ENCOUNTER — RA CDI HCC (OUTPATIENT)
Dept: OTHER | Facility: HOSPITAL | Age: 80
End: 2025-07-18

## 2025-07-20 DIAGNOSIS — E11.22 TYPE 2 DIABETES MELLITUS WITH STAGE 3 CHRONIC KIDNEY DISEASE, WITH LONG-TERM CURRENT USE OF INSULIN (HCC): ICD-10-CM

## 2025-07-20 DIAGNOSIS — Z79.4 TYPE 2 DIABETES MELLITUS WITH STAGE 3 CHRONIC KIDNEY DISEASE, WITH LONG-TERM CURRENT USE OF INSULIN (HCC): ICD-10-CM

## 2025-07-20 DIAGNOSIS — N18.30 TYPE 2 DIABETES MELLITUS WITH STAGE 3 CHRONIC KIDNEY DISEASE, WITH LONG-TERM CURRENT USE OF INSULIN (HCC): ICD-10-CM

## 2025-07-21 RX ORDER — PEN NEEDLE, DIABETIC 32GX 5/32"
NEEDLE, DISPOSABLE MISCELLANEOUS
Qty: 360 EACH | Refills: 1 | Status: SHIPPED | OUTPATIENT
Start: 2025-07-21 | End: 2025-07-25 | Stop reason: SDUPTHER

## 2025-07-25 ENCOUNTER — OFFICE VISIT (OUTPATIENT)
Dept: FAMILY MEDICINE CLINIC | Facility: CLINIC | Age: 80
End: 2025-07-25
Payer: MEDICARE

## 2025-07-25 VITALS
BODY MASS INDEX: 28.19 KG/M2 | SYSTOLIC BLOOD PRESSURE: 100 MMHG | WEIGHT: 186 LBS | RESPIRATION RATE: 16 BRPM | HEART RATE: 72 BPM | TEMPERATURE: 98.2 F | HEIGHT: 68 IN | DIASTOLIC BLOOD PRESSURE: 62 MMHG

## 2025-07-25 DIAGNOSIS — N18.31 CHRONIC KIDNEY DISEASE (CKD) STAGE G3A/A1, MODERATELY DECREASED GLOMERULAR FILTRATION RATE (GFR) BETWEEN 45-59 ML/MIN/1.73 SQUARE METER AND ALBUMINURIA CREATININE RATIO LESS THAN 30 MG/G (HCC): ICD-10-CM

## 2025-07-25 DIAGNOSIS — E11.22 TYPE 2 DIABETES MELLITUS WITH STAGE 3 CHRONIC KIDNEY DISEASE, WITH LONG-TERM CURRENT USE OF INSULIN (HCC): ICD-10-CM

## 2025-07-25 DIAGNOSIS — Z79.4 TYPE 2 DIABETES MELLITUS WITH STAGE 3B CHRONIC KIDNEY DISEASE, WITH LONG-TERM CURRENT USE OF INSULIN (HCC): ICD-10-CM

## 2025-07-25 DIAGNOSIS — N18.30 TYPE 2 DIABETES MELLITUS WITH STAGE 3 CHRONIC KIDNEY DISEASE, WITH LONG-TERM CURRENT USE OF INSULIN (HCC): ICD-10-CM

## 2025-07-25 DIAGNOSIS — E78.2 MIXED HYPERLIPIDEMIA: ICD-10-CM

## 2025-07-25 DIAGNOSIS — N18.32 TYPE 2 DIABETES MELLITUS WITH STAGE 3B CHRONIC KIDNEY DISEASE, WITH LONG-TERM CURRENT USE OF INSULIN (HCC): ICD-10-CM

## 2025-07-25 DIAGNOSIS — I25.10 CORONARY ARTERY DISEASE INVOLVING NATIVE CORONARY ARTERY OF NATIVE HEART WITHOUT ANGINA PECTORIS: ICD-10-CM

## 2025-07-25 DIAGNOSIS — R60.0 LOWER EXTREMITY EDEMA: ICD-10-CM

## 2025-07-25 DIAGNOSIS — I10 ESSENTIAL HYPERTENSION: Primary | ICD-10-CM

## 2025-07-25 DIAGNOSIS — I50.32 CHRONIC DIASTOLIC CONGESTIVE HEART FAILURE (HCC): ICD-10-CM

## 2025-07-25 DIAGNOSIS — E11.22 TYPE 2 DIABETES MELLITUS WITH STAGE 3B CHRONIC KIDNEY DISEASE, WITH LONG-TERM CURRENT USE OF INSULIN (HCC): ICD-10-CM

## 2025-07-25 DIAGNOSIS — Z79.4 TYPE 2 DIABETES MELLITUS WITH STAGE 3 CHRONIC KIDNEY DISEASE, WITH LONG-TERM CURRENT USE OF INSULIN (HCC): ICD-10-CM

## 2025-07-25 DIAGNOSIS — E03.9 ACQUIRED HYPOTHYROIDISM: ICD-10-CM

## 2025-07-25 LAB — SL AMB POCT HEMOGLOBIN AIC: 8.5 (ref ?–6.5)

## 2025-07-25 PROCEDURE — G2211 COMPLEX E/M VISIT ADD ON: HCPCS | Performed by: FAMILY MEDICINE

## 2025-07-25 PROCEDURE — 83036 HEMOGLOBIN GLYCOSYLATED A1C: CPT | Performed by: FAMILY MEDICINE

## 2025-07-25 PROCEDURE — 99214 OFFICE O/P EST MOD 30 MIN: CPT | Performed by: FAMILY MEDICINE

## 2025-07-25 RX ORDER — PEN NEEDLE, DIABETIC 32GX 5/32"
NEEDLE, DISPOSABLE MISCELLANEOUS
Qty: 360 EACH | Refills: 1 | Status: SHIPPED | OUTPATIENT
Start: 2025-07-25

## 2025-07-25 RX ORDER — INSULIN DEGLUDEC 100 U/ML
6 INJECTION, SOLUTION SUBCUTANEOUS
Qty: 15 ML | Refills: 0 | Status: SHIPPED | OUTPATIENT
Start: 2025-07-25 | End: 2025-08-24

## 2025-07-25 NOTE — ASSESSMENT & PLAN NOTE
Orders:  •  Albumin / creatinine urine ratio; Future  •  Comprehensive metabolic panel; Future  •  Hemoglobin A1C; Future  •  Lipid Panel with Direct LDL reflex; Future  •  TSH, 3rd generation with Free T4 reflex; Future

## 2025-07-25 NOTE — ASSESSMENT & PLAN NOTE
Lab Results   Component Value Date    EGFR 40 02/18/2025    EGFR 46 (L) 03/29/2023    EGFR 48 (L) 09/14/2022    CREATININE 1.60 (H) 02/18/2025    CREATININE 1.55 (H) 03/29/2023    CREATININE 1.49 (H) 09/14/2022

## 2025-07-25 NOTE — PROGRESS NOTES
Name: Gene Greene      : 1945      MRN: [id number]  Encounter Provider: Frank Lombardi, DO  Encounter Date: 2025   Encounter department: [facility]  :  Assessment & Plan  Essential hypertension  stable       Coronary artery disease involving native coronary artery of native heart without angina pectoris  No chest pain       Chronic diastolic congestive heart failure (HCC)  Wt Readings from Last 3 Encounters:   25 84.4 kg (186 lb)   25 83.6 kg (184 lb 3.2 oz)   25 83.9 kg (185 lb)       No SOB, occasionally gets fluid in his feet      Orders:  •  Albumin / creatinine urine ratio; Future  •  Comprehensive metabolic panel; Future  •  Hemoglobin A1C; Future  •  Lipid Panel with Direct LDL reflex; Future  •  TSH, 3rd generation with Free T4 reflex; Future    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  Pt was on ozempic - he stopped it - daughter states endo is aware.  Insulin - long acting was also stopped - does not sound like the short acting was modified  According to the note it looks like the ozempic was actually increased, not stopped.    Pt will need to be seen by endo - call and geta sooner appt      Lab Results   Component Value Date    HGBA1C 8.5 (A) 2025       Orders:  •  IRIS Diabetic eye exam  •  POCT hemoglobin A1c  •  insulin degludec (Tresiba FlexTouch) 100 units/mL injection pen; Inject 6 Units under the skin daily at bedtime  •  Albumin / creatinine urine ratio; Future  •  Comprehensive metabolic panel; Future  •  Hemoglobin A1C; Future  •  Lipid Panel with Direct LDL reflex; Future  •  TSH, 3rd generation with Free T4 reflex; Future    Acquired hypothyroidism    Orders:  •  Albumin / creatinine urine ratio; Future  •  Comprehensive metabolic panel; Future  •  " Hemoglobin A1C; Future  •  Lipid Panel with Direct LDL reflex; Future  •  TSH, 3rd generation with Free T4 reflex; Future    Chronic kidney disease (CKD) stage G3a/A1, moderately decreased glomerular filtration rate (GFR) between 45-59 mL/min/1.73 square meter and albuminuria creatinine ratio less than 30 mg/g ([facility])  Lab Results   Component Value Date    EGFR 40 02/18/2025    EGFR 46 (L) 03/29/2023    EGFR 48 (L) 09/14/2022    CREATININE 1.60 (H) 02/18/2025    CREATININE 1.55 (H) 03/29/2023    CREATININE 1.49 (H) 09/14/2022            Mixed hyperlipidemia    Orders:  •  Albumin / creatinine urine ratio; Future  •  Comprehensive metabolic panel; Future  •  Hemoglobin A1C; Future  •  Lipid Panel with Direct LDL reflex; Future  •  TSH, 3rd generation with Free T4 reflex; Future    Type 2 diabetes mellitus with stage 3 chronic kidney disease, with long-term current use of insulin ([facility])    Lab Results   Component Value Date    HGBA1C 8.5 (A) 07/25/2025       Orders:  •  Insulin Pen Needle (BD Pen Needle Sherie 2nd Gen) 32G X 4 MM MISC; Inject under the skin daily at bedtime    Lower extremity edema    Orders:  •  VAS Lower extremity venous insufficiency duplex, bilateral w/ measurements; Future           History of Present Illness   pT IS HERE FOR DIABETIC FOLLOW UP  pT IS DUE FOR FOOT AND EYE EXAM  Pt did not do labs for todays  Pt seen Endo  PT is no longer on ozempic and is ozz the long acting ionsulin  Daughter states the pt has been having high sugars  In the am the sugars are 227 to 236  This afternoon the sugar was 160    Long acting insulin dose - they do not recall what it was    Pt has B/l lower extremity swelling      Review of Systems   Cardiovascular:  Positive for leg swelling.       Objective   /62   Pulse 72   Temp 98.2 °F (36.8 °C)   Resp 16   Ht 5' 8\" (1.727 m)   Wt 84.4 kg (186 lb)   BMI 28.28 kg/m²      Diabetic Foot Exam    Patient's shoes and socks removed.    Right Foot/Ankle   Right " Foot Inspection  Skin Exam: skin normal. Skin not intact, no dry skin, no warmth, no callus, no erythema, no maceration, no abnormal color, no pre-ulcer, no ulcer and no callus.     Toe Exam: ROM and strength within normal limits.     Sensory   Vibration: intact  Proprioception: intact  Monofilament testing: intact    Vascular  Capillary refills: < 3 seconds  The right DP pulse is 2+. The right PT pulse is 2+.     Left Foot/Ankle  Left Foot Inspection  Skin Exam: skin normal. Skin not intact, no dry skin, no warmth, no erythema, no maceration, normal color, no pre-ulcer, no ulcer and no callus.     Toe Exam: ROM and strength within normal limits.     Sensory   Vibration: intact  Proprioception: intact  Monofilament testing: intact    Vascular  Capillary refills: < 3 seconds  The left DP pulse is 2+. The left PT pulse is 2+.     Assign Risk Category  No deformity present  No loss of protective sensation  No weak pulses  Risk: 0      Physical Exam    Cardiovascular:      Pulses: no weak pulses.           Dorsalis pedis pulses are 2+ on the right side and 2+ on the left side.        Posterior tibial pulses are 2+ on the right side and 2+ on the left side.     Musculoskeletal:      Right lower leg: Edema present.      Left lower leg: Edema present.   Feet:      Right foot:      Skin integrity: No ulcer, skin breakdown, erythema, warmth, callus or dry skin.      Left foot:      Skin integrity: No ulcer, skin breakdown, erythema, warmth, callus or dry skin.

## 2025-07-25 NOTE — ASSESSMENT & PLAN NOTE
Pt is managed by endo  Pt had an appt daughter states it was cancelled ansd states they dont have another slot fo 6 months.  Pt was on ozempic - he stopped it - daughter states endo is aware.  Insulin - long acting was also stopped - does not sound like the short acting was modified  According to the note it looks like the ozempic was actually increased, not stopped.    Pt will need to be seen by endo - call and geta sooner appt      Lab Results   Component Value Date    HGBA1C 8.5 (A) 07/25/2025       Orders:  •  IRIS Diabetic eye exam  •  POCT hemoglobin A1c  •  insulin degludec (Tresiba FlexTouch) 100 units/mL injection pen; Inject 6 Units under the skin daily at bedtime  •  Albumin / creatinine urine ratio; Future  •  Comprehensive metabolic panel; Future  •  Hemoglobin A1C; Future  •  Lipid Panel with Direct LDL reflex; Future  •  TSH, 3rd generation with Free T4 reflex; Future

## 2025-07-25 NOTE — ASSESSMENT & PLAN NOTE
Wt Readings from Last 3 Encounters:   07/25/25 84.4 kg (186 lb)   03/13/25 83.6 kg (184 lb 3.2 oz)   02/04/25 83.9 kg (185 lb)       No SOB, occasionally gets fluid in his feet      Orders:  •  Albumin / creatinine urine ratio; Future  •  Comprehensive metabolic panel; Future  •  Hemoglobin A1C; Future  •  Lipid Panel with Direct LDL reflex; Future  •  TSH, 3rd generation with Free T4 reflex; Future

## 2025-07-28 DIAGNOSIS — I10 ESSENTIAL HYPERTENSION: ICD-10-CM

## 2025-07-29 ENCOUNTER — TELEPHONE (OUTPATIENT)
Dept: FAMILY MEDICINE CLINIC | Facility: CLINIC | Age: 80
End: 2025-07-29

## 2025-07-29 DIAGNOSIS — N18.32 TYPE 2 DIABETES MELLITUS WITH STAGE 3B CHRONIC KIDNEY DISEASE, WITH LONG-TERM CURRENT USE OF INSULIN (HCC): Primary | ICD-10-CM

## 2025-07-29 DIAGNOSIS — Z79.4 TYPE 2 DIABETES MELLITUS WITH STAGE 3B CHRONIC KIDNEY DISEASE, WITH LONG-TERM CURRENT USE OF INSULIN (HCC): Primary | ICD-10-CM

## 2025-07-29 DIAGNOSIS — E11.22 TYPE 2 DIABETES MELLITUS WITH STAGE 3B CHRONIC KIDNEY DISEASE, WITH LONG-TERM CURRENT USE OF INSULIN (HCC): Primary | ICD-10-CM

## 2025-07-30 ENCOUNTER — TELEPHONE (OUTPATIENT)
Age: 80
End: 2025-07-30

## 2025-07-30 RX ORDER — METOPROLOL SUCCINATE 25 MG/1
25 TABLET, EXTENDED RELEASE ORAL DAILY
Qty: 90 TABLET | Refills: 0 | Status: SHIPPED | OUTPATIENT
Start: 2025-07-30

## 2025-08-01 ENCOUNTER — CLINICAL SUPPORT (OUTPATIENT)
Dept: FAMILY MEDICINE CLINIC | Facility: CLINIC | Age: 80
End: 2025-08-01

## 2025-08-01 DIAGNOSIS — E11.22 TYPE 2 DIABETES MELLITUS WITH STAGE 3B CHRONIC KIDNEY DISEASE, WITH LONG-TERM CURRENT USE OF INSULIN (HCC): Primary | ICD-10-CM

## 2025-08-01 DIAGNOSIS — Z79.4 TYPE 2 DIABETES MELLITUS WITH STAGE 3B CHRONIC KIDNEY DISEASE, WITH LONG-TERM CURRENT USE OF INSULIN (HCC): Primary | ICD-10-CM

## 2025-08-01 DIAGNOSIS — N18.32 TYPE 2 DIABETES MELLITUS WITH STAGE 3B CHRONIC KIDNEY DISEASE, WITH LONG-TERM CURRENT USE OF INSULIN (HCC): Primary | ICD-10-CM

## 2025-08-15 ENCOUNTER — HOSPITAL ENCOUNTER (OUTPATIENT)
Dept: RADIOLOGY | Facility: HOSPITAL | Age: 80
Discharge: HOME | End: 2025-08-15
Attending: FAMILY MEDICINE
Payer: MEDICARE

## 2025-08-22 ENCOUNTER — TELEPHONE (OUTPATIENT)
Age: 80
End: 2025-08-22

## 2025-08-23 LAB
ALBUMIN SERPL-MCNC: 4.2 G/DL (ref 3.7–4.7)
ALBUMIN/CREAT UR: 15 MG/G CREAT (ref 0–29)
ALP SERPL-CCNC: 105 IU/L (ref 44–121)
ALT SERPL-CCNC: 17 IU/L (ref 0–44)
AST SERPL-CCNC: 14 IU/L (ref 0–40)
BILIRUB SERPL-MCNC: 0.4 MG/DL (ref 0–1.2)
BUN SERPL-MCNC: 27 MG/DL (ref 8–27)
BUN/CREAT SERPL: 18 (ref 10–24)
CALCIUM SERPL-MCNC: 9.3 MG/DL (ref 8.6–10.2)
CHLORIDE SERPL-SCNC: 107 MMOL/L (ref 96–106)
CHOLEST SERPL-MCNC: 141 MG/DL (ref 100–199)
CO2 SERPL-SCNC: 21 MMOL/L (ref 20–29)
CREAT SERPL-MCNC: 1.5 MG/DL (ref 0.76–1.27)
CREAT UR-MCNC: 61 MG/DL
EGFR: 46 ML/MIN/1.73
GLOBULIN SER-MCNC: 1.5 G/DL (ref 1.5–4.5)
GLUCOSE SERPL-MCNC: 303 MG/DL (ref 70–99)
HBA1C MFR BLD: 10.3 % (ref 4.8–5.6)
HDLC SERPL-MCNC: 60 MG/DL
LDLC SERPL CALC-MCNC: 68 MG/DL (ref 0–99)
LDLC/HDLC SERPL: 1.1 RATIO (ref 0–3.6)
MICROALBUMIN UR-MCNC: 8.9 UG/ML
MICRODELETION SYND BLD/T FISH: NORMAL
MICRODELETION SYND BLD/T FISH: NORMAL
POTASSIUM SERPL-SCNC: 4.8 MMOL/L (ref 3.5–5.2)
PROT SERPL-MCNC: 5.7 G/DL (ref 6–8.5)
SL AMB VLDL CHOLESTEROL CALC: 13 MG/DL (ref 5–40)
SODIUM SERPL-SCNC: 141 MMOL/L (ref 134–144)
TRIGL SERPL-MCNC: 63 MG/DL (ref 0–149)
TSH SERPL DL<=0.005 MIU/L-ACNC: 0.02 UIU/ML (ref 0.45–4.5)

## (undated) DEVICE — THE MONARCH® "D" CARTRIDGE IS A SINGLE-USE POLYPROPYLENE CARTRIDGE FOR POSTERIOR CHAMBER IOL DELIVERY: Brand: MONARCH® III

## (undated) DEVICE — CANNULA HYDRODISSECTION NUCLEUS 25G X 7/8IN 35DEG 8MM FROM END SINGLE-USE VISITEC

## (undated) DEVICE — INTREPID® TRANSFORMER IA HP: Brand: INTREPID®

## (undated) DEVICE — CLEARCUT® SLIT KNIFE 2.75MM ANGLED: Brand: CLEARCUT®

## (undated) DEVICE — B-H IRRIGATING CAN 19GA FLAT ANGLED 8MM: Brand: OPHTHALMIC CANNULA

## (undated) DEVICE — SUT VICRYL 10-0 CS140-6 4 IN V960G

## (undated) DEVICE — MICROSURGICAL INSTRUMENT IRR. CYSTITOME 25GA STRAIGHT-REVERSE CUTTING: Brand: ALCON

## (undated) DEVICE — 3M™ TEGADERM™ TRANSPARENT FILM DRESSING FRAME STYLE, 1624W, 2-3/8 IN X 2-3/4 IN (6 CM X 7 CM), 100/CT 4CT/CASE: Brand: 3M™ TEGADERM™

## (undated) DEVICE — GLOVE SRG BIOGEL 7

## (undated) DEVICE — AIR INJECT CANNULA 27GA: Brand: OPHTHALMIC CANNULA

## (undated) DEVICE — EYE PACK CUSTOM -FINNEGAN

## (undated) DEVICE — EYE PADS 1 5/8"X2 5/8": Brand: MCKESSON

## (undated) DEVICE — ACTIVE FMS W/ 0.9 MM INFUSION SLEEVES, 0.9MM 30° ABS* INTREPID* BALANCED TIP: Brand: ALCON

## (undated) DEVICE — TURBOSONICS MICROSMOOTH MICROTIP PARTS KIT: Brand: TURBOSONICS, MICROSMOOTH, MICROTIP, ALCON

## (undated) DEVICE — NEEDLE PERIBULBAR 25G X 7/8 IN